# Patient Record
Sex: MALE | Race: WHITE | NOT HISPANIC OR LATINO | Employment: FULL TIME | URBAN - METROPOLITAN AREA
[De-identification: names, ages, dates, MRNs, and addresses within clinical notes are randomized per-mention and may not be internally consistent; named-entity substitution may affect disease eponyms.]

---

## 2017-01-05 ENCOUNTER — OFFICE VISIT (OUTPATIENT)
Dept: PULMONOLOGY | Facility: CLINIC | Age: 52
End: 2017-01-05
Payer: COMMERCIAL

## 2017-01-05 VITALS
HEIGHT: 73 IN | RESPIRATION RATE: 18 BRPM | SYSTOLIC BLOOD PRESSURE: 130 MMHG | DIASTOLIC BLOOD PRESSURE: 68 MMHG | OXYGEN SATURATION: 97 % | WEIGHT: 299.19 LBS | HEART RATE: 61 BPM | BODY MASS INDEX: 39.65 KG/M2

## 2017-01-05 DIAGNOSIS — G47.33 OSA (OBSTRUCTIVE SLEEP APNEA): Primary | ICD-10-CM

## 2017-01-05 DIAGNOSIS — R09.81 NASAL CONGESTION: ICD-10-CM

## 2017-01-05 PROCEDURE — 1159F MED LIST DOCD IN RCRD: CPT | Mod: S$GLB,,, | Performed by: NURSE PRACTITIONER

## 2017-01-05 PROCEDURE — 99214 OFFICE O/P EST MOD 30 MIN: CPT | Mod: S$GLB,,, | Performed by: NURSE PRACTITIONER

## 2017-01-05 PROCEDURE — 99999 PR PBB SHADOW E&M-EST. PATIENT-LVL III: CPT | Mod: PBBFAC,,, | Performed by: NURSE PRACTITIONER

## 2017-01-05 RX ORDER — FLUTICASONE PROPIONATE 50 MCG
2 SPRAY, SUSPENSION (ML) NASAL DAILY
Qty: 1 BOTTLE | Refills: 11 | Status: SHIPPED | OUTPATIENT
Start: 2017-01-05 | End: 2017-11-15 | Stop reason: SDUPTHER
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
## 2017-07-06 ENCOUNTER — PATIENT MESSAGE (OUTPATIENT)
Dept: PULMONOLOGY | Facility: CLINIC | Age: 52
End: 2017-07-06

## 2017-11-15 ENCOUNTER — OFFICE VISIT (OUTPATIENT)
Dept: URGENT CARE | Facility: CLINIC | Age: 52
End: 2017-11-15
Payer: COMMERCIAL

## 2017-11-15 VITALS
OXYGEN SATURATION: 98 % | WEIGHT: 312.5 LBS | DIASTOLIC BLOOD PRESSURE: 88 MMHG | HEIGHT: 73 IN | BODY MASS INDEX: 41.42 KG/M2 | HEART RATE: 84 BPM | TEMPERATURE: 99 F | SYSTOLIC BLOOD PRESSURE: 152 MMHG

## 2017-11-15 DIAGNOSIS — R05.9 COUGH: ICD-10-CM

## 2017-11-15 DIAGNOSIS — R09.81 SINUS CONGESTION: ICD-10-CM

## 2017-11-15 DIAGNOSIS — J32.9 SINUSITIS, UNSPECIFIED CHRONICITY, UNSPECIFIED LOCATION: Primary | ICD-10-CM

## 2017-11-15 DIAGNOSIS — M79.10 MYALGIA: ICD-10-CM

## 2017-11-15 LAB
CTP QC/QA: YES
FLUAV AG NPH QL: NEGATIVE
FLUBV AG NPH QL: NEGATIVE

## 2017-11-15 PROCEDURE — 99999 PR PBB SHADOW E&M-EST. PATIENT-LVL III: CPT | Mod: PBBFAC,,, | Performed by: NURSE PRACTITIONER

## 2017-11-15 PROCEDURE — 87804 INFLUENZA ASSAY W/OPTIC: CPT | Mod: 59,QW,S$GLB, | Performed by: NURSE PRACTITIONER

## 2017-11-15 PROCEDURE — 99214 OFFICE O/P EST MOD 30 MIN: CPT | Mod: 25,S$GLB,, | Performed by: NURSE PRACTITIONER

## 2017-11-15 RX ORDER — FLUTICASONE PROPIONATE 50 MCG
2 SPRAY, SUSPENSION (ML) NASAL DAILY
Qty: 1 BOTTLE | Refills: 11 | Status: SHIPPED | OUTPATIENT
Start: 2017-11-15 | End: 2019-10-14 | Stop reason: SDUPTHER

## 2017-11-15 RX ORDER — LORATADINE 10 MG/1
10 TABLET ORAL DAILY
Qty: 30 TABLET | Refills: 0 | Status: SHIPPED | OUTPATIENT
Start: 2017-11-15 | End: 2018-12-03

## 2017-11-15 RX ORDER — METHYLPREDNISOLONE 4 MG/1
TABLET ORAL
Qty: 1 PACKAGE | Refills: 0 | Status: SHIPPED | OUTPATIENT
Start: 2017-11-15 | End: 2017-12-04 | Stop reason: ALTCHOICE

## 2017-11-15 RX ORDER — AMOXICILLIN AND CLAVULANATE POTASSIUM 875; 125 MG/1; MG/1
1 TABLET, FILM COATED ORAL 2 TIMES DAILY
Qty: 20 TABLET | Refills: 0 | Status: SHIPPED | OUTPATIENT
Start: 2017-11-15 | End: 2017-11-25

## 2017-11-15 RX ORDER — PROMETHAZINE HYDROCHLORIDE AND DEXTROMETHORPHAN HYDROBROMIDE 6.25; 15 MG/5ML; MG/5ML
5 SYRUP ORAL NIGHTLY PRN
Qty: 118 ML | Refills: 0 | Status: SHIPPED | OUTPATIENT
Start: 2017-11-15 | End: 2017-11-25

## 2017-11-15 NOTE — PATIENT INSTRUCTIONS
Sinusitis (Antibiotic Treatment)    The sinuses are air-filled spaces within the bones of the face. They connect to the inside of the nose. Sinusitis is an inflammation of the tissue lining the sinus cavity. Sinus inflammation can occur during a cold. It can also be due to allergies to pollens and other particles in the air. Sinusitis can cause symptoms of sinus congestion and fullness. A sinus infection causes fever, headache and facial pain. There is often green or yellow drainage from the nose or into the back of the throat (post-nasal drip). You have been given antibiotics to treat this condition.  Home care:  · Take the full course of antibiotics as instructed. Do not stop taking them, even if you feel better.  · Drink plenty of water, hot tea, and other liquids. This may help thin mucus. It also may promote sinus drainage.  · Heat may help soothe painful areas of the face. Use a towel soaked in hot water. Or,  the shower and direct the hot spray onto your face. Using a vaporizer along with a menthol rub at night may also help.   · An expectorant containing guaifenesin may help thin the mucus and promote drainage from the sinuses.  · Over-the-counter decongestants may be used unless a similar medicine was prescribed. Nasal sprays work the fastest. Use one that contains phenylephrine or oxymetazoline. First blow the nose gently. Then use the spray. Do not use these medicines more often than directed on the label or symptoms may get worse. You may also use tablets containing pseudoephedrine. Avoid products that combine ingredients, because side effects may be increased. Read labels. You can also ask the pharmacist for help. (NOTE: Persons with high blood pressure should not use decongestants. They can raise blood pressure.)  · Over-the-counter antihistamines may help if allergies contributed to your sinusitis.    · Do not use nasal rinses or irrigation during an acute sinus infection, unless told to by  your health care provider. Rinsing may spread the infection to other sinuses.  · Use acetaminophen or ibuprofen to control pain, unless another pain medicine was prescribed. (If you have chronic liver or kidney disease or ever had a stomach ulcer, talk with your doctor before using these medicines. Aspirin should never be used in anyone under 18 years of age who is ill with a fever. It may cause severe liver damage.)  · Don't smoke. This can worsen symptoms.  Follow-up care  Follow up with your healthcare provider or our staff if you are not improving within the next week.  When to seek medical advice  Call your healthcare provider if any of these occur:  · Facial pain or headache becoming more severe  · Stiff neck  · Unusual drowsiness or confusion  · Swelling of the forehead or eyelids  · Vision problems, including blurred or double vision  · Fever of 100.4ºF (38ºC) or higher, or as directed by your healthcare provider  · Seizure  · Breathing problems  · Symptoms not resolving within 10 days  Date Last Reviewed: 4/13/2015  © 9024-6776 [employer]. [address] [place] [address], [place] [address]. All rights reserved. This information is not intended as a substitute for professional medical care. Always follow your healthcare professional's instructions.

## 2017-11-15 NOTE — PROGRESS NOTES
Subjective:       Patient ID: Dragan Man II is a 52 y.o. male.    Chief Complaint: Cough    Pt is a 52 year old male to clinic today with complaints of a cough. PND, congestion, sinus pressure and HA that began 5-7 days ago.       Cough   This is a new problem. The current episode started 1 to 4 weeks ago. The problem has been gradually worsening. The problem occurs every few minutes. The cough is productive of sputum. Associated symptoms include ear congestion, headaches, myalgias, nasal congestion, postnasal drip and rhinorrhea. Pertinent negatives include no chest pain, chills, ear pain, fever, heartburn, hemoptysis, rash, sore throat, shortness of breath, sweats, weight loss or wheezing. Nothing aggravates the symptoms. Treatments tried: nyquil/dayquil. The treatment provided mild relief. His past medical history is significant for asthma (childhood). There is no history of bronchitis or pneumonia.     Review of Systems   Constitutional: Negative for chills, diaphoresis, fatigue, fever and weight loss.   HENT: Positive for congestion, postnasal drip, rhinorrhea, sinus pain and sinus pressure. Negative for ear discharge, ear pain, sneezing, sore throat and trouble swallowing.    Eyes: Negative for pain.   Respiratory: Positive for cough. Negative for hemoptysis, chest tightness, shortness of breath and wheezing.    Cardiovascular: Negative for chest pain and palpitations.   Gastrointestinal: Negative for abdominal pain, constipation, diarrhea, heartburn, nausea and vomiting.   Genitourinary: Negative for dysuria.   Musculoskeletal: Positive for myalgias. Negative for back pain and neck pain.   Skin: Negative for rash.   Neurological: Positive for headaches. Negative for dizziness, light-headedness and numbness.       Objective:      Physical Exam   Constitutional: He is oriented to person, place, and time. He appears well-developed and well-nourished. No distress.   HENT:   Head: Normocephalic.   Right Ear:  Tympanic membrane, external ear and ear canal normal. No tenderness. Tympanic membrane is not bulging.   Left Ear: Tympanic membrane, external ear and ear canal normal. No tenderness. Tympanic membrane is not bulging.   Nose: Mucosal edema and rhinorrhea present. Right sinus exhibits maxillary sinus tenderness and frontal sinus tenderness. Left sinus exhibits maxillary sinus tenderness and frontal sinus tenderness.   Mouth/Throat: Uvula is midline, oropharynx is clear and moist and mucous membranes are normal. No oropharyngeal exudate, posterior oropharyngeal edema or posterior oropharyngeal erythema. No tonsillar exudate.   Eyes: Conjunctivae and EOM are normal. Pupils are equal, round, and reactive to light.   Neck: Normal range of motion. Neck supple.   Cardiovascular: Normal rate, regular rhythm and normal heart sounds.  Exam reveals no gallop and no friction rub.    No murmur heard.  Pulmonary/Chest: Effort normal and breath sounds normal. No accessory muscle usage. No apnea, no tachypnea and no bradypnea. No respiratory distress. He has no decreased breath sounds. He has no wheezes. He has no rhonchi. He has no rales.   Lymphadenopathy:        Head (right side): No submental, no submandibular and no tonsillar adenopathy present.        Head (left side): No submental, no submandibular and no tonsillar adenopathy present.     He has no cervical adenopathy.   Neurological: He is alert and oriented to person, place, and time.   Skin: Skin is warm and dry. No rash noted. He is not diaphoretic.   Psychiatric: He has a normal mood and affect. His behavior is normal. Thought content normal.   Nursing note and vitals reviewed.      Assessment:       1. Sinusitis, unspecified chronicity, unspecified location    2. Myalgia    3. Cough    4. Sinus congestion        Plan:   Sinusitis, unspecified chronicity, unspecified location  -     methylPREDNISolone (MEDROL DOSEPACK) 4 mg tablet; use as directed  Dispense: 1 Package;  Refill: 0  -     amoxicillin-clavulanate 875-125mg (AUGMENTIN) 875-125 mg per tablet; Take 1 tablet by mouth 2 (two) times daily.  Dispense: 20 tablet; Refill: 0    Myalgia  -     POCT Influenza A/B    Cough  -     promethazine-dextromethorphan (PROMETHAZINE-DM) 6.25-15 mg/5 mL Syrp; Take 5 mLs by mouth nightly as needed.  Dispense: 118 mL; Refill: 0    Sinus congestion  -     loratadine (CLARITIN) 10 mg tablet; Take 1 tablet (10 mg total) by mouth once daily.  Dispense: 30 tablet; Refill: 0  -     fluticasone (FLONASE) 50 mcg/actuation nasal spray; 2 sprays by Each Nare route once daily.  Dispense: 1 Bottle; Refill: 11      · Rest and increase fluids.   · May apply warm compresses as needed.   · Saline nasal spray or saline irrigation (Neti pot) to loosen nasal congestion.  · Flonase or Nasacort to reduce inflammation in the sinus cavities.  · Take antibiotics exactly as prescribed. Make sure to complete the entire course of antibiotics even if you start feeling better. This will prevent recurrence of your infection and bacterial resistance.   · Do not drive, drink alcohol, or take any other sedating medications or substances while taking cough syrup.   · Follow up with your primary care provider or with ENT if not improved within a few days or sooner for any new or worsening symptoms.   · Go to the ER for any fever that does not improve with Tylenol/Ibuprofen, neck stiffness, rash, severe headache, vision changes, shortness of breath, chest pain, severe facial pain or swelling, or for any other new and concerning symptoms.

## 2017-11-17 ENCOUNTER — HOSPITAL ENCOUNTER (OUTPATIENT)
Dept: RADIOLOGY | Facility: HOSPITAL | Age: 52
Discharge: HOME | End: 2017-11-17
Attending: FAMILY MEDICINE
Payer: COMMERCIAL

## 2017-11-17 ENCOUNTER — TELEPHONE (OUTPATIENT)
Dept: INTERNAL MEDICINE | Facility: CLINIC | Age: 52
End: 2017-11-17

## 2017-11-17 ENCOUNTER — OFFICE VISIT (OUTPATIENT)
Dept: INTERNAL MEDICINE | Facility: CLINIC | Age: 52
End: 2017-11-17
Payer: COMMERCIAL

## 2017-11-17 VITALS
WEIGHT: 311.06 LBS | BODY MASS INDEX: 41.22 KG/M2 | TEMPERATURE: 99 F | HEIGHT: 73 IN | DIASTOLIC BLOOD PRESSURE: 66 MMHG | OXYGEN SATURATION: 97 % | HEART RATE: 84 BPM | SYSTOLIC BLOOD PRESSURE: 138 MMHG

## 2017-11-17 DIAGNOSIS — R05.9 COUGH: ICD-10-CM

## 2017-11-17 DIAGNOSIS — E66.01 SEVERE OBESITY (BMI >= 40): Chronic | ICD-10-CM

## 2017-11-17 DIAGNOSIS — R73.09 ABNORMAL GLUCOSE: ICD-10-CM

## 2017-11-17 DIAGNOSIS — J20.9 ACUTE BRONCHITIS, UNSPECIFIED ORGANISM: Primary | ICD-10-CM

## 2017-11-17 PROCEDURE — 71020 XR CHEST PA AND LATERAL: CPT | Mod: TC,PO

## 2017-11-17 PROCEDURE — 71020 XR CHEST PA AND LATERAL: CPT | Mod: 26,,, | Performed by: RADIOLOGY

## 2017-11-17 PROCEDURE — 99999 PR PBB SHADOW E&M-EST. PATIENT-LVL III: CPT | Mod: PBBFAC,,, | Performed by: FAMILY MEDICINE

## 2017-11-17 PROCEDURE — 99214 OFFICE O/P EST MOD 30 MIN: CPT | Mod: 25,S$GLB,, | Performed by: FAMILY MEDICINE

## 2017-11-17 PROCEDURE — 96372 THER/PROPH/DIAG INJ SC/IM: CPT | Mod: S$GLB,,, | Performed by: FAMILY MEDICINE

## 2017-11-17 RX ORDER — METHYLPREDNISOLONE ACETATE 40 MG/ML
40 INJECTION, SUSPENSION INTRA-ARTICULAR; INTRALESIONAL; INTRAMUSCULAR; SOFT TISSUE
Status: COMPLETED | OUTPATIENT
Start: 2017-11-17 | End: 2017-11-17

## 2017-11-17 RX ADMIN — METHYLPREDNISOLONE ACETATE 40 MG: 40 INJECTION, SUSPENSION INTRA-ARTICULAR; INTRALESIONAL; INTRAMUSCULAR; SOFT TISSUE at 12:11

## 2017-11-17 NOTE — TELEPHONE ENCOUNTER
Spoke with patient advised he wasn't prescribed anything else, voices understanding, requested inhaler, advised I would check with doctor voices understanding

## 2017-11-17 NOTE — TELEPHONE ENCOUNTER
----- Message from Cyndi Joyner sent at 11/17/2017  1:20 PM CST -----  Contact: patient  Calling for status of RX for medication. Please call patient ASAP @ [phone]. Thanks, albert

## 2017-11-17 NOTE — ASSESSMENT & PLAN NOTE
--------------------------------------------------------------------------------  PROBLEM/CONDITION: OBESITY  RELEVANT DATA REVIEWED:  Wt Readings from Last 3 Encounters:   11/17/17 (!) 141.1 kg (311 lb 1.1 oz)   11/15/17 (!) 141.7 kg (312 lb 8 oz)   01/05/17 135.7 kg (299 lb 2.6 oz)     BMI Readings from Last 3 Encounters:   11/17/17 41.04 kg/m²   11/15/17 41.23 kg/m²   01/05/17 39.47 kg/m²      Lab Results   Component Value Date    HGBA1C 5.6 08/26/2013    CHOL 193 08/26/2013    TRIG 56 08/26/2013    HDL 49 08/26/2013    LDLCALC 133.0 08/26/2013    AST 20 08/26/2013    ALT 31 08/26/2013

## 2017-11-17 NOTE — LETTER
November 20, 2017      Wil Curiel, TYRONE  [address] [place] [address]           [facility]  [address]  [place] [address]  Phone: [phone]  Fax: [phone]          Patient: Dragan Man II   MR Number: [id number]   YOB: 1965   Date of Visit: 11/17/2017       Dear Wli Curiel:    Thank you for referring Dragan Man to me for evaluation. Attached you will find relevant portions of my assessment and plan of care.    If you have questions, please do not hesitate to call me. I look forward to following Dragan Man along with you.    Sincerely,    DENYS Barba MD    Enclosure  CC:  No Recipients    If you would like to receive this communication electronically, please contact [email or web address] or [phone] to request more information on [employer] Link access.    For providers and/or their staff who would like to refer a patient to [facility], please contact us through our one-stop-shop provider referral line, [facility], at [phone].    If you feel you have received this communication in error or would no longer like to receive these types of communications, please e-mail [email or web address]

## 2017-11-20 NOTE — PROGRESS NOTES
HEALTH MAINTENANCE REVIEW  Health Maintenance   Topic Date Due    Hepatitis C Screening  1965    TETANUS VACCINE  10/16/1981    Influenza Vaccine  08/01/2017    Lipid Panel  08/26/2018    Colonoscopy  05/14/2024        HEALTH MAINTENANCE INTERVENTIONS - DUE OR DUE SOON  Health Maintenance Due   Topic Date Due    Hepatitis C Screening  1965    TETANUS VACCINE  10/16/1981    Influenza Vaccine  08/01/2017       FUTURE APPOINTMENTS  Future Appointments  Date Time Provider Department Center   12/4/2017 9:00 AM DENYS Barba MD [facility]   1/3/2018 1:00 PM Elizabeth Lejeune, NP [facility]       CHIEF COMPLAINT  Cough and Nasal Congestion      HISTORY OF PRESENT ILLNESS  PROBLEM/CONDITION: NEW PROBLEM to me is what appears to be acute bronchitis. ONSET was 7 to 8 days ago. QUALITY described as a moist and congested cough. ASSOCIATED SYMPTOMS include: Christie, nasal stuffiness/congestion, post nasal drip. SEVERITY of symptoms described as MODERATELY SEVERE. ALLEVIATING FACTORS include methylprednisolone prescribed 2 days ago at urgent care. TIMING of symptoms described as worse at night. He reports no ASSOCIATED fever, chest pain, hemoptysis, or shortness of breath. I educated him on the apparently viral nature of this acute illness, how the management was largely supportive and symptom focused. We discussed the risks and benefits of treatment options. I encouraged him to rest and drink ample fluids. I discouraged the use of over-the-counter cough and cold medications. I told him to expect gradual resolution of symptoms over the next week or so, and I instructed him to let me know if his illness failed to follow this expected course.    PROBLEM/CONDITION: NEW/WORSENING problem is SEVERE obesity, with greater than 1 pound per month involuntary weight gain since January of this year. Therapeutic lifestyle changes encouraged.    PROBLEM/CONDITION: NEW PROBLEM identified is abnormal glucose  "(125 mg/dL) on screening fasting labs performed at work. I explained to him my concern for possible diabetes. He agreed to obtain labs ordered and return soon thereafter to review results and discuss treatment options.    NARRATIVE HISTORY: He is delinquent on a number of age-appropriate health maintenance services. I encouraged him to return soon for a preventive services visit and permit us an opportunity to properly address these issues.    No other complaints or concerns reported.    Problem List Items Addressed This Visit     Abnormal glucose    Relevant Orders    Hemoglobin A1c (Completed)    Acute bronchitis - Primary    Relevant Medications    methylPREDNISolone acetate injection 40 mg (Completed)    Class 3 obesity (BMI >= 40) (Chronic)    Current Assessment & Plan     --------------------------------------------------------------------------------  PROBLEM/CONDITION: OBESITY  RELEVANT DATA REVIEWED:  Wt Readings from Last 3 Encounters:   11/17/17 (!) 141.1 kg (311 lb 1.1 oz)   11/15/17 (!) 141.7 kg (312 lb 8 oz)   01/05/17 135.7 kg (299 lb 2.6 oz)     BMI Readings from Last 3 Encounters:   11/17/17 41.04 kg/m²   11/15/17 41.23 kg/m²   01/05/17 39.47 kg/m²      Lab Results   Component Value Date    HGBA1C 5.6 08/26/2013    CHOL 193 08/26/2013    TRIG 56 08/26/2013    HDL 49 08/26/2013    LDLCALC 133.0 08/26/2013    AST 20 08/26/2013    ALT 31 08/26/2013              Other Visit Diagnoses     Cough        Relevant Orders    X-Ray Chest PA And Lateral (Completed)          REVIEW OF SYSTEMS  CONSTITUTIONAL: No fever reported.  CARDIOVASCULAR: No chest pain reported.  PULMONARY: No trouble breathing reported.     PHYSICAL EXAM  Vitals:    11/17/17 1057   BP: 138/66   BP Location: Right arm   Patient Position: Sitting   BP Method: Large (Manual)   Pulse: 84   Temp: 98.8 °F (37.1 °C)   TempSrc: Tympanic   SpO2: 97%   Weight: (!) 141.1 kg (311 lb 1.1 oz)   Height: 6' 1" (1.854 m)     CONSTITUTIONAL: Vital " signs noted. No apparent distress. Does not appear acutely ill or septic. Appears adequately hydrated.  EYES: Pupils equal and reactive. Extraocular movements intact. Sclerae anicteric. Lids and conjunctiva unremarkable.  ENT: External ENT grossly unremarkable. Ear canals and visualized tympanic membranes are unremarkable. Hearing grossly intact. Nasal mucosa pink. Oropharynx moist without lesion, inflammation or exudate. Posterior oropharynx is symmetric.   NECK: Neck supple without meningismus. Trachea midline. No significant cervical lymphadenopathy.  PULM: Lungs clear. Breathing unlabored.  HEART: Auscultation reveals regular rate and rhythm without murmur, gallop or rub. No carotid bruit.  GI: Abdomen soft and nontender. Bowel sounds present.  DERM: Skin warm and moist with normal turgor.  NEURO: Strength is reasonably symmetric without gross focal motor deficits or gross deficits of cranial nerves III-XII.  PSYCH: Alert and oriented x 3. Mood is grossly euthymic. Affect appropriate. Judgment and insight not grossly compromised.  MSK: Grossly normal stance and gait.     PAST MEDICAL HISTORY, FAMILY HISTORY, SOCIAL HISTORY, CURRENT MEDICATION LIST, and ALLERGY LIST reviewed by me (DENYS Barba MD) and are updated consistent with the patient's report.    ASSESSMENT and PLAN  Acute bronchitis, unspecified organism  -     methylPREDNISolone acetate injection 40 mg; Inject 1 mL (40 mg total) into the muscle one time.    Abnormal glucose  -     Hemoglobin A1c; Future; Expected date: 12/01/2017    Class 3 obesity (BMI >= 40)    Cough  -     X-Ray Chest PA And Lateral; Future; Expected date: 11/17/2017        Medication List with Changes/Refills   Current Medications    AMOXICILLIN-CLAVULANATE 875-125MG (AUGMENTIN) 875-125 MG PER TABLET    Take 1 tablet by mouth 2 (two) times daily.    FLUTICASONE (FLONASE) 50 MCG/ACTUATION NASAL SPRAY    2 sprays by Each Nare route once daily.    LORATADINE (CLARITIN)  "10 MG TABLET    Take 1 tablet (10 mg total) by mouth once daily.    METHYLPREDNISOLONE (MEDROL DOSEPACK) 4 MG TABLET    use as directed    PROMETHAZINE-DEXTROMETHORPHAN (PROMETHAZINE-DM) 6.25-15 MG/5 ML SYRP    Take 5 mLs by mouth nightly as needed.    SODIUM CHLORIDE (OCEAN) 0.65 % NASAL SPRAY    1 spray by Nasal route as needed for Congestion.       Return in about 2 weeks (around 12/1/2017) for review test results and discuss treatment plan, re-evaluate problem(s) discussed today.    ABOUT THIS DOCUMENTATION:  · The order of the conditions listed in the HPI is one of convenience and does not necessarily reflect the chronology of the appointment, nor the relative importance of a condition. It is possible that additional description or status details about condition(s) may be found elsewhere in the documentation for today's encounter.  · Documentation entered by me for this encounter was done in part using speech-recognition technology. Although I have made an effort to ensure accuracy, "sound like" errors may exist and should be interpreted in context.                        -DENYS Barba MD    There are no Patient Instructions on file for this visit.    "

## 2017-12-04 ENCOUNTER — HOSPITAL ENCOUNTER (OUTPATIENT)
Dept: RADIOLOGY | Facility: HOSPITAL | Age: 52
Discharge: HOME | End: 2017-12-04
Attending: PHYSICIAN ASSISTANT
Payer: COMMERCIAL

## 2017-12-04 ENCOUNTER — OFFICE VISIT (OUTPATIENT)
Dept: INTERNAL MEDICINE | Facility: CLINIC | Age: 52
End: 2017-12-04
Payer: COMMERCIAL

## 2017-12-04 VITALS
WEIGHT: 315 LBS | DIASTOLIC BLOOD PRESSURE: 88 MMHG | OXYGEN SATURATION: 98 % | BODY MASS INDEX: 41.75 KG/M2 | TEMPERATURE: 98 F | HEART RATE: 80 BPM | HEIGHT: 73 IN | SYSTOLIC BLOOD PRESSURE: 156 MMHG

## 2017-12-04 DIAGNOSIS — J40 BRONCHITIS: Primary | ICD-10-CM

## 2017-12-04 DIAGNOSIS — R01.1 MURMUR: ICD-10-CM

## 2017-12-04 DIAGNOSIS — D22.9 NUMEROUS MOLES: ICD-10-CM

## 2017-12-04 DIAGNOSIS — J40 BRONCHITIS: ICD-10-CM

## 2017-12-04 PROCEDURE — 99213 OFFICE O/P EST LOW 20 MIN: CPT | Mod: 25,S$GLB,, | Performed by: PHYSICIAN ASSISTANT

## 2017-12-04 PROCEDURE — 90686 IIV4 VACC NO PRSV 0.5 ML IM: CPT | Mod: S$GLB,,, | Performed by: FAMILY MEDICINE

## 2017-12-04 PROCEDURE — 71020 XR CHEST PA AND LATERAL: CPT | Mod: TC,PO

## 2017-12-04 PROCEDURE — 99999 PR PBB SHADOW E&M-EST. PATIENT-LVL IV: CPT | Mod: PBBFAC,,, | Performed by: PHYSICIAN ASSISTANT

## 2017-12-04 PROCEDURE — 90471 IMMUNIZATION ADMIN: CPT | Mod: S$GLB,,, | Performed by: FAMILY MEDICINE

## 2017-12-04 PROCEDURE — 71020 XR CHEST PA AND LATERAL: CPT | Mod: 26,,, | Performed by: RADIOLOGY

## 2017-12-04 NOTE — PROGRESS NOTES
Subjective:      Patient ID: Dragan Man II is a 52 y.o. male.    Chief Complaint: Follow-up and Bronchitis    Patient reports significant improvement since onset. Denies any chest pain, shortness of breath, wheezing, or fever.       Cough   This is a new problem. The current episode started 1 to 4 weeks ago. The problem has been gradually improving. Associated symptoms include postnasal drip, a rash and rhinorrhea. Pertinent negatives include no chest pain, chills, ear congestion, ear pain, fever, headaches, heartburn, hemoptysis, myalgias, nasal congestion, sore throat, shortness of breath, sweats, weight loss or wheezing. Treatments tried: mucinex, cough drops, water, flonase, saline rinse, steroids, antibiotics. The treatment provided moderate relief. There is no history of asthma, bronchiectasis, bronchitis, COPD, emphysema, environmental allergies or pneumonia.   Rash   This is a new problem. The current episode started 1 to 4 weeks ago. The problem is unchanged. The affected locations include the face (left temple). The rash is characterized by peeling, scaling and itchiness. Associated symptoms include coughing and rhinorrhea. Pertinent negatives include no congestion, diarrhea, fatigue, fever, shortness of breath, sore throat or vomiting. Past treatments include topical steroids and antibiotic cream. The treatment provided no relief. There is no history of allergies, asthma, eczema or varicella.       Review of Systems   Constitutional: Negative for activity change, appetite change, chills, diaphoresis, fatigue, fever, unexpected weight change and weight loss.   HENT: Positive for postnasal drip and rhinorrhea. Negative for congestion, ear pain, hearing loss, sore throat, trouble swallowing and voice change.    Eyes: Negative.  Negative for visual disturbance.   Respiratory: Positive for cough. Negative for hemoptysis, choking, chest tightness, shortness of breath and wheezing.    Cardiovascular:  "Negative for chest pain, palpitations and leg swelling.   Gastrointestinal: Negative for abdominal distention, abdominal pain, blood in stool, constipation, diarrhea, heartburn, nausea and vomiting.   Endocrine: Negative for cold intolerance, heat intolerance, polydipsia and polyuria.   Genitourinary: Negative.  Negative for difficulty urinating and frequency.   Musculoskeletal: Negative for arthralgias, back pain, gait problem, joint swelling and myalgias.   Skin: Positive for rash. Negative for color change, pallor and wound.   Allergic/Immunologic: Negative for environmental allergies.   Neurological: Negative for dizziness, tremors, weakness, light-headedness, numbness and headaches.   Hematological: Negative for adenopathy.   Psychiatric/Behavioral: Negative for behavioral problems, confusion, self-injury, sleep disturbance and suicidal ideas. The patient is not nervous/anxious.      Objective:   BP (!) 156/88   Pulse 80   Temp 97.7 °F (36.5 °C) (Tympanic)   Ht 6' 1" (1.854 m)   Wt (!) 144.2 kg (317 lb 14.5 oz)   SpO2 98%   BMI 41.94 kg/m²     Physical Exam   Constitutional: He is oriented to person, place, and time. He appears well-developed and well-nourished.   HENT:   Head: Normocephalic and atraumatic.       Right Ear: Tympanic membrane, external ear and ear canal normal.   Left Ear: Tympanic membrane, external ear and ear canal normal.   Nose: Mucosal edema and rhinorrhea present.   Mouth/Throat: Uvula is midline, oropharynx is clear and moist and mucous membranes are normal. No oropharyngeal exudate. No tonsillar exudate.   Eyes: Conjunctivae and EOM are normal. Pupils are equal, round, and reactive to light.   Neck: Normal range of motion. Neck supple.   Cardiovascular: Normal rate and regular rhythm.  Exam reveals no gallop and no friction rub.    Murmur heard.  Pulmonary/Chest: Effort normal and breath sounds normal. No respiratory distress. He has no wheezes. He has no rales. He exhibits no " tenderness.   Abdominal: Soft. He exhibits no distension. There is no tenderness.   Musculoskeletal: Normal range of motion.   Lymphadenopathy:     He has no cervical adenopathy.   Neurological: He is alert and oriented to person, place, and time.   Skin: Skin is warm and dry.   Psychiatric: He has a normal mood and affect. His behavior is normal. Judgment and thought content normal.   Vitals reviewed.    CXR 11/17/2017 results:   Findings: There is mild cardiomegaly.  Mild interstitial opacities in the lungs bilaterally and mild bibasilar parenchymal scarring or atelectasis.  No confluent infiltrate or effusion.  Multilevel thoracic spondylosis.    Assessment:     1. Bronchitis    2. Numerous moles    3. Murmur      Plan:   Bronchitis  -     X-Ray Chest PA And Lateral; Future; Expected date: 12/04/2017  -increase fluids. Cough drops as needed.     Numerous moles  -     Ambulatory referral to Dermatology    Murmur  -murmur auscultated today. Asymptomatic.  Advised pt that if he starts to notice any lightheadedness, chest pain, shortness of breath, he needs to RTC.   -follow up with PCP     Other orders  -     Influenza - Quadrivalent (3 years & older) (PF)    Return if symptoms worsen or fail to improve.

## 2018-01-03 ENCOUNTER — OFFICE VISIT (OUTPATIENT)
Dept: SLEEP MEDICINE | Facility: CLINIC | Age: 53
End: 2018-01-03
Payer: COMMERCIAL

## 2018-01-03 VITALS
HEART RATE: 82 BPM | RESPIRATION RATE: 18 BRPM | OXYGEN SATURATION: 98 % | WEIGHT: 315 LBS | BODY MASS INDEX: 41.75 KG/M2 | SYSTOLIC BLOOD PRESSURE: 160 MMHG | DIASTOLIC BLOOD PRESSURE: 80 MMHG | HEIGHT: 73 IN

## 2018-01-03 DIAGNOSIS — E66.01 MORBID OBESITY WITH BMI OF 40.0-44.9, ADULT: ICD-10-CM

## 2018-01-03 DIAGNOSIS — G47.33 OSA (OBSTRUCTIVE SLEEP APNEA): Primary | ICD-10-CM

## 2018-01-03 PROCEDURE — 99999 PR PBB SHADOW E&M-EST. PATIENT-LVL III: CPT | Mod: PBBFAC,,, | Performed by: NURSE PRACTITIONER

## 2018-01-03 PROCEDURE — 99213 OFFICE O/P EST LOW 20 MIN: CPT | Mod: S$GLB,,, | Performed by: NURSE PRACTITIONER

## 2018-01-03 NOTE — LETTER
January 3, 2018                   [facility]  [address] [place] [address]  Phone: [phone] January 3, 2018     Patient: Dragan Man    YOB: 1965   Date of Visit: 1/3/2018       To Whom It May Concern:    Dragan Man  has obstructive sleep apnea on CPAP. He is compliant with CPAP. CPAP 14cm H2O which is optimal, as it reduced the rate of to A + H Index = 1.9 events / hr asleep. Review attached CPAP download.  If you have any questions or concerns, please don't hesitate to call.    Sincerely,          Elizabeth Lejeune, NP

## 2018-01-03 NOTE — PROGRESS NOTES
"Subjective:      Patient ID: Dragan Man II is a 52 y.o. male.    Chief Complaint: Sleep Apnea    Presents to office for review of CPAP therapy. Patient states improved symptoms with use of CPAP. Sleeping more soundly. Waking up feeling more refreshed. Improved daytime sleepiness. Patient states he is benefiting from use of the CPAP. He is requested updated machine. He is slowly but steadily gaining weight. 20lb. Due to excess calories.     Patient Active Problem List:     LAVERNE (obstructive sleep apnea)     Special screening for malignant neoplasms, colon     Advanced sleep phase syndrome     Behaviorally induced insufficient sleep syndrome     Sleep-related bruxism     Inadequate sleep hygiene     Abnormal glucose     Class 3 obesity (BMI >= 40)     Acute bronchitis            BP (!) 160/80   Pulse 82   Resp 18   Ht 6' 1" (1.854 m)   Wt (!) 146.4 kg (322 lb 12.1 oz)   SpO2 98%   BMI 42.58 kg/m²   Body mass index is 42.58 kg/m².    Review of Systems   Constitutional: Positive for weight gain.   HENT: Negative.    Respiratory: Negative.    Cardiovascular: Negative.    Musculoskeletal: Negative.    Gastrointestinal: Negative.    Neurological: Negative.    Psychiatric/Behavioral: Negative.      Objective:      Physical Exam   Constitutional: He is oriented to person, place, and time. He appears well-developed and well-nourished.   Obese   HENT:   Head: Normocephalic and atraumatic.   Neck: Normal range of motion. Neck supple.   Neurological: He is alert and oriented to person, place, and time.   Skin: Skin is warm and dry.   Psychiatric: He has a normal mood and affect.     Personal Diagnostic Review  CPAP download shows patient wears on average 8 hrs and 20 minutes. Greater than 4 hrs 100 % of the time.    Assessment:       1. LAVERNE (obstructive sleep apnea)    2. Morbid obesity with BMI of 40.0-44.9, adult        Outpatient Encounter Prescriptions as of 1/3/2018   Medication Sig Dispense Refill    " fluticasone (FLONASE) 50 mcg/actuation nasal spray 2 sprays by Each Nare route once daily. 1 Bottle 11    sodium chloride (OCEAN) 0.65 % nasal spray 1 spray by Nasal route as needed for Congestion.      loratadine (CLARITIN) 10 mg tablet Take 1 tablet (10 mg total) by mouth once daily. 30 tablet 0     No facility-administered encounter medications on file as of 1/3/2018.      Orders Placed This Encounter   Procedures    CPAP/BIPAP SUPPLIES     Order Specific Question:   Type of mask:     Answer:   FFM     Order Specific Question:   Headgear?     Answer:   Yes     Order Specific Question:   Tubing?     Answer:   Yes     Order Specific Question:   Humidifier chamber?     Answer:   Yes     Order Specific Question:   Chin strap?     Answer:   Yes     Order Specific Question:   Filters?     Answer:   Yes     Order Specific Question:   Length of need (1-99 months):     Answer:   99    CPAP FOR HOME USE     Order Specific Question:   Type:     Answer:   Auto CPAP     Order Specific Question:   Auto CPAP pressure setting range (cmH20):     Answer:   12-16     Order Specific Question:   Length of need (1-99 months):     Answer:   99     Order Specific Question:   Humidification:     Answer:   Heated     Order Specific Question:   Type of mask:     Answer:   FFM     Order Specific Question:   Headgear?     Answer:   Yes     Order Specific Question:   Tubing?     Answer:   Yes     Order Specific Question:   Humidifier chamber?     Answer:   Yes     Order Specific Question:   Chin strap?     Answer:   Yes     Order Specific Question:   Filters?     Answer:   Yes     Plan:      Weight loss and exercise to improve overall health.  AutoPAP 12-16 cm H2O replacement and follow up in 8 weeks with download of data card and review of symptoms.    Letter for DOT    Total time spent in face to face counseling and coordination of care -  15 minutes over 50% of time was used in discussion of prognosis, risks, benefits of treatment,  instructions and compliance with regimen . Coordination with DME

## 2018-01-22 ENCOUNTER — INITIAL CONSULT (OUTPATIENT)
Dept: DERMATOLOGY | Facility: CLINIC | Age: 53
End: 2018-01-22
Payer: COMMERCIAL

## 2018-01-22 ENCOUNTER — HOSPITAL ENCOUNTER (OUTPATIENT)
Dept: RADIOLOGY | Facility: HOSPITAL | Age: 53
Discharge: HOME | End: 2018-01-22
Attending: PHYSICIAN ASSISTANT
Payer: COMMERCIAL

## 2018-01-22 ENCOUNTER — OFFICE VISIT (OUTPATIENT)
Dept: ORTHOPEDICS | Facility: CLINIC | Age: 53
End: 2018-01-22
Payer: COMMERCIAL

## 2018-01-22 VITALS
HEART RATE: 83 BPM | DIASTOLIC BLOOD PRESSURE: 78 MMHG | RESPIRATION RATE: 12 BRPM | HEIGHT: 73 IN | WEIGHT: 314.38 LBS | SYSTOLIC BLOOD PRESSURE: 156 MMHG | BODY MASS INDEX: 41.67 KG/M2

## 2018-01-22 DIAGNOSIS — M67.431 GANGLION CYST OF DORSUM OF RIGHT WRIST: ICD-10-CM

## 2018-01-22 DIAGNOSIS — L57.0 ACTINIC KERATOSES: ICD-10-CM

## 2018-01-22 DIAGNOSIS — L73.9 FOLLICULITIS: Primary | ICD-10-CM
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
## 2018-01-22 DIAGNOSIS — M25.531 RIGHT WRIST PAIN: Primary | ICD-10-CM

## 2018-01-22 DIAGNOSIS — L08.9: Primary | ICD-10-CM

## 2018-01-22 DIAGNOSIS — S60.861A: Primary | ICD-10-CM

## 2018-01-22 DIAGNOSIS — M25.531 RIGHT WRIST PAIN: ICD-10-CM

## 2018-01-22 PROCEDURE — 87070 CULTURE OTHR SPECIMN AEROBIC: CPT

## 2018-01-22 PROCEDURE — 87077 CULTURE AEROBIC IDENTIFY: CPT

## 2018-01-22 PROCEDURE — 17000 DESTRUCT PREMALG LESION: CPT | Mod: S$GLB,,, | Performed by: DERMATOLOGY

## 2018-01-22 PROCEDURE — 73110 X-RAY EXAM OF WRIST: CPT | Mod: 26,RT,, | Performed by: RADIOLOGY

## 2018-01-22 PROCEDURE — 87186 SC STD MICRODIL/AGAR DIL: CPT

## 2018-01-22 PROCEDURE — 99203 OFFICE O/P NEW LOW 30 MIN: CPT | Mod: 25,S$GLB,, | Performed by: DERMATOLOGY

## 2018-01-22 PROCEDURE — 99999 PR PBB SHADOW E&M-EST. PATIENT-LVL III: CPT | Mod: PBBFAC,,, | Performed by: PHYSICIAN ASSISTANT

## 2018-01-22 PROCEDURE — 17003 DESTRUCT PREMALG LES 2-14: CPT | Mod: S$GLB,,, | Performed by: DERMATOLOGY

## 2018-01-22 PROCEDURE — 99999 PR PBB SHADOW E&M-EST. PATIENT-LVL III: CPT | Mod: PBBFAC,,, | Performed by: DERMATOLOGY

## 2018-01-22 PROCEDURE — 73110 X-RAY EXAM OF WRIST: CPT | Mod: TC,FY,PO,RT

## 2018-01-22 PROCEDURE — 99203 OFFICE O/P NEW LOW 30 MIN: CPT | Mod: S$GLB,,, | Performed by: PHYSICIAN ASSISTANT

## 2018-01-22 RX ORDER — RIFAMPIN 300 MG/1
300 CAPSULE ORAL EVERY 12 HOURS
Qty: 28 CAPSULE | Refills: 0 | Status: SHIPPED | OUTPATIENT
Start: 2018-01-22 | End: 2018-06-17

## 2018-01-22 RX ORDER — CLINDAMYCIN PHOSPHATE 10 MG/ML
SOLUTION TOPICAL 2 TIMES DAILY
Qty: 60 EACH | Refills: 3 | Status: SHIPPED | OUTPATIENT
Start: 2018-01-22 | End: 2018-06-17

## 2018-01-22 RX ORDER — SULFAMETHOXAZOLE AND TRIMETHOPRIM 800; 160 MG/1; MG/1
1 TABLET ORAL 2 TIMES DAILY
Qty: 28 TABLET | Refills: 0 | Status: SHIPPED | OUTPATIENT
Start: 2018-01-22 | End: 2018-06-17

## 2018-01-22 RX ORDER — DOXYCYCLINE 100 MG/1
CAPSULE ORAL
Qty: 20 CAPSULE | Refills: 0 | Status: SHIPPED | OUTPATIENT
Start: 2018-01-22 | End: 2018-01-22

## 2018-01-22 NOTE — LETTER
January 22, 2018      Yuni Art PA-C  [address]  [place] [address]           [facility]  [address] [place] [address]  Phone: [phone]  Fax: [phone]          Patient: Dragan Man II   MR Number: [id number]   YOB: 1965   Date of Visit: 1/22/2018       Dear Yuni Art:    Thank you for referring Dragan Man to me for evaluation. Attached you will find relevant portions of my assessment and plan of care.    If you have questions, please do not hesitate to call me. I look forward to following Dragan Man along with you.    Sincerely,    Skye Aguirre MD    Enclosure  CC:  No Recipients    If you would like to receive this communication electronically, please contact [email or web address] or [phone] to request more information on [employer] Link access.    For providers and/or their staff who would like to refer a patient to [facility], please contact us through our one-stop-shop provider referral line, [facility], at [phone].    If you feel you have received this communication in error or would no longer like to receive these types of communications, please e-mail [email or web address]

## 2018-01-22 NOTE — PROGRESS NOTES
Subjective:       Patient ID:  Dragan Man II is a 52 y.o. male who presents for   Chief Complaint   Patient presents with    Insect Bite     c/o insect bite x 2 weeks tx boil ease, triple antibiotic oint    Mass     c/o bumps on posterior scalp x 2 weeks tx. triple antibiotic oint, witch hazel, seabreeze    Spot     c/o dry flaky spot on left temple x 1 year tx triple antibiotic     History of Present Illness: The patient presents with chief complaint of spot.  Location: left temple  Duration: 1 year  Signs/Symptoms: dry, flaky    Prior treatments: otc triple antibiotic cream    Pt denies personal and family history of skin cancer    History of Present Illness: The patient presents with chief complaint of lesion.  Location: right wrist  Duration: 3 weeks, improving  Signs/Symptoms: painful, swollen, yellowish drainage    Prior treatments: boil ease and triple antibiotic oint            Review of Systems   Constitutional: Negative for fever and chills.   Gastrointestinal: Negative for nausea and vomiting.   Skin: Negative for daily sunscreen use, activity-related sunscreen use and recent sunburn.   Hematologic/Lymphatic: Does not bruise/bleed easily.        Objective:    Physical Exam   Constitutional: He appears well-developed and well-nourished. No distress.   Neurological: He is alert and oriented to person, place, and time. He is not disoriented.   Psychiatric: He has a normal mood and affect.   Skin:   Areas Examined (abnormalities noted in diagram):   Scalp / Hair Palpated and Inspected  Head / Face Inspection Performed  Neck Inspection Performed  Chest / Axilla Inspection Performed  Abdomen Inspection Performed  Back Inspection Performed  RUE Inspected  LUE Inspection Performed  RLE Inspected  LLE Inspection Performed  Nails and Digits Inspection Performed                          Diagram Legend     Erythematous scaling macule/papule c/w actinic keratosis     Assessment / Plan:         Folliculitis  -     doxycycline (MONODOX) 100 MG capsule; Take twice daily with food for 10 days.  May cause upset stomach.  Dispense: 20 capsule; Refill: 0  -     clindamycin (CLEOCIN T) 1 % Swab; Apply topically 2 (two) times daily.  Dispense: 60 each; Refill: 3  -     Recommend d/c picking areas of scalp, will start above meds.    Ganglion cyst of dorsum of right wrist  -     Ambulatory Referral to Orthopedics  -     S/p infection per patient.  Currently resolved, will refer to ortho for management options    Actinic keratoses  Cryosurgery Procedure Note    The patient is informed of the precancerous quality and need for treatment of these lesions. After risks, benefits and alternatives explained, including blistering, pain, hyper- and hypopigmentation, patient verbally consents to cryotherapy to precancerous lesions. Liquid nitrogen cryosurgery is applied to the 13 actinic keratoses, as detailed in the physical exam, to produce a freeze injury. The patient is aware that blisters may form and is instructed on wound care with gentle cleansing and use of vaseline ointment to keep moist until healed. The patient is supplied a handout on cryosurgery and is instructed to call if lesions do not completely resolve.             Follow-up in about 6 months (around 7/22/2018).

## 2018-01-22 NOTE — LETTER
January 23, 2018      Skye Aguirre MD  [address] [place] [place] [address]           [facility]  [address] [place] [address]  Phone: [phone]  Fax: [phone]          Patient: Dragan Man II   MR Number: [id number]   YOB: 1965   Date of Visit: 1/22/2018       Dear Dr. Skye Aguirre:    Thank you for referring Dragan Man to me for evaluation. Attached you will find relevant portions of my assessment and plan of care.    If you have questions, please do not hesitate to call me. I look forward to following Dragan Man along with you.    Sincerely,    Sharon Cheung PA-C    Enclosure  CC:  No Recipients    If you would like to receive this communication electronically, please contact [email or web address] or [phone] to request more information on [employer] Link access.    For providers and/or their staff who would like to refer a patient to [facility], please contact us through our one-stop-shop provider referral line, [facility], at [phone].    If you feel you have received this communication in error or would no longer like to receive these types of communications, please e-mail [email or web address]

## 2018-01-23 NOTE — PROGRESS NOTES
Subjective:      Patient ID: Dragan Man II is a 52 y.o. male.    Chief Complaint: Swelling of the Right Wrist      HPI: Dragan Man II  is a 52 y.o. male who c/o Swelling of the Right Wrist   for duration of 3 weeks.  He tells me that he noticed a spider on his wrist dorsally was worried it might be a spider bite.  He was seen by someone in the dermatology department.  They had some concern that this may be a ganglion cyst and have referred him to me for further evaluation.  He was given a prescription earlier today of doxycycline, but has not yet picked it up.  Pain is 0 out of 10 in severity.  However, he had aching pain as of last week.  Alleviating factors include.  Aggravating factors include trying to squeeze on the area of swelling.  He denies fevers.  He tells me that the area of swelling is much improved from last week or so.  He also tells me the erythema has improved dramatically as well.    Review of Systems   Constitution: Negative for fever.   Cardiovascular: Negative for chest pain.   Respiratory: Negative for cough and shortness of breath.    Skin: Positive for color change (erythema right wrist dorsally). Negative for rash.   Musculoskeletal: Positive for joint pain and joint swelling. Negative for stiffness.   Gastrointestinal: Negative for heartburn.   Neurological: Negative for headaches and numbness.         Objective:        General    Nursing note and vitals reviewed.  Constitutional: He is oriented to person, place, and time. He appears well-developed and well-nourished.   HENT:   Head: Normocephalic and atraumatic.   Eyes: EOM are normal.   Cardiovascular: Normal rate and regular rhythm.    Pulmonary/Chest: Effort normal.   Abdominal: Soft.   Neurological: He is alert and oriented to person, place, and time.   Psychiatric: He has a normal mood and affect. His behavior is normal.             Right Hand/Wrist Exam     Inspection   Scars: Wrist - absent   Effusion: Wrist - absent    Bruising: Wrist - absent   Deformity: Wrist - deformity (swelling and induration dorsally)     Tenderness   The patient is tender to palpation of the dorsal area.    Tests     Atrophy   Thenar:  negative  Hypothenar:  negative  Intrinsic:  negative  1st Dorsal Interosseous: negative    Other     Neuorologic Exam    Median Distribution: normal  Ulnar Distribution: normal  Radial Distribution: normal    Comments:  2+ radial pulse.  He has an area of induration on the right dorsal wrist.  It measures 2 cm x 1.75 cm.  He has associated erythema in the skin area surrounding the induration.  He is no purulent drainage.  He does have slight tenderness to palpation.  There is no fluid fluctuance.  He has some mild skin maceration centrally.  There is no associated necrotic tissue.  I have included a picture of the area of concern within his chart under the media tab.          Muscle Strength   Right Upper Extremity   Wrist Extension: 5/5/5   Wrist Flexion: 5/5/5   : 5/5/5   Pinch Mechanism: 5/5  Left Upper Extremity  Wrist Extension: 5/5/5   Wrist Flexion: 5/5/5   :  5/5/5   Pinch Mechanism: 5/5    Vascular Exam       Capillary Refill  Right Hand: normal capillary refill            Xray:   Right wrist from 1/22/18 images and report were reviewed today.  I agree with the radiologist's interpretation.  Minimal degenerative changes noted within the wrist and visualized portion of the hand.  Mild diffuse soft tissue swelling without evidence of acute or healing fracture.  No radiopaque foreign body or soft tissue calcification.  No focal erosion or periosteal reaction.  Slight increase prominence degenerative change at the first CMC joint.    Assessment:       Encounter Diagnosis   Name Primary?    Nonvenomous insect bite of right wrist with infection, initial encounter Yes          Plan:       Dragan was seen today for swelling.    Diagnoses and all orders for this visit:    Nonvenomous insect bite of right wrist  with infection, initial encounter  -     sulfamethoxazole-trimethoprim 800-160mg (BACTRIM DS) 800-160 mg Tab; Take 1 tablet by mouth 2 (two) times daily.  -     rifAMpin (RIFADIN) 300 MG capsule; Take 1 capsule (300 mg total) by mouth every 12 (twelve) hours.    Mr. Man him's in today for new problem of the right wrist.  I do not think this is a ganglion cyst.  I think this is a developing abscess in the right wrist dorsally.  I recommend oral antibiotics in the form of Bactrim and rifampin.  I have sent the prescriptions to his pharmacy.  I have instructed him that rifampin is known to cause bodily secretions to turn bright Orange or red.  Should he develop any fevers, increasing pain, increasing erythema, or purulent drainage, he should notify the office immediately.  Should occur after hours, I would recommend he go to the emergency department.  If he does not improve on oral antibiotics, we may need to consider I&D either in the operating room or doing a minor procedure here in the office.  I will see him back in the office in 1 week.  I'll follow him closely.  I discussed my assessment and plan with Dr. Read who agreed.  However, Dr. Read has recommended should the patient need an I&D, I would need to contact the physician on call for the department that day.  Patient verbalizes understanding and agrees.    Follow-up in about 1 week (around 1/29/2018).          The patient understands, chooses and consents to this plan and accepts all   the risks which include but are not limited to the risks mentioned above.     Disclaimer: This note was prepared using a voice recognition system and is likely to have sound alike errors within the text.

## 2018-01-25 LAB — BACTERIA SPEC AEROBE CULT: NORMAL

## 2018-01-29 ENCOUNTER — TELEPHONE (OUTPATIENT)
Dept: ORTHOPEDICS | Facility: CLINIC | Age: 53
End: 2018-01-29

## 2018-03-05 ENCOUNTER — OFFICE VISIT (OUTPATIENT)
Dept: PULMONOLOGY | Facility: CLINIC | Age: 53
End: 2018-03-05
Payer: COMMERCIAL

## 2018-03-05 VITALS
SYSTOLIC BLOOD PRESSURE: 138 MMHG | OXYGEN SATURATION: 97 % | WEIGHT: 313.25 LBS | RESPIRATION RATE: 18 BRPM | DIASTOLIC BLOOD PRESSURE: 82 MMHG | HEART RATE: 82 BPM | HEIGHT: 73 IN | BODY MASS INDEX: 41.52 KG/M2

## 2018-03-05 DIAGNOSIS — E66.01 MORBID OBESITY WITH BMI OF 40.0-44.9, ADULT: ICD-10-CM

## 2018-03-05 DIAGNOSIS — G47.33 OSA (OBSTRUCTIVE SLEEP APNEA): Primary | ICD-10-CM

## 2018-03-05 PROCEDURE — 99213 OFFICE O/P EST LOW 20 MIN: CPT | Mod: S$GLB,,, | Performed by: NURSE PRACTITIONER

## 2018-03-05 PROCEDURE — 99999 PR PBB SHADOW E&M-EST. PATIENT-LVL IV: CPT | Mod: PBBFAC,,, | Performed by: NURSE PRACTITIONER

## 2018-03-05 NOTE — PROGRESS NOTES
"Subjective:      Patient ID: Dragan Man II is a 52 y.o. male.    Chief Complaint: Sleep Apnea    Presents to office for review of AutoPAP therapy. Recent replacement. Patient states improved symptoms with use of AutoPAP. Sleeping more soundly. Waking up feeling more refreshed. Improved daytime sleepiness. Patient states he is benefiting from use of the AutoPAP.     Patient Active Problem List:     LAVERNE (obstructive sleep apnea)     Special screening for malignant neoplasms, colon     Advanced sleep phase syndrome     Behaviorally induced insufficient sleep syndrome     Sleep-related bruxism     Inadequate sleep hygiene     Abnormal glucose     Class 3 obesity (BMI >= 40)     Acute bronchitis              /82   Pulse 82   Resp 18   Ht 6' 1" (1.854 m)   Wt (!) 142.1 kg (313 lb 4.4 oz)   SpO2 97%   BMI 41.33 kg/m²   Body mass index is 41.33 kg/m².    Review of Systems   Constitutional: Negative.    HENT: Negative.    Respiratory: Negative.    Cardiovascular: Negative.    Musculoskeletal: Negative.    Gastrointestinal: Negative.    Neurological: Negative.    Psychiatric/Behavioral: Negative.      Objective:      Physical Exam   Constitutional: He is oriented to person, place, and time. He appears well-developed and well-nourished.   HENT:   Head: Normocephalic and atraumatic.   Neck: Normal range of motion. Neck supple.   Neurological: He is alert and oriented to person, place, and time.   Skin: Skin is warm and dry.   Psychiatric: He has a normal mood and affect.     Personal Diagnostic Review    Autopap download: Patient wears on average 8 hrs and 46 minutes. Greater than 4 hrs 100 % of the time. 90% percentile pressure 16 with AHI 2.7 events/hr      Assessment:       1. LAVERNE (obstructive sleep apnea)    2. Morbid obesity with BMI of 40.0-44.9, adult        Outpatient Encounter Prescriptions as of 3/5/2018   Medication Sig Dispense Refill    clindamycin (CLEOCIN T) 1 % Swab Apply topically 2 (two) " times daily. 60 each 3    fluticasone (FLONASE) 50 mcg/actuation nasal spray 2 sprays by Each Nare route once daily. 1 Bottle 11    loratadine (CLARITIN) 10 mg tablet Take 1 tablet (10 mg total) by mouth once daily. 30 tablet 0    rifAMpin (RIFADIN) 300 MG capsule Take 1 capsule (300 mg total) by mouth every 12 (twelve) hours. 28 capsule 0    sodium chloride (OCEAN) 0.65 % nasal spray 1 spray by Nasal route as needed for Congestion.      sulfamethoxazole-trimethoprim 800-160mg (BACTRIM DS) 800-160 mg Tab Take 1 tablet by mouth 2 (two) times daily. 28 tablet 0     No facility-administered encounter medications on file as of 3/5/2018.      No orders of the defined types were placed in this encounter.    Plan:   Weight loss and exercise to improve overall health.  Doing well on PAP settings. Patient is compliant. Follow up in 12 months with PAP data download or call earlier if any problems.

## 2018-06-17 ENCOUNTER — HOSPITAL ENCOUNTER (OUTPATIENT)
Dept: RADIOLOGY | Facility: HOSPITAL | Age: 53
Discharge: HOME | End: 2018-06-17
Attending: NURSE PRACTITIONER
Payer: COMMERCIAL

## 2018-06-17 ENCOUNTER — OFFICE VISIT (OUTPATIENT)
Dept: URGENT CARE | Facility: CLINIC | Age: 53
End: 2018-06-17
Payer: COMMERCIAL

## 2018-06-17 VITALS
OXYGEN SATURATION: 98 % | HEART RATE: 78 BPM | SYSTOLIC BLOOD PRESSURE: 154 MMHG | RESPIRATION RATE: 18 BRPM | WEIGHT: 309.06 LBS | TEMPERATURE: 98 F | DIASTOLIC BLOOD PRESSURE: 70 MMHG | HEIGHT: 73 IN | BODY MASS INDEX: 40.96 KG/M2
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
## 2018-06-17 DIAGNOSIS — M79.671 RIGHT FOOT PAIN: ICD-10-CM

## 2018-06-17 DIAGNOSIS — M25.571 ACUTE RIGHT ANKLE PAIN: ICD-10-CM

## 2018-06-17 DIAGNOSIS — M25.571 ACUTE RIGHT ANKLE PAIN: Primary | ICD-10-CM

## 2018-06-17 DIAGNOSIS — E66.01 SEVERE OBESITY (BMI >= 40): ICD-10-CM

## 2018-06-17 PROCEDURE — 73630 X-RAY EXAM OF FOOT: CPT | Mod: 26,RT,, | Performed by: RADIOLOGY

## 2018-06-17 PROCEDURE — 73610 X-RAY EXAM OF ANKLE: CPT | Mod: TC,FY,PO,RT

## 2018-06-17 PROCEDURE — 99214 OFFICE O/P EST MOD 30 MIN: CPT | Mod: S$GLB,,, | Performed by: NURSE PRACTITIONER

## 2018-06-17 PROCEDURE — 73630 X-RAY EXAM OF FOOT: CPT | Mod: TC,FY,PO,RT

## 2018-06-17 PROCEDURE — 73610 X-RAY EXAM OF ANKLE: CPT | Mod: 26,RT,, | Performed by: RADIOLOGY

## 2018-06-17 PROCEDURE — 3008F BODY MASS INDEX DOCD: CPT | Mod: CPTII,S$GLB,, | Performed by: NURSE PRACTITIONER

## 2018-06-17 PROCEDURE — 99999 PR PBB SHADOW E&M-EST. PATIENT-LVL IV: CPT | Mod: PBBFAC,,, | Performed by: NURSE PRACTITIONER

## 2018-06-17 RX ORDER — TRAMADOL HYDROCHLORIDE 50 MG/1
50 TABLET ORAL EVERY 6 HOURS PRN
Qty: 10 TABLET | Refills: 0 | Status: SHIPPED | OUTPATIENT
Start: 2018-06-17 | End: 2018-12-03 | Stop reason: ALTCHOICE

## 2018-06-17 NOTE — PATIENT INSTRUCTIONS
PLAN:  X-ray right foot and ankle stat  Consult Orthopedics  Advise increase p.o. fluids--water/juice & rest  Med's:  Ultram/no refills  Advise of boot / requested  Advised to elevate ankle compresses  Tylenol or Ibuprofen for fever, headache and body aches.  Advised no heat  Advise follow up with PCP  Advise go to ER if nausea, vomiting, fever, increased pain, or fail to improve with treatment.  AVS provided and reviewed with patient including supportive care, follow up, and red flag symptoms.   Patient verbalizes understanding and agrees with treatment plan. Discharged from Urgent Care in stable condition.

## 2018-06-17 NOTE — PROGRESS NOTES
CHIEF COMPLAINT/REASON FOR VISIT:  Right ankle and foot pain    HISTORY OF PRESENT ILLNESS:  52-year-old male complains of slipping and falling, twisted right ankle and foot onset 1 week ago.  Admits tried over-the-counter medications and ice with some relief.  Admits used warm soaks to right ankle and foot with no relief.  Patient admits slipped, twisted right ankle & foot onset yesterday re-injuring right ankle.  Patient concerned of possible fracture.  Discuss further evaluation with x-rays.  Patient agrees with plan of therapy.  Discussed Ace wrap vs a boot.  Patient requesting fracture boot.   Patient denies LOC, dizziness, chest pain, shortness of breath, nausea, vomiting, diarrhea, body aches, congestion, fever, cough, back pain and urinary discomfort.  Denies seek emergency room treatment.      Past Medical History:   Diagnosis Date    Depression     LAVERNE (obstructive sleep apnea)           Social History     Social History    Marital status:      Spouse name: N/A    Number of children: N/A    Years of education: N/A     Occupational History    Not on file.     Social History Main Topics    Smoking status: Never Smoker    Smokeless tobacco: Former User      Comment: quit 15 years ago     Alcohol use Yes      Comment: socially // beer    Drug use: No    Sexual activity: Yes     Partners: Female     Other Topics Concern    Not on file     Social History Narrative    No narrative on file          Family History   Problem Relation Age of Onset    Diabetes Father     Diabetes Paternal Grandfather        ROS:  GENERAL:  Slipped and twisted right foot and ankle.  SKIN: No rashes, itching or changes in color or texture of skin.  HEENT: No headaches or recent head trauma. Denies ear pain, discharge or vertigo. No loss of smell, no epistaxis or postnasal drip. No hoarseness or change in voice.   NODES: No masses or lesions. Denies swollen glands.  CHEST: Denies cyanosis, wheezing, cough and  sputum production.  CARDIOVASCULAR: Denies chest pain, PND, orthopnea or reduced exercise tolerance.  MUSCULOSKELETAL:  Right ankle and foot pain  NEUROLOGIC: No history of seizures, paralysis, alteration of gait or coordination.  PSYCHIATRIC: Denies mood swings, depression or suicidal thoughts.    PE:   APPEARANCE: Well nourished, well developed, in mild distress.   V/S: Reviewed.  SKIN: Normal skin turgor, no lesions..  CHEST:  No respiratory symptoms  CARDIOVASCULAR: Regular rate and rhythm   MUSCULOSKELETAL:  Right foot and ankle with limited range of motion due to pain, right lateral ankle with tenderness to touch, minimal soft tissue swelling, pulses palpable, no redness, no ecchymosis.  NEUROLOGIC: No sensory deficits. Gait & Posture:  Ambulates with a slight limp. No cerebellar signs.  MENTAL STATUS: Patient alert, oriented x 3 & conversant.    PLAN:  X-ray right foot and ankle stat  Consult Orthopedics  Advise increase p.o. fluids--water/juice & rest  Med's:  Ultram/no refills  Advise of boot / requested  Advised to elevate ankle compresses  Tylenol or Ibuprofen for fever, headache and body aches.  Advised no heat  Advise follow up with PCP  Advise go to ER if nausea, vomiting, fever, increased pain, or fail to improve with treatment.  AVS provided and reviewed with patient including supportive care, follow up, and red flag symptoms.   Patient verbalizes understanding and agrees with treatment plan. Discharged from Urgent Care in stable condition.  Given work excuse    DIAGNOSIS:  Fall  Obesity  Right ankle pain  Right foot pain

## 2018-12-03 ENCOUNTER — LAB VISIT (OUTPATIENT)
Dept: LAB | Facility: HOSPITAL | Age: 53
End: 2018-12-03
Attending: FAMILY MEDICINE
Payer: COMMERCIAL

## 2018-12-03 ENCOUNTER — OFFICE VISIT (OUTPATIENT)
Dept: INTERNAL MEDICINE | Facility: CLINIC | Age: 53
End: 2018-12-03
Payer: COMMERCIAL

## 2018-12-03 ENCOUNTER — OFFICE VISIT (OUTPATIENT)
Dept: PULMONOLOGY | Facility: CLINIC | Age: 53
End: 2018-12-03
Payer: COMMERCIAL

## 2018-12-03 VITALS
TEMPERATURE: 99 F | BODY MASS INDEX: 41.75 KG/M2 | OXYGEN SATURATION: 96 % | RESPIRATION RATE: 16 BRPM | HEART RATE: 72 BPM | HEIGHT: 73 IN | SYSTOLIC BLOOD PRESSURE: 158 MMHG | DIASTOLIC BLOOD PRESSURE: 92 MMHG | WEIGHT: 315 LBS

## 2018-12-03 VITALS
HEART RATE: 74 BPM | HEIGHT: 73 IN | DIASTOLIC BLOOD PRESSURE: 70 MMHG | OXYGEN SATURATION: 98 % | SYSTOLIC BLOOD PRESSURE: 158 MMHG | WEIGHT: 315 LBS | BODY MASS INDEX: 41.75 KG/M2 | RESPIRATION RATE: 20 BRPM

## 2018-12-03 DIAGNOSIS — G47.33 OSA (OBSTRUCTIVE SLEEP APNEA): Primary | ICD-10-CM

## 2018-12-03 DIAGNOSIS — E66.01 MORBID OBESITY WITH BMI OF 40.0-44.9, ADULT: ICD-10-CM

## 2018-12-03 DIAGNOSIS — I10 BENIGN ESSENTIAL HYPERTENSION: Primary | ICD-10-CM

## 2018-12-03 DIAGNOSIS — R05.9 COUGH: ICD-10-CM

## 2018-12-03 DIAGNOSIS — I10 BENIGN ESSENTIAL HYPERTENSION: ICD-10-CM

## 2018-12-03 DIAGNOSIS — E66.01 SEVERE OBESITY (BMI >= 40): Chronic | ICD-10-CM

## 2018-12-03 DIAGNOSIS — G47.33 OBSTRUCTIVE SLEEP APNEA TREATED WITH CONTINUOUS POSITIVE AIRWAY PRESSURE (CPAP): Chronic | ICD-10-CM

## 2018-12-03 PROCEDURE — 99214 OFFICE O/P EST MOD 30 MIN: CPT | Mod: 25,S$GLB,, | Performed by: NURSE PRACTITIONER

## 2018-12-03 PROCEDURE — 99999 PR PBB SHADOW E&M-EST. PATIENT-LVL III: CPT | Mod: PBBFAC,,, | Performed by: NURSE PRACTITIONER

## 2018-12-03 PROCEDURE — 85025 COMPLETE CBC W/AUTO DIFF WBC: CPT

## 2018-12-03 PROCEDURE — 36415 COLL VENOUS BLD VENIPUNCTURE: CPT | Mod: PO

## 2018-12-03 PROCEDURE — 99999 PR PBB SHADOW E&M-EST. PATIENT-LVL IV: CPT | Mod: PBBFAC,,, | Performed by: FAMILY MEDICINE

## 2018-12-03 PROCEDURE — 90471 IMMUNIZATION ADMIN: CPT | Mod: S$GLB,,, | Performed by: NURSE PRACTITIONER

## 2018-12-03 PROCEDURE — 99214 OFFICE O/P EST MOD 30 MIN: CPT | Mod: S$GLB,,, | Performed by: FAMILY MEDICINE

## 2018-12-03 PROCEDURE — 3008F BODY MASS INDEX DOCD: CPT | Mod: CPTII,S$GLB,, | Performed by: FAMILY MEDICINE

## 2018-12-03 PROCEDURE — 80061 LIPID PANEL: CPT

## 2018-12-03 PROCEDURE — 80053 COMPREHEN METABOLIC PANEL: CPT

## 2018-12-03 PROCEDURE — 3008F BODY MASS INDEX DOCD: CPT | Mod: CPTII,S$GLB,, | Performed by: NURSE PRACTITIONER

## 2018-12-03 PROCEDURE — 90686 IIV4 VACC NO PRSV 0.5 ML IM: CPT | Mod: S$GLB,,, | Performed by: NURSE PRACTITIONER

## 2018-12-03 RX ORDER — DEXTROMETHORPHAN HYDROBROMIDE, GUAIFENESIN 5; 100 MG/5ML; MG/5ML
650 LIQUID ORAL
COMMUNITY
End: 2020-02-17

## 2018-12-03 RX ORDER — LOSARTAN POTASSIUM AND HYDROCHLOROTHIAZIDE 12.5; 5 MG/1; MG/1
1 TABLET ORAL DAILY
Qty: 30 TABLET | Refills: 1 | Status: SHIPPED | OUTPATIENT
Start: 2018-12-03 | End: 2019-01-16

## 2018-12-03 RX ORDER — METHYLPREDNISOLONE 4 MG/1
4 TABLET ORAL SEE ADMIN INSTRUCTIONS
COMMUNITY
End: 2018-12-20

## 2018-12-03 RX ORDER — METHYLPREDNISOLONE 4 MG/1
TABLET ORAL
Qty: 1 PACKAGE | Refills: 0 | Status: SHIPPED | OUTPATIENT
Start: 2018-12-03 | End: 2018-12-03 | Stop reason: ALTCHOICE

## 2018-12-03 NOTE — PROGRESS NOTES
"Subjective:      Patient ID: Dragan Man II is a 53 y.o. male.    Chief Complaint: Sleep Apnea    HPI  Presents to office for review of AutoPAP therapy. Patient states improved symptoms with use of AutoPAP. Sleeping more soundly. Waking up feeling more refreshed. Improved daytime sleepiness. Patient states he is benefiting from use of the AutoPAP. He has had some weight gain.  He continues to have cough from bronchitis and postnasal drip.   Denies reflux but cough wakes him up at night at times.    He states he has had some high blood pressure readings at home.  BP (!) 158/70 (BP Location: Right arm, Patient Position: Sitting)   Pulse 74   Resp 20   Ht 6' 1.2" (1.859 m)   Wt (!) 145.7 kg (321 lb 3.4 oz)   SpO2 98%   BMI 42.15 kg/m²   Body mass index is 42.15 kg/m².    Review of Systems   Constitutional: Positive for weight gain.   HENT: Positive for postnasal drip.    Respiratory: Positive for cough.      Objective:      Physical Exam   Constitutional: He is oriented to person, place, and time. He appears well-developed and well-nourished.   obese   HENT:   Head: Normocephalic and atraumatic.   Mouth/Throat: Oropharynx is clear and moist.   Neck: Normal range of motion. Neck supple.   Cardiovascular: Normal rate and regular rhythm.   Pulmonary/Chest: Effort normal and breath sounds normal.   Abdominal: Soft.   Musculoskeletal: He exhibits no edema.   Neurological: He is alert and oriented to person, place, and time.   Skin: Skin is warm and dry.   Psychiatric: He has a normal mood and affect.     Personal Diagnostic Review  Autopap download: Patient wears on average 8 hrs and 9 minutes. Greater than 4 hrs 100 % of the time. 90% percentile pressure 16.9 with AHI 1.1 events/hr        Assessment:       1. LAVERNE (obstructive sleep apnea)    2. Morbid obesity with BMI of 40.0-44.9, adult    3. Cough        Outpatient Encounter Medications as of 12/3/2018   Medication Sig Dispense Refill    sodium chloride " (OCEAN) 0.65 % nasal spray 1 spray by Nasal route as needed for Congestion.      fluticasone (FLONASE) 50 mcg/actuation nasal spray 2 sprays by Each Nare route once daily. 1 Bottle 11    methylPREDNISolone (MEDROL DOSEPACK) 4 mg tablet use as directed 1 Package 0    traMADol (ULTRAM) 50 mg tablet Take 1 tablet (50 mg total) by mouth every 6 (six) hours as needed for Pain. 10 tablet 0    [DISCONTINUED] loratadine (CLARITIN) 10 mg tablet Take 1 tablet (10 mg total) by mouth once daily. 30 tablet 0     No facility-administered encounter medications on file as of 12/3/2018.      Orders Placed This Encounter   Procedures    Influenza - Quadrivalent (3 years & older) (PF)     Plan:   Weight loss and exercise to improve overall health.  High blood pressure reading today and at home. Schedule appt with Dr. Barba for follow up this week.   Medrol dose aakash. flonase  Doing well on PAP settings. Patient is compliant. Follow up in 12 months with PAP data download or call earlier if any problems.

## 2018-12-03 NOTE — ASSESSMENT & PLAN NOTE
He reports compliance with use of CPAP for treatment of obstructive sleep apnea, and he reports perceived therapeutic benefit.

## 2018-12-03 NOTE — ASSESSMENT & PLAN NOTE
Wt Readings from Last 5 Encounters:   12/03/18 (!) 145 kg (319 lb 10.7 oz)   12/03/18 (!) 145.7 kg (321 lb 3.4 oz)   06/17/18 (!) 140.2 kg (309 lb 1.4 oz)   03/05/18 (!) 142.1 kg (313 lb 4.4 oz)   01/22/18 (!) 142.6 kg (314 lb 6 oz)    He has long-standing struggle with obesity, worsening in spite of his efforts at therapeutic lifestyle changes.  We discussed treatment options.

## 2018-12-03 NOTE — ASSESSMENT & PLAN NOTE
BP Readings from Last 6 Encounters:   12/03/18 (!) 158/92   12/03/18 (!) 158/70   06/17/18 (!) 154/70   03/05/18 138/82   01/22/18 (!) 156/78   01/03/18 (!) 160/80     Wt Readings from Last 2 Encounters:   12/03/18 (!) 145 kg (319 lb 10.7 oz)   12/03/18 (!) 145.7 kg (321 lb 3.4 oz)     BMI Readings from Last 2 Encounters:   12/03/18 41.94 kg/m²   12/03/18 42.15 kg/m²     Lab Results   Component Value Date    ESTGFRAFRICA >60 08/26/2013    EGFRNONAA >60 08/26/2013    CREATININE 0.7 08/26/2013    BUN 15 08/26/2013       The ASCVD Risk score (Caty SMITH Jr., et al., 2013) failed to calculate for the following reasons:    Cannot find a previous HDL lab    Cannot find a previous total cholesterol lab     Hypertension is untreated, a symptomatic, uncontrolled.

## 2018-12-03 NOTE — PATIENT INSTRUCTIONS
[facility] has a very good comprehensive weight loss program and bariatric surgery program. To learn more:    COMPREHENSIVE WEIGHT LOSS PROGRAM - Phone [phone]     [email or web address]    BARIATRIC SURGERY PROGRAM - Phone [phone]     [email or web address]

## 2018-12-03 NOTE — LETTER
December 3, 2018                   [facility]  [address]  [place] [address]  Phone: [phone]  Fax: [phone] December 3, 2018     Patient: Dragan Man    YOB: 1965   Date of Visit: 12/3/2018       To Whom It May Concern:     Dragan Man  has obstructive sleep apnea on CPAP. He is compliant with CPAP. A + H Index = 1.1 events / hr asleep on therapy. Review attached CPAP download.  If you have any questions or concerns, please don't hesitate to call..    If you have any questions or concerns, please don't hesitate to call.    Sincerely,          Elizabeth Lejeune, NP

## 2018-12-03 NOTE — PROGRESS NOTES
CHIEF COMPLAINT  Hypertension    HISTORY OF PRESENT ILLNESS    Problem List Items Addressed This Visit        Cardiac/Vascular    Benign essential hypertension - Primary    Current Assessment & Plan     BP Readings from Last 6 Encounters:   12/03/18 (!) 158/92   12/03/18 (!) 158/70   06/17/18 (!) 154/70   03/05/18 138/82   01/22/18 (!) 156/78   01/03/18 (!) 160/80     Wt Readings from Last 2 Encounters:   12/03/18 (!) 145 kg (319 lb 10.7 oz)   12/03/18 (!) 145.7 kg (321 lb 3.4 oz)     BMI Readings from Last 2 Encounters:   12/03/18 41.94 kg/m²   12/03/18 42.15 kg/m²     Lab Results   Component Value Date    ESTGFRAFRICA >60 08/26/2013    EGFRNONAA >60 08/26/2013    CREATININE 0.7 08/26/2013    BUN 15 08/26/2013       The ASCVD Risk score (Caty SMITH Jr., et al., 2013) failed to calculate for the following reasons:    Cannot find a previous HDL lab    Cannot find a previous total cholesterol lab     Hypertension is untreated, a symptomatic, uncontrolled.         Relevant Medications    losartan-hydrochlorothiazide 50-12.5 mg (HYZAAR) 50-12.5 mg per tablet    Other Relevant Orders    Hypertension Digital Medicine ([facility]) Enrollment Order (Completed)    Hypertension Digital Medicine ([facility]): Assign Onboarding Questionnaires (Completed)    Lipid panel    Comprehensive metabolic panel    CBC auto differential       Endocrine    Severe obesity (BMI >= 40)    Current Assessment & Plan     Wt Readings from Last 5 Encounters:   12/03/18 (!) 145 kg (319 lb 10.7 oz)   12/03/18 (!) 145.7 kg (321 lb 3.4 oz)   06/17/18 (!) 140.2 kg (309 lb 1.4 oz)   03/05/18 (!) 142.1 kg (313 lb 4.4 oz)   01/22/18 (!) 142.6 kg (314 lb 6 oz)    He has long-standing struggle with obesity, worsening in spite of his efforts at therapeutic lifestyle changes.  We discussed treatment options.         Relevant Orders    Lipid panel    Comprehensive metabolic panel    CBC auto differential       Other    Obstructive sleep apnea treated with continuous positive  "airway pressure (CPAP) (Chronic)    Current Assessment & Plan     He reports compliance with use of CPAP for treatment of obstructive sleep apnea, and he reports perceived therapeutic benefit.          Relevant Orders    CBC auto differential          PAST MEDICAL HISTORY, FAMILY HISTORY and SOCIAL HISTORY, CURRENT MEDICATION LIST, and ALLERGY LIST reviewed by me (DENYS Barba MD) and are updated consistent with the patient's report.    REVIEW OF SYSTEMS  CONSTITUTIONAL: No fever reported.  CARDIOVASCULAR: No chest pain reported.  PULMONARY: No trouble breathing reported.     PHYSICAL EXAM  Vitals:    12/03/18 1648   BP: (!) 158/92   BP Location: Right arm   Patient Position: Sitting   BP Method: Large (Manual)   Pulse: 72   Resp: 16   Temp: 98.7 °F (37.1 °C)   TempSrc: Oral   SpO2: 96%   Weight: (!) 145 kg (319 lb 10.7 oz)   Height: 6' 1.2" (1.859 m)     CONSTITUTIONAL: Vital signs noted. No apparent distress. Does not appear acutely ill or septic. Appears adequately hydrated.  HEENT: External ENT grossly unremarkable. Hearing grossly intact. Oropharynx moist.  PULM: Lungs clear. Breathing unlabored.  HEART: Auscultation reveals regular rate and rhythm without murmur, gallop or rub.  DERM: Skin warm and moist with normal turgor.  NEURO: There are no gross focal motor deficits or gross deficits of cranial nerves III-XII.  PSYCHIATRIC: Alert and oriented x 3. Mood is grossly neutral. Affect appropriate. Judgment and insight not grossly compromised.  MUSCULOSKELETAL: Grossly normal stance and gait.     ASSESSMENT and PLAN  Benign essential hypertension  -     Hypertension [employer] [facility] ([place] [facility]) Enrollment Order  -     Hypertension Digital Medicine ([place] [facility]): Assign Onboarding Questionnaires  -     losartan-hydrochlorothiazide 50-12.5 mg (HYZAAR) 50-12.5 mg per tablet; Take 1 tablet by mouth once daily.  Dispense: 30 tablet; Refill: 1  -     Lipid panel; Future; Expected date: 12/03/2018  -     Comprehensive " "metabolic panel; Future; Expected date: 12/03/2018  -     CBC auto differential; Future; Expected date: 12/03/2018    Class 3 obesity (BMI >= 40)  -     Lipid panel; Future; Expected date: 12/03/2018  -     Comprehensive metabolic panel; Future; Expected date: 12/03/2018  -     CBC auto differential; Future; Expected date: 12/03/2018    Obstructive sleep apnea treated with continuous positive airway pressure (CPAP)  -     CBC auto differential; Future; Expected date: 12/03/2018        PRESCRIPTION MEDICATION MANAGEMENT  Medications Ordered This Encounter   Medications    losartan-hydrochlorothiazide 50-12.5 mg (HYZAAR) 50-12.5 mg per tablet     Sig: Take 1 tablet by mouth once daily.     Dispense:  30 tablet     Refill:  1     Medications Discontinued During This Encounter   Medication Reason    methylPREDNISolone (MEDROL DOSEPACK) 4 mg tablet Therapy completed    traMADol (ULTRAM) 50 mg tablet Therapy completed       Follow-up in about 6 weeks (around 1/14/2019) for review test results and discuss treatment plan, re-evaluate problem(s) discussed today.    Patient Instructions   [facility] has a very good comprehensive weight loss program and bariatric surgery program. To learn more:    COMPREHENSIVE WEIGHT LOSS PROGRAM - Phone [phone]     [email or web address]    BARIATRIC SURGERY PROGRAM - Phone [phone]     [email or web address]        ABOUT THIS DOCUMENTATION:  · The order of the conditions listed in the HPI is one of convenience and does not necessarily reflect the chronology of the appointment, nor the relative importance of a condition.  · Documentation entered by me for this encounter was done in part using speech-recognition technology. Although I have made an effort to ensure accuracy, "sound like" errors may exist and should be interpreted in context.                        -DENYS Barba MD     "

## 2018-12-04 ENCOUNTER — PATIENT MESSAGE (OUTPATIENT)
Dept: ADMINISTRATIVE | Facility: OTHER | Age: 53
End: 2018-12-04

## 2018-12-04 LAB
ALBUMIN SERPL BCP-MCNC: 3.8 G/DL
ALP SERPL-CCNC: 87 U/L
ALT SERPL W/O P-5'-P-CCNC: 33 U/L
ANION GAP SERPL CALC-SCNC: 6 MMOL/L
AST SERPL-CCNC: 25 U/L
BASOPHILS # BLD AUTO: 0.06 K/UL
BASOPHILS NFR BLD: 0.6 %
BILIRUB SERPL-MCNC: 0.6 MG/DL
BUN SERPL-MCNC: 13 MG/DL
CALCIUM SERPL-MCNC: 9.3 MG/DL
CHLORIDE SERPL-SCNC: 104 MMOL/L
CHOLEST SERPL-MCNC: 186 MG/DL
CHOLEST/HDLC SERPL: 3.9 {RATIO}
CO2 SERPL-SCNC: 28 MMOL/L
CREAT SERPL-MCNC: 0.8 MG/DL
DIFFERENTIAL METHOD: ABNORMAL
EOSINOPHIL # BLD AUTO: 0.3 K/UL
EOSINOPHIL NFR BLD: 2.6 %
ERYTHROCYTE [DISTWIDTH] IN BLOOD BY AUTOMATED COUNT: 12.2 %
EST. GFR  (AFRICAN AMERICAN): >60 ML/MIN/1.73 M^2
EST. GFR  (NON AFRICAN AMERICAN): >60 ML/MIN/1.73 M^2
GLUCOSE SERPL-MCNC: 85 MG/DL
HCT VFR BLD AUTO: 40.7 %
HDLC SERPL-MCNC: 48 MG/DL
HDLC SERPL: 25.8 %
HGB BLD-MCNC: 13.8 G/DL
IMM GRANULOCYTES # BLD AUTO: 0.04 K/UL
IMM GRANULOCYTES NFR BLD AUTO: 0.4 %
LDLC SERPL CALC-MCNC: 103.4 MG/DL
LYMPHOCYTES # BLD AUTO: 3.1 K/UL
LYMPHOCYTES NFR BLD: 32.4 %
MCH RBC QN AUTO: 32.5 PG
MCHC RBC AUTO-ENTMCNC: 33.9 G/DL
MCV RBC AUTO: 96 FL
MONOCYTES # BLD AUTO: 1 K/UL
MONOCYTES NFR BLD: 10.4 %
NEUTROPHILS # BLD AUTO: 5.1 K/UL
NEUTROPHILS NFR BLD: 53.6 %
NONHDLC SERPL-MCNC: 138 MG/DL
NRBC BLD-RTO: 0 /100 WBC
PLATELET # BLD AUTO: 154 K/UL
PMV BLD AUTO: 10.9 FL
POTASSIUM SERPL-SCNC: 4.4 MMOL/L
PROT SERPL-MCNC: 7.5 G/DL
RBC # BLD AUTO: 4.24 M/UL
SODIUM SERPL-SCNC: 138 MMOL/L
TRIGL SERPL-MCNC: 173 MG/DL
WBC # BLD AUTO: 9.5 K/UL

## 2018-12-06 ENCOUNTER — PATIENT MESSAGE (OUTPATIENT)
Dept: ADMINISTRATIVE | Facility: OTHER | Age: 53
End: 2018-12-06

## 2018-12-08 ENCOUNTER — PATIENT MESSAGE (OUTPATIENT)
Dept: ADMINISTRATIVE | Facility: OTHER | Age: 53
End: 2018-12-08

## 2018-12-17 ENCOUNTER — PATIENT OUTREACH (OUTPATIENT)
Dept: OTHER | Facility: OTHER | Age: 53
End: 2018-12-17

## 2018-12-17 NOTE — LETTER
Pamela Maldonado PA-C  [address] [place] [address]     Dear Dragan Man II,    Welcome to the [facility] Digital [facility] Program!           My name is Pamela Maldonado PA-C and I am your dedicated Digital Medicine clinician.  As an expert in medication management, I will help ensure that the medications you are taking continue to provide you with the intended benefits.        I am Mikhail Andrade and I will be your health  for the duration of the program.  My  job is to help you identify lifestyle changes to improve your blood pressure control.  We will talk about nutrition, exercise, and other ways that you may be able to adjust your current habits to better your health. Together, we will work to improve your overall health and encourage you to meet your goals for a healthier lifestyle.    What we expect from YOU:    You will need to take blood pressure readings multiple times a week and no less than one reading per week.   It is important that you take your measurements at different times during the day, when possible.     What you should expect from your Digital Medicine Care Team:   We will provide you with education about high blood pressure, including lifestyle changes that could help you to control your blood pressure.   We will review your weekly readings and provide you with monthly blood pressure progress reports after you have been in the program for more than 30 days.   We will send monthly progress reports on your blood pressure control to your physician so they can follow along with your progress as well.    You will be able to reach me by phone at [phone] or through your [facility] account by clicking my name under Care Team on the right side of the home screen.    I look forward to working with you to achieve your blood pressure goals!    Sincerely,  Pamela Maldonado PA-C  Your personal clinician    Please visit  [email or web address] to learn more about high blood pressure and what you can do lower your blood pressure.                                                                                           Dragan Man II  [address] [place] [address]

## 2018-12-17 NOTE — PROGRESS NOTES
Digital Medicine Enrollment Call    Introduced Mr. Dragan Man II to Digital Medicine.     Discussed program expectations and requirements.    Introduced digital medicine care team.     Reviewed the importance of self-monitoring for digital medicine participation.    Discussed proper blood pressure technique.      Reviewed that the Digital Medicine team is not available for emergencies and instructed the patient to call 911 or [facility] ([phone] or [phone]) if one arises.    Pt stated he feels the holidays has elevated his BP.        Pt requested to be contacted @4      Last 5 Patient Entered Readings                                      Current 30 Day Average: 154/83     Recent Readings 12/17/2018 12/16/2018 12/15/2018 12/14/2018 12/13/2018    SBP (mmHg) 142 141 144 164 147    DBP (mmHg) 82 71 77 77 86    Pulse 65 95 66 78 72

## 2018-12-19 ENCOUNTER — PATIENT OUTREACH (OUTPATIENT)
Dept: OTHER | Facility: OTHER | Age: 53
End: 2018-12-19

## 2018-12-19 DIAGNOSIS — I10 BENIGN ESSENTIAL HYPERTENSION: Primary | ICD-10-CM

## 2018-12-19 NOTE — PROGRESS NOTES
Last 5 Patient Entered Readings                                      Current 30 Day Average: 155/83     Recent Readings 12/19/2018 12/18/2018 12/17/2018 12/16/2018 12/15/2018    SBP (mmHg) 159 160 142 141 144    DBP (mmHg) 84 86 82 71 77    Pulse 67 80 65 95 66        Called patient following Enrollment Call.    PMHx: HTN, LAVERNE on CPAP, Obesity  Started on losartan/HCTZ 50-12.5 on 12/3/18   [employer]     Reviewed patient's medications and verified allergies on file.   Hypertension Medications             losartan-hydrochlorothiazide 50-12.5 mg (HYZAAR) 50-12.5 mg per tablet Take 1 tablet by mouth once daily.        Patient and I agreed that the patient will take his BP daily to every other day at varying times of the day.  Also asked that the BP be taken at least 1 hour after taking BP medications.     Explained that our goal is to get his BP to consistently below 130/80mmHg.     Emailed patient my direct phone number in case he has any questions.    From Profile: need to discuss next time.   Depression:   Sleep Apnea: diagnosed on CPAP

## 2019-01-03 ENCOUNTER — PATIENT OUTREACH (OUTPATIENT)
Dept: OTHER | Facility: OTHER | Age: 54
End: 2019-01-03

## 2019-01-03 DIAGNOSIS — I10 BENIGN ESSENTIAL HYPERTENSION: Primary | ICD-10-CM

## 2019-01-03 NOTE — PROGRESS NOTES
Last 5 Patient Entered Readings                                      Current 30 Day Average: 155/82     Recent Readings 1/6/2019 1/5/2019 1/4/2019 1/3/2019 1/2/2019    SBP (mmHg) 152 167 156 156 166    DBP (mmHg) 79 78 85 89 85    Pulse 72 78 70 69 83        Increase losartan/HCTZ to 100/25 mg (2 tablets of 50/12.5).  Denies symptoms of elevated blood pressure: severe HA, change in vision or speech, numbness/tingling/weakness on one side of body, CP, SOB.     Pt c/o constipation and acid indigestion since starting on medication. Encouraged increased fluid intake, fiber and/or use of a stool softener daily.      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    Will f/u in 2 weeks.

## 2019-01-08 ENCOUNTER — PATIENT OUTREACH (OUTPATIENT)
Dept: OTHER | Facility: OTHER | Age: 54
End: 2019-01-08

## 2019-01-08 NOTE — PROGRESS NOTES
Last 5 Patient Entered Readings                                      Current 30 Day Average: 155/82     Recent Readings 1/6/2019 1/5/2019 1/4/2019 1/3/2019 1/2/2019    SBP (mmHg) 152 167 156 156 166    DBP (mmHg) 79 78 85 89 85    Pulse 72 78 70 69 83        Digital Medicine: Health  Introduction Call     1/8: Left voicemail and requested call back in order to complete Digital Medicine health  introduction call.

## 2019-01-16 ENCOUNTER — PATIENT MESSAGE (OUTPATIENT)
Dept: OTHER | Facility: OTHER | Age: 54
End: 2019-01-16

## 2019-01-16 DIAGNOSIS — I10 BENIGN ESSENTIAL HYPERTENSION: ICD-10-CM

## 2019-01-16 RX ORDER — LOSARTAN POTASSIUM AND HYDROCHLOROTHIAZIDE 25; 100 MG/1; MG/1
1 TABLET ORAL DAILY
Qty: 30 TABLET | Refills: 1 | Status: SHIPPED | OUTPATIENT
Start: 2019-01-16 | End: 2019-02-04 | Stop reason: ALTCHOICE

## 2019-01-16 NOTE — PROGRESS NOTES
"Last 5 Patient Entered Readings                                      Current 30 Day Average: 153/78     Recent Readings 1/15/2019 1/14/2019 1/13/2019 1/11/2019 1/10/2019    SBP (mmHg) 154 143 159 146 134    DBP (mmHg) 71 68 74 67 68    Pulse 73 73 81 76 76          Digital Medicine: Health  Introduction    Introduced Mr. Dragan Man II to Digital Medicine. Discussed health  role and recommended lifestyle modifications.  Reviewed proper timing, technique, and body position for taking BP readings. Pt acknowledged.     Lifestyle Assessment:    Current Dietary Habits(i.e. 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  Patient states he has been eating more vegetables as well.  Patient states he has been substituting a bottle water for his "third cup of coffee".  Patient denies use of a salt shaker.  I encouraged patient to continue making these new choices and keep up the great work.     Exercise:  Patient states "he bought a treadmill".  Patient is just trying to "get on it".  I encouraged patient to start off easier and do 10-20 minutes x2/wk.  Patient states that is something he will try.  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        Alcohol/Tobacco:  None    Medication Adherence:   has been compliant with the medicaiton regimen  Patient reports he has been having "stoamch issues" for about 3 weeks.  He is unsure if it is related to medication change or dietary changes.  Confirmed that patient started new medication regimen as prescribed by Digital Medicine Clinician.    Other goals:  Will discuss next call.    Reviewed AHA/AACE recommendations:  Limit sodium intake to <2000mg/day. Pt acknowledged.     Reviewed the importance of self-monitoring, medication adherence, and that the health  can be used as a resource for lifestyle modifications to help reduce or maintain a healthy " lifestyle.  Reviewed that the Digital Medicine team is not available for emergencies and instructed the patient to call 911 or [facility] ([phone] or [phone]) if one arises.

## 2019-01-21 ENCOUNTER — PATIENT OUTREACH (OUTPATIENT)
Dept: OTHER | Facility: OTHER | Age: 54
End: 2019-01-21

## 2019-01-21 NOTE — PROGRESS NOTES
Last 5 Patient Entered Readings                                      Current 30 Day Average: 148/75     Recent Readings 1/26/2019 1/24/2019 1/23/2019 1/21/2019 1/20/2019    SBP (mmHg) 129 131 124 151 134    DBP (mmHg) 77 74 78 77 73    Pulse 84 76 88 74 71        Average BP and readings improved.  Pt states taking the Losartan 100-25 mg tablet TWICE a day as I instructed. I clarified that I told him twice daily for the 50-12.5 mg tablet. He did not read the message I sent when I prescribed the higher dose tablet.  He understands now and will start taking the medication only once daily.  If BP goes up, I will change Losartan to Olmesartan or Irbesartan.        F/U in 1 week.

## 2019-02-04 ENCOUNTER — PATIENT OUTREACH (OUTPATIENT)
Dept: OTHER | Facility: OTHER | Age: 54
End: 2019-02-04

## 2019-02-04 DIAGNOSIS — I10 BENIGN ESSENTIAL HYPERTENSION: Primary | ICD-10-CM

## 2019-02-04 RX ORDER — OLMESARTAN MEDOXOMIL AND HYDROCHLOROTHIAZIDE 40/25 40; 25 MG/1; MG/1
1 TABLET ORAL DAILY
Qty: 30 TABLET | Refills: 3 | Status: SHIPPED | OUTPATIENT
Start: 2019-02-04 | End: 2019-02-25

## 2019-02-04 NOTE — PROGRESS NOTES
Last 5 Patient Entered Readings                                      Current 30 Day Average: 144/73     Recent Readings 2/4/2019 2/3/2019 2/2/2019 2/1/2019 1/31/2019    SBP (mmHg) 149 143 152 130 145    DBP (mmHg) 71 81 78 72 72    Pulse 86 86 68 72 82          Patient's BP average is above goal of <130/80.     Patient denies s/s of hypertension (SOB, CP, severe headaches, changes in vision) associated with high readings. Instructed patient to go to the ED if BP > 180/110 and accompanied by hypertensive s/s, patient confirms understanding.    Made the following changes: Change losartan HCTZ to olmesartan HCTZ.     Will continue to monitor regularly. Will follow up in 2 weeks, sooner if BP begins to trend upward or downward.    Patient has my contact information and knows to call with any concerns or clinical changes.     Current HTN regimen:  Hypertension Medications             olmesartan-hydrochlorothiazide (BENICAR HCT) 40-25 mg per tablet Take 1 tablet by mouth once daily. *NEW

## 2019-02-13 ENCOUNTER — PATIENT OUTREACH (OUTPATIENT)
Dept: OTHER | Facility: OTHER | Age: 54
End: 2019-02-13

## 2019-02-13 NOTE — PROGRESS NOTES
"Last 5 Patient Entered Readings                                      Current 30 Day Average: 145/74     Recent Readings 2/12/2019 2/11/2019 2/10/2019 2/9/2019 2/9/2019    SBP (mmHg) 147 161 151 144 156    DBP (mmHg) 74 82 66 61 69    Pulse 78 58 81 67 66        Digital Medicine: Health  Follow Up    Lifestyle Modifications:    1.Dietary Modifications (Sodium intake <2,000mg/day, food labels, dining out):   Patient reports eating more salads instead of pork.  Informed patient of the "hidden sodium" that he could get from salad and encouraged patient to continue to make the healthier choices.  Patient reports staying hydrated and "laying off of the cokes".  Gave praise and encouraged patient to keep up the great work.     2.Physical Activity:   Patient reports he has been doing more walking and yard work lately to get ahead of "Spring".  Encouraged patient to keep up the great work.      3.Medication Therapy:   Patient has been compliant with the medication regimen.  Confirmed that patient started new medication regimen as prescribed by Digital Medicine Clinician.    4.Patient has the following medication side effects/concerns (Frequency/Alleviating factors/Precipitating factors, etc.):  Patient reports has been feeling tired lately.  Patient reports "he just has not had energy".    Follow up with Mr. Archibald ISRAEL Man II completed. No further questions or concerns. Will continue to follow up to achieve health goals.  Patient is currently not at goal, 145/74 mmHg does exceed <130/80 mmHg.  "

## 2019-02-18 ENCOUNTER — PATIENT OUTREACH (OUTPATIENT)
Dept: OTHER | Facility: OTHER | Age: 54
End: 2019-02-18

## 2019-02-18 DIAGNOSIS — I10 BENIGN ESSENTIAL HYPERTENSION: Primary | ICD-10-CM

## 2019-02-18 NOTE — PROGRESS NOTES
Last 5 Patient Entered Readings                                      Current 30 Day Average: 147/75     Recent Readings 2/25/2019 2/24/2019 2/24/2019 2/22/2019 2/21/2019    SBP (mmHg) 149 151 146 139 157    DBP (mmHg) 65 74 71 84 89    Pulse 79 54 55 64 65        Pt called to report significant hand and finger numbness and joint pain since starting on the olmesartan-HCTZ.  Instructed him to STOP that medication and restart the losartan-HCTZ today.  I will call him in a few days and if still elevated, I will add a CCB.    Hypertension Medications             losartan-hydrochlorothiazide 100-25 mg (HYZAAR) 100-25 mg per tablet Take 1 tablet by mouth once daily.

## 2019-02-25 RX ORDER — LOSARTAN POTASSIUM AND HYDROCHLOROTHIAZIDE 25; 100 MG/1; MG/1
1 TABLET ORAL DAILY
Qty: 30 TABLET | Refills: 1 | Status: SHIPPED | OUTPATIENT
Start: 2019-02-25 | End: 2019-04-01 | Stop reason: SDUPTHER

## 2019-02-28 ENCOUNTER — PATIENT OUTREACH (OUTPATIENT)
Dept: OTHER | Facility: OTHER | Age: 54
End: 2019-02-28

## 2019-02-28 NOTE — PROGRESS NOTES
Last 5 Patient Entered Readings                                      Current 30 Day Average: 148/74     Recent Readings 2/28/2019 2/28/2019 2/27/2019 2/26/2019 2/25/2019    SBP (mmHg) 144 150 150 140 149    DBP (mmHg) 71 73 80 77 65    Pulse 65 67 87 66 79          2/28: Spoke to patient to inquire about whether symptoms of joint pain and hand/finger numbness are improved since stopping Olmesartan to Losartan.  Pt reports fewer symptoms.  BP is stable so will leave on current regimen and call next week to add CCB if needed.    Hypertension Medications             losartan-hydrochlorothiazide 100-25 mg (HYZAAR) 100-25 mg per tablet Take 1 tablet by mouth once daily.

## 2019-03-06 ENCOUNTER — PATIENT OUTREACH (OUTPATIENT)
Dept: OTHER | Facility: OTHER | Age: 54
End: 2019-03-06

## 2019-03-06 DIAGNOSIS — I10 BENIGN ESSENTIAL HYPERTENSION: Primary | ICD-10-CM

## 2019-03-06 RX ORDER — AMLODIPINE BESYLATE 2.5 MG/1
2.5 TABLET ORAL DAILY
Qty: 30 TABLET | Refills: 3 | Status: SHIPPED | OUTPATIENT
Start: 2019-03-06 | End: 2019-04-01 | Stop reason: SDUPTHER

## 2019-03-06 NOTE — PROGRESS NOTES
Last 5 Patient Entered Readings                                      Current 30 Day Average: 148/74     Recent Readings 3/5/2019 3/5/2019 3/3/2019 3/2/2019 3/1/2019    SBP (mmHg) 156 152 140 156 145    DBP (mmHg) 82 85 69 68 68    Pulse 69 69 67 83 82          3/6: Spoke to patient.  He reports resolution of the finger and hand numbness and improvement in knee joint pain since stopping Olmesartan.  Pt reports 6 pound weight gain too though states he did not eat more or exercise less.  BP average above goal.  Starting on Amlodipine 2.5 mg daily.  Will f/u in 2 weeks.    Hypertension Medications             amLODIPine (NORVASC) 2.5 MG tablet Take 1 tablet (2.5 mg total) by mouth once daily.    losartan-hydrochlorothiazide 100-25 mg (HYZAAR) 100-25 mg per tablet Take 1 tablet by mouth once daily.

## 2019-03-12 ENCOUNTER — PATIENT MESSAGE (OUTPATIENT)
Dept: OTHER | Facility: OTHER | Age: 54
End: 2019-03-12

## 2019-03-14 NOTE — PROGRESS NOTES
Last 5 Patient Entered Readings                                      Current 30 Day Average: 147/74     Recent Readings 3/10/2019 3/9/2019 3/8/2019 3/7/2019 3/5/2019    SBP (mmHg) 146 159 133 134 156    DBP (mmHg) 72 75 65 71 82    Pulse 71 76 75 75 69        3/14: PA-C is scheduled to follow up on 3/20.  Patient prefers to be contacted Mon-Wed.  Will push outreach after PA-C call.

## 2019-03-20 ENCOUNTER — PATIENT MESSAGE (OUTPATIENT)
Dept: OTHER | Facility: OTHER | Age: 54
End: 2019-03-20

## 2019-03-20 ENCOUNTER — PATIENT OUTREACH (OUTPATIENT)
Dept: OTHER | Facility: OTHER | Age: 54
End: 2019-03-20

## 2019-03-20 NOTE — PROGRESS NOTES
"Last 5 Patient Entered Readings                                      Current 30 Day Average: 147/74     Recent Readings 3/10/2019 3/9/2019 3/8/2019 3/7/2019 3/5/2019    SBP (mmHg) 146 159 133 134 156    DBP (mmHg) 72 75 65 71 82    Pulse 71 76 75 75 69        3/20: Spoke to patient.  He spoke to [employer] about getting new phone connected and was told that he was fully connected. I see no readings since 3/10 so asked him to log into [employer] mel.  He states he is feeling a "little run down". Denies CP, SOB, HA, change in vision.  He states he is getting a FitBit to be able to log his activity as he is trying to do more exercise.  He thinks that is why he is tired.       Hypertension Medications             amLODIPine (NORVASC) 2.5 MG tablet Take 1 tablet (2.5 mg total) by mouth once daily.    losartan-hydrochlorothiazide 100-25 mg (HYZAAR) 100-25 mg per tablet Take 1 tablet by mouth once daily.        Will f/u.    "

## 2019-03-25 ENCOUNTER — PATIENT MESSAGE (OUTPATIENT)
Dept: OTHER | Facility: OTHER | Age: 54
End: 2019-03-25

## 2019-03-27 ENCOUNTER — PATIENT OUTREACH (OUTPATIENT)
Dept: OTHER | Facility: OTHER | Age: 54
End: 2019-03-27

## 2019-03-27 NOTE — PROGRESS NOTES
Last 5 Patient Entered Readings                                      Current 30 Day Average: 146/73     Recent Readings 3/27/2019 3/27/2019 3/27/2019 3/26/2019 3/25/2019    SBP (mmHg) 142 - - 149 -    DBP (mmHg) 81 - - 76 -    Pulse 78 63 65 69 92          Digital Medicine: Health  Follow Up    3/27: Left voicemail to follow up with Mr. Dragan Man II.  Current BP average 146/73 mmHg is not at goal, <130/80 mmHg.

## 2019-04-01 DIAGNOSIS — I10 BENIGN ESSENTIAL HYPERTENSION: ICD-10-CM

## 2019-04-01 RX ORDER — LOSARTAN POTASSIUM AND HYDROCHLOROTHIAZIDE 25; 100 MG/1; MG/1
1 TABLET ORAL DAILY
Qty: 30 TABLET | Refills: 1 | Status: SHIPPED | OUTPATIENT
Start: 2019-04-01 | End: 2019-04-18 | Stop reason: SDUPTHER

## 2019-04-01 RX ORDER — AMLODIPINE BESYLATE 2.5 MG/1
2.5 TABLET ORAL DAILY
Qty: 30 TABLET | Refills: 3 | Status: SHIPPED | OUTPATIENT
Start: 2019-04-01 | End: 2019-04-18

## 2019-04-03 ENCOUNTER — PATIENT OUTREACH (OUTPATIENT)
Dept: OTHER | Facility: OTHER | Age: 54
End: 2019-04-03

## 2019-04-03 DIAGNOSIS — I10 BENIGN ESSENTIAL HYPERTENSION: Primary | ICD-10-CM

## 2019-04-03 NOTE — PROGRESS NOTES
Last 5 Patient Entered Readings                                      Current 30 Day Average: 148/74     Recent Readings 3/31/2019 3/30/2019 3/28/2019 3/27/2019 3/27/2019    SBP (mmHg) 164 155 148 142 -    DBP (mmHg) 82 74 73 81 -    Pulse 75 69 69 78 63          4/3: Spoke to patient.  Average still above goal.  Pt reports increased stress at work.  Denies symptoms of elevated blood pressure: severe HA, change in vision or speech, numbness/tingling/weakness on one side of body, CP, SOB.      Increasing amlodipine to 5 mg daily as pt having no negative side effects on 2.5 mg dose.      Hypertension Medications             amLODIPine (NORVASC) 2.5 MG tablet Take 1 tablet (2.5 mg total) by mouth once daily.    losartan-hydrochlorothiazide 100-25 mg (HYZAAR) 100-25 mg per tablet Take 1 tablet by mouth once daily.        F/U next week.

## 2019-04-05 ENCOUNTER — PATIENT MESSAGE (OUTPATIENT)
Dept: OTHER | Facility: OTHER | Age: 54
End: 2019-04-05

## 2019-04-10 NOTE — PROGRESS NOTES
Last 5 Patient Entered Readings                                      Current 30 Day Average: 147/72     Recent Readings 4/10/2019 4/9/2019 4/8/2019 4/8/2019 4/7/2019    SBP (mmHg) 142 151 - 139 143    DBP (mmHg) 60 71 - 67 61    Pulse 86 65 64 75 83        4/10: PA changed medication on 4/3 and is scheduled to follow up on 4/11.  Will push outreach 1 week.

## 2019-04-17 NOTE — PROGRESS NOTES
Last 5 Patient Entered Readings                                      Current 30 Day Average: 147/71     Recent Readings 4/15/2019 4/13/2019 4/13/2019 4/12/2019 4/10/2019    SBP (mmHg) 155 137 140 157 142    DBP (mmHg) 68 59 60 74 60    Pulse 75 72 76 66 86          Digital Medicine: Health  Follow Up    4/17: Left voicemail to follow up with Mr. Archibald ISRAEL Man II.  Current BP average 147/71 mmHg is not at goal, <130/80 mmHg.  Sending [employer] message.

## 2019-04-18 ENCOUNTER — PATIENT OUTREACH (OUTPATIENT)
Dept: ADMINISTRATIVE | Facility: HOSPITAL | Age: 54
End: 2019-04-18

## 2019-04-18 ENCOUNTER — PATIENT MESSAGE (OUTPATIENT)
Dept: OTHER | Facility: OTHER | Age: 54
End: 2019-04-18

## 2019-04-18 DIAGNOSIS — I10 BENIGN ESSENTIAL HYPERTENSION: ICD-10-CM

## 2019-04-18 RX ORDER — LOSARTAN POTASSIUM AND HYDROCHLOROTHIAZIDE 25; 100 MG/1; MG/1
1 TABLET ORAL DAILY
Qty: 90 TABLET | Refills: 3 | Status: SHIPPED | OUTPATIENT
Start: 2019-04-18 | End: 2019-05-15 | Stop reason: SDUPTHER

## 2019-04-18 RX ORDER — AMLODIPINE BESYLATE 10 MG/1
10 TABLET ORAL DAILY
Qty: 30 TABLET | Refills: 3 | Status: SHIPPED | OUTPATIENT
Start: 2019-04-18 | End: 2019-05-15 | Stop reason: SDUPTHER

## 2019-04-18 NOTE — PROGRESS NOTES
Last 5 Patient Entered Readings                                      Current 30 Day Average: 147/71     Recent Readings 4/17/2019 4/15/2019 4/13/2019 4/13/2019 4/12/2019    SBP (mmHg) 144 155 137 140 157    DBP (mmHg) 70 68 59 60 74    Pulse 74 75 72 76 66          4/18: Reviewed chart.  Pt sent email requesting refill on Amlodipine so increased to 10 mg dose.  Also refilled Hyzaar as requested. Will f/u in 2 weeks.    Hypertension Medications             amLODIPine (NORVASC) 10 MG tablet Take 1 tablet (10 mg total) by mouth once daily.    losartan-hydrochlorothiazide 100-25 mg (HYZAAR) 100-25 mg per tablet Take 1 tablet by mouth once daily.

## 2019-04-18 NOTE — PROGRESS NOTES
Contacted patient to schedule.  Patient is enrolled in DM HTN will check DM records for the next 2 weeks d/t med changes by OLEG Maldonado.

## 2019-04-25 ENCOUNTER — PATIENT MESSAGE (OUTPATIENT)
Dept: PULMONOLOGY | Facility: CLINIC | Age: 54
End: 2019-04-25

## 2019-04-26 ENCOUNTER — TELEPHONE (OUTPATIENT)
Dept: PULMONOLOGY | Facility: CLINIC | Age: 54
End: 2019-04-26

## 2019-04-26 NOTE — TELEPHONE ENCOUNTER
Pt states [facility] is waiting for a signed order that he says has been sent to us.  Do you have or can you get it signed.  Pt wants to stop by to  supplies but I told him to wait until we get order signed and returned.

## 2019-04-26 NOTE — TELEPHONE ENCOUNTER
----- Message from Ivette Sanchez sent at 4/26/2019  8:41 AM CDT -----  Contact: Patient  Type:  Patient Returning Call    Who Called:Patient  Who Left Message for Patient:Ramona  Does the patient know what this is regarding?:equipment  Would the patient rather a call back or a response via [facility]? call  Best Call Back Number:[phone]  Additional Information:

## 2019-04-29 DIAGNOSIS — G47.33 OSA (OBSTRUCTIVE SLEEP APNEA): Primary | ICD-10-CM

## 2019-04-30 ENCOUNTER — PATIENT OUTREACH (OUTPATIENT)
Dept: OTHER | Facility: OTHER | Age: 54
End: 2019-04-30

## 2019-04-30 NOTE — PROGRESS NOTES
Last 5 Patient Entered Readings                                      Current 30 Day Average: 144/71     Recent Readings 4/29/2019 4/27/2019 4/25/2019 4/25/2019 4/25/2019    SBP (mmHg) 134 143 143 150 148    DBP (mmHg) 69 69 71 68 74    Pulse 75 62 70 77 63          4/30: Spoke to patient. He reports no negative side effects with increased amlodipine. Average slightly improved and reading for yesterday much improved.  Continue current regimen for 2 more weeks.     Hypertension Medications             amLODIPine (NORVASC) 10 MG tablet Take 1 tablet (10 mg total) by mouth once daily.    losartan-hydrochlorothiazide 100-25 mg (HYZAAR) 100-25 mg per tablet Take 1 tablet by mouth once daily.

## 2019-05-08 NOTE — PROGRESS NOTES
Last 5 Patient Entered Readings                                      Current 30 Day Average: 143/71     Recent Readings 5/7/2019 5/6/2019 5/5/2019 5/3/2019 5/3/2019    SBP (mmHg) 142 137 145 152 143    DBP (mmHg) 71 71 70 81 75    Pulse 77 72 74 72 74            Digital Medicine: Health  Follow Up    5/8: Left voicemail to follow up with Mr. Archibald ISRAEL Man II.  Current BP average 143/71 mmHg is not at goal, <130/80 mmHg.  Patient spoke with PA on 4/30.  Will continue to follow up.

## 2019-05-15 ENCOUNTER — PATIENT MESSAGE (OUTPATIENT)
Dept: OTHER | Facility: OTHER | Age: 54
End: 2019-05-15

## 2019-05-15 ENCOUNTER — PATIENT OUTREACH (OUTPATIENT)
Dept: OTHER | Facility: OTHER | Age: 54
End: 2019-05-15

## 2019-05-15 DIAGNOSIS — I10 BENIGN ESSENTIAL HYPERTENSION: ICD-10-CM

## 2019-05-15 RX ORDER — AMLODIPINE BESYLATE 10 MG/1
10 TABLET ORAL DAILY
Qty: 90 TABLET | Refills: 3 | Status: SHIPPED | OUTPATIENT
Start: 2019-05-15 | End: 2019-10-21 | Stop reason: SDUPTHER

## 2019-05-15 RX ORDER — LOSARTAN POTASSIUM AND HYDROCHLOROTHIAZIDE 25; 100 MG/1; MG/1
1 TABLET ORAL DAILY
Qty: 90 TABLET | Refills: 3 | Status: SHIPPED | OUTPATIENT
Start: 2019-05-15 | End: 2019-10-21 | Stop reason: SDUPTHER

## 2019-05-15 NOTE — PROGRESS NOTES
Last 5 Patient Entered Readings                                      Current 30 Day Average: 143/71     Recent Readings 5/15/2019 5/15/2019 5/14/2019 5/14/2019 5/12/2019    SBP (mmHg) 129 143 139 141 138    DBP (mmHg) 70 68 70 70 63    Pulse 71 73 63 67 70          5/15: Spoke to patient.  Average and readings improving.  Continue current regimen.  States feeling great.  Ordered 90 day refills on both meds.     Hypertension Medications             amLODIPine (NORVASC) 10 MG tablet Take 1 tablet (10 mg total) by mouth once daily.    losartan-hydrochlorothiazide 100-25 mg (HYZAAR) 100-25 mg per tablet Take 1 tablet by mouth once daily.

## 2019-07-02 ENCOUNTER — PATIENT OUTREACH (OUTPATIENT)
Dept: OTHER | Facility: OTHER | Age: 54
End: 2019-07-02

## 2019-07-02 NOTE — PROGRESS NOTES
Last 5 Patient Entered Readings                                      Current 30 Day Average: 131/66     Recent Readings 6/26/2019 6/22/2019 6/22/2019 6/18/2019 6/17/2019    SBP (mmHg) - 120 - 143 -    DBP (mmHg) - 80 - 73 -    Pulse 71 80 72 87 72          Digital Medicine: Health  Follow Up    7/2: Left voicemail to follow up with Paulo Dragan ISRAEL Man II.  Current BP average 131/66 mmHg is not at goal, <130/80 mmHg.  No readings since 6/22, will send [employer] message.

## 2019-07-15 ENCOUNTER — PATIENT OUTREACH (OUTPATIENT)
Dept: OTHER | Facility: OTHER | Age: 54
End: 2019-07-15

## 2019-07-15 NOTE — PROGRESS NOTES
Last 5 Patient Entered Readings                                      Current 30 Day Average: 134/67     Recent Readings 7/29/2019 7/29/2019 7/29/2019 7/29/2019 7/28/2019    SBP (mmHg) 138 125 133 - 133    DBP (mmHg) 54 56 62 - 56    Pulse 76 75 81 82 79        7/30: Spoke to patient. Close to goal so no med changes needed.       Hypertension Medications             amLODIPine (NORVASC) 10 MG tablet Take 1 tablet (10 mg total) by mouth once daily.    losartan-hydrochlorothiazide 100-25 mg (HYZAAR) 100-25 mg per tablet Take 1 tablet by mouth once daily.

## 2019-07-25 NOTE — PROGRESS NOTES
Last 5 Patient Entered Readings                                      Current 30 Day Average: 134/69     Recent Readings 7/25/2019 7/25/2019 7/25/2019 7/24/2019 7/22/2019    SBP (mmHg) 140 145 - - 143    DBP (mmHg) 67 72 - - 64    Pulse 62 63 67 78 78        Digital Medicine: Health  Follow Up    Patient reports he has started taking Magnesium, Ginseng, Omega 3's & Fish Oils, cinnamon and tumeric.  Patient reports he spoke with his doctor about all of them.  Gave praises to patient for speaking with his doctor.   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  Encouraged patient to relax for a couple of hours after work before taking any BP readings, patient verbalized understanding.    Lifestyle Modifications:    1.Dietary Modifications (Sodium intake <2,000mg/day, food labels, dining out):   Patient and I talked about staying adequately hydrated.  Patient reports he drinks 6-8 16oz parker/day.  Patient reports he will drink a Gatorade zero or Pedialyte w/ water to replenish electrolytes.  Gave encouragement to patient.      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  It is a physically demanding job.     3.Medication Therapy:   Patient has been compliant with the medication regimen.    4.Patient has the following medication side effects/concerns: None  (Frequency/Alleviating factors/Precipitating factors, etc.)     Follow up with Mr. Dragan Man II completed. No further questions or concerns. Will continue to follow up to achieve health goals.  Patient is currently not at goal, 134/69 mmHg does exceed <130/80 mmHg.

## 2019-09-17 ENCOUNTER — PATIENT OUTREACH (OUTPATIENT)
Dept: OTHER | Facility: OTHER | Age: 54
End: 2019-09-17

## 2019-10-14 ENCOUNTER — OFFICE VISIT (OUTPATIENT)
Dept: INTERNAL MEDICINE | Facility: CLINIC | Age: 54
End: 2019-10-14
Payer: COMMERCIAL

## 2019-10-14 ENCOUNTER — HOSPITAL ENCOUNTER (OUTPATIENT)
Dept: RADIOLOGY | Facility: HOSPITAL | Age: 54
Discharge: HOME | End: 2019-10-14
Attending: FAMILY MEDICINE
Payer: COMMERCIAL

## 2019-10-14 ENCOUNTER — CLINICAL SUPPORT (OUTPATIENT)
Dept: CARDIOLOGY | Facility: CLINIC | Age: 54
End: 2019-10-14
Payer: COMMERCIAL

## 2019-10-14 ENCOUNTER — LAB VISIT (OUTPATIENT)
Dept: LAB | Facility: HOSPITAL | Age: 54
End: 2019-10-14
Attending: FAMILY MEDICINE
Payer: COMMERCIAL

## 2019-10-14 VITALS
BODY MASS INDEX: 41.75 KG/M2 | SYSTOLIC BLOOD PRESSURE: 136 MMHG | HEART RATE: 82 BPM | OXYGEN SATURATION: 94 % | HEIGHT: 73 IN | TEMPERATURE: 98 F | DIASTOLIC BLOOD PRESSURE: 60 MMHG | WEIGHT: 315 LBS

## 2019-10-14 DIAGNOSIS — Z12.5 SCREENING PSA (PROSTATE SPECIFIC ANTIGEN): ICD-10-CM

## 2019-10-14 DIAGNOSIS — Z11.59 ENCOUNTER FOR HEPATITIS C SCREENING TEST FOR LOW RISK PATIENT: ICD-10-CM

## 2019-10-14 DIAGNOSIS — Z23 NEED FOR SHINGLES VACCINE: ICD-10-CM

## 2019-10-14 DIAGNOSIS — Z13.220 SCREENING FOR LIPID DISORDERS: ICD-10-CM

## 2019-10-14 DIAGNOSIS — R06.09 DYSPNEA ON EXERTION: ICD-10-CM

## 2019-10-14 DIAGNOSIS — I10 BENIGN ESSENTIAL HYPERTENSION: ICD-10-CM

## 2019-10-14 DIAGNOSIS — R06.09 DYSPNEA ON EXERTION: Primary | ICD-10-CM

## 2019-10-14 DIAGNOSIS — Z23 NEED FOR DIPHTHERIA-TETANUS-PERTUSSIS (TDAP) VACCINE: ICD-10-CM

## 2019-10-14 DIAGNOSIS — R01.1 HEART MURMUR: ICD-10-CM

## 2019-10-14 DIAGNOSIS — G47.33 OBSTRUCTIVE SLEEP APNEA TREATED WITH CONTINUOUS POSITIVE AIRWAY PRESSURE (CPAP): Chronic | ICD-10-CM

## 2019-10-14 DIAGNOSIS — Z91.89 AT RISK FOR CARDIOVASCULAR EVENT: ICD-10-CM

## 2019-10-14 DIAGNOSIS — Z00.00 PREVENTATIVE HEALTH CARE: ICD-10-CM

## 2019-10-14 DIAGNOSIS — E66.01 OBESITY, CLASS III, BMI 40-49.9 (MORBID OBESITY): Chronic | ICD-10-CM

## 2019-10-14 DIAGNOSIS — Z23 NEED FOR INFLUENZA VACCINATION: ICD-10-CM

## 2019-10-14 DIAGNOSIS — Z13.79 GENETIC SCREENING: ICD-10-CM

## 2019-10-14 DIAGNOSIS — L57.0 ACTINIC KERATOSES: ICD-10-CM

## 2019-10-14 DIAGNOSIS — J30.1 SEASONAL ALLERGIC RHINITIS DUE TO POLLEN: Chronic | ICD-10-CM

## 2019-10-14 DIAGNOSIS — R09.81 CHRONIC NASAL CONGESTION: ICD-10-CM

## 2019-10-14 PROBLEM — R73.09 ABNORMAL GLUCOSE: Status: RESOLVED | Noted: 2017-11-17 | Resolved: 2019-10-14

## 2019-10-14 PROBLEM — J20.9 ACUTE BRONCHITIS: Status: RESOLVED | Noted: 2017-11-17 | Resolved: 2019-10-14

## 2019-10-14 PROBLEM — E66.813 OBESITY, CLASS III, BMI 40-49.9 (MORBID OBESITY): Chronic | Status: ACTIVE | Noted: 2017-11-17

## 2019-10-14 PROCEDURE — 71046 XR CHEST PA AND LATERAL: ICD-10-PCS | Mod: 26,,, | Performed by: RADIOLOGY

## 2019-10-14 PROCEDURE — 85025 COMPLETE CBC W/AUTO DIFF WBC: CPT

## 2019-10-14 PROCEDURE — 93005 ELECTROCARDIOGRAM TRACING: CPT | Mod: S$GLB,,, | Performed by: FAMILY MEDICINE

## 2019-10-14 PROCEDURE — 93005 EKG 12-LEAD: ICD-10-PCS | Mod: S$GLB,,, | Performed by: FAMILY MEDICINE

## 2019-10-14 PROCEDURE — 90715 TDAP VACCINE GREATER THAN OR EQUAL TO 7YO IM: ICD-10-PCS | Mod: S$GLB,,, | Performed by: FAMILY MEDICINE

## 2019-10-14 PROCEDURE — 90686 IIV4 VACC NO PRSV 0.5 ML IM: CPT | Mod: S$GLB,,, | Performed by: FAMILY MEDICINE

## 2019-10-14 PROCEDURE — 80061 LIPID PANEL: CPT

## 2019-10-14 PROCEDURE — 99999 PR PBB SHADOW E&M-EST. PATIENT-LVL V: ICD-10-PCS | Mod: PBBFAC,,, | Performed by: FAMILY MEDICINE

## 2019-10-14 PROCEDURE — 90471 IMMUNIZATION ADMIN: CPT | Mod: S$GLB,,, | Performed by: FAMILY MEDICINE

## 2019-10-14 PROCEDURE — 99214 PR OFFICE/OUTPT VISIT, EST, LEVL IV, 30-39 MIN: ICD-10-PCS | Mod: 25,S$GLB,, | Performed by: FAMILY MEDICINE

## 2019-10-14 PROCEDURE — 84153 ASSAY OF PSA TOTAL: CPT

## 2019-10-14 PROCEDURE — 84443 ASSAY THYROID STIM HORMONE: CPT

## 2019-10-14 PROCEDURE — 3078F DIAST BP <80 MM HG: CPT | Mod: CPTII,S$GLB,, | Performed by: FAMILY MEDICINE

## 2019-10-14 PROCEDURE — 3008F PR BODY MASS INDEX (BMI) DOCUMENTED: ICD-10-PCS | Mod: CPTII,S$GLB,, | Performed by: FAMILY MEDICINE

## 2019-10-14 PROCEDURE — 3008F BODY MASS INDEX DOCD: CPT | Mod: CPTII,S$GLB,, | Performed by: FAMILY MEDICINE

## 2019-10-14 PROCEDURE — 93010 ELECTROCARDIOGRAM REPORT: CPT | Mod: S$GLB,,, | Performed by: INTERNAL MEDICINE

## 2019-10-14 PROCEDURE — 99214 OFFICE O/P EST MOD 30 MIN: CPT | Mod: 25,S$GLB,, | Performed by: FAMILY MEDICINE

## 2019-10-14 PROCEDURE — 90471 FLU VACCINE (QUAD) GREATER THAN OR EQUAL TO 3YO PRESERVATIVE FREE IM: ICD-10-PCS | Mod: S$GLB,,, | Performed by: FAMILY MEDICINE

## 2019-10-14 PROCEDURE — 99999 PR PBB SHADOW E&M-EST. PATIENT-LVL V: CPT | Mod: PBBFAC,,, | Performed by: FAMILY MEDICINE

## 2019-10-14 PROCEDURE — 3078F PR MOST RECENT DIASTOLIC BLOOD PRESSURE < 80 MM HG: ICD-10-PCS | Mod: CPTII,S$GLB,, | Performed by: FAMILY MEDICINE

## 2019-10-14 PROCEDURE — 3075F SYST BP GE 130 - 139MM HG: CPT | Mod: CPTII,S$GLB,, | Performed by: FAMILY MEDICINE

## 2019-10-14 PROCEDURE — 3075F PR MOST RECENT SYSTOLIC BLOOD PRESS GE 130-139MM HG: ICD-10-PCS | Mod: CPTII,S$GLB,, | Performed by: FAMILY MEDICINE

## 2019-10-14 PROCEDURE — 36415 COLL VENOUS BLD VENIPUNCTURE: CPT

## 2019-10-14 PROCEDURE — 71046 X-RAY EXAM CHEST 2 VIEWS: CPT | Mod: TC

## 2019-10-14 PROCEDURE — 90472 TDAP VACCINE GREATER THAN OR EQUAL TO 7YO IM: ICD-10-PCS | Mod: S$GLB,,, | Performed by: FAMILY MEDICINE

## 2019-10-14 PROCEDURE — 86803 HEPATITIS C AB TEST: CPT

## 2019-10-14 PROCEDURE — 90686 FLU VACCINE (QUAD) GREATER THAN OR EQUAL TO 3YO PRESERVATIVE FREE IM: ICD-10-PCS | Mod: S$GLB,,, | Performed by: FAMILY MEDICINE

## 2019-10-14 PROCEDURE — 90472 IMMUNIZATION ADMIN EACH ADD: CPT | Mod: S$GLB,,, | Performed by: FAMILY MEDICINE

## 2019-10-14 PROCEDURE — 71046 X-RAY EXAM CHEST 2 VIEWS: CPT | Mod: 26,,, | Performed by: RADIOLOGY

## 2019-10-14 PROCEDURE — 80053 COMPREHEN METABOLIC PANEL: CPT

## 2019-10-14 PROCEDURE — 90715 TDAP VACCINE 7 YRS/> IM: CPT | Mod: S$GLB,,, | Performed by: FAMILY MEDICINE

## 2019-10-14 PROCEDURE — 93010 EKG 12-LEAD: ICD-10-PCS | Mod: S$GLB,,, | Performed by: INTERNAL MEDICINE

## 2019-10-14 RX ORDER — ATORVASTATIN CALCIUM 20 MG/1
20 TABLET, FILM COATED ORAL NIGHTLY
Qty: 30 TABLET | Refills: 1 | Status: SHIPPED | OUTPATIENT
Start: 2019-10-14 | End: 2019-10-21 | Stop reason: SDUPTHER

## 2019-10-14 RX ORDER — FLUTICASONE PROPIONATE 50 MCG
2 SPRAY, SUSPENSION (ML) NASAL DAILY
Qty: 3 BOTTLE | Refills: 4 | Status: SHIPPED | OUTPATIENT
Start: 2019-10-14 | End: 2019-10-17 | Stop reason: SINTOL

## 2019-10-14 NOTE — ASSESSMENT & PLAN NOTE
He describes typical chronic seasonal nasal congestion and rhinorrhea exacerbated by pollen and changes in weather.

## 2019-10-14 NOTE — ASSESSMENT & PLAN NOTE
Wt Readings from Last 6 Encounters:   10/14/19 (!) 149.6 kg (329 lb 12.9 oz)   12/03/18 (!) 145 kg (319 lb 10.7 oz)   12/03/18 (!) 145.7 kg (321 lb 3.4 oz)   06/17/18 (!) 140.2 kg (309 lb 1.4 oz)   03/05/18 (!) 142.1 kg (313 lb 4.4 oz)   01/22/18 (!) 142.6 kg (314 lb 6 oz)     BMI Readings from Last 2 Encounters:   10/14/19 43.51 kg/m²   12/03/18 41.94 kg/m²     Worse. Therapeutic lifestyle changes encouraged.

## 2019-10-14 NOTE — PROGRESS NOTES
I'm happy to report that your chest x-ray did NOT show a pneumonia or anything else bad in your lungs and heart.

## 2019-10-14 NOTE — ASSESSMENT & PLAN NOTE
Lab Results   Component Value Date    CHOL 186 12/03/2018    CHOL 193 08/26/2013    TRIG 173 (H) 12/03/2018    TRIG 56 08/26/2013    HDL 48 12/03/2018    HDL 49 08/26/2013    LDLCALC 103.4 12/03/2018    LDLCALC 133.0 08/26/2013    NONHDLCHOL 138 12/03/2018    AST 25 12/03/2018    ALT 33 12/03/2018     Wt Readings from Last 2 Encounters:   10/14/19 (!) 149.6 kg (329 lb 12.9 oz)   12/03/18 (!) 145 kg (319 lb 10.7 oz)     Body mass index is 43.51 kg/m².  The 10-year ASCVD risk score (Catymyla SMITH Jr., et al., 2013) is: 6.9%    Values used to calculate the score:      Age: 53 years      Sex: Male      Is Non- : No      Diabetic: No      Tobacco smoker: No      Systolic Blood Pressure: 136 mmHg      Is BP treated: Yes      HDL Cholesterol: 48 mg/dL      Total Cholesterol: 186 mg/dL

## 2019-10-14 NOTE — PROGRESS NOTES
CHIEF COMPLAINT  Sinus Problem      HISTORY, ASSESSMENT, and PLAN    1. Dyspnea on exertion        ASSESSMENT:  Gradual onset over last several months.  He feels he gets winded more easily.  No exertional chest discomfort.     2. Heart murmur        ASSESSMENT: This problem is NEW. This problem requires further workup.      3. Seasonal allergic rhinitis due to pollen    4. Chronic nasal congestion        ASSESSMENT:  Worsening symptoms over last few months, to the point that it is making it difficult for him to use his CPAP.     5. Obstructive sleep apnea treated with continuous positive airway pressure (CPAP)        ASSESSMENT:  He he reports worsening hypersomnia.  He agreed to schedule follow-up appointment with sleep clinic for further evaluation and treatment.     6. Obesity, Class III, BMI 40-49.9 (morbid obesity)        ASSESSMENT:  Worse, in spite of his efforts at therapeutic lifestyle changes.  We discussed differential diagnosis and treatment options.     7. Benign essential hypertension        ASSESSMENT: Well Controlled. Improved.      8. Actinic keratoses        ASSESSMENT: Multiple.     9. Preventative health care    10. Screening for lipid disorders    11. Screening PSA (prostate specific antigen)    12. Need for influenza vaccination    13. Encounter for hepatitis C screening test for low risk patient    14. At risk for cardiovascular event    15. Need for diphtheria-tetanus-pertussis (Tdap) vaccine    16. Need for shingles vaccine        Orders Placed This Encounter    X-Ray Chest PA And Lateral    Tdap Vaccine    Influenza - Quadrivalent (Recombinant) (PF)    Comprehensive metabolic panel    Hepatitis C antibody    Lipid panel    PSA, Screening    CBC auto differential    TSH    Ambulatory Referral to Cardiology    Ambulatory Referral to ENT    Ambulatory Referral to Dermatology    SCHEDULED EKG 12-LEAD (to [facility])    fluticasone propionate (FLONASE) 50 mcg/actuation nasal spray     atorvastatin (LIPITOR) 20 MG tablet    varicella-zoster gE-AS01B, PF, (SHINGRIX) 50 mcg/0.5 mL injection       Problem List Items Addressed This Visit        Derm    Actinic keratoses    Relevant Orders    Ambulatory Referral to Dermatology       Cardiac/Vascular    Heart murmur    Relevant Orders    Ambulatory Referral to Cardiology    Benign essential hypertension    Relevant Orders    SCHEDULED EKG 12-LEAD (to [place])    Comprehensive metabolic panel    TSH    At risk for cardiovascular event    Current Assessment & Plan     Lab Results   Component Value Date    CHOL 186 12/03/2018    CHOL 193 08/26/2013    TRIG 173 (H) 12/03/2018    TRIG 56 08/26/2013    HDL 48 12/03/2018    HDL 49 08/26/2013    LDLCALC 103.4 12/03/2018    LDLCALC 133.0 08/26/2013    NONHDLCHOL 138 12/03/2018    AST 25 12/03/2018    ALT 33 12/03/2018     Wt Readings from Last 2 Encounters:   10/14/19 (!) 149.6 kg (329 lb 12.9 oz)   12/03/18 (!) 145 kg (319 lb 10.7 oz)     Body mass index is 43.51 kg/m².  The 10-year ASCVD risk score (Caty SMITH Jr., et al., 2013) is: 6.9%    Values used to calculate the score:      Age: 53 years      Sex: Male      Is Non- : No      Diabetic: No      Tobacco smoker: No      Systolic Blood Pressure: 136 mmHg      Is BP treated: Yes      HDL Cholesterol: 48 mg/dL      Total Cholesterol: 186 mg/dL         Relevant Medications    atorvastatin (LIPITOR) 20 MG tablet       Endocrine    Obesity, Class III, BMI 40-49.9 (morbid obesity) (Chronic)    Current Assessment & Plan     Wt Readings from Last 6 Encounters:   10/14/19 (!) 149.6 kg (329 lb 12.9 oz)   12/03/18 (!) 145 kg (319 lb 10.7 oz)   12/03/18 (!) 145.7 kg (321 lb 3.4 oz)   06/17/18 (!) 140.2 kg (309 lb 1.4 oz)   03/05/18 (!) 142.1 kg (313 lb 4.4 oz)   01/22/18 (!) 142.6 kg (314 lb 6 oz)     BMI Readings from Last 2 Encounters:   10/14/19 43.51 kg/m²   12/03/18 41.94 kg/m²     Worse. Therapeutic lifestyle changes encouraged.           Relevant Orders    Comprehensive metabolic panel    Lipid panel    CBC auto differential    TSH       Other    Seasonal allergic rhinitis due to pollen (Chronic)    Current Assessment & Plan     He describes typical chronic seasonal nasal congestion and rhinorrhea exacerbated by pollen and changes in weather.          Relevant Medications    fluticasone propionate (FLONASE) 50 mcg/actuation nasal spray    Other Relevant Orders    Ambulatory Referral to ENT    Obstructive sleep apnea treated with continuous positive airway pressure (CPAP) (Chronic)    Relevant Orders    CBC auto differential    Dyspnea on exertion - Primary    Current Assessment & Plan     Onset over last 2 months         Relevant Orders    Ambulatory Referral to Cardiology    SCHEDULED EKG 12-LEAD (to [facility])    X-Ray Chest PA And Lateral    Comprehensive metabolic panel    CBC auto differential      Other Visit Diagnoses     Chronic nasal congestion        Relevant Orders    Ambulatory Referral to ENT    Preventative health care        Relevant Medications    varicella-zoster gE-AS01B, PF, (SHINGRIX) 50 mcg/0.5 mL injection    Other Relevant Orders    Tdap Vaccine    Influenza - Quadrivalent (Recombinant) (PF)    Screening for lipid disorders        Relevant Orders    Lipid panel    Screening PSA (prostate specific antigen)        Relevant Orders    PSA, Screening    Need for influenza vaccination        Relevant Orders    Influenza - Quadrivalent (Recombinant) (PF)    Encounter for hepatitis C screening test for low risk patient        Relevant Orders    Hepatitis C antibody    Need for diphtheria-tetanus-pertussis (Tdap) vaccine        Relevant Orders    Tdap Vaccine    Need for shingles vaccine        Relevant Medications    varicella-zoster gE-AS01B, PF, (SHINGRIX) 50 mcg/0.5 mL injection           Outpatient Medications Prior to Visit   Medication Sig Dispense Refill    acetaminophen (TYLENOL) 650 MG TbSR Take 650 mg by mouth as needed.       "amLODIPine (NORVASC) 10 MG tablet Take 1 tablet (10 mg total) by mouth once daily. 90 tablet 3    losartan-hydrochlorothiazide 100-25 mg (HYZAAR) 100-25 mg per tablet Take 1 tablet by mouth once daily. 90 tablet 3    sodium chloride (OCEAN) 0.65 % nasal spray 1 spray by Nasal route as needed for Congestion.      fluticasone (FLONASE) 50 mcg/actuation nasal spray 2 sprays by Each Nare route once daily. 1 Bottle 11     No facility-administered medications prior to visit.         Medications Ordered This Encounter   Medications    atorvastatin (LIPITOR) 20 MG tablet     Sig: Take 1 tablet (20 mg total) by mouth every evening.     Dispense:  30 tablet     Refill:  1    fluticasone propionate (FLONASE) 50 mcg/actuation nasal spray     Si sprays (100 mcg total) by Each Nostril route once daily.     Dispense:  3 Bottle     Refill:  4    varicella-zoster gE-AS01B, PF, (SHINGRIX) 50 mcg/0.5 mL injection     Sig: Inject 0.5 mL (one dose) into muscle now; give second dose at least 2 months later     Dispense:  0.5 mL     Refill:  1       Medications Discontinued During This Encounter   Medication Reason    fluticasone (FLONASE) 50 mcg/actuation nasal spray Reorder        Follow up in about 1 week (around 10/21/2019) for re-evaluate problem(s) discussed today.    REVIEW OF SYSTEMS  CARDIOVASCULAR: No chest pain/discomfort, palpitations, or syncope reported.  PULMONARY: No hemoptysis reported.  ENDOCRINE: No polyuria or polydipsia reported.     PHYSICAL EXAM  Vitals:    10/14/19 1431   BP: 136/60   BP Location: Right arm   Patient Position: Sitting   BP Method: Large (Manual)   Pulse: 82   Temp: 98.4 °F (36.9 °C)   TempSrc: Tympanic   SpO2: (!) 94%   Weight: (!) 149.6 kg (329 lb 12.9 oz)   Height: 6' 1" (1.854 m)     CONSTITUTIONAL: Vital signs noted. No apparent distress. Does not appear acutely ill or septic. Appears adequately hydrated.  HEENT: External ENT grossly unremarkable. Hearing grossly intact. Oropharynx " "moist.  PULM: Lungs clear. Breathing unlabored.  HEART: Auscultation reveals regular rate and rhythm with grade 2/6 systolic murmur. No gallop or rub. No carotid bruit.  DERM: Skin warm and moist with normal turgor.  NEURO: There are no gross focal motor deficits or gross deficits of cranial nerves III-XII.  PSYCHIATRIC: Alert and conversant. Mood grossly neutral. Judgment and insight not grossly compromised.     TOTAL TIME evaluating and managing this patient for this encounter exceeded 25 minutes, the majority spent counseling and coordinating care for the listed diagnoses.   Documentation entered by me for this encounter may have been done in part using speech-recognition technology. Although I have made an effort to ensure accuracy, "sound like" errors may exist and should be interpreted in context. -DENYS Barba MD.   "

## 2019-10-15 LAB
ALBUMIN SERPL BCP-MCNC: 3.9 G/DL (ref 3.5–5.2)
ALP SERPL-CCNC: 94 U/L (ref 55–135)
ALT SERPL W/O P-5'-P-CCNC: 29 U/L (ref 10–44)
ANION GAP SERPL CALC-SCNC: 12 MMOL/L (ref 8–16)
AST SERPL-CCNC: 23 U/L (ref 10–40)
BASOPHILS # BLD AUTO: 0.04 K/UL (ref 0–0.2)
BASOPHILS NFR BLD: 0.4 % (ref 0–1.9)
BILIRUB SERPL-MCNC: 0.6 MG/DL (ref 0.1–1)
BUN SERPL-MCNC: 25 MG/DL (ref 6–20)
CALCIUM SERPL-MCNC: 9.3 MG/DL (ref 8.7–10.5)
CHLORIDE SERPL-SCNC: 99 MMOL/L (ref 95–110)
CHOLEST SERPL-MCNC: 185 MG/DL (ref 120–199)
CHOLEST/HDLC SERPL: 4.9 {RATIO} (ref 2–5)
CO2 SERPL-SCNC: 26 MMOL/L (ref 23–29)
COMPLEXED PSA SERPL-MCNC: 0.31 NG/ML (ref 0–4)
CREAT SERPL-MCNC: 1.2 MG/DL (ref 0.5–1.4)
DIFFERENTIAL METHOD: ABNORMAL
EOSINOPHIL # BLD AUTO: 0.2 K/UL (ref 0–0.5)
EOSINOPHIL NFR BLD: 2.1 % (ref 0–8)
ERYTHROCYTE [DISTWIDTH] IN BLOOD BY AUTOMATED COUNT: 12.6 % (ref 11.5–14.5)
EST. GFR  (AFRICAN AMERICAN): >60 ML/MIN/1.73 M^2
EST. GFR  (NON AFRICAN AMERICAN): >60 ML/MIN/1.73 M^2
GLUCOSE SERPL-MCNC: 104 MG/DL (ref 70–110)
HCT VFR BLD AUTO: 39.4 % (ref 40–54)
HDLC SERPL-MCNC: 38 MG/DL (ref 40–75)
HDLC SERPL: 20.5 % (ref 20–50)
HGB BLD-MCNC: 12.8 G/DL (ref 14–18)
IMM GRANULOCYTES # BLD AUTO: 0.05 K/UL (ref 0–0.04)
IMM GRANULOCYTES NFR BLD AUTO: 0.6 % (ref 0–0.5)
LDLC SERPL CALC-MCNC: 80.8 MG/DL (ref 63–159)
LYMPHOCYTES # BLD AUTO: 2.7 K/UL (ref 1–4.8)
LYMPHOCYTES NFR BLD: 29.7 % (ref 18–48)
MCH RBC QN AUTO: 32 PG (ref 27–31)
MCHC RBC AUTO-ENTMCNC: 32.5 G/DL (ref 32–36)
MCV RBC AUTO: 99 FL (ref 82–98)
MONOCYTES # BLD AUTO: 0.8 K/UL (ref 0.3–1)
MONOCYTES NFR BLD: 9.1 % (ref 4–15)
NEUTROPHILS # BLD AUTO: 5.2 K/UL (ref 1.8–7.7)
NEUTROPHILS NFR BLD: 58.1 % (ref 38–73)
NONHDLC SERPL-MCNC: 147 MG/DL
NRBC BLD-RTO: 0 /100 WBC
PLATELET # BLD AUTO: 172 K/UL (ref 150–350)
PMV BLD AUTO: 11.4 FL (ref 9.2–12.9)
POTASSIUM SERPL-SCNC: 4 MMOL/L (ref 3.5–5.1)
PROT SERPL-MCNC: 7.9 G/DL (ref 6–8.4)
RBC # BLD AUTO: 4 M/UL (ref 4.6–6.2)
SODIUM SERPL-SCNC: 137 MMOL/L (ref 136–145)
TRIGL SERPL-MCNC: 331 MG/DL (ref 30–150)
TSH SERPL DL<=0.005 MIU/L-ACNC: 3.98 UIU/ML (ref 0.4–4)
WBC # BLD AUTO: 8.95 K/UL (ref 3.9–12.7)

## 2019-10-16 ENCOUNTER — OFFICE VISIT (OUTPATIENT)
Dept: SLEEP MEDICINE | Facility: CLINIC | Age: 54
End: 2019-10-16
Payer: COMMERCIAL

## 2019-10-16 VITALS
HEART RATE: 95 BPM | BODY MASS INDEX: 41.75 KG/M2 | HEIGHT: 73 IN | DIASTOLIC BLOOD PRESSURE: 80 MMHG | OXYGEN SATURATION: 96 % | RESPIRATION RATE: 18 BRPM | WEIGHT: 315 LBS | SYSTOLIC BLOOD PRESSURE: 130 MMHG

## 2019-10-16 DIAGNOSIS — G47.33 OBSTRUCTIVE SLEEP APNEA TREATED WITH CONTINUOUS POSITIVE AIRWAY PRESSURE (CPAP): Primary | Chronic | ICD-10-CM

## 2019-10-16 DIAGNOSIS — E66.01 MORBID OBESITY WITH BMI OF 40.0-44.9, ADULT: ICD-10-CM

## 2019-10-16 DIAGNOSIS — R06.02 SOB (SHORTNESS OF BREATH): ICD-10-CM

## 2019-10-16 LAB — HCV AB SERPL QL IA: NEGATIVE

## 2019-10-16 PROCEDURE — 99214 OFFICE O/P EST MOD 30 MIN: CPT | Mod: S$GLB,,, | Performed by: NURSE PRACTITIONER

## 2019-10-16 PROCEDURE — 99999 PR PBB SHADOW E&M-EST. PATIENT-LVL III: CPT | Mod: PBBFAC,,, | Performed by: NURSE PRACTITIONER

## 2019-10-16 PROCEDURE — 3079F DIAST BP 80-89 MM HG: CPT | Mod: CPTII,S$GLB,, | Performed by: NURSE PRACTITIONER

## 2019-10-16 PROCEDURE — 3008F BODY MASS INDEX DOCD: CPT | Mod: CPTII,S$GLB,, | Performed by: NURSE PRACTITIONER

## 2019-10-16 PROCEDURE — 3075F SYST BP GE 130 - 139MM HG: CPT | Mod: CPTII,S$GLB,, | Performed by: NURSE PRACTITIONER

## 2019-10-16 PROCEDURE — 99214 PR OFFICE/OUTPT VISIT, EST, LEVL IV, 30-39 MIN: ICD-10-PCS | Mod: S$GLB,,, | Performed by: NURSE PRACTITIONER

## 2019-10-16 PROCEDURE — 99999 PR PBB SHADOW E&M-EST. PATIENT-LVL III: ICD-10-PCS | Mod: PBBFAC,,, | Performed by: NURSE PRACTITIONER

## 2019-10-16 PROCEDURE — 3079F PR MOST RECENT DIASTOLIC BLOOD PRESSURE 80-89 MM HG: ICD-10-PCS | Mod: CPTII,S$GLB,, | Performed by: NURSE PRACTITIONER

## 2019-10-16 PROCEDURE — 3075F PR MOST RECENT SYSTOLIC BLOOD PRESS GE 130-139MM HG: ICD-10-PCS | Mod: CPTII,S$GLB,, | Performed by: NURSE PRACTITIONER

## 2019-10-16 PROCEDURE — 3008F PR BODY MASS INDEX (BMI) DOCUMENTED: ICD-10-PCS | Mod: CPTII,S$GLB,, | Performed by: NURSE PRACTITIONER

## 2019-10-16 NOTE — PROGRESS NOTES
"Subjective:      Patient ID: Dragan Man II is a 54 y.o. male.    Chief Complaint: Sleep Apnea    HPI  Patient presents to the office today for evaluation of sleep apnea.  Increased fatigue.  Increased shortness of breath especially in the heat with activity.  He has referral in with Cardiology.  He would also like pulmonary testing.  BMI 43.  No wheezing.  He has cough at times from a tickle in his throat.  He has nasal congestion.  He is a nonsmoker.  He states he had breathing problems when he was a child  .  He wears his auto Pap nightly and benefits from use.    Patient Active Problem List   Diagnosis    Obstructive sleep apnea treated with continuous positive airway pressure (CPAP)    Advanced sleep phase syndrome    Behaviorally induced insufficient sleep syndrome    Sleep-related bruxism    Inadequate sleep hygiene    Obesity, Class III, BMI 40-49.9 (morbid obesity)    Benign essential hypertension    Actinic keratoses    Heart murmur    Dyspnea on exertion    Seasonal allergic rhinitis due to pollen    At risk for cardiovascular event       /80   Pulse 95   Resp 18   Ht 6' 1" (1.854 m)   Wt (!) 149.1 kg (328 lb 11.3 oz)   SpO2 96%   BMI 43.37 kg/m²   Body mass index is 43.37 kg/m².    Review of Systems   Constitutional: Positive for weight gain and fatigue.   HENT: Positive for congestion.    Respiratory: Positive for dyspnea on extertion and somnolence.    All other systems reviewed and are negative.    Objective:      Physical Exam   Constitutional: He is oriented to person, place, and time. He appears well-developed and well-nourished.   Obese   HENT:   Head: Normocephalic and atraumatic.   Nose: Nose normal.   Mouth/Throat: Uvula is midline and oropharynx is clear and moist.   Mallampati Score: IV     Neck: Trachea normal and normal range of motion. Neck supple. No thyroid mass and no thyromegaly present.   Cardiovascular: Normal rate, regular rhythm and normal heart sounds. "   Pulmonary/Chest: Effort normal and breath sounds normal. He has no wheezes. He has no rhonchi. He has no rales. Chest wall is not dull to percussion.   Abdominal: Soft. He exhibits no mass. There is no hepatosplenomegaly or splenomegaly. There is no tenderness.   Distended from obesity   Musculoskeletal: Normal range of motion. He exhibits no edema.   Neurological: He is alert and oriented to person, place, and time.   Skin: Skin is warm and dry.   Psychiatric: He has a normal mood and affect.     Personal Diagnostic Review      Results for orders placed during the hospital encounter of 10/14/19   X-Ray Chest PA And Lateral    Narrative EXAMINATION:  XR CHEST PA AND LATERAL    CLINICAL HISTORY:  Other forms of dyspnea    TECHNIQUE:  PA and lateral views of the chest were performed.    COMPARISON:  December 4, 2017    FINDINGS:  Decreased inspiratory result minimally accentuates heart and pulmonary markings.  Smooth pleural thickening stents today apices bilaterally.  Heart size within upper limits normal and pulmonary vasculatures stable and symmetric.  Midline trachea and sharp CP angles bilaterally.  Stable smooth elevation right hemidiaphragm.  No focal consolidation or effusion.    Generalized osteopenia and spondylosis with stable mild accentuated kyphosis.  Multilevel anterior vertebral body wedging in the mid to lower T-spine.      Impression Stable exam without acute infiltrate.  See above.      Electronically signed by: Igor Acosta MD  Date:    10/14/2019  Time:    18:08         Assessment:       1. Obstructive sleep apnea treated with continuous positive airway pressure (CPAP)    2. SOB (shortness of breath)    3. Morbid obesity with BMI of 40.0-44.9, adult        Outpatient Encounter Medications as of 10/16/2019   Medication Sig Dispense Refill    acetaminophen (TYLENOL) 650 MG TbSR Take 650 mg by mouth as needed.      amLODIPine (NORVASC) 10 MG tablet Take 1 tablet (10 mg total) by mouth once  daily. 90 tablet 3    atorvastatin (LIPITOR) 20 MG tablet Take 1 tablet (20 mg total) by mouth every evening. 30 tablet 1    fluticasone propionate (FLONASE) 50 mcg/actuation nasal spray 2 sprays (100 mcg total) by Each Nostril route once daily. 3 Bottle 4    losartan-hydrochlorothiazide 100-25 mg (HYZAAR) 100-25 mg per tablet Take 1 tablet by mouth once daily. 90 tablet 3    sodium chloride (OCEAN) 0.65 % nasal spray 1 spray by Nasal route as needed for Congestion.      varicella-zoster gE-AS01B, PF, (SHINGRIX) 50 mcg/0.5 mL injection Inject 0.5 mL (one dose) into muscle now; give second dose at least 2 months later 0.5 mL 1     No facility-administered encounter medications on file as of 10/16/2019.      Orders Placed This Encounter   Procedures    CPAP/BIPAP SUPPLIES     Order Specific Question:   Type of mask:     Answer:   FFM     Order Specific Question:   Headgear?     Answer:   Yes     Order Specific Question:   Tubing?     Answer:   Yes     Order Specific Question:   Humidifier chamber?     Answer:   Yes     Order Specific Question:   Chin strap?     Answer:   Yes     Order Specific Question:   Filters?     Answer:   Yes     Order Specific Question:   Cushions?     Answer:   Yes     Order Specific Question:   Length of need (1-99 months):     Answer:   99    Complete PFT with bronchodilator     Standing Status:   Future     Standing Expiration Date:   10/15/2020     Plan:        Problem List Items Addressed This Visit        Other    Obstructive sleep apnea treated with continuous positive airway pressure (CPAP) - Primary (Chronic)    Relevant Orders    CPAP/BIPAP SUPPLIES      Other Visit Diagnoses     SOB (shortness of breath)        Relevant Orders    Complete PFT with bronchodilator    Morbid obesity with BMI of 40.0-44.9, adult          Discussed Domo mel for weight loss- calorie and activity tracker.

## 2019-10-16 NOTE — PROGRESS NOTES
[facility]: [facility]  Follow-Up    Patient reports he has been having shortness of breath and has been back and forth doing testing at the hospital.  Patient reports he thinks it may just be allergies.    Patient denies any other concerns at this time.           Follow Up  Follow-up reason(s): reading review            Physical Activity:   When asked if exercising, patient responded: yes    Patient participates in the following activities: biking    Patient reports he has a stationary bike.  Patient states he is going to try to start doing 3miles a couple of days/wk.     Assigning the following patient goal(s): increase physical activity      INTERVENTION(S)  recommend physical activity and encouragement/support      There are no preventive care reminders to display for this patient.    Last 5 Patient Entered Readings                                      Current 30 Day Average: 135/69     Recent Readings 10/9/2019 10/9/2019 10/9/2019 10/8/2019 10/8/2019    SBP (mmHg) 136 143 138 136 142    DBP (mmHg) 69 72 70 62 66    Pulse 79 59 58 61 65

## 2019-10-16 NOTE — PROGRESS NOTES
Referring Provider:    DENYS Barba Md  [address] [place] [address]  Subjective:   Patient: Dragan Man II [id number], :1965   Visit date:10/17/2019 8:37 AM    Chief Complaint:  Nasal Congestion    HPI:  Dragan is a 54 y.o. male who I was asked to see in consultation for evaluation of the following issue(s):    Patient presented to clinic on 10/17/19 for an evaluation of chronic nasal congestion and AR. Has been on flonase and nasal saline with limited relief.  Prior allergy testing in  with Dr. Allred- cats, cockroach, dust mite, bald cypress, grass pollen.  Primary complaint for me is bilateral nasal obstruction.  Alternates sides.  No facial pain or pressure.  No purulent drainage.     Review of Systems:  Negative unless checked off.  Gen:  []fever   []fatigue  HENT:  []nosebleeds  []dental problem   Eyes:  []photophobia  []visual disturbance  Resp:  []chest tightness []wheezing  Card:  []chest pain  []leg swelling  GI:  []abdominal pain []blood in stool  :  []dysuria  []hematuria  Musc:  []joint swelling  []gait problem  Skin:  []color change  []pallor  Neuro:  []seizures  []numbness  Hem:  []bruise/bleed easily  Psych:  []hallucinations  []behavioral problems  Allergy/Imm: is allergic to olmesartan.    His meds, allergies, medical, surgical, social & family histories were reviewed & updated:  -     He has a current medication list which includes the following prescription(s): acetaminophen, amlodipine, atorvastatin, doxycycline, losartan-hydrochlorothiazide 100-25 mg, metronidazole, sodium chloride, varicella-zoster ge-as01b (pf), cetirizine, clindamycin, and mometasone.  -     He  has a past medical history of Actinic keratoses (10/14/2019), Depression, LAVERNE (obstructive sleep apnea), and Seasonal allergic rhinitis due to pollen (10/14/2019).   -     He does not have any pertinent problems on file.   -     He  has a past surgical history that includes None.  -     He   "reports that he has never smoked. He has quit using smokeless tobacco. He reports that he drinks alcohol. He reports that he does not use drugs.  -     His family history includes Diabetes in his father and paternal grandfather.  -     He is allergic to olmesartan.    Objective:     Physical Exam:  Vitals:  /64   Pulse 60   Ht 6' 1" (1.854 m)   Wt (!) 149.2 kg (328 lb 14.8 oz)   BMI 43.40 kg/m²   General appearance:  Well developed, well nourished    Eyes:  Extraocular motions intact, PERRL    Communication:  no hoarseness, no dysphonia    Ears:  Otoscopy of external auditory canals and tympanic membranes was normal, clinical speech reception thresholds grossly intact, no mass/lesion of auricle.  Nose:  No masses/lesions of external nose, nasal mucosa, septum, and turbinates were within normal limits.  Mouth:  No mass/lesion of lips, teeth, gums, hard/soft palate, tongue, tonsils, or oropharynx.    Cardiovascular:  No pedal edema; Radial Pulses +2     Neck & Lymphatics:  No cervical lymphadenopathy, no neck mass/crepitus/ asymmetry, trachea is midline, no thyroid enlargement/tenderness/mass.    Psych: Oriented x3,  Alert with normal mood and affect.     Respiration/Chest:  Symmetric expansion during respiration, normal respiratory effort.    Skin:  Warm and intact. No ulcerations of face, scalp, neck.    Assessment & Plan:   Dragan was seen today for nasal congestion.    Diagnoses and all orders for this visit:    Allergic rhinitis due to animal hair and dander  -     Ambulatory referral to Allergy    Nasal turbinate hypertrophy    Deviated nasal septum      Discussed options of turbinate reduction +/- septoplasty but would needs allergy optimization.  Discussed that congestion/obstruction may resolve with allergy treatment.  Will plan to see him back in 6 weeks and if still unable to breathe through the nose, will discuss surgical/procedural options again.           Thank you for allowing me to " participate in the care of Dragan.    Emmanuel Hayes MD, FACS

## 2019-10-17 ENCOUNTER — OFFICE VISIT (OUTPATIENT)
Dept: ALLERGY | Facility: CLINIC | Age: 54
End: 2019-10-17
Payer: COMMERCIAL

## 2019-10-17 ENCOUNTER — LAB VISIT (OUTPATIENT)
Dept: LAB | Facility: HOSPITAL | Age: 54
End: 2019-10-17
Attending: ALLERGY & IMMUNOLOGY
Payer: COMMERCIAL

## 2019-10-17 ENCOUNTER — OFFICE VISIT (OUTPATIENT)
Dept: CARDIOLOGY | Facility: CLINIC | Age: 54
End: 2019-10-17
Payer: COMMERCIAL

## 2019-10-17 ENCOUNTER — OFFICE VISIT (OUTPATIENT)
Dept: OTOLARYNGOLOGY | Facility: CLINIC | Age: 54
End: 2019-10-17
Payer: COMMERCIAL

## 2019-10-17 ENCOUNTER — INITIAL CONSULT (OUTPATIENT)
Dept: DERMATOLOGY | Facility: CLINIC | Age: 54
End: 2019-10-17
Payer: COMMERCIAL

## 2019-10-17 VITALS
BODY MASS INDEX: 41.75 KG/M2 | DIASTOLIC BLOOD PRESSURE: 64 MMHG | OXYGEN SATURATION: 98 % | WEIGHT: 315 LBS | HEIGHT: 73 IN | SYSTOLIC BLOOD PRESSURE: 136 MMHG | HEART RATE: 60 BPM

## 2019-10-17 VITALS
WEIGHT: 315 LBS | SYSTOLIC BLOOD PRESSURE: 136 MMHG | HEART RATE: 60 BPM | DIASTOLIC BLOOD PRESSURE: 64 MMHG | HEIGHT: 73 IN | BODY MASS INDEX: 41.75 KG/M2

## 2019-10-17 VITALS
TEMPERATURE: 97 F | HEIGHT: 73 IN | BODY MASS INDEX: 41.75 KG/M2 | WEIGHT: 315 LBS | DIASTOLIC BLOOD PRESSURE: 68 MMHG | SYSTOLIC BLOOD PRESSURE: 134 MMHG | HEART RATE: 64 BPM

## 2019-10-17 DIAGNOSIS — R06.09 DOE (DYSPNEA ON EXERTION): ICD-10-CM

## 2019-10-17 DIAGNOSIS — J34.3 NASAL TURBINATE HYPERTROPHY: ICD-10-CM

## 2019-10-17 DIAGNOSIS — L71.9 ROSACEA: Primary | ICD-10-CM

## 2019-10-17 DIAGNOSIS — J30.1 SEASONAL ALLERGIC RHINITIS DUE TO POLLEN: Chronic | ICD-10-CM

## 2019-10-17 DIAGNOSIS — G47.33 OBSTRUCTIVE SLEEP APNEA TREATED WITH CONTINUOUS POSITIVE AIRWAY PRESSURE (CPAP): Chronic | ICD-10-CM

## 2019-10-17 DIAGNOSIS — L73.9 FOLLICULITIS: ICD-10-CM

## 2019-10-17 DIAGNOSIS — I10 BENIGN ESSENTIAL HYPERTENSION: ICD-10-CM

## 2019-10-17 DIAGNOSIS — J31.0 RHINITIS, UNSPECIFIED TYPE: ICD-10-CM

## 2019-10-17 DIAGNOSIS — L57.0 ACTINIC KERATOSES: ICD-10-CM

## 2019-10-17 DIAGNOSIS — R07.89 OTHER CHEST PAIN: Primary | ICD-10-CM

## 2019-10-17 DIAGNOSIS — R01.1 HEART MURMUR: ICD-10-CM

## 2019-10-17 DIAGNOSIS — J31.0 RHINITIS, UNSPECIFIED TYPE: Primary | ICD-10-CM

## 2019-10-17 DIAGNOSIS — J30.81 ALLERGIC RHINITIS DUE TO ANIMAL HAIR AND DANDER: Primary | ICD-10-CM

## 2019-10-17 DIAGNOSIS — J34.2 DEVIATED NASAL SEPTUM: ICD-10-CM

## 2019-10-17 DIAGNOSIS — E66.01 OBESITY, CLASS III, BMI 40-49.9 (MORBID OBESITY): Chronic | ICD-10-CM

## 2019-10-17 LAB — IGE SERPL-ACNC: 750 IU/ML (ref 0–100)

## 2019-10-17 PROCEDURE — 86003 ALLG SPEC IGE CRUDE XTRC EA: CPT

## 2019-10-17 PROCEDURE — 99245 PR OFFICE CONSULTATION,LEVEL V: ICD-10-PCS | Mod: S$GLB,,, | Performed by: INTERNAL MEDICINE

## 2019-10-17 PROCEDURE — 99999 PR PBB SHADOW E&M-EST. PATIENT-LVL III: ICD-10-PCS | Mod: PBBFAC,,, | Performed by: OTOLARYNGOLOGY

## 2019-10-17 PROCEDURE — 99999 PR PBB SHADOW E&M-EST. PATIENT-LVL III: CPT | Mod: PBBFAC,,, | Performed by: ALLERGY & IMMUNOLOGY

## 2019-10-17 PROCEDURE — 99999 PR PBB SHADOW E&M-EST. PATIENT-LVL III: CPT | Mod: PBBFAC,,, | Performed by: INTERNAL MEDICINE

## 2019-10-17 PROCEDURE — 99999 PR PBB SHADOW E&M-EST. PATIENT-LVL II: ICD-10-PCS | Mod: PBBFAC,,, | Performed by: PHYSICIAN ASSISTANT

## 2019-10-17 PROCEDURE — 99999 PR PBB SHADOW E&M-EST. PATIENT-LVL III: ICD-10-PCS | Mod: PBBFAC,,, | Performed by: ALLERGY & IMMUNOLOGY

## 2019-10-17 PROCEDURE — 3075F SYST BP GE 130 - 139MM HG: CPT | Mod: CPTII,S$GLB,, | Performed by: ALLERGY & IMMUNOLOGY

## 2019-10-17 PROCEDURE — 95004 PERQ TESTS W/ALRGNC XTRCS: CPT | Mod: S$GLB,,, | Performed by: ALLERGY & IMMUNOLOGY

## 2019-10-17 PROCEDURE — 99999 PR PBB SHADOW E&M-EST. PATIENT-LVL III: ICD-10-PCS | Mod: PBBFAC,,, | Performed by: INTERNAL MEDICINE

## 2019-10-17 PROCEDURE — 3078F DIAST BP <80 MM HG: CPT | Mod: CPTII,S$GLB,, | Performed by: ALLERGY & IMMUNOLOGY

## 2019-10-17 PROCEDURE — 99204 OFFICE O/P NEW MOD 45 MIN: CPT | Mod: 25,S$GLB,, | Performed by: ALLERGY & IMMUNOLOGY

## 2019-10-17 PROCEDURE — 36415 COLL VENOUS BLD VENIPUNCTURE: CPT

## 2019-10-17 PROCEDURE — 3078F PR MOST RECENT DIASTOLIC BLOOD PRESSURE < 80 MM HG: ICD-10-PCS | Mod: CPTII,S$GLB,, | Performed by: PHYSICIAN ASSISTANT

## 2019-10-17 PROCEDURE — 82785 ASSAY OF IGE: CPT

## 2019-10-17 PROCEDURE — 99213 PR OFFICE/OUTPT VISIT, EST, LEVL III, 20-29 MIN: ICD-10-PCS | Mod: 25,S$GLB,, | Performed by: PHYSICIAN ASSISTANT

## 2019-10-17 PROCEDURE — 17004 DESTROY PREMAL LESIONS 15/>: CPT | Mod: S$GLB,,, | Performed by: PHYSICIAN ASSISTANT

## 2019-10-17 PROCEDURE — 86003 ALLG SPEC IGE CRUDE XTRC EA: CPT | Mod: 59

## 2019-10-17 PROCEDURE — 99243 OFF/OP CNSLTJ NEW/EST LOW 30: CPT | Mod: S$GLB,,, | Performed by: OTOLARYNGOLOGY

## 2019-10-17 PROCEDURE — 99204 PR OFFICE/OUTPT VISIT, NEW, LEVL IV, 45-59 MIN: ICD-10-PCS | Mod: 25,S$GLB,, | Performed by: ALLERGY & IMMUNOLOGY

## 2019-10-17 PROCEDURE — 95004 PR ALLERGY SKIN TESTS,ALLERGENS: ICD-10-PCS | Mod: S$GLB,,, | Performed by: ALLERGY & IMMUNOLOGY

## 2019-10-17 PROCEDURE — 3074F SYST BP LT 130 MM HG: CPT | Mod: CPTII,S$GLB,, | Performed by: PHYSICIAN ASSISTANT

## 2019-10-17 PROCEDURE — 3078F PR MOST RECENT DIASTOLIC BLOOD PRESSURE < 80 MM HG: ICD-10-PCS | Mod: CPTII,S$GLB,, | Performed by: ALLERGY & IMMUNOLOGY

## 2019-10-17 PROCEDURE — 17004 PR DESTRUCTION, PREMALIGNANT LESIONS; 15 OR MORE LESIONS: ICD-10-PCS | Mod: S$GLB,,, | Performed by: PHYSICIAN ASSISTANT

## 2019-10-17 PROCEDURE — 3074F PR MOST RECENT SYSTOLIC BLOOD PRESSURE < 130 MM HG: ICD-10-PCS | Mod: CPTII,S$GLB,, | Performed by: PHYSICIAN ASSISTANT

## 2019-10-17 PROCEDURE — 99999 PR PBB SHADOW E&M-EST. PATIENT-LVL II: CPT | Mod: PBBFAC,,, | Performed by: PHYSICIAN ASSISTANT

## 2019-10-17 PROCEDURE — 99213 OFFICE O/P EST LOW 20 MIN: CPT | Mod: 25,S$GLB,, | Performed by: PHYSICIAN ASSISTANT

## 2019-10-17 PROCEDURE — 99245 OFF/OP CONSLTJ NEW/EST HI 55: CPT | Mod: S$GLB,,, | Performed by: INTERNAL MEDICINE

## 2019-10-17 PROCEDURE — 99999 PR PBB SHADOW E&M-EST. PATIENT-LVL III: CPT | Mod: PBBFAC,,, | Performed by: OTOLARYNGOLOGY

## 2019-10-17 PROCEDURE — 3078F DIAST BP <80 MM HG: CPT | Mod: CPTII,S$GLB,, | Performed by: PHYSICIAN ASSISTANT

## 2019-10-17 PROCEDURE — 99243 PR OFFICE CONSULTATION,LEVEL III: ICD-10-PCS | Mod: S$GLB,,, | Performed by: OTOLARYNGOLOGY

## 2019-10-17 PROCEDURE — 3075F PR MOST RECENT SYSTOLIC BLOOD PRESS GE 130-139MM HG: ICD-10-PCS | Mod: CPTII,S$GLB,, | Performed by: ALLERGY & IMMUNOLOGY

## 2019-10-17 RX ORDER — CETIRIZINE HYDROCHLORIDE 10 MG/1
10 TABLET ORAL DAILY
Qty: 30 TABLET | Refills: 3 | COMMUNITY
Start: 2019-10-17 | End: 2021-10-14

## 2019-10-17 RX ORDER — METRONIDAZOLE 7.5 MG/G
GEL TOPICAL
Qty: 1 TUBE | Refills: 6 | Status: SHIPPED | OUTPATIENT
Start: 2019-10-17 | End: 2021-04-12

## 2019-10-17 RX ORDER — CLINDAMYCIN PHOSPHATE 10 MG/ML
SOLUTION TOPICAL 2 TIMES DAILY
Qty: 60 EACH | Refills: 5 | Status: SHIPPED | OUTPATIENT
Start: 2019-10-17 | End: 2021-04-12

## 2019-10-17 RX ORDER — DOXYCYCLINE 100 MG/1
CAPSULE ORAL
Qty: 30 CAPSULE | Refills: 0 | Status: SHIPPED | OUTPATIENT
Start: 2019-10-17 | End: 2019-11-12 | Stop reason: SDUPTHER

## 2019-10-17 RX ORDER — MOMETASONE FUROATE 50 UG/1
2 SPRAY, METERED NASAL DAILY
Qty: 30 G | Refills: 3 | Status: SHIPPED | OUTPATIENT
Start: 2019-10-17 | End: 2020-03-12 | Stop reason: SDUPTHER

## 2019-10-17 NOTE — PROGRESS NOTES
Subjective:       Patient ID: Dragan Man II is a 54 y.o. male.    Chief Complaint:  Allergies      HPI: 54 year old male complaining of a dry nose, post nasal drip, blurry vision, and itchy eyes. He denies a runny nose or nasal congestion. He has seen Dr. Allred in the past- 2014. He is currently using a nasal saline spray and Flonase. He is not taking oral antihistamines. He does not feel that Flonase is helping. He has not been on allergy immunotherapy in the past. He has had one sinus infection over the last year.    Past Medical History:   Diagnosis Date    Actinic keratoses 10/14/2019    Depression     LAVERNE (obstructive sleep apnea)     Seasonal allergic rhinitis due to pollen 10/14/2019     Family History   Problem Relation Age of Onset    Diabetes Father     Diabetes Paternal Grandfather      Current Outpatient Medications on File Prior to Visit   Medication Sig Dispense Refill    acetaminophen (TYLENOL) 650 MG TbSR Take 650 mg by mouth as needed.      amLODIPine (NORVASC) 10 MG tablet Take 1 tablet (10 mg total) by mouth once daily. 90 tablet 3    atorvastatin (LIPITOR) 20 MG tablet Take 1 tablet (20 mg total) by mouth every evening. 30 tablet 1    clindamycin (CLEOCIN T) 1 % Swab Apply topically 2 (two) times daily. For acne of neck. 60 each 5    doxycycline (MONODOX) 100 MG capsule Take once daily with food.  May cause upset stomach. 30 capsule 0    losartan-hydrochlorothiazide 100-25 mg (HYZAAR) 100-25 mg per tablet Take 1 tablet by mouth once daily. 90 tablet 3    metroNIDAZOLE (METROGEL) 0.75 % gel AAA face bid 1 Tube 6    sodium chloride (OCEAN) 0.65 % nasal spray 1 spray by Nasal route as needed for Congestion.      varicella-zoster gE-AS01B, PF, (SHINGRIX) 50 mcg/0.5 mL injection Inject 0.5 mL (one dose) into muscle now; give second dose at least 2 months later 0.5 mL 1    [DISCONTINUED] fluticasone propionate (FLONASE) 50 mcg/actuation nasal spray 2 sprays (100 mcg total) by  Each Nostril route once daily. 3 Bottle 4     No current facility-administered medications on file prior to visit.      Review of patient's allergies indicates:   Allergen Reactions    Olmesartan Other (See Comments)     Numbness and tingling in fingers and knee joint pain   No food or insect sting allergy        Environmental History: 3 dogs, no tobacco, carpet  Review of Systems   Constitutional: Negative for activity change, appetite change, chills and fever.   HENT: Positive for postnasal drip. Negative for congestion.    Eyes: Positive for itching. Negative for discharge.   Respiratory: Negative for cough, shortness of breath and wheezing.    Cardiovascular: Positive for leg swelling. Negative for chest pain.        Saw Cardiology earlier today   Gastrointestinal: Negative for diarrhea, nausea and vomiting.   Endocrine: Negative for polyuria.   Genitourinary: Negative for dysuria.   Musculoskeletal: Negative for arthralgias and back pain.   Skin: Negative for rash.   Allergic/Immunologic: Positive for environmental allergies. Negative for food allergies and immunocompromised state.   Hematological: Does not bruise/bleed easily.   Psychiatric/Behavioral: Negative for agitation and confusion.        Objective:    Physical Exam   Constitutional: He is oriented to person, place, and time. He appears well-developed and well-nourished. He appears distressed.   HENT:   Head: Normocephalic and atraumatic.   Left Ear: External ear normal.   Nose: Nose normal.   Mouth/Throat: Oropharynx is clear and moist.   Eyes: Pupils are equal, round, and reactive to light. EOM are normal. Right eye exhibits no discharge. Left eye exhibits no discharge.   Neck: Normal range of motion. Neck supple. No thyromegaly present.   Cardiovascular: Normal rate, regular rhythm, normal heart sounds and intact distal pulses. Exam reveals no gallop and no friction rub.   No murmur heard.  Pulmonary/Chest: Effort normal and breath sounds normal.  No stridor. No respiratory distress. He has no wheezes. He has no rales.   Abdominal: Soft. Bowel sounds are normal. He exhibits no distension and no mass. There is no tenderness. There is no guarding.   Musculoskeletal: Normal range of motion. He exhibits edema.   Neurological: He is alert and oriented to person, place, and time.   Skin: Skin is warm and dry. No rash noted. He is not diaphoretic.   Psychiatric: He has a normal mood and affect. His behavior is normal. Thought content normal.   Vitals reviewed.      Laboratory:   Skin prick testing attempted, but inappropriate positive control, thus invalid test.      A  Allergens:  Environmental  Prick  1:1   1 Histamine (+ control)  0   2 Saline (- control)  0   3 Cat Hair  0   4 Dog Epithelia   0   5 Farinae Mite  0   6 Ptero Mite  0   7 Cockroach   0   8 Alternaria  0   9 Aspergillus  0   10 Helminthosporium  0   B POLLENS: TREES and ONE Grass    1 Bald [place] 0   2 [place]  0   3 Elm  0   4 [place]  0   5 Ligustrum  0   6 [place]  0   7 Pecan  0   8 Red [place]  0   9 Victoria  0   10 Bahia  0   C POLLENS: Grass and Weeds    1 Bermuda  0   2 Miguel  0   3 Red Top  0   4 Rye   0   5 Ace  0   6 English Plantain  0   7 Marsh Elder  0   8 Pigweed  0   9 Ragweed  0   10 Russian Thistle   0   D Mold Spores    1 Curvularia  0   2 Epicoccum  0   3 Fusarium  0   4 Hormodendrum  0   5 Penicillium   0   6 Monilia / Yeast  0   7 Phoma  0   8 Stemphylium  0       Assessment:       1. Rhinitis, unspecified type         Plan:     Rhinitis, unspecified type  -     IgE; Future; Expected date: 10/17/2019  -     Bahia grass IgE; Future; Expected date: 10/17/2019  -     Aspergillus fumagatus IgE; Future; Expected date: 10/17/2019  -     Chaetomium globosum IgE; Future; Expected date: 10/17/2019  -     Cockroach, American IgE; Future; Expected date: 10/17/2019  -     Cladosporium IgE; Future; Expected date: 10/17/2019  -     Curvularia lunata IgE; Future; Expected date:  10/17/2019  -     D. farinae IgE; Future; Expected date: 10/17/2019  -     D. pteronyssinus IgE; Future; Expected date: 10/17/2019  -     Dog dander IgE; Future; Expected date: 10/17/2019  -     Plantain, English IgE; Future; Expected date: 10/17/2019  -     Eucalyptus IgE; Future; Expected date: 10/17/2019  -     Marsh elder, rough IgE; Future; Expected date: 10/17/2019  -     Mugwort IgE; Future; Expected date: 10/17/2019  -     Nettle IgE; Future; Expected date: 10/17/2019  -     Orchard grass IgE; Future; Expected date: 10/17/2019  -     Grandy, western white IgE; Future; Expected date: 10/17/2019  -     Privet, common IgE; Future; Expected date: 10/17/2019  -     Ragweed, short, common IgE; Future; Expected date: 10/17/2019  -     Red top grass IgE; Future; Expected date: 10/17/2019  -     Rye grass, cultivated IgE; Future; Expected date: 10/17/2019  -     Thistle, Russian IgE; Future; Expected date: 10/17/2019  -     Stemphyllium IgE; Future; Expected date: 10/17/2019  -     Ace IgE; Future; Expected date: 10/17/2019  -     Miguel grass IgE; Future; Expected date: 10/17/2019  -     ALLERGEN CAT EPITHELLIUM; Future; Expected date: 10/17/2019    Other orders  -     mometasone (NASONEX) 50 mcg/actuation nasal spray; 2 sprays by Nasal route once daily.  Dispense: 30 g; Refill: 3  -     cetirizine (ZYRTEC) 10 MG tablet; Take 1 tablet (10 mg total) by mouth once daily.  Dispense: 30 tablet; Refill: 3

## 2019-10-17 NOTE — LETTER
October 17, 2019      DENYS Barba MD  [address]  [place] [address]           [facility]  [address]  [place] [address]  Phone: [phone]  Fax: [phone]          Patient: Dragan Man II   MR Number: [id number]   YOB: 1965   Date of Visit: 10/17/2019       Dear Dr. DENYS Barba:    Thank you for referring Dragan Man to me for evaluation. Attached you will find relevant portions of my assessment and plan of care.    If you have questions, please do not hesitate to call me. I look forward to following Dragan Man along with you.    Sincerely,    Ron Shaikh MD    Enclosure  CC:  No Recipients    If you would like to receive this communication electronically, please contact [email or web address] or [phone] to request more information on [employer] Link access.    For providers and/or their staff who would like to refer a patient to [facility], please contact us through our one-stop-shop provider referral line, [facility], at [phone].    If you feel you have received this communication in error or would no longer like to receive these types of communications, please e-mail [email or web address]

## 2019-10-17 NOTE — LETTER
October 17, 2019      DENYS Barba MD  [address]  [place] [address]           [facility]  [address] [facility] [address]  Phone: [phone]  Fax: [phone]          Patient: Dragan Man II   MR Number: [id number]   YOB: 1965   Date of Visit: 10/17/2019       Dear Dr. DENYS Barba:    Thank you for referring Dragan Man to me for evaluation. Attached you will find relevant portions of my assessment and plan of care.    If you have questions, please do not hesitate to call me. I look forward to following Dragan Man along with you.    Sincerely,    Patti Taylor PA-C    Enclosure  CC:  No Recipients    If you would like to receive this communication electronically, please contact [email or web address] or [phone] to request more information on [employer] Link access.    For providers and/or their staff who would like to refer a patient to [facility], please contact us through our one-stop-shop provider referral line, [facility], at [phone].    If you feel you have received this communication in error or would no longer like to receive these types of communications, please e-mail [email or web address]

## 2019-10-17 NOTE — PROGRESS NOTES
Subjective:       Patient ID:  Dragan Man II is a 54 y.o. male who presents for   Chief Complaint   Patient presents with    Rhinitis       Hx of AKs, ganglion cyst of right wrist, folliculitis of posterior neck, last seen by Dr. Aguirre on 1/22/2018.  Here today at referral of Dr. Barba for AKs of face. Patient c/o dry, rough patches of nose.      C/o cyst of nose that is occasionally tender. Denies drainage or swelling, but occasional redness.    For folliculitis, previous tx: clinda wipes bid, doxy x 10 days. States the rubbing alochol and Witchhazel seem to work better than the wipes.            Review of Systems   Constitutional: Negative for fever and chills.   Gastrointestinal: Negative for nausea and vomiting.   Skin: Positive for dry skin and activity-related sunscreen use. Negative for itching, rash, daily sunscreen use and recent sunburn.   Hematologic/Lymphatic: Does not bruise/bleed easily.        Objective:    Physical Exam   Constitutional: He appears well-developed and well-nourished. No distress.   Neurological: He is alert and oriented to person, place, and time. He is not disoriented.   Psychiatric: He has a normal mood and affect.   Skin:   Areas Examined (abnormalities noted in diagram):   Scalp / Hair Palpated and Inspected  Head / Face Inspection Performed  Neck Inspection Performed  Chest / Axilla Inspection Performed  Back Inspection Performed  RUE Inspected  LUE Inspection Performed  Nails and Digits Inspection Performed                  Diagram Legend     Erythematous scaling macule/papule c/w actinic keratosis       Vascular papule c/w angioma      Pigmented verrucoid papule/plaque c/w seborrheic keratosis      Yellow umbilicated papule c/w sebaceous hyperplasia      Irregularly shaped tan macule c/w lentigo     1-2 mm smooth white papules consistent with Milia      Movable subcutaneous cyst with punctum c/w epidermal inclusion cyst      Subcutaneous movable cyst c/w pilar cyst       Firm pink to brown papule c/w dermatofibroma      Pedunculated fleshy papule(s) c/w skin tag(s)      Evenly pigmented macule c/w junctional nevus     Mildly variegated pigmented, slightly irregular-bordered macule c/w mildly atypical nevus      Flesh colored to evenly pigmented papule c/w intradermal nevus       Pink pearly papule/plaque c/w basal cell carcinoma      Erythematous hyperkeratotic cursted plaque c/w SCC      Surgical scar with no sign of skin cancer recurrence      Open and closed comedones      Inflammatory papules and pustules      Verrucoid papule consistent consistent with wart     Erythematous eczematous patches and plaques     Dystrophic onycholytic nail with subungual debris c/w onychomycosis     Umbilicated papule    Erythematous-base heme-crusted tan verrucoid plaque consistent with inflamed seborrheic keratosis     Erythematous Silvery Scaling Plaque c/w Psoriasis     See annotation      Assessment / Plan:        Rosacea  -     doxycycline (MONODOX) 100 MG capsule; Take once daily with food.  May cause upset stomach.  Dispense: 30 capsule; Refill: 0  -     metroNIDAZOLE (METROGEL) 0.75 % gel; AAA face bid  Dispense: 1 Tube; Refill: 6  Discussed dx and tx options. Will start daily spf 30 (recommend mineral based). Recommend gentle cleanser. Start metrogel bid. Start doxy qd x 1 month, would consider Oracea in future. AAD brochure provided.    Folliculitis  Start doxy as above, will refill clinda wipes. Consider BPO wash in future.    Actinic keratoses  Cryosurgery Procedure Note    The patient is informed of the precancerous quality and need for treatment of these lesions. After risks, benefits and alternatives explained, including blistering, pain, hyper- and hypopigmentation, patient verbally consents to cryotherapy to precancerous lesions. Liquid nitrogen cryosurgery is applied to the 28 actinic keratoses, as detailed in the physical exam, to produce a freeze injury. The patient is  aware that blisters may form and is instructed on wound care with gentle cleansing and use of vaseline ointment to keep moist until healed. The patient is supplied a handout on cryosurgery and is instructed to call if lesions do not completely resolve.         Follow up in about 4 weeks (around 11/14/2019) for to see Dermatology MD.

## 2019-10-17 NOTE — LETTER
October 17, 2019      DENYS Barba MD  [address] [facility] [address]           [facility]  [address] [facility] [address]  Phone: [phone]  Fax: [phone]          Patient: Dragan Man II   MR Number: [id number]   YOB: 1965   Date of Visit: 10/17/2019       Dear Dr. DENYS Barba:    Thank you for referring Dragan Man to me for evaluation. Attached you will find relevant portions of my assessment and plan of care.    If you have questions, please do not hesitate to call me. I look forward to following Dragan Man along with you.    Sincerely,    Emmanuel Hayes MD    Enclosure  CC:  No Recipients    If you would like to receive this communication electronically, please contact [email or web address] or [phone] to request more information on "[employer] Link access.    For providers and/or their staff who would like to refer a patient to [facility], please contact us through our one-stop-shop provider referral line, [facility], at [phone].    If you feel you have received this communication in error or would no longer like to receive these types of communications, please e-mail [email or web address]

## 2019-10-17 NOTE — LETTER
October 17, 2019      Emmanuel Hayes MD  [address]  [place] [address]           [facility]  [address]  [place] [address]  Phone: [phone]  Fax: [phone]          Patient: Dragan Man II   MR Number: [id number]   YOB: 1965   Date of Visit: 10/17/2019       Dear Dr. Emmanuel Hayes:    Thank you for referring Dragan Man to me for evaluation. Attached you will find relevant portions of my assessment and plan of care.    If you have questions, please do not hesitate to call me. I look forward to following Dragan Man along with you.    Sincerely,    Millie Vang MD    Enclosure  CC:  No Recipients    If you would like to receive this communication electronically, please contact [email or web address] or [phone] to request more information on [employer] Link access.    For providers and/or their staff who would like to refer a patient to [facility], please contact us through our one-stop-shop provider referral line, [facility], at [phone].    If you feel you have received this communication in error or would no longer like to receive these types of communications, please e-mail [email or web address]

## 2019-10-17 NOTE — PATIENT INSTRUCTIONS
Stop Fluticasone  Start Mometasone- Nasonex- one spray in each nostril twice a day  Cetirizine 10 mg at night    Labs today

## 2019-10-17 NOTE — PATIENT INSTRUCTIONS
CRYOSURGERY      Your PA has used a method called cryosurgery to treat your skin condition. Cryosurgery refers to the use of very cold substances to treat a variety of skin conditions such as warts, pre-skin cancers, molluscum contagiosum, sun spots, and several benign growths. The substance we use in cryosurgery is liquid nitrogen and is so cold (-195 degrees Celsius) that is burns when administered.     Following treatment in the office, the skin may immediately burn and become red. You may find the area around the lesion is affected as well. It is sometimes necessary to treat not only the lesion, but a small area of the surrounding normal skin to achieve a good response.     A blister, and even a blood filled blister, may form after treatment.   This is a normal response. If the blister is painful, it is acceptable to sterilize a needle with rubbing alcohol and gently pop the blister. It is important that you gently wash the area with soap and warm water as the blister fluid may contain wart virus if a wart was treated. Do not remove the roof of the blister.     The area treated can take anywhere from 1-3 weeks to heal. Healing time depends on the kind of skin lesion treated, the location, and how aggressively the lesion was treated. It is recommended that the areas treated are covered with Vaseline or bacitracin ointment and a band-aid. If a band-aid is not practical, just ointment applied several times per day will do. Keeping these areas moist will speed the healing time.    Treatment with liquid nitrogen can leave a scar. In dark skin, it may be a light or dark scar, in light skin it may be a white or pink scar. These will generally fade with time, but may never go away completely.     If you have any concerns after your treatment, please feel free to call the office.       [address] [place] [address] / [phone] / [email or web address]    Sunscreens for the Face  Cetaphil Redness Control    La  Roche-Posay    CeraVe AM or Ultra light    Elta MD UV Clear

## 2019-10-17 NOTE — PROGRESS NOTES
Subjective:   Patient ID:  Dragan Man II is a 54 y.o. male who presents for evaluation of Rhinitis (due to pollen ) and Chronic Nasal Congestion      55 yo. Male, referred for chest pain,   and beer delivery  PMH LAVERNE on CPAP, obesity. No Dx of heart attack,DM,stroke and cancer. No smoking/drinking  Chronic ROLLINS. Recently worse with dizziness, left chest tightness. Occurred at workplace and physical activity. Improved the symptoms at home. Thought related to the temperature change from parking lot to cooler.  Started treadmill and stationary bike now.   No f/h premature CAD  In 2012, had episode of syncope when lifted up heavy stuff.  2012. MPI showed no ischemia and echo showed normal EF, dilated RV. Mild LAE and         Past Medical History:   Diagnosis Date    Actinic keratoses 10/14/2019    Depression     LAVERNE (obstructive sleep apnea)     Seasonal allergic rhinitis due to pollen 10/14/2019       Past Surgical History:   Procedure Laterality Date    None         Social History     Tobacco Use    Smoking status: Never Smoker    Smokeless tobacco: Former User    Tobacco comment: quit 15 years ago    Substance Use Topics    Alcohol use: Yes     Frequency: 2-4 times a month     Drinks per session: 3 or 4     Binge frequency: Less than monthly     Comment: socially // beer    Drug use: No       Family History   Problem Relation Age of Onset    Diabetes Father     Diabetes Paternal Grandfather        Review of Systems   Constitution: Negative for decreased appetite, diaphoresis, fever, malaise/fatigue and night sweats.   HENT: Negative for nosebleeds.    Eyes: Negative for blurred vision and double vision.   Cardiovascular: Positive for chest pain and dyspnea on exertion. Negative for claudication, irregular heartbeat, leg swelling, near-syncope, orthopnea, palpitations, paroxysmal nocturnal dyspnea and syncope.   Respiratory: Negative for cough, shortness of breath, sleep disturbances due  to breathing, snoring, sputum production and wheezing.    Endocrine: Negative for cold intolerance and polyuria.   Hematologic/Lymphatic: Does not bruise/bleed easily.   Skin: Negative for rash.   Musculoskeletal: Negative for back pain, falls, joint pain, joint swelling and neck pain.   Gastrointestinal: Negative for abdominal pain, heartburn, nausea and vomiting.   Genitourinary: Negative for dysuria, frequency and hematuria.   Neurological: Positive for dizziness. Negative for difficulty with concentration, focal weakness, headaches, light-headedness, numbness, seizures and weakness.   Psychiatric/Behavioral: Negative for depression, memory loss and substance abuse. The patient does not have insomnia.    Allergic/Immunologic: Negative for HIV exposure and hives.       Objective:   Physical Exam   Constitutional: He is oriented to person, place, and time. He appears well-nourished.   HENT:   Head: Normocephalic.   Eyes: Pupils are equal, round, and reactive to light.   Neck: Normal carotid pulses and no JVD present. Carotid bruit is not present. No thyromegaly present.   Cardiovascular: Normal rate, regular rhythm and normal pulses.  No extrasystoles are present. PMI is not displaced. Exam reveals no gallop and no S3.   Murmur (ESM 3/6 on bases and along LSB. up to the neck) heard.  Pulmonary/Chest: Breath sounds normal. No stridor. No respiratory distress.   Abdominal: Soft. Bowel sounds are normal. There is no tenderness. There is no rebound.   Musculoskeletal: Normal range of motion.   Neurological: He is alert and oriented to person, place, and time.   Skin: Skin is intact. No rash noted.   Psychiatric: His behavior is normal.       Lab Results   Component Value Date    CHOL 185 10/14/2019    CHOL 186 12/03/2018    CHOL 193 08/26/2013     Lab Results   Component Value Date    HDL 38 (L) 10/14/2019    HDL 48 12/03/2018    HDL 49 08/26/2013     Lab Results   Component Value Date    LDLCALC 80.8 10/14/2019     LDLCALC 103.4 12/03/2018    LDLCALC 133.0 08/26/2013     Lab Results   Component Value Date    TRIG 331 (H) 10/14/2019    TRIG 173 (H) 12/03/2018    TRIG 56 08/26/2013     Lab Results   Component Value Date    CHOLHDL 20.5 10/14/2019    CHOLHDL 25.8 12/03/2018    CHOLHDL 25.4 08/26/2013       Chemistry        Component Value Date/Time     10/14/2019 1636    K 4.0 10/14/2019 1636    CL 99 10/14/2019 1636    CO2 26 10/14/2019 1636    BUN 25 (H) 10/14/2019 1636    CREATININE 1.2 10/14/2019 1636     10/14/2019 1636        Component Value Date/Time    CALCIUM 9.3 10/14/2019 1636    ALKPHOS 94 10/14/2019 1636    AST 23 10/14/2019 1636    ALT 29 10/14/2019 1636    BILITOT 0.6 10/14/2019 1636    ESTGFRAFRICA >60.0 10/14/2019 1636    EGFRNONAA >60.0 10/14/2019 1636          Lab Results   Component Value Date    HGBA1C 5.5 11/17/2017     Lab Results   Component Value Date    TSH 3.980 10/14/2019     Lab Results   Component Value Date    INR 1.1 10/18/2012     Lab Results   Component Value Date    WBC 8.95 10/14/2019    HGB 12.8 (L) 10/14/2019    HCT 39.4 (L) 10/14/2019    MCV 99 (H) 10/14/2019     10/14/2019     BNP  @LABRCNTIP(BNP,BNPTRIAGEBLO)@  Estimated Creatinine Clearance: 104.6 mL/min (based on SCr of 1.2 mg/dL).  No results found in the last 24 hours.  No results found in the last 24 hours.  No results found in the last 24 hours.    Assessment:      1. ROLLINS (dyspnea on exertion)    2. Other chest pain    3. Obstructive sleep apnea treated with continuous positive airway pressure (CPAP)    4. Benign essential hypertension    5. Obesity, Class III, BMI 40-49.9 (morbid obesity)    6. Seasonal allergic rhinitis due to pollen      Atypical CP and ROLLINS at workplace  BP high   Obese  LDL and A1c wnl    Plan:   Treadmill stress ekg  Echo  continue Hyzaar, Statin  Discussed DASH  Recommend heart-healthy diet, weight control and regular exercise.  Fadumo. Risk modification.   F/u with pcp    I have reviewed all  pertinent labs and cardiac studies. Plans and recommendations have been formulated under my direct supervision. All questions answered and patient voiced understanding. Patient to continue current medications.

## 2019-10-17 NOTE — PATIENT INSTRUCTIONS

## 2019-10-18 ENCOUNTER — CLINICAL SUPPORT (OUTPATIENT)
Dept: PULMONOLOGY | Facility: CLINIC | Age: 54
End: 2019-10-18
Payer: COMMERCIAL

## 2019-10-18 ENCOUNTER — CLINICAL SUPPORT (OUTPATIENT)
Dept: CARDIOLOGY | Facility: CLINIC | Age: 54
End: 2019-10-18
Attending: INTERNAL MEDICINE
Payer: COMMERCIAL

## 2019-10-18 DIAGNOSIS — R07.89 OTHER CHEST PAIN: ICD-10-CM

## 2019-10-18 DIAGNOSIS — R06.02 SOB (SHORTNESS OF BREATH): ICD-10-CM

## 2019-10-18 DIAGNOSIS — R01.1 HEART MURMUR: ICD-10-CM

## 2019-10-18 LAB
AORTIC VALVE REGURGITATION: ABNORMAL
AORTIC VALVE STENOSIS: ABNORMAL
BRPFT: ABNORMAL
DIASTOLIC DYSFUNCTION: NO
DIASTOLIC DYSFUNCTION: YES
DLCO ADJ PRE: 27.43 ML/(MIN*MMHG) (ref 23.74–37.6)
DLCO SINGLE BREATH LLN: 23.74
DLCO SINGLE BREATH PRE REF: 84.5 %
DLCO SINGLE BREATH REF: 30.67
DLCOC SBVA LLN: 3.04
DLCOC SBVA PRE REF: 117.8 %
DLCOC SBVA REF: 4.2
DLCOC SINGLE BREATH LLN: 23.74
DLCOC SINGLE BREATH PRE REF: 89.4 %
DLCOC SINGLE BREATH REF: 30.67
DLCOVA LLN: 3.04
DLCOVA PRE REF: 111.3 %
DLCOVA PRE: 4.68 ML/(MIN*MMHG*L) (ref 3.04–5.36)
DLCOVA REF: 4.2
DLVAADJ PRE: 4.95 ML/(MIN*MMHG*L) (ref 3.04–5.36)
ERV LLN: 1.27
ERV PRE REF: 31.6 %
ERV REF: 1.27
FEF 25 75 CHG: 22.1 %
FEF 25 75 LLN: 1.69
FEF 25 75 POST REF: 78.6 %
FEF 25 75 PRE REF: 64.4 %
FEF 25 75 REF: 3.3
FET100 CHG: 5.2 %
FEV1 CHG: 9.4 %
FEV1 FVC CHG: 4.9 %
FEV1 FVC LLN: 66
FEV1 FVC POST REF: 101.7 %
FEV1 FVC PRE REF: 96.9 %
FEV1 FVC REF: 78
FEV1 LLN: 2.94
FEV1 POST REF: 71 %
FEV1 PRE REF: 64.9 %
FEV1 REF: 3.84
FRCPLETH LLN: 2.64
FRCPLETH PREREF: 66.5 %
FRCPLETH REF: 3.63
FVC CHG: 4.3 %
FVC LLN: 3.8
FVC POST REF: 69.6 %
FVC PRE REF: 66.7 %
FVC REF: 4.94
IVC PRE: 3.78 L (ref 3.8–6.07)
IVC SINGLE BREATH LLN: 3.8
IVC SINGLE BREATH PRE REF: 76.6 %
IVC SINGLE BREATH REF: 4.94
MITRAL VALVE MOBILITY: NORMAL
MVV LLN: 123
MVV PRE REF: 47.6 %
MVV REF: 145
PEF CHG: 28.8 %
PEF LLN: 7.32
PEF POST REF: 60.6 %
PEF PRE REF: 47 %
PEF REF: 9.69
POST FEF 25 75: 2.59 L/S (ref 1.69–4.91)
POST FET 100: 7.57 SEC
POST FEV1 FVC: 79.32 % (ref 66.49–89.47)
POST FEV1: 2.72 L (ref 2.94–4.74)
POST FVC: 3.43 L (ref 3.8–6.07)
POST PEF: 5.87 L/S (ref 7.32–12.07)
PRE DLCO: 25.93 ML/(MIN*MMHG) (ref 23.74–37.6)
PRE ERV: 0.4 L (ref 1.27–1.27)
PRE FEF 25 75: 2.12 L/S (ref 1.69–4.91)
PRE FET 100: 7.19 SEC
PRE FEV1 FVC: 75.58 % (ref 66.49–89.47)
PRE FEV1: 2.49 L (ref 2.94–4.74)
PRE FRC PL: 2.41 L
PRE FVC: 3.29 L (ref 3.8–6.07)
PRE MVV: 69 L/MIN (ref 123.28–166.8)
PRE PEF: 4.56 L/S (ref 7.32–12.07)
PRE RV: 1.78 L (ref 1.69–3.03)
PRE TLC: 5.77 L (ref 6.15–8.45)
RAW LLN: 3.06
RAW PRE REF: 80.7 %
RAW PRE: 2.47 CMH2O*S/L (ref 3.06–3.06)
RAW REF: 3.06
RETIRED EF AND QEF - SEE NOTES: 60 (ref 55–65)
RV LLN: 1.69
RV PRE REF: 75.6 %
RV REF: 2.36
RVTLC LLN: 27
RVTLC PRE REF: 86.3 %
RVTLC PRE: 30.89 % (ref 26.82–44.78)
RVTLC REF: 36
TLC LLN: 6.15
TLC PRE REF: 79.1 %
TLC REF: 7.3
VA PRE: 5.55 L (ref 7.15–7.15)
VA SINGLE BREATH LLN: 7.15
VA SINGLE BREATH PRE REF: 77.6 %
VA SINGLE BREATH REF: 7.15
VC LLN: 3.8
VC PRE REF: 80.8 %
VC PRE: 3.99 L (ref 3.8–6.07)
VC REF: 4.94
VTGRAWPRE: 2.94 L

## 2019-10-18 PROCEDURE — 94729 PR C02/MEMBANE DIFFUSE CAPACITY: ICD-10-PCS | Mod: S$GLB,,, | Performed by: INTERNAL MEDICINE

## 2019-10-18 PROCEDURE — 93306 2D ECHO WITH COLOR FLOW DOPPLER: ICD-10-PCS | Mod: S$GLB,,, | Performed by: INTERNAL MEDICINE

## 2019-10-18 PROCEDURE — 94726 PULM FUNCT TST PLETHYSMOGRAP: ICD-10-PCS | Mod: S$GLB,,, | Performed by: INTERNAL MEDICINE

## 2019-10-18 PROCEDURE — 93015 CARDIAC TREADMILL STRESS TEST: ICD-10-PCS | Mod: S$GLB,,, | Performed by: INTERNAL MEDICINE

## 2019-10-18 PROCEDURE — 93015 CV STRESS TEST SUPVJ I&R: CPT | Mod: S$GLB,,, | Performed by: INTERNAL MEDICINE

## 2019-10-18 PROCEDURE — 93306 TTE W/DOPPLER COMPLETE: CPT | Mod: S$GLB,,, | Performed by: INTERNAL MEDICINE

## 2019-10-18 PROCEDURE — 94726 PLETHYSMOGRAPHY LUNG VOLUMES: CPT | Mod: S$GLB,,, | Performed by: INTERNAL MEDICINE

## 2019-10-18 PROCEDURE — 94729 DIFFUSING CAPACITY: CPT | Mod: S$GLB,,, | Performed by: INTERNAL MEDICINE

## 2019-10-18 PROCEDURE — 94060 EVALUATION OF WHEEZING: CPT | Mod: S$GLB,,, | Performed by: INTERNAL MEDICINE

## 2019-10-18 PROCEDURE — 94060 PR EVAL OF BRONCHOSPASM: ICD-10-PCS | Mod: S$GLB,,, | Performed by: INTERNAL MEDICINE

## 2019-10-21 ENCOUNTER — OFFICE VISIT (OUTPATIENT)
Dept: INTERNAL MEDICINE | Facility: CLINIC | Age: 54
End: 2019-10-21
Payer: COMMERCIAL

## 2019-10-21 VITALS
HEIGHT: 73 IN | DIASTOLIC BLOOD PRESSURE: 70 MMHG | HEART RATE: 68 BPM | WEIGHT: 315 LBS | SYSTOLIC BLOOD PRESSURE: 130 MMHG | BODY MASS INDEX: 41.75 KG/M2 | TEMPERATURE: 97 F | OXYGEN SATURATION: 98 %

## 2019-10-21 DIAGNOSIS — E66.01 MORBID OBESITY WITH BMI OF 40.0-44.9, ADULT: Chronic | ICD-10-CM

## 2019-10-21 DIAGNOSIS — Z91.89 AT RISK FOR CARDIOVASCULAR EVENT: ICD-10-CM

## 2019-10-21 DIAGNOSIS — G47.33 OBSTRUCTIVE SLEEP APNEA TREATED WITH CONTINUOUS POSITIVE AIRWAY PRESSURE (CPAP): Chronic | ICD-10-CM

## 2019-10-21 DIAGNOSIS — I10 BENIGN ESSENTIAL HYPERTENSION: Primary | ICD-10-CM

## 2019-10-21 DIAGNOSIS — R01.1 HEART MURMUR: ICD-10-CM

## 2019-10-21 LAB
A FUMIGATUS IGE QN: <0.35 KU/L
ALLERGEN CHAETOMIUM GLOBOSUM IGE: <0.35 KU/L
ALLERGEN WHITE PINE TREE IGE: <0.35 KU/L
BAHIA GRASS IGE QN: <0.35 KU/L
C HERBARUM IGE QN: <0.35 KU/L
C LUNATA IGE QN: <0.35 KU/L
CAT DANDER IGE QN: <0.35 KU/L
CHAETOMIUM GLOB. CLASS: NORMAL
COCKSFOOT IGE QN: <0.35 KU/L
COMMON RAGWEED IGE QN: <0.35 KU/L
D FARINAE IGE QN: 0.58 KU/L
D PTERONYSS IGE QN: 0.62 KU/L
DEPRECATED A FUMIGATUS IGE RAST QL: NORMAL
DEPRECATED BAHIA GRASS IGE RAST QL: NORMAL
DEPRECATED C HERBARUM IGE RAST QL: NORMAL
DEPRECATED C LUNATA IGE RAST QL: NORMAL
DEPRECATED CAT DANDER IGE RAST QL: NORMAL
DEPRECATED COCKSFOOT IGE RAST QL: NORMAL
DEPRECATED COMMON RAGWEED IGE RAST QL: NORMAL
DEPRECATED D FARINAE IGE RAST QL: ABNORMAL
DEPRECATED D PTERONYSS IGE RAST QL: ABNORMAL
DEPRECATED DOG DANDER IGE RAST QL: NORMAL
DEPRECATED ELDER IGE RAST QL: NORMAL
DEPRECATED ENGL PLANTAIN IGE RAST QL: NORMAL
DEPRECATED GUM-TREE IGE RAST QL: NORMAL
DEPRECATED JOHNSON GRASS IGE RAST QL: NORMAL
DEPRECATED MUGWORT IGE RAST QL: NORMAL
DEPRECATED NETTLE IGE RAST QL: NORMAL
DEPRECATED PER RYE GRASS IGE RAST QL: NORMAL
DEPRECATED PRIVET IGE RAST QL: NORMAL
DEPRECATED RED TOP GRASS IGE RAST QL: NORMAL
DEPRECATED ROACH IGE RAST QL: ABNORMAL
DEPRECATED SALTWORT IGE RAST QL: NORMAL
DEPRECATED TIMOTHY IGE RAST QL: NORMAL
DOG DANDER IGE QN: <0.35 KU/L
ELDER IGE QN: <0.35 KU/L
ENGL PLANTAIN IGE QN: <0.35 KU/L
GUM-TREE IGE QN: <0.35 KU/L
JOHNSON GRASS IGE QN: <0.35 KU/L
MUGWORT IGE QN: <0.35 KU/L
NETTLE IGE QN: <0.35 KU/L
PER RYE GRASS IGE QN: <0.35 KU/L
PRIVET IGE QN: <0.35 KU/L
RED TOP GRASS IGE QN: <0.35 KU/L
ROACH IGE QN: 2.03 KU/L
SALTWORT IGE QN: <0.35 KU/L
TIMOTHY IGE QN: <0.35 KU/L
WHITE PINE CLASS: NORMAL

## 2019-10-21 PROCEDURE — 3075F SYST BP GE 130 - 139MM HG: CPT | Mod: CPTII,S$GLB,, | Performed by: FAMILY MEDICINE

## 2019-10-21 PROCEDURE — 3075F PR MOST RECENT SYSTOLIC BLOOD PRESS GE 130-139MM HG: ICD-10-PCS | Mod: CPTII,S$GLB,, | Performed by: FAMILY MEDICINE

## 2019-10-21 PROCEDURE — 3078F DIAST BP <80 MM HG: CPT | Mod: CPTII,S$GLB,, | Performed by: FAMILY MEDICINE

## 2019-10-21 PROCEDURE — 99999 PR PBB SHADOW E&M-EST. PATIENT-LVL III: CPT | Mod: PBBFAC,,, | Performed by: FAMILY MEDICINE

## 2019-10-21 PROCEDURE — 99213 OFFICE O/P EST LOW 20 MIN: CPT | Mod: S$GLB,,, | Performed by: FAMILY MEDICINE

## 2019-10-21 PROCEDURE — 3078F PR MOST RECENT DIASTOLIC BLOOD PRESSURE < 80 MM HG: ICD-10-PCS | Mod: CPTII,S$GLB,, | Performed by: FAMILY MEDICINE

## 2019-10-21 PROCEDURE — 3008F PR BODY MASS INDEX (BMI) DOCUMENTED: ICD-10-PCS | Mod: CPTII,S$GLB,, | Performed by: FAMILY MEDICINE

## 2019-10-21 PROCEDURE — 99213 PR OFFICE/OUTPT VISIT, EST, LEVL III, 20-29 MIN: ICD-10-PCS | Mod: S$GLB,,, | Performed by: FAMILY MEDICINE

## 2019-10-21 PROCEDURE — 99999 PR PBB SHADOW E&M-EST. PATIENT-LVL III: ICD-10-PCS | Mod: PBBFAC,,, | Performed by: FAMILY MEDICINE

## 2019-10-21 PROCEDURE — 3008F BODY MASS INDEX DOCD: CPT | Mod: CPTII,S$GLB,, | Performed by: FAMILY MEDICINE

## 2019-10-21 RX ORDER — ATORVASTATIN CALCIUM 20 MG/1
20 TABLET, FILM COATED ORAL NIGHTLY
Qty: 90 TABLET | Refills: 4 | Status: SHIPPED | OUTPATIENT
Start: 2019-10-21 | End: 2021-01-04 | Stop reason: SDUPTHER

## 2019-10-21 RX ORDER — AMLODIPINE BESYLATE 10 MG/1
10 TABLET ORAL DAILY
Qty: 90 TABLET | Refills: 4 | Status: SHIPPED | OUTPATIENT
Start: 2019-10-21 | End: 2020-02-18 | Stop reason: DRUGHIGH

## 2019-10-21 RX ORDER — LOSARTAN POTASSIUM AND HYDROCHLOROTHIAZIDE 25; 100 MG/1; MG/1
1 TABLET ORAL DAILY
Qty: 90 TABLET | Refills: 4 | Status: SHIPPED | OUTPATIENT
Start: 2019-10-21 | End: 2020-02-17

## 2019-10-21 NOTE — PROGRESS NOTES
CHIEF COMPLAINT  Follow-up      HISTORY, ASSESSMENT, and PLAN    1. Benign essential hypertension    2. Heart murmur    3. Obstructive sleep apnea treated with continuous positive airway pressure (CPAP)    4. Morbid obesity with BMI of 40.0-44.9, adult    5. At risk for cardiovascular event      Chronic conditions are IMPROVED or at least STABLE and COMPENSATED/CONTROLLED.     Future Appointments   Date Time Provider Department Center   11/18/2019  8:00 AM Skye Aguirre MD [facility] DERM [facility]   11/22/2019  8:45 AM Emmanuel Hayes MD [facility] ENT [facility]   12/9/2019 11:40 AM Elizabeth Lejeune, NP [facility] PULMSVC [facility]        Orders Placed This Encounter    atorvastatin (LIPITOR) 20 MG tablet    amLODIPine (NORVASC) 10 MG tablet    losartan-hydrochlorothiazide 100-25 mg (HYZAAR) 100-25 mg per tablet       Problem List Items Addressed This Visit        Cardiac/Vascular    At risk for cardiovascular event    Relevant Medications    atorvastatin (LIPITOR) 20 MG tablet    Benign essential hypertension - Primary    Relevant Medications    amLODIPine (NORVASC) 10 MG tablet    losartan-hydrochlorothiazide 100-25 mg (HYZAAR) 100-25 mg per tablet    Heart murmur    Overview     Echocardiogram 10/18/2019    1 - Normal left ventricular systolic function (EF 60-65%).     2 - Impaired LV relaxation, elevated LAP (grade 2 diastolic dysfunction).     3 - Normal right ventricular systolic function .     4 - No wall motion abnormalities.     5 - Severe left atrial enlargement.     6 - Concentric hypertrophy.     7 - Mild aortic regurgitation.     8 - Moderate aortic stenosis, CRISTINO = 1.87 cm2, AVAi = 0.7 cm2/m2, peak velocity = 3.87 m/s, mean gradient = 34 mmHg.             Endocrine    Morbid obesity with BMI of 40.0-44.9, adult (Chronic)       Other    Obstructive sleep apnea treated with continuous positive airway pressure (CPAP) (Chronic)           Outpatient Medications Prior to Visit   Medication Sig Dispense Refill     acetaminophen (TYLENOL) 650 MG TbSR Take 650 mg by mouth as needed.      cetirizine (ZYRTEC) 10 MG tablet Take 1 tablet (10 mg total) by mouth once daily. 30 tablet 3    clindamycin (CLEOCIN T) 1 % Swab Apply topically 2 (two) times daily. For acne of neck. 60 each 5    doxycycline (MONODOX) 100 MG capsule Take once daily with food.  May cause upset stomach. 30 capsule 0    metroNIDAZOLE (METROGEL) 0.75 % gel AAA face bid 1 Tube 6    mometasone (NASONEX) 50 mcg/actuation nasal spray 2 sprays by Nasal route once daily. 30 g 3    sodium chloride (OCEAN) 0.65 % nasal spray 1 spray by Nasal route as needed for Congestion.      varicella-zoster gE-AS01B, PF, (SHINGRIX) 50 mcg/0.5 mL injection Inject 0.5 mL (one dose) into muscle now; give second dose at least 2 months later 0.5 mL 1    amLODIPine (NORVASC) 10 MG tablet Take 1 tablet (10 mg total) by mouth once daily. 90 tablet 3    atorvastatin (LIPITOR) 20 MG tablet Take 1 tablet (20 mg total) by mouth every evening. 30 tablet 1    losartan-hydrochlorothiazide 100-25 mg (HYZAAR) 100-25 mg per tablet Take 1 tablet by mouth once daily. 90 tablet 3     No facility-administered medications prior to visit.         Medications Ordered This Encounter   Medications    amLODIPine (NORVASC) 10 MG tablet     Sig: Take 1 tablet (10 mg total) by mouth once daily.     Dispense:  90 tablet     Refill:  4    atorvastatin (LIPITOR) 20 MG tablet     Sig: Take 1 tablet (20 mg total) by mouth every evening.     Dispense:  90 tablet     Refill:  4    losartan-hydrochlorothiazide 100-25 mg (HYZAAR) 100-25 mg per tablet     Sig: Take 1 tablet by mouth once daily.     Dispense:  90 tablet     Refill:  4     Please dispense #[id number]       Medications Discontinued During This Encounter   Medication Reason    atorvastatin (LIPITOR) 20 MG tablet Reorder    amLODIPine (NORVASC) 10 MG tablet Reorder    losartan-hydrochlorothiazide 100-25 mg (HYZAAR) 100-25 mg per tablet Reorder     "    Follow up in about 16 weeks (around 2/10/2020) for re-evaluate problem(s) discussed today.    REVIEW OF SYSTEMS  CONSTITUTIONAL: No fever reported.  CARDIOVASCULAR: No chest pain reported.  PULMONARY: No trouble breathing reported.     PHYSICAL EXAM  Vitals:    10/21/19 1148   BP: 130/70   Pulse: 68   Temp: 96.8 °F (36 °C)   TempSrc: Tympanic   SpO2: 98%   Weight: (!) 145.1 kg (319 lb 14.2 oz)   Height: 6' 1" (1.854 m)     CONSTITUTIONAL: Vital signs noted. No apparent distress. Does not appear acutely ill or septic. Appears adequately hydrated.  ENT: Oropharynx moist.  PULM: Breathing unlabored.  CV: Heart regular.  DERM: Skin normothermic.  PSYCHIATRIC: Alert and conversant. Grossly oriented. Mood is grossly neutral. Affect appropriate. Judgment and insight not grossly compromised.     TOTAL TIME evaluating and managing this patient for this encounter exceeded 15 minutes, the majority spent counseling and coordinating care for the listed diagnoses.   Documentation entered by me for this encounter may have been done in part using speech-recognition technology. Although I have made an effort to ensure accuracy, "sound like" errors may exist and should be interpreted in context. -DENYS Barba MD.   "

## 2019-10-22 ENCOUNTER — PATIENT MESSAGE (OUTPATIENT)
Dept: ALLERGY | Facility: CLINIC | Age: 54
End: 2019-10-22

## 2019-10-22 ENCOUNTER — TELEPHONE (OUTPATIENT)
Dept: CARDIOLOGY | Facility: CLINIC | Age: 54
End: 2019-10-22

## 2019-10-22 NOTE — TELEPHONE ENCOUNTER
The patient has been notified of this information and all questions answered.      Echo showed normal EF and mild AS

## 2019-10-22 NOTE — TELEPHONE ENCOUNTER
The patient has been notified of this information and all questions answered.      Stress test no ischemia.   HTN uncontrolled. Follow up in 2 months. Appointment was made for 12/16/19.

## 2019-10-22 NOTE — TELEPHONE ENCOUNTER
----- Message from Ron Shaikh MD sent at 10/21/2019  4:41 PM CDT -----  Stress test no ischemia.   HTN uncontrolled. F/h in 2 months

## 2019-10-22 NOTE — TELEPHONE ENCOUNTER
----- Message from Ron Shaikh MD sent at 10/22/2019  4:04 PM CDT -----  Echo showed normal EF and mild AS.

## 2019-10-22 NOTE — TELEPHONE ENCOUNTER
Dear Mr. Man,    I hope your day is going well. I enjoyed chatting with you last week, particularly about food 9 I am a Foodie).  :)  You allergy labs are significant for dust mites and cockroach. One lab is pending but it will not change my current recommendations.  I recommend continuing your current medications.  If interested in allergy immunotherapy, please let me know.    I also recommend:    Dust mites are microscopic insect-like pests that live in house dust. They feed on dead skin or dander that are shed by people and pets.They can worsen asthma and allergies. Dust mites live in mattresses, bedding, upholstered furniture, carpets, and curtains in your home. No matter how clean a home is, dust mites cannot be completely eliminated. A number of things can be done to reduce the number of dust mites:  -Use a dehumidifier or air conditioner to maintain humidity levels at, or below, 50 percent.  -Encase mattress and pillows in dust mite- proof or allergen impermeable covers.  -Wash all bedding and blankets once a week in hot water, 130 to 140 degrees Fahrenheit, to kill dust mites. Non- washable bedding can be frozen overnight.  -Replace wool or feathered bedding products with synthetic materials, and traditional stuffed animals with washable ones.  -In bedrooms, replace wall to wall carpeting with bare floors, and remove fabric curtains and upholstered furniture, whenever possible.  -Use a damp mop or rag to remove dust. Never use a dry cloth, as it stirs up allergens.  -Use a double-layered microfilter bag or a HEPA filter in your vacuum .  -Wear a mask while vacuuming, and stay out of the vacuuming area for 20 minutes after vacuuming, to allow dust and allergens to settle.    Have a great day. Please let me know, if you have any questions.  RENETTA

## 2019-10-24 LAB
STEMPHYLIUM HERBARUM CLASS: NORMAL
STEMPHYLLIUM, IGE: <0.1 KU/L

## 2019-11-11 ENCOUNTER — PATIENT OUTREACH (OUTPATIENT)
Dept: OTHER | Facility: OTHER | Age: 54
End: 2019-11-11

## 2019-11-11 DIAGNOSIS — I10 BENIGN ESSENTIAL HYPERTENSION: Primary | ICD-10-CM

## 2019-11-11 NOTE — PROGRESS NOTES
"Digital Medicine: Clinician Follow-Up    11/11: LM for patient.  Close to goal. /70 on 10/21 at PCP office.   11/25: LM for patient.    12/23/2019  LM for patient.  Close to goal.  BP was 134/74 on 12/16 at Cardiology appt.   01/06/2020:  Reviewed chart.  Pt spoke to HC on 12/30.  Postponing my call x 2 weeks.   01/20/2020 LM for patient.  Sent BombBomb Message.  Patient returned my call.  States that he had MOHS surgery to remove skin cancer on his face.  States did not exercise for > 1 week and admits to weight gain.  Readings yesterday were better.  Mentioned Intermittent Fasting to him as a possible strategy for weight loss since he states he "cannot get under 300 pounds."  States that he weighs 306 currently.        The history is provided by the patient. No  was used.     Follow Up  Follow-up reason(s): reading review      Readings are trending down       INTERVENTION(S)  encouragement/support    PLAN  continue monitoring    Readings improving.  Pt states no needs from Program today.       Last 5 Patient Entered Readings                                      Current 30 Day Average: 138/68     Recent Readings 1/19/2020 1/19/2020 1/17/2020 1/17/2020 1/15/2020    SBP (mmHg) 133 130 138 134 141    DBP (mmHg) 68 72 69 67 59    Pulse 69 70 65 63 71                 Hypertension Medications             amLODIPine (NORVASC) 10 MG tablet Take 1 tablet (10 mg total) by mouth once daily.    losartan-hydrochlorothiazide 100-25 mg (HYZAAR) 100-25 mg per tablet Take 1 tablet by mouth once daily.                 Screenings  "

## 2019-11-12 DIAGNOSIS — L71.9 ROSACEA: ICD-10-CM

## 2019-11-13 RX ORDER — DOXYCYCLINE 100 MG/1
CAPSULE ORAL
Qty: 30 CAPSULE | Refills: 0 | Status: SHIPPED | OUTPATIENT
Start: 2019-11-13 | End: 2019-12-09 | Stop reason: SDUPTHER

## 2019-11-18 ENCOUNTER — OFFICE VISIT (OUTPATIENT)
Dept: DERMATOLOGY | Facility: CLINIC | Age: 54
End: 2019-11-18
Payer: COMMERCIAL

## 2019-11-18 DIAGNOSIS — M67.431 GANGLION CYST OF DORSUM OF RIGHT WRIST: ICD-10-CM

## 2019-11-18 DIAGNOSIS — L57.0 ACTINIC KERATOSES: ICD-10-CM

## 2019-11-18 DIAGNOSIS — D48.5 NEOPLASM OF UNCERTAIN BEHAVIOR OF SKIN: ICD-10-CM

## 2019-11-18 DIAGNOSIS — L71.9 ROSACEA: Primary | ICD-10-CM

## 2019-11-18 PROCEDURE — 17004 PR DESTRUCTION, PREMALIGNANT LESIONS; 15 OR MORE LESIONS: ICD-10-PCS | Mod: S$GLB,,, | Performed by: DERMATOLOGY

## 2019-11-18 PROCEDURE — 99213 PR OFFICE/OUTPT VISIT, EST, LEVL III, 20-29 MIN: ICD-10-PCS | Mod: 25,S$GLB,, | Performed by: DERMATOLOGY

## 2019-11-18 PROCEDURE — 88305 TISSUE EXAM BY PATHOLOGIST: ICD-10-PCS | Mod: 26,,, | Performed by: PATHOLOGY

## 2019-11-18 PROCEDURE — 99999 PR PBB SHADOW E&M-EST. PATIENT-LVL III: CPT | Mod: PBBFAC,,, | Performed by: DERMATOLOGY

## 2019-11-18 PROCEDURE — 17004 DESTROY PREMAL LESIONS 15/>: CPT | Mod: S$GLB,,, | Performed by: DERMATOLOGY

## 2019-11-18 PROCEDURE — 11102 TANGNTL BX SKIN SINGLE LES: CPT | Mod: 59,S$GLB,, | Performed by: DERMATOLOGY

## 2019-11-18 PROCEDURE — 88305 TISSUE EXAM BY PATHOLOGIST: CPT | Mod: 26,,, | Performed by: PATHOLOGY

## 2019-11-18 PROCEDURE — 11102 PR TANGENTIAL BIOPSY, SKIN, SINGLE LESION: ICD-10-PCS | Mod: 59,S$GLB,, | Performed by: DERMATOLOGY

## 2019-11-18 PROCEDURE — 88305 TISSUE EXAM BY PATHOLOGIST: CPT | Performed by: PATHOLOGY

## 2019-11-18 PROCEDURE — 99213 OFFICE O/P EST LOW 20 MIN: CPT | Mod: 25,S$GLB,, | Performed by: DERMATOLOGY

## 2019-11-18 PROCEDURE — 99999 PR PBB SHADOW E&M-EST. PATIENT-LVL III: ICD-10-PCS | Mod: PBBFAC,,, | Performed by: DERMATOLOGY

## 2019-11-18 NOTE — PATIENT INSTRUCTIONS
CRYOSURGERY      Your doctor has used a method called cryosurgery to treat your skin condition. Cryosurgery refers to the use of very cold substances to treat a variety of skin conditions such as warts, pre-skin cancers, molluscum contagiosum, sun spots, and several benign growths. The substance we use in cryosurgery is liquid nitrogen and is so cold (-195 degrees Celsius) that is burns when administered.     Following treatment in the office, the skin may immediately burn and become red. You may find the area around the lesion is affected as well. It is sometimes necessary to treat not only the lesion, but a small area of the surrounding normal skin to achieve a good response.     A blister, and even a blood filled blister, may form after treatment.   This is a normal response. If the blister is painful, it is acceptable to sterilize a needle and with rubbing alcohol and gently pop the blister. It is important that you gently wash the area with soap and warm water as the blister fluid may contain wart virus if a wart was treated. Do no remove the roof of the blister.     The area treated can take anywhere from 1-3 weeks to heal. Healing time depends on the kind of skin lesion treated, the location, and how aggressively the lesion was treated. It is recommended that the areas treated are covered with Vaseline or bacitracin ointment and a band-aid. If a band-aid is not practical, just ointment applied several times per day will do. Keeping these areas moist will speed the healing time.    Treatment with liquid nitrogen can leave a scar. In dark skin, it may be a light or dark scar, in light skin it may be a white or pink scar. These will generally fade with time.    If you have any concerns after your treatment, please feel free to call the office.         [facility] DERMATOLOGY  [address] [address]  Dept: [phone]  Dept Fax: [phone]     Shave Biopsy Wound  Care    Your doctor has performed a shave biopsy today.  A band aid and vaseline ointment has been placed over the site.  This should remain in place for 24 hours.  It is recommended that you keep the area dry for the first 24 hours.  After 24 hours, you may remove the band aid and wash the area with warm soap and water and apply Vaseline jelly.  Many patients prefer to use Neosporin or Bacitracin ointment.  This is acceptable; however, know that you can develop an allergy to this medication even if you have used it safely for years.  It is important to keep the area moist.  Letting it dry out and get air slows healing time, and will worsen the scar.  Band aid is optional after first 24 hours.      If you notice increasing redness, tenderness, pain, or yellow drainage at the biopsy site, please notify your doctor.  These are signs of an infection.    If your biopsy site is bleeding, apply firm pressure for 15 minutes straight.  Repeat for another 15 minutes, if it is still bleeding.   If the surgical site continues to bleed, then please contact your doctor.      [facility] - [facility]   DERMATOLOGY  [address]   [facility] [address]   Dept: [phone]   Dept Fax: [phone]

## 2019-11-18 NOTE — PROGRESS NOTES
Subjective:       Patient ID:  Dragan Man II is a 54 y.o. male who presents for   Chief Complaint   Patient presents with    Spot     c/o spots to face and nose x 1 month,,previously cryo'ed    Skin Check     UBSE,,,     Hx of AK's, last seen on [facility] on 10/17/19.  He c/o lesion of the right nose x several years, + tender and drainage. No tx tried.     The patient denies personal or family history of skin cancer.        Review of Systems   Constitutional: Negative for fever and chills.   Gastrointestinal: Negative for nausea and vomiting.   Skin: Positive for activity-related sunscreen use. Negative for daily sunscreen use and recent sunburn.   Hematologic/Lymphatic: Does not bruise/bleed easily.        Objective:    Physical Exam   Constitutional: He appears well-developed and well-nourished. No distress.   Neurological: He is alert and oriented to person, place, and time. He is not disoriented.   Psychiatric: He has a normal mood and affect.   Skin:   Areas Examined (abnormalities noted in diagram):   Scalp / Hair Palpated and Inspected  Head / Face Inspection Performed  Neck Inspection Performed  Chest / Axilla Inspection Performed  Abdomen Inspection Performed  Back Inspection Performed  RUE Inspected  LUE Inspection Performed  Nails and Digits Inspection Performed                   Diagram Legend     Erythematous scaling macule/papule c/w actinic keratosis       Vascular papule c/w angioma      Pigmented verrucoid papule/plaque c/w seborrheic keratosis      Yellow umbilicated papule c/w sebaceous hyperplasia      Irregularly shaped tan macule c/w lentigo     1-2 mm smooth white papules consistent with Milia      Movable subcutaneous cyst with punctum c/w epidermal inclusion cyst      Subcutaneous movable cyst c/w pilar cyst      Firm pink to brown papule c/w dermatofibroma      Pedunculated fleshy papule(s) c/w skin tag(s)      Evenly pigmented macule c/w junctional nevus     Mildly variegated  pigmented, slightly irregular-bordered macule c/w mildly atypical nevus      Flesh colored to evenly pigmented papule c/w intradermal nevus       Pink pearly papule/plaque c/w basal cell carcinoma      Erythematous hyperkeratotic cursted plaque c/w SCC      Surgical scar with no sign of skin cancer recurrence      Open and closed comedones      Inflammatory papules and pustules      Verrucoid papule consistent consistent with wart     Erythematous eczematous patches and plaques     Dystrophic onycholytic nail with subungual debris c/w onychomycosis     Umbilicated papule    Erythematous-base heme-crusted tan verrucoid plaque consistent with inflamed seborrheic keratosis     Erythematous Silvery Scaling Plaque c/w Psoriasis     See annotation            Assessment / Plan:      Pathology Orders:     Normal Orders This Visit    Specimen to Pathology, Dermatology     Questions:    Procedure Type:  Dermatology and skin neoplasms    Number of Specimens:  1    ------------------------:  -------------------------    Spec 1 Procedure:  Biopsy    Spec 1 Clinical Impression:  r/o SCC    Spec 1 Source:  right nasal sidewall        Rosacea  Continue doxy and metrogel.     Ganglion cyst of dorsum of right wrist  -     Ambulatory referral/consult to Orthopedics; Future; Expected date: 11/18/2019    Actinic keratoses  Cryosurgery Procedure Note    The patient is informed of the precancerous quality and need for treatment of these lesions. After risks, benefits and alternatives explained, including blistering, pain, hyper- and hypopigmentation, patient verbally consents to cryotherapy to precancerous lesions. Liquid nitrogen cryosurgery is applied to the 15 actinic keratoses, as detailed in the physical exam, to produce a freeze injury. The patient is aware that blisters may form and is instructed on wound care with gentle cleansing and use of vaseline ointment to keep moist until healed. The patient is supplied a handout on  cryosurgery and is instructed to call if lesions do not completely resolve.    Neoplasm of uncertain behavior of skin  -     Specimen to Pathology, Dermatology  -     Shave biopsy(-ies) done of 1 site(s).   Patient informed to call for results within 2 weeks if [facility] not [facility]           Follow up for call for results.     PROCEDURE NOTE - SHAVE BIOPSY   Location: see above    After risk, benefits, and alternatives were discussed with the patient, the patient agrees to the procedure by verbal informed consent.  The area(s) were cleansed with alcohol. 2 cc of lidocaine 1% with epinephrine was injected for local anesthesia into each lesion(s).  A sharp dermablade was used to remove part or all of the lesion(s).  The specimen(s) will be sent for tissue pathology.  Hemostasis was obtained with aluminum chloride and/or hyfrecation.  The area(s) were dressed with vaseline ointment and bandaged.  The patient tolerated the procedure well without adverse events.  Wound care instructions were given to the patient on the AVS.  The patient will be notified of pathology results once available. Results will also be available in Epic.

## 2019-12-04 ENCOUNTER — TELEPHONE (OUTPATIENT)
Dept: SPORTS MEDICINE | Facility: CLINIC | Age: 54
End: 2019-12-04

## 2019-12-05 ENCOUNTER — TELEPHONE (OUTPATIENT)
Dept: SPORTS MEDICINE | Facility: CLINIC | Age: 54
End: 2019-12-05

## 2019-12-09 ENCOUNTER — OFFICE VISIT (OUTPATIENT)
Dept: PULMONOLOGY | Facility: CLINIC | Age: 54
End: 2019-12-09
Payer: COMMERCIAL

## 2019-12-09 VITALS
DIASTOLIC BLOOD PRESSURE: 80 MMHG | RESPIRATION RATE: 18 BRPM | WEIGHT: 315 LBS | SYSTOLIC BLOOD PRESSURE: 130 MMHG | HEART RATE: 74 BPM | BODY MASS INDEX: 41.75 KG/M2 | OXYGEN SATURATION: 96 % | HEIGHT: 73 IN

## 2019-12-09 DIAGNOSIS — G47.33 OBSTRUCTIVE SLEEP APNEA TREATED WITH CONTINUOUS POSITIVE AIRWAY PRESSURE (CPAP): Primary | Chronic | ICD-10-CM

## 2019-12-09 DIAGNOSIS — L71.9 ROSACEA: ICD-10-CM

## 2019-12-09 DIAGNOSIS — R06.09 DOE (DYSPNEA ON EXERTION): ICD-10-CM

## 2019-12-09 DIAGNOSIS — E66.01 MORBID OBESITY WITH BMI OF 40.0-44.9, ADULT: Chronic | ICD-10-CM

## 2019-12-09 PROCEDURE — 3079F DIAST BP 80-89 MM HG: CPT | Mod: CPTII,S$GLB,, | Performed by: NURSE PRACTITIONER

## 2019-12-09 PROCEDURE — 99999 PR PBB SHADOW E&M-EST. PATIENT-LVL IV: ICD-10-PCS | Mod: PBBFAC,,, | Performed by: NURSE PRACTITIONER

## 2019-12-09 PROCEDURE — 3075F SYST BP GE 130 - 139MM HG: CPT | Mod: CPTII,S$GLB,, | Performed by: NURSE PRACTITIONER

## 2019-12-09 PROCEDURE — 99214 PR OFFICE/OUTPT VISIT, EST, LEVL IV, 30-39 MIN: ICD-10-PCS | Mod: S$GLB,,, | Performed by: NURSE PRACTITIONER

## 2019-12-09 PROCEDURE — 3075F PR MOST RECENT SYSTOLIC BLOOD PRESS GE 130-139MM HG: ICD-10-PCS | Mod: CPTII,S$GLB,, | Performed by: NURSE PRACTITIONER

## 2019-12-09 PROCEDURE — 99214 OFFICE O/P EST MOD 30 MIN: CPT | Mod: S$GLB,,, | Performed by: NURSE PRACTITIONER

## 2019-12-09 PROCEDURE — 3008F BODY MASS INDEX DOCD: CPT | Mod: CPTII,S$GLB,, | Performed by: NURSE PRACTITIONER

## 2019-12-09 PROCEDURE — 3079F PR MOST RECENT DIASTOLIC BLOOD PRESSURE 80-89 MM HG: ICD-10-PCS | Mod: CPTII,S$GLB,, | Performed by: NURSE PRACTITIONER

## 2019-12-09 PROCEDURE — 99999 PR PBB SHADOW E&M-EST. PATIENT-LVL IV: CPT | Mod: PBBFAC,,, | Performed by: NURSE PRACTITIONER

## 2019-12-09 PROCEDURE — 3008F PR BODY MASS INDEX (BMI) DOCUMENTED: ICD-10-PCS | Mod: CPTII,S$GLB,, | Performed by: NURSE PRACTITIONER

## 2019-12-09 NOTE — LETTER
December 9, 2019                   [facility]  [address]  [place] [address]  Phone: [phone]  Fax: [phone] December 9, 2019     Patient: Dragan Man    YOB: 1965   Date of Visit: 12/9/2019       To Whom It May Concern:    Dragan Man  Has obstructive sleep apnea. He is compliant with CPAP with normal AHI on CPAP. No work restrictions as long as he continues treatment. No daytime sleepiness.    If you have any questions or concerns, please don't hesitate to call.    Sincerely,        Elizabeth Lejeune, NP

## 2019-12-09 NOTE — PROGRESS NOTES
"Subjective:      Patient ID: Dragan Man II is a 54 y.o. male.    Chief Complaint: Sleep Apnea    HPI  Presents to office for review of AutoPAP therapy. Patient states improved symptoms with use of AutoPAP. Sleeping more soundly. Waking up feeling more refreshed. Improved daytime sleepiness. Patient states he is benefiting from use of the AutoPAP. He is losing weight with [employer] mel.   ROLLINS with abdominal obesity and restriction of PFT.     Patient Active Problem List   Diagnosis    Obstructive sleep apnea treated with continuous positive airway pressure (CPAP)    Advanced sleep phase syndrome    Behaviorally induced insufficient sleep syndrome    Sleep-related bruxism    Inadequate sleep hygiene    Morbid obesity with BMI of 40.0-44.9, adult    Benign essential hypertension    Actinic keratoses    Heart murmur    ROLLINS (dyspnea on exertion)    Seasonal allergic rhinitis due to pollen    At risk for cardiovascular event    Other chest pain       /80   Pulse 74   Resp 18   Ht 6' 1" (1.854 m)   Wt (!) 143.2 kg (315 lb 11.2 oz)   SpO2 96%   BMI 41.65 kg/m²   Body mass index is 41.65 kg/m².    Review of Systems   Constitutional: Negative.    HENT: Negative.    Respiratory: Positive for dyspnea on extertion.    Cardiovascular: Negative.    Musculoskeletal: Negative.    Gastrointestinal: Negative.    Neurological: Negative.    Psychiatric/Behavioral: Negative.      Objective:      Physical Exam   Constitutional: He is oriented to person, place, and time. He appears well-developed and well-nourished.   Obese   HENT:   Head: Normocephalic and atraumatic.   Nose: Nose normal.   Mouth/Throat: Uvula is midline and oropharynx is clear and moist.   Mallampati Score: IV     Neck: Trachea normal and normal range of motion. Neck supple. No thyroid mass and no thyromegaly present.   Cardiovascular: Normal rate, regular rhythm and normal heart sounds.   Pulmonary/Chest: Effort normal and breath " sounds normal. He has no wheezes. He has no rhonchi. He has no rales. Chest wall is not dull to percussion.   Abdominal: Soft. He exhibits no mass. There is no hepatosplenomegaly or splenomegaly. There is no tenderness.   Distended from obesity   Musculoskeletal: Normal range of motion. He exhibits no edema.   Neurological: He is alert and oriented to person, place, and time.   Skin: Skin is warm and dry.   Psychiatric: He has a normal mood and affect.     Personal Diagnostic Review  Compliance Summary  11/9/2019 - 12/8/2019 (30 days)  Days with Device Usage 30 days  Days without Device Usage 0 days  Percent Days with Device Usage 100.0%  Cumulative Usage 10 days 1 hrs. 38 mins. 31 secs.  Maximum Usage (1 Day) 12 hrs. 37 mins. 27 secs.  Average Usage (All Days) 8 hrs. 3 mins. 17 secs.  Average Usage (Days Used) 8 hrs. 3 mins. 17 secs.  Minimum Usage (1 Day) 4 hrs. 36 mins. 37 secs.  Percent of Days with Usage >= 4 Hours 100.0%  Percent of Days with Usage < 4 Hours 0.0%  Date Range  Total Blower Time 10 days 1 hrs. 46 mins. 42 secs.  Average AHI 1.0  Auto-CPAP Summary  Auto-CPAP Mean Pressure 15.0 cmH2O  Auto-CPAP Peak Average Pressure 16.3 cmH2O  Average Device Pressure <= 90% of Time 16.9 cmH2O  Average Time in Large Leak Per Day 12 secs.    Results for orders placed during the hospital encounter of 10/14/19   X-Ray Chest PA And Lateral    Narrative EXAMINATION:  XR CHEST PA AND LATERAL    CLINICAL HISTORY:  Other forms of dyspnea    TECHNIQUE:  PA and lateral views of the chest were performed.    COMPARISON:  December 4, 2017    FINDINGS:  Decreased inspiratory result minimally accentuates heart and pulmonary markings.  Smooth pleural thickening stents today apices bilaterally.  Heart size within upper limits normal and pulmonary vasculatures stable and symmetric.  Midline trachea and sharp CP angles bilaterally.  Stable smooth elevation right hemidiaphragm.  No focal consolidation or effusion.    Generalized  osteopenia and spondylosis with stable mild accentuated kyphosis.  Multilevel anterior vertebral body wedging in the mid to lower T-spine.      Impression Stable exam without acute infiltrate.  See above.      Electronically signed by: Igor Acosta MD  Date:    10/14/2019  Time:    18:08         Assessment:       1. Obstructive sleep apnea treated with continuous positive airway pressure (CPAP)    2. Morbid obesity with BMI of 40.0-44.9, adult    3. ROLLINS (dyspnea on exertion)        Outpatient Encounter Medications as of 12/9/2019   Medication Sig Dispense Refill    acetaminophen (TYLENOL) 650 MG TbSR Take 650 mg by mouth as needed.      amLODIPine (NORVASC) 10 MG tablet Take 1 tablet (10 mg total) by mouth once daily. 90 tablet 4    atorvastatin (LIPITOR) 20 MG tablet Take 1 tablet (20 mg total) by mouth every evening. 90 tablet 4    cetirizine (ZYRTEC) 10 MG tablet Take 1 tablet (10 mg total) by mouth once daily. 30 tablet 3    clindamycin (CLEOCIN T) 1 % Swab Apply topically 2 (two) times daily. For acne of neck. 60 each 5    doxycycline (MONODOX) 100 MG capsule Take once daily with food.  May cause upset stomach. 30 capsule 0    losartan-hydrochlorothiazide 100-25 mg (HYZAAR) 100-25 mg per tablet Take 1 tablet by mouth once daily. 90 tablet 4    metroNIDAZOLE (METROGEL) 0.75 % gel AAA face bid 1 Tube 6    mometasone (NASONEX) 50 mcg/actuation nasal spray 2 sprays by Nasal route once daily. 30 g 3    sodium chloride (OCEAN) 0.65 % nasal spray 1 spray by Nasal route as needed for Congestion.      varicella-zoster gE-AS01B, PF, (SHINGRIX) 50 mcg/0.5 mL injection Inject 0.5 mL (one dose) into muscle now; give second dose at least 2 months later 0.5 mL 1    [DISCONTINUED] amLODIPine (NORVASC) 10 MG tablet Take 1 tablet (10 mg total) by mouth once daily. 90 tablet 3    [DISCONTINUED] atorvastatin (LIPITOR) 20 MG tablet Take 1 tablet (20 mg total) by mouth every evening. 30 tablet 1    [DISCONTINUED]  doxycycline (MONODOX) 100 MG capsule Take once daily with food.  May cause upset stomach. 30 capsule 0    [DISCONTINUED] losartan-hydrochlorothiazide 100-25 mg (HYZAAR) 100-25 mg per tablet Take 1 tablet by mouth once daily. 90 tablet 3     No facility-administered encounter medications on file as of 12/9/2019.      No orders of the defined types were placed in this encounter.    Plan:   Continue weight loss and exercise to improve overall health.  Doing well on PAP settings. Patient is compliant. Follow up in 12 months with PAP data download or call earlier if any problems.  CDL letter       Problem List Items Addressed This Visit        Endocrine    Morbid obesity with BMI of 40.0-44.9, adult (Chronic)       Other    Obstructive sleep apnea treated with continuous positive airway pressure (CPAP) - Primary (Chronic)    ROLLINS (dyspnea on exertion)

## 2019-12-10 RX ORDER — DOXYCYCLINE 50 MG/1
CAPSULE ORAL
Qty: 30 CAPSULE | Refills: 5 | Status: SHIPPED | OUTPATIENT
Start: 2019-12-10 | End: 2020-02-17

## 2019-12-11 DIAGNOSIS — L71.9 ROSACEA: ICD-10-CM

## 2019-12-11 RX ORDER — DOXYCYCLINE 50 MG/1
CAPSULE ORAL
Qty: 30 CAPSULE | Refills: 5 | OUTPATIENT
Start: 2019-12-11

## 2019-12-13 LAB
FINAL PATHOLOGIC DIAGNOSIS: NORMAL
GROSS: NORMAL

## 2019-12-16 ENCOUNTER — OFFICE VISIT (OUTPATIENT)
Dept: CARDIOLOGY | Facility: CLINIC | Age: 54
End: 2019-12-16
Payer: COMMERCIAL

## 2019-12-16 VITALS
HEART RATE: 63 BPM | SYSTOLIC BLOOD PRESSURE: 134 MMHG | DIASTOLIC BLOOD PRESSURE: 74 MMHG | OXYGEN SATURATION: 96 % | BODY MASS INDEX: 42.44 KG/M2 | WEIGHT: 315 LBS

## 2019-12-16 DIAGNOSIS — I35.0 NONRHEUMATIC AORTIC VALVE STENOSIS: ICD-10-CM

## 2019-12-16 DIAGNOSIS — I51.7 LEFT ATRIAL ENLARGEMENT: ICD-10-CM

## 2019-12-16 DIAGNOSIS — E66.01 MORBID OBESITY WITH BMI OF 40.0-44.9, ADULT: Chronic | ICD-10-CM

## 2019-12-16 DIAGNOSIS — I10 BENIGN ESSENTIAL HYPERTENSION: Primary | ICD-10-CM

## 2019-12-16 PROCEDURE — 3075F PR MOST RECENT SYSTOLIC BLOOD PRESS GE 130-139MM HG: ICD-10-PCS | Mod: CPTII,S$GLB,, | Performed by: INTERNAL MEDICINE

## 2019-12-16 PROCEDURE — 3008F BODY MASS INDEX DOCD: CPT | Mod: CPTII,S$GLB,, | Performed by: INTERNAL MEDICINE

## 2019-12-16 PROCEDURE — 99214 PR OFFICE/OUTPT VISIT, EST, LEVL IV, 30-39 MIN: ICD-10-PCS | Mod: S$GLB,,, | Performed by: INTERNAL MEDICINE

## 2019-12-16 PROCEDURE — 99999 PR PBB SHADOW E&M-EST. PATIENT-LVL III: ICD-10-PCS | Mod: PBBFAC,,, | Performed by: INTERNAL MEDICINE

## 2019-12-16 PROCEDURE — 3078F DIAST BP <80 MM HG: CPT | Mod: CPTII,S$GLB,, | Performed by: INTERNAL MEDICINE

## 2019-12-16 PROCEDURE — 3078F PR MOST RECENT DIASTOLIC BLOOD PRESSURE < 80 MM HG: ICD-10-PCS | Mod: CPTII,S$GLB,, | Performed by: INTERNAL MEDICINE

## 2019-12-16 PROCEDURE — 99999 PR PBB SHADOW E&M-EST. PATIENT-LVL III: CPT | Mod: PBBFAC,,, | Performed by: INTERNAL MEDICINE

## 2019-12-16 PROCEDURE — 99214 OFFICE O/P EST MOD 30 MIN: CPT | Mod: S$GLB,,, | Performed by: INTERNAL MEDICINE

## 2019-12-16 PROCEDURE — 3008F PR BODY MASS INDEX (BMI) DOCUMENTED: ICD-10-PCS | Mod: CPTII,S$GLB,, | Performed by: INTERNAL MEDICINE

## 2019-12-16 PROCEDURE — 3075F SYST BP GE 130 - 139MM HG: CPT | Mod: CPTII,S$GLB,, | Performed by: INTERNAL MEDICINE

## 2019-12-16 NOTE — PROGRESS NOTES
Subjective:   Patient ID:  Dragan Man II is a 54 y.o. male who presents for follow up of No chief complaint on file.      53 yo. Male, f/u for HTN.  and beer delivery  PMH HTN, LAVERNE on CPAP, obesity. No Dx of heart attack,DM,stroke and cancer. No smoking/drinking  Chronic ROLLINS. Recently worse with dizziness, left chest tightness. Occurred at workplace and physical activity. Improved the symptoms at home. Thought related to the temperature change from parking lot to cooler.   Treadmill stress ekg showed no stress induced EKG change. Peak  mmHG   ECHO showed normal EF, severe LAE, and mild AS  No f/h premature CAD  In 2012, had episode of syncope when lifted up heavy stuff.  2012. MPI showed no ischemia and echo showed normal EF, dilated RV. Mild LAE and   Enrolled in HTN digit propgram      Past Medical History:   Diagnosis Date    Actinic keratoses 10/14/2019    Depression     Morbid obesity with BMI of 40.0-44.9, adult 11/17/2017    LAVERNE (obstructive sleep apnea)     Seasonal allergic rhinitis due to pollen 10/14/2019       Past Surgical History:   Procedure Laterality Date    None         Social History     Tobacco Use    Smoking status: Never Smoker    Smokeless tobacco: Former User    Tobacco comment: quit 15 years ago    Substance Use Topics    Alcohol use: Yes     Frequency: 2-4 times a month     Drinks per session: 3 or 4     Binge frequency: Less than monthly     Comment: socially // beer    Drug use: No       Family History   Problem Relation Age of Onset    Diabetes Father     Diabetes Paternal Grandfather          Review of Systems   Constitution: Negative for decreased appetite, diaphoresis, fever, malaise/fatigue and night sweats.   HENT: Negative for nosebleeds.    Eyes: Negative for blurred vision and double vision.   Cardiovascular: Positive for dyspnea on exertion. Negative for claudication, irregular heartbeat, leg swelling, near-syncope, orthopnea,  palpitations, paroxysmal nocturnal dyspnea and syncope.   Respiratory: Negative for cough, shortness of breath, sleep disturbances due to breathing, snoring, sputum production and wheezing.    Endocrine: Negative for cold intolerance and polyuria.   Hematologic/Lymphatic: Does not bruise/bleed easily.   Skin: Negative for rash.   Musculoskeletal: Negative for back pain, falls, joint pain, joint swelling and neck pain.   Gastrointestinal: Negative for abdominal pain, heartburn, nausea and vomiting.   Genitourinary: Negative for dysuria, frequency and hematuria.   Neurological: Positive for dizziness. Negative for difficulty with concentration, focal weakness, headaches, light-headedness, numbness, seizures and weakness.   Psychiatric/Behavioral: Negative for depression, memory loss and substance abuse. The patient does not have insomnia.    Allergic/Immunologic: Negative for HIV exposure and hives.       Objective:   Physical Exam   Constitutional: He is oriented to person, place, and time. He appears well-nourished.   HENT:   Head: Normocephalic.   Eyes: Pupils are equal, round, and reactive to light.   Neck: Normal carotid pulses and no JVD present. Carotid bruit is not present. No thyromegaly present.   Cardiovascular: Normal rate, regular rhythm and normal pulses.  No extrasystoles are present. PMI is not displaced. Exam reveals no gallop and no S3.   Murmur: ESM 3/6 on bases and along LSB. up to the neck.  Pulmonary/Chest: Breath sounds normal. No stridor. No respiratory distress.   Abdominal: Soft. Bowel sounds are normal. There is no tenderness. There is no rebound.   Musculoskeletal: Normal range of motion.   Neurological: He is alert and oriented to person, place, and time.   Skin: Skin is intact. No rash noted.   Psychiatric: His behavior is normal.       Lab Results   Component Value Date    CHOL 185 10/14/2019    CHOL 186 12/03/2018    CHOL 193 08/26/2013     Lab Results   Component Value Date    HDL 38  (L) 10/14/2019    HDL 48 12/03/2018    HDL 49 08/26/2013     Lab Results   Component Value Date    LDLCALC 80.8 10/14/2019    LDLCALC 103.4 12/03/2018    LDLCALC 133.0 08/26/2013     Lab Results   Component Value Date    TRIG 331 (H) 10/14/2019    TRIG 173 (H) 12/03/2018    TRIG 56 08/26/2013     Lab Results   Component Value Date    CHOLHDL 20.5 10/14/2019    CHOLHDL 25.8 12/03/2018    CHOLHDL 25.4 08/26/2013       Chemistry        Component Value Date/Time     10/14/2019 1636    K 4.0 10/14/2019 1636    CL 99 10/14/2019 1636    CO2 26 10/14/2019 1636    BUN 25 (H) 10/14/2019 1636    CREATININE 1.2 10/14/2019 1636     10/14/2019 1636        Component Value Date/Time    CALCIUM 9.3 10/14/2019 1636    ALKPHOS 94 10/14/2019 1636    AST 23 10/14/2019 1636    ALT 29 10/14/2019 1636    BILITOT 0.6 10/14/2019 1636    ESTGFRAFRICA >60.0 10/14/2019 1636    EGFRNONAA >60.0 10/14/2019 1636          Lab Results   Component Value Date    HGBA1C 5.5 11/17/2017     Lab Results   Component Value Date    TSH 3.980 10/14/2019     Lab Results   Component Value Date    INR 1.1 10/18/2012     Lab Results   Component Value Date    WBC 8.95 10/14/2019    HGB 12.8 (L) 10/14/2019    HCT 39.4 (L) 10/14/2019    MCV 99 (H) 10/14/2019     10/14/2019     BMP  Sodium   Date Value Ref Range Status   10/14/2019 137 136 - 145 mmol/L Final     Potassium   Date Value Ref Range Status   10/14/2019 4.0 3.5 - 5.1 mmol/L Final     Chloride   Date Value Ref Range Status   10/14/2019 99 95 - 110 mmol/L Final     CO2   Date Value Ref Range Status   10/14/2019 26 23 - 29 mmol/L Final     BUN, Bld   Date Value Ref Range Status   10/14/2019 25 (H) 6 - 20 mg/dL Final     Creatinine   Date Value Ref Range Status   10/14/2019 1.2 0.5 - 1.4 mg/dL Final     Calcium   Date Value Ref Range Status   10/14/2019 9.3 8.7 - 10.5 mg/dL Final     Anion Gap   Date Value Ref Range Status   10/14/2019 12 8 - 16 mmol/L Final     eGFR if     Date Value Ref Range Status   10/14/2019 >60.0 >60 mL/min/1.73 m^2 Final     eGFR if non    Date Value Ref Range Status   10/14/2019 >60.0 >60 mL/min/1.73 m^2 Final     Comment:     Calculation used to obtain the estimated glomerular filtration  rate (eGFR) is the CKD-EPI equation.        BNP  @LABRCNTIP(BNP,BNPTRIAGEBLO)@  @LABRCNTIP(troponini)@  CrCl cannot be calculated (Patient's most recent lab result is older than the maximum 7 days allowed.).  No results found in the last 24 hours.  No results found in the last 24 hours.  No results found in the last 24 hours.    Assessment:      1. Benign essential hypertension    2. Left atrial enlargement    3. Nonrheumatic aortic valve stenosis    4. Morbid obesity with BMI of 40.0-44.9, adult      BP controlled  Severe LAE  Mild to moderate AS.  Cut the coffeine  Plan:     Continue amlodipine and ARB/HCTZ  Repeat echo in 1 yr for AS  DASH  Recommend heart-healthy diet, weight control and regular exercise.  Fadumo. Risk modification.   RTC in 1 yr    I have reviewed all pertinent labs and cardiac studies. Plans and recommendations have been formulated under my direct supervision. All questions answered and patient voiced understanding. Patient to continue current medications.

## 2019-12-17 ENCOUNTER — PATIENT MESSAGE (OUTPATIENT)
Dept: ADMINISTRATIVE | Facility: OTHER | Age: 54
End: 2019-12-17

## 2019-12-17 ENCOUNTER — TELEPHONE (OUTPATIENT)
Dept: DERMATOLOGY | Facility: CLINIC | Age: 54
End: 2019-12-17

## 2019-12-17 NOTE — TELEPHONE ENCOUNTER
----- Message from Chuck Carver sent at 12/17/2019  3:42 PM CST -----  Contact: self  Type:  Patient Returning Call    Who Called:pt  Who Left Message for Patient:n/a  Does the patient know what this is regarding?:no  Would the patient rather a call back or a response via [facility]? Call back  Best Call Back Number:[phone]  Additional Information: none    Thanks,  hCuck Carver

## 2019-12-18 ENCOUNTER — PATIENT MESSAGE (OUTPATIENT)
Dept: DERMATOLOGY | Facility: CLINIC | Age: 54
End: 2019-12-18

## 2019-12-30 ENCOUNTER — PATIENT OUTREACH (OUTPATIENT)
Dept: OTHER | Facility: OTHER | Age: 54
End: 2019-12-30

## 2019-12-30 NOTE — PROGRESS NOTES
"Digital Medicine: Health  Follow-Up    Patient reports he was losing weight and got down to about "305lbs" and then "the holidays happened".  Encouraged patient to get back into his routine.          Follow Up  Follow-up reason(s): reading review      Patient reports he has another machine that was reading "in the 120's".  Patient reports he got a physical and his reading was "120/60?".  Instructed patient to bring his machine to his next appointment to have them compare it against a manual reading.  Reminded patient of his technique and making sure to relax 5-10 minutes before taking readings.       INTERVENTION(S)  encouragement/support and denied further coaching    PLAN  patient verbalizes understanding and continue monitoring      There are no preventive care reminders to display for this patient.    Last 5 Patient Entered Readings                                      Current 30 Day Average: 134/70     Recent Readings 12/30/2019 12/30/2019 12/28/2019 12/28/2019 12/28/2019    SBP (mmHg) 133 131 145 139 145    DBP (mmHg) 73 67 71 73 80    Pulse 61 62 59 57 62            Diet Screening   No change to diet.      Physical Activity Screening   No change to exercise routine.        "

## 2019-12-31 ENCOUNTER — PATIENT MESSAGE (OUTPATIENT)
Dept: ADMINISTRATIVE | Facility: OTHER | Age: 54
End: 2019-12-31

## 2020-01-11 ENCOUNTER — PATIENT MESSAGE (OUTPATIENT)
Dept: ADMINISTRATIVE | Facility: OTHER | Age: 55
End: 2020-01-11

## 2020-01-13 ENCOUNTER — TELEPHONE (OUTPATIENT)
Dept: OTHER | Facility: OTHER | Age: 55
End: 2020-01-13

## 2020-01-13 DIAGNOSIS — I10 BENIGN ESSENTIAL HYPERTENSION: ICD-10-CM

## 2020-01-13 NOTE — TELEPHONE ENCOUNTER
01/13/2020  [facility] called to ask whether OK to split Losartan-HCTZ into 2 different tablets since combo tablet is on back-order.    Pamela Maldonado, MPH, PA-C  [facility]  [phone]

## 2020-02-06 ENCOUNTER — DOCUMENTATION ONLY (OUTPATIENT)
Dept: INTERNAL MEDICINE | Facility: CLINIC | Age: 55
End: 2020-02-06

## 2020-02-07 NOTE — PROGRESS NOTES
Correspondence from Jaden Collazo MD      January 13, 2020    Dear DR Honorio Barba,     I had the pleasure of seeing your patient, RENATA LUJAN, in consultation on January 13, 2020. He was referred by Dr. Skye Aguirrefor a lesion on the right nasal sidewall . The tumor was completely removed and repaired as below:     Mohs Surgery   Surgery Date: January 13, 2020  Diagnosis: Squamous Cell Carcinoma in situ  Location: right nasal sidewall  Date of Previous Biopsy: 11/18/3019  Stages: 1   Primary Defect Size: 1.7 cm x 1.2 cm  Repair Type: Localized Dermabrasion     Thank you for the opportunity to participate in RENATA LUJAN's care. I will Keep you updated on nis progress. Meanwhile, if I can be of any further assistance, please do not hesitate to contact me.     Sincerely,    Jaden Collazo MD

## 2020-02-17 ENCOUNTER — OFFICE VISIT (OUTPATIENT)
Dept: INTERNAL MEDICINE | Facility: CLINIC | Age: 55
End: 2020-02-17
Payer: COMMERCIAL

## 2020-02-17 VITALS
SYSTOLIC BLOOD PRESSURE: 132 MMHG | TEMPERATURE: 98 F | WEIGHT: 315 LBS | DIASTOLIC BLOOD PRESSURE: 62 MMHG | OXYGEN SATURATION: 96 % | HEART RATE: 71 BPM | HEIGHT: 73 IN | BODY MASS INDEX: 41.75 KG/M2

## 2020-02-17 DIAGNOSIS — G56.23 ULNAR NEUROPATHY OF BOTH UPPER EXTREMITIES: Chronic | ICD-10-CM

## 2020-02-17 DIAGNOSIS — I50.32 CHRONIC DIASTOLIC CONGESTIVE HEART FAILURE: ICD-10-CM

## 2020-02-17 DIAGNOSIS — G47.33 OBSTRUCTIVE SLEEP APNEA TREATED WITH CONTINUOUS POSITIVE AIRWAY PRESSURE (CPAP): Chronic | ICD-10-CM

## 2020-02-17 DIAGNOSIS — I35.0 NONRHEUMATIC AORTIC VALVE STENOSIS: ICD-10-CM

## 2020-02-17 DIAGNOSIS — R60.1 GENERALIZED EDEMA: Primary | ICD-10-CM

## 2020-02-17 DIAGNOSIS — I10 BENIGN ESSENTIAL HYPERTENSION: ICD-10-CM

## 2020-02-17 PROBLEM — R07.89 OTHER CHEST PAIN: Status: RESOLVED | Noted: 2019-10-17 | Resolved: 2020-02-17

## 2020-02-17 PROCEDURE — 99214 OFFICE O/P EST MOD 30 MIN: CPT | Mod: S$GLB,,, | Performed by: FAMILY MEDICINE

## 2020-02-17 PROCEDURE — 3075F SYST BP GE 130 - 139MM HG: CPT | Mod: CPTII,S$GLB,, | Performed by: FAMILY MEDICINE

## 2020-02-17 PROCEDURE — 99214 PR OFFICE/OUTPT VISIT, EST, LEVL IV, 30-39 MIN: ICD-10-PCS | Mod: S$GLB,,, | Performed by: FAMILY MEDICINE

## 2020-02-17 PROCEDURE — 99999 PR PBB SHADOW E&M-EST. PATIENT-LVL III: CPT | Mod: PBBFAC,,, | Performed by: FAMILY MEDICINE

## 2020-02-17 PROCEDURE — 99999 PR PBB SHADOW E&M-EST. PATIENT-LVL III: ICD-10-PCS | Mod: PBBFAC,,, | Performed by: FAMILY MEDICINE

## 2020-02-17 PROCEDURE — 3075F PR MOST RECENT SYSTOLIC BLOOD PRESS GE 130-139MM HG: ICD-10-PCS | Mod: CPTII,S$GLB,, | Performed by: FAMILY MEDICINE

## 2020-02-17 PROCEDURE — 3008F BODY MASS INDEX DOCD: CPT | Mod: CPTII,S$GLB,, | Performed by: FAMILY MEDICINE

## 2020-02-17 PROCEDURE — 3078F DIAST BP <80 MM HG: CPT | Mod: CPTII,S$GLB,, | Performed by: FAMILY MEDICINE

## 2020-02-17 PROCEDURE — 3078F PR MOST RECENT DIASTOLIC BLOOD PRESSURE < 80 MM HG: ICD-10-PCS | Mod: CPTII,S$GLB,, | Performed by: FAMILY MEDICINE

## 2020-02-17 PROCEDURE — 3008F PR BODY MASS INDEX (BMI) DOCUMENTED: ICD-10-PCS | Mod: CPTII,S$GLB,, | Performed by: FAMILY MEDICINE

## 2020-02-17 RX ORDER — HYDROCHLOROTHIAZIDE 25 MG/1
TABLET ORAL
COMMUNITY
Start: 2020-01-13 | End: 2020-10-05 | Stop reason: SDUPTHER

## 2020-02-17 RX ORDER — HYDROCODONE BITARTRATE AND ACETAMINOPHEN 5; 325 MG/1; MG/1
TABLET ORAL
COMMUNITY
Start: 2020-01-13 | End: 2020-02-17

## 2020-02-17 RX ORDER — LOSARTAN POTASSIUM 100 MG/1
TABLET ORAL
COMMUNITY
Start: 2020-01-13 | End: 2021-03-18 | Stop reason: SDUPTHER

## 2020-02-17 NOTE — ASSESSMENT & PLAN NOTE
He reports several month history of painless edema, primarily bilateral lower extremity edema (1+ pitting pretibial edema on exam today).  However, he also reports puffiness in his hands and a sensation of puffiness in his face.  He has been aggressively avoiding salt and drinking ample fluid.  We discussed how his amlodipine may be an exacerbating factor. We discussed differential diagnosis and treatment options.  At his request, I am going to send a message to his cardiologist asking his thoughts and recommendations for how to proceed.

## 2020-02-17 NOTE — PROGRESS NOTES
CHIEF COMPLAINT  Follow-up      SUMMARY OF DIAGNOSES AND ASSOCIATED ORDERS    1. Generalized edema    2. Ulnar neuropathy of both upper extremities  - EMG W/ ULTRASOUND AND NERVE CONDUCTION TEST 2 Extremities; Future    3. Benign essential hypertension    4. Obstructive sleep apnea treated with continuous positive airway pressure (CPAP)    5. Chronic diastolic congestive heart failure    6. Nonrheumatic aortic valve stenosis       ADDITIONAL HISTORY OF PRESENT ILLNESS  Problem List Items Addressed This Visit        Neuro    Ulnar neuropathy of both upper extremities (Chronic)    Current Assessment & Plan     Several months worsening paresthesias in distribution of ulnar nerves bilaterally.         Relevant Orders    EMG W/ ULTRASOUND AND NERVE CONDUCTION TEST 2 Extremities       Cardiac/Vascular    Benign essential hypertension    Current Assessment & Plan     BP Readings from Last 6 Encounters:   02/17/20 132/62   12/16/19 134/74   12/09/19 130/80   10/21/19 130/70   10/17/19 134/68   10/17/19 136/64     Lab Results   Component Value Date    ESTGFRAFRICA >60.0 10/14/2019    EGFRNONAA >60.0 10/14/2019    CREATININE 1.2 10/14/2019    BUN 25 (H) 10/14/2019    K 4.0 10/14/2019     10/14/2019    CL 99 10/14/2019     Well controlled, stable.           Chronic diastolic congestive heart failure    Overview     ECHOCARDIOGRAM 10/18/2019  1 - Normal left ventricular systolic function (EF 60-65%).  2 - Impaired LV relaxation, elevated LAP (grade 2 diastolic dysfunction).  3 - Normal right ventricular systolic function.  4 - No wall motion abnormalities.  5 - Severe left atrial enlargement.  6 - Concentric hypertrophy.  7 - Mild aortic regurgitation.  8 - Moderate aortic stenosis, CRISTINO = 1.87 cm2, AVAi = 0.7 cm2/m2, peak velocity = 3.87 m/s, mean gradient = 34 mmHg.         Current Assessment & Plan     Compensated/controlled and stable. No orthopnea or PND.         Nonrheumatic aortic valve stenosis    Overview      ECHOCARDIOGRAM 10/18/2019  1 - Normal left ventricular systolic function (EF 60-65%).  2 - Impaired LV relaxation, elevated LAP (grade 2 diastolic dysfunction).  3 - Normal right ventricular systolic function.  4 - No wall motion abnormalities.  5 - Severe left atrial enlargement.  6 - Concentric hypertrophy.  7 - Mild aortic regurgitation.  8 - Moderate aortic stenosis, CRISTINO = 1.87 cm2, AVAi = 0.7 cm2/m2, peak velocity = 3.87 m/s, mean gradient = 34 mmHg.         Current Assessment & Plan     Clinically stable.            Other    Generalized edema - Primary    Current Assessment & Plan     He reports several month history of painless edema, primarily bilateral lower extremity edema (1+ pitting pretibial edema on exam today).  However, he also reports puffiness in his hands and a sensation of puffiness in his face.  He has been aggressively avoiding salt and drinking ample fluid.  We discussed how his amlodipine may be an exacerbating factor. We discussed differential diagnosis and treatment options.  At his request, I am going to send a message to his cardiologist asking his thoughts and recommendations for how to proceed.         Obstructive sleep apnea treated with continuous positive airway pressure (CPAP) (Chronic)    Current Assessment & Plan     He reports compliance with use of CPAP for treatment of obstructive sleep apnea, and he reports perceived therapeutic benefit.               MEDICATION MANAGMENT    MEDICATIONS SUMMARY: I am having Dragan Man II maintain his sodium chloride, varicella-zoster gE-AS01B (PF), metroNIDAZOLE, clindamycin, mometasone, cetirizine, atorvastatin, amLODIPine, losartan, and hydroCHLOROthiazide.    Outpatient Medications Prior to Visit   Medication Sig Dispense Refill    amLODIPine (NORVASC) 10 MG tablet Take 1 tablet (10 mg total) by mouth once daily. 90 tablet 4    atorvastatin (LIPITOR) 20 MG tablet Take 1 tablet (20 mg total) by mouth every evening. 90 tablet 4     cetirizine (ZYRTEC) 10 MG tablet Take 1 tablet (10 mg total) by mouth once daily. 30 tablet 3    clindamycin (CLEOCIN T) 1 % Swab Apply topically 2 (two) times daily. For acne of neck. 60 each 5    hydroCHLOROthiazide (HYDRODIURIL) 25 MG tablet       losartan (COZAAR) 100 MG tablet       metroNIDAZOLE (METROGEL) 0.75 % gel AAA face bid 1 Tube 6    mometasone (NASONEX) 50 mcg/actuation nasal spray 2 sprays by Nasal route once daily. 30 g 3    sodium chloride (OCEAN) 0.65 % nasal spray 1 spray by Nasal route as needed for Congestion.      varicella-zoster gE-AS01B, PF, (SHINGRIX) 50 mcg/0.5 mL injection Inject 0.5 mL (one dose) into muscle now; give second dose at least 2 months later 0.5 mL 1    acetaminophen (TYLENOL) 650 MG TbSR Take 650 mg by mouth as needed.      doxycycline (MONODOX) 50 MG Cap Take once daily with food.  May cause upset stomach. 30 capsule 5    HYDROcodone-acetaminophen (NORCO) 5-325 mg per tablet       losartan-hydrochlorothiazide 100-25 mg (HYZAAR) 100-25 mg per tablet Take 1 tablet by mouth once daily. 90 tablet 4     No facility-administered medications prior to visit.         Medications Discontinued During This Encounter   Medication Reason    acetaminophen (TYLENOL) 650 MG TbSR Patient no longer taking    doxycycline (MONODOX) 50 MG Cap Patient no longer taking    HYDROcodone-acetaminophen (NORCO) 5-325 mg per tablet Patient no longer taking    losartan-hydrochlorothiazide 100-25 mg (HYZAAR) 100-25 mg per tablet Patient no longer taking             FOLLOW-UP  Follow up if symptoms worsen or fail to improve.     REVIEW OF SYSTEMS  CARDIOVASCULAR: No angina or orthopnea reported.   PULMONARY: No trouble breathing reported.   GENITOURINARY: No dysuria or hematuria reported.     PHYSICAL EXAM  Vitals:    02/17/20 1647   BP: 132/62   BP Location: Left arm   Patient Position: Sitting   BP Method: Large (Manual)   Pulse: 71   Temp: 97.5 °F (36.4 °C)   TempSrc: Tympanic   SpO2: 96%  "  Weight: (!) 147.4 kg (324 lb 15.3 oz)   Height: 6' 1" (1.854 m)     CONSTITUTIONAL: Vital signs noted. No apparent distress. Does not appear acutely ill or septic. Appears adequately hydrated.  HEENT: External ENT grossly unremarkable. Hearing grossly intact. Oropharynx moist.  PULM: Lungs clear. Breathing unlabored.  HEART: Auscultation reveals regular rate and rhythm. He has painless 1+ pitting pretibial edema.  DERM: Skin warm and moist with normal turgor.  NEURO: There are no gross focal motor deficits or gross deficits of cranial nerves III-XII.  PSYCHIATRIC: Alert and conversant. Mood grossly neutral. Judgment and insight not grossly compromised.     Documentation entered by me for this encounter may have been done in part using speech-recognition technology. Although I have made an effort to ensure accuracy, "sound like" errors may exist and should be interpreted in context. -DENYS Barba MD.   "

## 2020-02-17 NOTE — PATIENT INSTRUCTIONS
Understanding Chronic Venous Insufficiency  Problems with the veins in the legs may lead to chronic venous insufficiency (CVI). CVI means that there is a long-term problem with the veins not being able to pump blood back to your heart. When this happens, blood stays in the legs and causes swelling and aching.   Two problems that may lead to chronic venous insufficiency are:  · Damaged valves. Valves keep blood flowing from the legs through the blood vessels and back to the heart. When the valves are damaged, blood does not flow as well.   · Deep vein thrombosis (DVT). Blood clots may form in the deep veins of the legs. This may cause pain, redness, and swelling in the legs. It may also block the flow of blood back to the heart. Seek immediate medical care if you have these symptoms.  · A blood clot in the leg can also break off and travel to the lungs. This is called pulmonary embolism (PE). In the lungs, the clot can cut off the flow of blood. This may cause chest pain, trouble breathing, sweating, a fast heartbeat, coughing (may cough up blood), and fainting. It is a medical emergency and may cause death. Call 911 if you have these symptoms.  · Healthcare providers call the two conditions, DVT and PE, venous thromboembolism (VTE).  CVI cant be cured, but you can control leg swelling to reduce the likelihood of ulcers (sores).  Recognizing the symptoms  Be aware of the following:  · If you stand or sit with your feet down for long periods, your legs may ache or feel heavy.  · Swollen ankles are possibly the most common symptom of CVI.  · As swelling increases, the skin over your ankles may show red spots or a brownish tinge. The skin may feel leathery or scaly, and may start to itch.  · If swelling is not controlled, an ulcer (open wound) may form.  What you can do  Reduce your risk of developing ulcers by doing the following:  · Increase blood flow back to your heart by elevating your legs, exercising daily,  and wearing elastic stockings.  · Boost blood flow in your legs by losing excess weight.  · If you must stand or sit in one place for a period of time, keep your blood moving by wiggling your toes, shifting your body position, and rising up on the balls of your feet.    Date Last Reviewed: 5/1/2016  © 7565-2964 [employer]. [address] [place] [address], [place] [address]. All rights reserved. This information is not intended as a substitute for professional medical care. Always follow your healthcare professional's instructions.

## 2020-02-17 NOTE — ASSESSMENT & PLAN NOTE
BP Readings from Last 6 Encounters:   02/17/20 132/62   12/16/19 134/74   12/09/19 130/80   10/21/19 130/70   10/17/19 134/68   10/17/19 136/64     Lab Results   Component Value Date    ESTGFRAFRICA >60.0 10/14/2019    EGFRNONAA >60.0 10/14/2019    CREATININE 1.2 10/14/2019    BUN 25 (H) 10/14/2019    K 4.0 10/14/2019     10/14/2019    CL 99 10/14/2019     Well controlled, stable.

## 2020-02-18 ENCOUNTER — TELEPHONE (OUTPATIENT)
Dept: INTERNAL MEDICINE | Facility: CLINIC | Age: 55
End: 2020-02-18

## 2020-02-18 DIAGNOSIS — I10 BENIGN ESSENTIAL HYPERTENSION: Primary | ICD-10-CM

## 2020-02-18 DIAGNOSIS — R60.1 GENERALIZED EDEMA: ICD-10-CM

## 2020-02-18 RX ORDER — SPIRONOLACTONE 25 MG/1
25 TABLET ORAL DAILY
Qty: 90 TABLET | Refills: 0 | Status: SHIPPED | OUTPATIENT
Start: 2020-02-18 | End: 2020-03-23

## 2020-02-18 RX ORDER — AMLODIPINE BESYLATE 2.5 MG/1
2.5 TABLET ORAL DAILY
Qty: 90 TABLET | Refills: 0 | Status: SHIPPED | OUTPATIENT
Start: 2020-02-18 | End: 2020-03-23

## 2020-02-18 NOTE — TELEPHONE ENCOUNTER
Called and left a voicemail for patient to contact me about if he would like his follow up and lab scheduled on 3/18/2020.

## 2020-02-18 NOTE — TELEPHONE ENCOUNTER
----- Message from Ron Shaikh MD sent at 2/17/2020  9:57 PM CST -----  Regarding: RE: edema  I think ok to d/c amlodipine.  May add Aldactone for HTN and edema  THX  -Z  ----- Message -----  From: DENYS Barba MD  Sent: 2/17/2020   6:15 PM CST  To: Ron Shaikh MD  Subject: edema                                            Hi, Dr. Shaikh.    Dragan reports several month history of painless edema, primarily bilateral lower extremity edema (1+ pitting pretibial edema on exam today). However, he also reports puffiness in his hands and in his face. He has been aggressively avoiding salt and drinking ample water. His TSH and renal function was unremarkable in October. He reports no orthopnea or PND. We discussed how his amlodipine may be an exacerbating factor. I have recommended that he use compression stockings.    Dragan's requested that I send you this message to ask for your thoughts and recommendations.    Thanks for your help.    TIFFANY

## 2020-02-18 NOTE — TELEPHONE ENCOUNTER
ACTION NEEDED:  Please read Patient Portal Message (below), and then contact him to verify his receipt and understanding.  If he wants to do his lab and appointment on the same day as his Dermatology appointment (March 18), schedule him to have his lab done BEFORE his Dermatology appointment, then his appointment with me AFTER the dermatology appointment. That way the potassium lab results should be available by the time of his appointment with me.  Thanks.  Orders Placed This Encounter   Procedures    Dragan Pena.    I discussed your edema with Dr. Shaikh.    We decided to decrease your amlodipine, since it can make edema worse. In its place, we will substitute spironolactone, which will help control your blood pressure and decrease edema.    I'll need to see you in about a month so that we can check your potassium level and re-evaluate your blood pressure and your edema Someone from [facility] will be contacting you soon to help you schedule your lab and appointment. If you would like, they can all be done around the same time as your Dermatology appointment here (Wednesday, March 18, 2020 at 10:00 AM).    I look forward to seeing you then.     Thanks for letting me care for you, thanks for trusting us with your healthcare needs, and thanks for using [facility].    Sincerely,    DENYS Barba MD     P.S. - Please tell me how I'm doing and review me at [email or web address].    Medications Discontinued During This Encounter   Medication Reason    amLODIPine (NORVASC) 10 MG tablet Dose adjustment      Medications Ordered This Encounter   Medications    amLODIPine (NORVASC) 2.5 MG tablet     Sig: Take 1 tablet (2.5 mg total) by mouth once daily.     Dispense:  90 tablet     Refill:  0     NOTE DOSE CHANGE. This REPLACES any prior prescription for this drug. DISCONTINUE any prior prescription for this drug.     spironolactone (ALDACTONE) 25 MG tablet     Sig: Take 1 tablet (25 mg total) by mouth once daily.      Dispense:  90 tablet     Refill:  0

## 2020-02-24 ENCOUNTER — PATIENT MESSAGE (OUTPATIENT)
Dept: INTERNAL MEDICINE | Facility: CLINIC | Age: 55
End: 2020-02-24

## 2020-02-26 ENCOUNTER — PATIENT MESSAGE (OUTPATIENT)
Dept: INTERNAL MEDICINE | Facility: CLINIC | Age: 55
End: 2020-02-26

## 2020-02-26 ENCOUNTER — PATIENT OUTREACH (OUTPATIENT)
Dept: OTHER | Facility: OTHER | Age: 55
End: 2020-02-26

## 2020-03-12 RX ORDER — MOMETASONE FUROATE 50 UG/1
2 SPRAY, METERED NASAL DAILY
Qty: 30 G | Refills: 3 | Status: SHIPPED | OUTPATIENT
Start: 2020-03-12 | End: 2020-07-06

## 2020-03-18 ENCOUNTER — PATIENT OUTREACH (OUTPATIENT)
Dept: OTHER | Facility: OTHER | Age: 55
End: 2020-03-18

## 2020-03-18 NOTE — PROGRESS NOTES
Digital Medicine: Clinician Follow-Up    3/18/2020  Called and LM for patient.  SBP average above goal.  Saw PCP on 2/17 with /62.  He changed his medication by decreasing Amlodipine and adding Spironolactone.  PCP noted that he talked to pt's Cardiologist Dr. Shaikh about the changes. Noted significant edema which prompted med changes.  Ask pt whether edema improved.  BP readings fluctuating with some readings at goal.     03/19/2020 Pt returned my call and LM so I called him and was able to speak to him.  States swelling is much improved.  Last reading better.                Follow Up  Follow-up reason(s): reading review      Readings are trending down       INTERVENTION(S)  encouragement/support    PLAN  continue monitoring    Continue current regimen.          Last 5 Patient Entered Readings                                      Current 30 Day Average: 139/68     Recent Readings 3/19/2020 3/18/2020 3/18/2020 3/18/2020 3/17/2020    SBP (mmHg) 136 142 132 146 135    DBP (mmHg) 68 67 69 71 63    Pulse 66 63 60 63 78             Hypertension Medications             amLODIPine (NORVASC) 2.5 MG tablet Take 1 tablet (2.5 mg total) by mouth once daily.    hydroCHLOROthiazide (HYDRODIURIL) 25 MG tablet     losartan (COZAAR) 100 MG tablet     spironolactone (ALDACTONE) 25 MG tablet Take 1 tablet (25 mg total) by mouth once daily.                             Medication Adherence Screening   He did not miss a dose this month.

## 2020-03-20 ENCOUNTER — PATIENT MESSAGE (OUTPATIENT)
Dept: DERMATOLOGY | Facility: CLINIC | Age: 55
End: 2020-03-20

## 2020-03-25 RX ORDER — DOXYCYCLINE HYCLATE 50 MG/1
50 CAPSULE ORAL DAILY
Qty: 30 CAPSULE | Refills: 3 | Status: SHIPPED | OUTPATIENT
Start: 2020-03-25 | End: 2020-05-11

## 2020-03-25 NOTE — TELEPHONE ENCOUNTER
Pt message:  Good afternoon   I am out DOXYCYCLINEMONO 50MG and looking to get refill. I am bumps returning and red rash/tender on my nose again now that I am out.   I was able to make appointment on May 11 to see you.   Thank you.

## 2020-03-25 NOTE — PROGRESS NOTES
"Digital Medicine: Health  Follow-Up    Patient reports his mother in law passed away "about three weeks ago".  Patient reports he was very stressed out but is starting to     Patient reports he has been taking fish oil and tumeric pills to help with inflammation. Encouraged patient to speak with his doctor about taking those supplements.         Follow Up  Follow-up reason(s): reading review      Readings are trending down   Patient reports his readings are trending down due to his stress levels coming down.       INTERVENTION(S)  encouragement/support and denied further coaching    PLAN  continue monitoring      There are no preventive care reminders to display for this patient.    Last 5 Patient Entered Readings                                      Current 30 Day Average: 139/68     Recent Readings 3/25/2020 3/25/2020 3/23/2020 3/23/2020 3/19/2020    SBP (mmHg) 130 131 140 146 136    DBP (mmHg) 64 66 66 68 68    Pulse 63 66 69 70 66            Diet Screening   No change to diet.      Physical Activity Screening   No change to exercise routine.        "

## 2020-04-13 ENCOUNTER — PATIENT MESSAGE (OUTPATIENT)
Dept: OTHER | Facility: OTHER | Age: 55
End: 2020-04-13

## 2020-04-14 NOTE — TELEPHONE ENCOUNTER
04/13/2020  Called pt to ask him more about his low oxygen saturation.  States those are taken with his phone, not a oximeter.  He denies dyspnea.  Instructed him to monitor and seek medical attention immediately if he experiences SOB at anytime.  He verbalized understanding.    Recommended OTC sinus medication safe for HTN.  Recommended Henok's vapor rub for topical application.    Pamela Maldonado, MPH, PA-C  [facility]  [phone]

## 2020-05-11 ENCOUNTER — LAB VISIT (OUTPATIENT)
Dept: LAB | Facility: HOSPITAL | Age: 55
End: 2020-05-11
Attending: FAMILY MEDICINE
Payer: COMMERCIAL

## 2020-05-11 ENCOUNTER — OFFICE VISIT (OUTPATIENT)
Dept: DERMATOLOGY | Facility: CLINIC | Age: 55
End: 2020-05-11
Payer: COMMERCIAL

## 2020-05-11 DIAGNOSIS — R60.1 GENERALIZED EDEMA: ICD-10-CM

## 2020-05-11 DIAGNOSIS — Z12.83 SCREENING, MALIGNANT NEOPLASM, SKIN: ICD-10-CM

## 2020-05-11 DIAGNOSIS — I10 BENIGN ESSENTIAL HYPERTENSION: ICD-10-CM

## 2020-05-11 DIAGNOSIS — L90.5 SCAR CONDITIONS/SKIN FIBROSIS: Primary | ICD-10-CM

## 2020-05-11 DIAGNOSIS — Z85.828 HISTORY OF SKIN CANCER: ICD-10-CM

## 2020-05-11 DIAGNOSIS — L71.9 ROSACEA: ICD-10-CM

## 2020-05-11 DIAGNOSIS — D48.5 NEOPLASM OF UNCERTAIN BEHAVIOR OF SKIN: ICD-10-CM

## 2020-05-11 DIAGNOSIS — L57.0 ACTINIC KERATOSES: ICD-10-CM

## 2020-05-11 LAB
ANION GAP SERPL CALC-SCNC: 8 MMOL/L (ref 8–16)
BUN SERPL-MCNC: 11 MG/DL (ref 6–20)
CALCIUM SERPL-MCNC: 9.2 MG/DL (ref 8.7–10.5)
CHLORIDE SERPL-SCNC: 101 MMOL/L (ref 95–110)
CO2 SERPL-SCNC: 28 MMOL/L (ref 23–29)
CREAT SERPL-MCNC: 0.8 MG/DL (ref 0.5–1.4)
EST. GFR  (AFRICAN AMERICAN): >60 ML/MIN/1.73 M^2
EST. GFR  (NON AFRICAN AMERICAN): >60 ML/MIN/1.73 M^2
GLUCOSE SERPL-MCNC: 123 MG/DL (ref 70–110)
POTASSIUM SERPL-SCNC: 4.4 MMOL/L (ref 3.5–5.1)
POTASSIUM SERPL-SCNC: 4.4 MMOL/L (ref 3.5–5.1)
SODIUM SERPL-SCNC: 137 MMOL/L (ref 136–145)

## 2020-05-11 PROCEDURE — 88342 IMHCHEM/IMCYTCHM 1ST ANTB: CPT | Mod: 26,,, | Performed by: PATHOLOGY

## 2020-05-11 PROCEDURE — 99214 PR OFFICE/OUTPT VISIT, EST, LEVL IV, 30-39 MIN: ICD-10-PCS | Mod: 25,S$GLB,, | Performed by: DERMATOLOGY

## 2020-05-11 PROCEDURE — 11103 PR TANGENTIAL BIOPSY, SKIN, EA ADDTL LESION: ICD-10-PCS | Mod: S$GLB,,, | Performed by: DERMATOLOGY

## 2020-05-11 PROCEDURE — 17003 DESTRUCT PREMALG LES 2-14: CPT | Mod: S$GLB,,, | Performed by: DERMATOLOGY

## 2020-05-11 PROCEDURE — 11103 TANGNTL BX SKIN EA SEP/ADDL: CPT | Mod: S$GLB,,, | Performed by: DERMATOLOGY

## 2020-05-11 PROCEDURE — 80048 BASIC METABOLIC PNL TOTAL CA: CPT

## 2020-05-11 PROCEDURE — 88342 IMHCHEM/IMCYTCHM 1ST ANTB: CPT | Performed by: PATHOLOGY

## 2020-05-11 PROCEDURE — 88305 TISSUE EXAM BY PATHOLOGIST: ICD-10-PCS | Mod: 26,,, | Performed by: PATHOLOGY

## 2020-05-11 PROCEDURE — 88305 TISSUE EXAM BY PATHOLOGIST: CPT | Mod: 26,,, | Performed by: PATHOLOGY

## 2020-05-11 PROCEDURE — 11102 TANGNTL BX SKIN SINGLE LES: CPT | Mod: S$GLB,,, | Performed by: DERMATOLOGY

## 2020-05-11 PROCEDURE — 36415 COLL VENOUS BLD VENIPUNCTURE: CPT

## 2020-05-11 PROCEDURE — 17003 DESTRUCTION, PREMALIGNANT LESIONS; SECOND THROUGH 14 LESIONS: ICD-10-PCS | Mod: S$GLB,,, | Performed by: DERMATOLOGY

## 2020-05-11 PROCEDURE — 11102 PR TANGENTIAL BIOPSY, SKIN, SINGLE LESION: ICD-10-PCS | Mod: S$GLB,,, | Performed by: DERMATOLOGY

## 2020-05-11 PROCEDURE — 17000 PR DESTRUCTION(LASER SURGERY,CRYOSURGERY,CHEMOSURGERY),PREMALIGNANT LESIONS,FIRST LESION: ICD-10-PCS | Mod: 59,S$GLB,, | Performed by: DERMATOLOGY

## 2020-05-11 PROCEDURE — 17000 DESTRUCT PREMALG LESION: CPT | Mod: 59,S$GLB,, | Performed by: DERMATOLOGY

## 2020-05-11 PROCEDURE — 88305 TISSUE EXAM BY PATHOLOGIST: CPT | Mod: 59 | Performed by: PATHOLOGY

## 2020-05-11 PROCEDURE — 99999 PR PBB SHADOW E&M-EST. PATIENT-LVL III: ICD-10-PCS | Mod: PBBFAC,,, | Performed by: DERMATOLOGY

## 2020-05-11 PROCEDURE — 99999 PR PBB SHADOW E&M-EST. PATIENT-LVL III: CPT | Mod: PBBFAC,,, | Performed by: DERMATOLOGY

## 2020-05-11 PROCEDURE — 99214 OFFICE O/P EST MOD 30 MIN: CPT | Mod: 25,S$GLB,, | Performed by: DERMATOLOGY

## 2020-05-11 PROCEDURE — 88342 CHG IMMUNOCYTOCHEMISTRY: ICD-10-PCS | Mod: 26,,, | Performed by: PATHOLOGY

## 2020-05-11 RX ORDER — DOXYCYCLINE 100 MG/1
CAPSULE ORAL
Qty: 30 CAPSULE | Refills: 2 | Status: SHIPPED | OUTPATIENT
Start: 2020-05-11 | End: 2020-07-29

## 2020-05-11 NOTE — PROGRESS NOTES
Subjective:       Patient ID:  Dragan Man II is a 54 y.o. male who presents for   Chief Complaint   Patient presents with    Skin Check     skin tags, moles treated on nose . has bumps on nose      Hx of SCCIS of the right nasal sidewall (s/p Mohs 1/13/20), AK's, rosacea, last seen on [facility] on 11/18/19.  He c/o lesion of the left nose x several days, + crusted. No tx tried.    The patient denies personal or family history of skin cancer.    For rosacea, he has tried doxy 50 mg qD without improvement, clinda swabs and metrogel.  He wishes to increase to doxy 100 mg qD.             Review of Systems   Constitutional: Negative for fever and chills.   Gastrointestinal: Negative for nausea and vomiting.   Skin: Positive for activity-related sunscreen use. Negative for daily sunscreen use and recent sunburn.   Hematologic/Lymphatic: Does not bruise/bleed easily.        Objective:    Physical Exam   Constitutional: He appears well-developed and well-nourished. No distress.   Neurological: He is alert and oriented to person, place, and time. He is not disoriented.   Psychiatric: He has a normal mood and affect.   Skin:   Areas Examined (abnormalities noted in diagram):   Scalp / Hair Palpated and Inspected  Head / Face Inspection Performed  Neck Inspection Performed  Chest / Axilla Inspection Performed  Abdomen Inspection Performed  Back Inspection Performed  RUE Inspected  LUE Inspection Performed  Nails and Digits Inspection Performed              Diagram Legend     Erythematous scaling macule/papule c/w actinic keratosis       Vascular papule c/w angioma      Pigmented verrucoid papule/plaque c/w seborrheic keratosis      Yellow umbilicated papule c/w sebaceous hyperplasia      Irregularly shaped tan macule c/w lentigo     1-2 mm smooth white papules consistent with Milia      Movable subcutaneous cyst with punctum c/w epidermal inclusion cyst      Subcutaneous movable cyst c/w pilar cyst      Firm pink to  brown papule c/w dermatofibroma      Pedunculated fleshy papule(s) c/w skin tag(s)      Evenly pigmented macule c/w junctional nevus     Mildly variegated pigmented, slightly irregular-bordered macule c/w mildly atypical nevus      Flesh colored to evenly pigmented papule c/w intradermal nevus       Pink pearly papule/plaque c/w basal cell carcinoma      Erythematous hyperkeratotic cursted plaque c/w SCC      Surgical scar with no sign of skin cancer recurrence      Open and closed comedones      Inflammatory papules and pustules      Verrucoid papule consistent consistent with wart     Erythematous eczematous patches and plaques     Dystrophic onycholytic nail with subungual debris c/w onychomycosis     Umbilicated papule    Erythematous-base heme-crusted tan verrucoid plaque consistent with inflamed seborrheic keratosis     Erythematous Silvery Scaling Plaque c/w Psoriasis     See annotation                    Assessment / Plan:      Pathology Orders:     Normal Orders This Visit    Specimen to Pathology, Dermatology     Questions:    Procedure Type:  Dermatology and skin neoplasms    Number of Specimens:  3    ------------------------:  -------------------------    Spec 1 Procedure:  Biopsy    Spec 1 Clinical Impression:  r/o SCCIS vs. HAK    Spec 1 Source:  left temple    ------------------------:  -------------------------    Spec 2 Procedure:  Biopsy    Spec 2 Clinical Impression:  r/o SCCIS vs. HAK    Spec 2 Source:  left medial cheek    ------------------------:  -------------------------    Spec 3 Procedure:  Biopsy    Spec 3 Clinical Impression:  r/o SCCIS vs. HAK    Spec 3 Source:  right lower eyelid    Clinical Information:  see above        Scar conditions/skin fibrosis  Screening, malignant neoplasm, skin  History of skin cancer  Scar of the right nasal sidewall, hx of NMSC.  No evidence of recurrence on physical exam today.  Continue routine skin surveillance. Daily sunscreen advised.    Rosacea  -      doxycycline (MONODOX) 100 MG capsule; Take once daily with food.  May cause upset stomach.  Dispense: 30 capsule; Refill: 2    Actinic keratoses  Cryosurgery Procedure Note    The patient is informed of the precancerous quality and need for treatment of these lesions. After risks, benefits and alternatives explained, including blistering, pain, hyper- and hypopigmentation, patient verbally consents to cryotherapy to precancerous lesions. Liquid nitrogen cryosurgery is applied to the 11 actinic keratoses, as detailed in the physical exam, to produce a freeze injury. The patient is aware that blisters may form and is instructed on wound care with gentle cleansing and use of vaseline ointment to keep moist until healed. The patient is supplied a handout on cryosurgery and is instructed to call if lesions do not completely resolve.      Neoplasm of uncertain behavior of skin  -     Specimen to Pathology, Dermatology  -     Shave biopsy(-ies) done of 3 site(s).   Patient informed to call for results within 2 weeks if have not received notification [facility] telephone [facility] or [facility]           Follow up for call for results.     PROCEDURE NOTE - SHAVE BIOPSY   Location: see above    After risk, benefits, and alternatives were discussed with the patient, the patient agrees to the procedure by verbal informed consent.  The area(s) were cleansed with alcohol. 2 cc of lidocaine 1% with epinephrine was injected for local anesthesia into each lesion(s).  A sharp dermablade was used to remove part or all of the lesion(s).  The specimen(s) will be sent for tissue pathology.  Hemostasis was obtained with aluminum chloride and/or hyfrecation.  The area(s) were dressed with vaseline ointment and bandaged.  The patient tolerated the procedure well without adverse events.  Wound care instructions were given to the patient on the AVS.  The patient will be notified of pathology results once available. Results will also be available in  [facility].

## 2020-05-11 NOTE — PATIENT INSTRUCTIONS
Shave Biopsy Wound Care    Your doctor has performed a shave biopsy today.  A band aid and vaseline ointment has been placed over the site.  This should remain in place for 24 hours.  It is recommended that you keep the area dry for the first 24 hours.  After 24 hours, you may remove the band aid and wash the area with warm soap and water and apply Vaseline jelly.  Many patients prefer to use Neosporin or Bacitracin ointment.  This is acceptable; however, know that you can develop an allergy to this medication even if you have used it safely for years.  It is important to keep the area moist.  Letting it dry out and get air slows healing time, and will worsen the scar.  Band aid is optional after first 24 hours.      If you notice increasing redness, tenderness, pain, or yellow drainage at the biopsy site, please notify your doctor.  These are signs of an infection.    If your biopsy site is bleeding, apply firm pressure for 15 minutes straight.  Repeat for another 15 minutes, if it is still bleeding.   If the surgical site continues to bleed, then please contact your doctor.      [facility] - [facility]   DERMATOLOGY  [address]   [place] [address]   Dept: [phone]   Dept Fax: [phone]

## 2020-05-14 LAB
FINAL PATHOLOGIC DIAGNOSIS: NORMAL
GROSS: NORMAL

## 2020-05-19 ENCOUNTER — PATIENT OUTREACH (OUTPATIENT)
Dept: OTHER | Facility: OTHER | Age: 55
End: 2020-05-19

## 2020-05-19 NOTE — PROGRESS NOTES
Digital Medicine: Clinician Follow-Up    05/19/2020  Spoke to patient whose last 2 readings very close to goal.  He believes it is bc he got 15 hours of sleep starting Sunday night through Monday morning. Was off of work yesterday (Monday).  States breathing problems and sleep improved after he got a new CPAP mask. Expect BP to continue to improve.     The history is provided by the patient. No  was used.     Follow Up  Follow-up reason(s): reading review      Readings are trending down due to lifestyle change and medication adherence.        INTERVENTION(S)  encouragement/support    PLAN  continue monitoring    Encouraged pt to continue sleeping as much as he can.  F/U in 2 months. Did not tolerate Olmesartan.           Last 5 Patient Entered Readings                                      Current 30 Day Average: 139/74     Recent Readings 5/19/2020 5/19/2020 5/19/2020 5/14/2020 5/14/2020    SBP (mmHg) 131 132 147 150 144    DBP (mmHg) 71 70 75 83 75    Pulse 63 65 64 59 56             Hypertension Medications             amLODIPine (NORVASC) 2.5 MG tablet TAKE 1 TABLET BY MOUTH EVERY DAY    hydroCHLOROthiazide (HYDRODIURIL) 25 MG tablet     losartan (COZAAR) 100 MG tablet     spironolactone (ALDACTONE) 25 MG tablet TAKE 1 TABLET BY MOUTH EVERY DAY                             Medication Adherence Screening   He did not miss a dose this month.

## 2020-06-10 ENCOUNTER — PATIENT OUTREACH (OUTPATIENT)
Dept: OTHER | Facility: OTHER | Age: 55
End: 2020-06-10

## 2020-06-10 NOTE — PROGRESS NOTES
Digital Medicine: Health  Follow-Up    Called to introduce myself as new health . Patient reports he had surgery this morning to remove skin cancer, doing well. Reports systolic BP was 140s this morning. Patient reports he is well, no complaints. Denies symptoms of hypertension- HA, chest pains and SOB. Reports experiencing slight breathing issues due to sinuses.           The history is provided by the patient.   Follow Up  Follow-up reason(s): reading review and routine education          INTERVENTION(S)  recommended diet modifications, recommend physical activity, encouragement/support, denied further coaching, denied resources and denied questions    PLAN  patient verbalizes understanding, demonstrates understanding via teach back, patient amenable to changes and continue monitoring      There are no preventive care reminders to display for this patient.    Last 5 Patient Entered Readings                                      Current 30 Day Average: 142/74     Recent Readings 6/7/2020 6/7/2020 6/2/2020 6/2/2020 6/2/2020    SBP (mmHg) 144 146 144 137 146    DBP (mmHg) 73 74 68 68 73    Pulse 58 58 72 71 74                      Diet Screening   Patient reports eating or drinking the following: fruit    Reports he has been working to get back into a routine, hard with numerous recent appointments.   Changed his diet- working to get more fruit and less pork, reducing salt intake; expressed 5-10 bottles of water per day    Physical Activity Screening   When asked if exercising, patient responded: yes    Patient participates in the following activities: biking    Reports he has been working to get back into a routine of exercising, hard with numerous recent appointments. Recently set up his stationary bike. Reports he enjoys riding it in the afternoons as a stress-relief. 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     Medication Adherence Screening   He did not miss a dose this month.      SDOH

## 2020-06-12 DIAGNOSIS — I10 BENIGN ESSENTIAL HYPERTENSION: ICD-10-CM

## 2020-06-12 DIAGNOSIS — R60.1 GENERALIZED EDEMA: ICD-10-CM

## 2020-06-28 RX ORDER — SPIRONOLACTONE 25 MG/1
TABLET ORAL
Qty: 90 TABLET | Refills: 0 | Status: SHIPPED | OUTPATIENT
Start: 2020-06-28 | End: 2020-09-21

## 2020-07-15 ENCOUNTER — PATIENT OUTREACH (OUTPATIENT)
Dept: OTHER | Facility: OTHER | Age: 55
End: 2020-07-15

## 2020-07-15 NOTE — PROGRESS NOTES
Digital Medicine: Clinician Follow-Up    07/15/2020 LM for patient with SBP above goal.  Ask about lower readings.  May change HCTZ to Chlorthalidone for trial. 137/68    07/28/2020 LM for patient.  140/67    08/11/2020 LM for patient. 137/72  Change HCTZ to Chlorthalidone.  Patient called me back.  States that he has not charged his device lately so will do so tonight.  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  States that yesterday he had 1 flat tire and his air brakes malfunctioned on his way back to [place] from [place]. Today, he had 3 flat tires on his way to [place] when [place].      The history is provided by the patient.   Follow-up reason(s): routine follow up.     Hypertension    Readings are trending up due to stress and need to charge device .    Patient is not experiencing signs/symptoms of hypotension.  Patient is not experiencing signs/symptoms of hypertension.        Last 5 Patient Entered Readings                                      Current 30 Day Average: 140/67     Recent Readings 7/22/2020 7/22/2020 7/22/2020 7/14/2020 7/14/2020    SBP (mmHg) 141 137 140 137 133    DBP (mmHg) 65 65 66 69 69    Pulse 74 76 77 62 63             Screenings    ASSESSMENT(S)  Patients BP average is 137/72 mmHg, which is above goal. Patient's BP goal is less than or equal to 130/80 per 2017 ACC/AHA Hypertension Guidelines.       Hypertension Plan  Continue current therapy.  Instructed to charge device.       Patient verbalizes understanding.        If readings continue to be elevated, will switch HCTZ to Chlorthalidone for trial.           Hypertension Medications             amLODIPine (NORVASC) 2.5 MG tablet TAKE 1 TABLET BY MOUTH EVERY DAY    hydroCHLOROthiazide (HYDRODIURIL) 25 MG tablet     losartan (COZAAR) 100 MG tablet     spironolactone (ALDACTONE) 25 MG tablet TAKE 1 TABLET BY  MOUTH EVERY DAY

## 2020-07-22 ENCOUNTER — PATIENT OUTREACH (OUTPATIENT)
Dept: OTHER | Facility: OTHER | Age: 55
End: 2020-07-22

## 2020-08-17 ENCOUNTER — OFFICE VISIT (OUTPATIENT)
Dept: DERMATOLOGY | Facility: CLINIC | Age: 55
End: 2020-08-17
Payer: COMMERCIAL

## 2020-08-17 DIAGNOSIS — Z85.828 HISTORY OF SKIN CANCER: ICD-10-CM

## 2020-08-17 DIAGNOSIS — D48.5 NEOPLASM OF UNCERTAIN BEHAVIOR OF SKIN: ICD-10-CM

## 2020-08-17 DIAGNOSIS — L91.8 ACROCHORDON: ICD-10-CM

## 2020-08-17 DIAGNOSIS — Z12.83 SCREENING, MALIGNANT NEOPLASM, SKIN: ICD-10-CM

## 2020-08-17 DIAGNOSIS — L90.5 SCAR CONDITIONS/SKIN FIBROSIS: Primary | ICD-10-CM

## 2020-08-17 PROCEDURE — 99213 OFFICE O/P EST LOW 20 MIN: CPT | Mod: 25,S$GLB,, | Performed by: DERMATOLOGY

## 2020-08-17 PROCEDURE — 11102 PR TANGENTIAL BIOPSY, SKIN, SINGLE LESION: ICD-10-PCS | Mod: S$GLB,,, | Performed by: DERMATOLOGY

## 2020-08-17 PROCEDURE — 88305 TISSUE EXAM BY PATHOLOGIST: ICD-10-PCS | Mod: 26,,, | Performed by: PATHOLOGY

## 2020-08-17 PROCEDURE — 88305 TISSUE EXAM BY PATHOLOGIST: CPT | Performed by: PATHOLOGY

## 2020-08-17 PROCEDURE — 99999 PR PBB SHADOW E&M-EST. PATIENT-LVL III: CPT | Mod: PBBFAC,,, | Performed by: DERMATOLOGY

## 2020-08-17 PROCEDURE — 88305 TISSUE EXAM BY PATHOLOGIST: CPT | Mod: 26,,, | Performed by: PATHOLOGY

## 2020-08-17 PROCEDURE — 99213 PR OFFICE/OUTPT VISIT, EST, LEVL III, 20-29 MIN: ICD-10-PCS | Mod: 25,S$GLB,, | Performed by: DERMATOLOGY

## 2020-08-17 PROCEDURE — 99999 PR PBB SHADOW E&M-EST. PATIENT-LVL III: ICD-10-PCS | Mod: PBBFAC,,, | Performed by: DERMATOLOGY

## 2020-08-17 PROCEDURE — 11102 TANGNTL BX SKIN SINGLE LES: CPT | Mod: S$GLB,,, | Performed by: DERMATOLOGY

## 2020-08-17 NOTE — PATIENT INSTRUCTIONS
Shave Biopsy Wound Care    Your doctor has performed a shave biopsy today.  A band aid and vaseline ointment has been placed over the site.  This should remain in place for 24 hours.  It is recommended that you keep the area dry for the first 24 hours.  After 24 hours, you may remove the band aid and wash the area with warm soap and water and apply Vaseline jelly.  Many patients prefer to use Neosporin or Bacitracin ointment.  This is acceptable; however, know that you can develop an allergy to this medication even if you have used it safely for years.  It is important to keep the area moist.  Letting it dry out and get air slows healing time, and will worsen the scar.  Band aid is optional after first 24 hours.      If you notice increasing redness, tenderness, pain, or yellow drainage at the biopsy site, please notify your doctor.  These are signs of an infection.    If your biopsy site is bleeding, apply firm pressure for 15 minutes straight.  Repeat for another 15 minutes, if it is still bleeding.   If the surgical site continues to bleed, then please contact your doctor.      [facility]   DERMATOLOGY  [address] [facility] [address]   Dept: [phone]   Dept Fax: [phone]             Preventing Skin Cancer  Relaxing in the sun may feel good. But it isnt good for your skin. In fact, being exposed to the suns harmful rays is a major cause of skin cancer. This is a serious disease that can be life-threatening. People of all ages and backgrounds are at risk. But in most cases, skin cancer can be prevented.    Your Role in Prevention  You can act today to help prevent skin cancer. Start by avoiding the suns UV (ultraviolet) rays. And dont use tanning beds, which are no safer than the sun. Taking these steps can help keep you from getting skin cancer. It can also help prevent wrinkles and other sun-induced aging effects. Make sure your children also  follow these safeguards. Now is the time to start taking preventive steps against skin cancer.  When You Are Outdoors  Protect your skin when you go outdoors during the day. Take precautions whenever you go out to eat, run errands by car or on foot, or do any outdoor activity. There isnt just one easy way to protect your skin. Its best to follow all of these steps:  · Wear tightly woven clothing that covers your skin. Put on a wide-brimmed hat to protect your face, ears, and scalp.  · Watch the clock. Try to avoid the sun between 10 a.m. and 4 p.m., when it is strongest.  · Head for the shade or create your own. Use an umbrella when sitting or strolling.  · Know that the suns rays can reflect off sand, water, and snow. This can harm your skin. Take extra care when you are near reflective surfaces.  · Keep in mind that even when the weather is hazy or cloudy, your skin can be exposed to strong UV rays.  · Shield your skin with sunscreen. Also, apply sunscreen to your childrens skin.  Tips for Using Sunscreen  To help prevent skin cancer, choose the right sunscreen and use it correctly. Try the following tips:  · Choose a sunscreen that has a sun protection factor (SPF) of at least 15. For the best protection, an SPF of at least 30 is preferred. Also, choose a sunscreen labeled broad spectrum. This will shield you from both UVA and UVB (ultraviolet A and B) rays.  · If one brand irritates your skin, try another, particularly ones without fragrance.  · Use a water-resistant sunscreen if swimming or sweating.  · Reapply sunscreen every 2 hours. If youre active, do this more often.  · Cover any sun-exposed skin, from your face to your feet. Dont forget your ears and your lips.  · Know that while sunscreen helps protect you, it isnt enough. You should also wear protective clothing. And try to stay out of the sun as much as you can, especially from 10 a.m. to 4 p.m.  © 1834-7415 [employer] [address]  [place] [address]. All rights reserved. This information is not intended as a substitute for professional medical care. Always follow your healthcare professional's instructions.

## 2020-08-17 NOTE — PROGRESS NOTES
Subjective:       Patient ID:  Dragan Man II is a 54 y.o. male who presents for   Chief Complaint   Patient presents with    Skin Check     Hx of SCCIS of the right nasal sidewall (s/p Mohs 1/13/20), SCC of the left temple (s/p Mohs on 5/29/20 ?), SCCIS of the left medial cheek (s/p Mohs on 5/29/20 ?), SCCIS of the right lower eyelid (s/p Mohs on 6/1/20 ?), last seen on 5/11/20.  He c/o scaly area of the right eyebrow.  No tx tried.    This is a high risk patient here to check for the development of new lesions.     For rosacea, he has tried doxy 100 mg qD and clinda swabs with improvement.       Review of Systems   Constitutional: Negative for fever and chills.   Gastrointestinal: Negative for nausea and vomiting.   Skin: Positive for activity-related sunscreen use. Negative for daily sunscreen use and recent sunburn.   Hematologic/Lymphatic: Does not bruise/bleed easily.        Objective:    Physical Exam   Constitutional: He appears well-developed and well-nourished. No distress.   Neurological: He is alert and oriented to person, place, and time. He is not disoriented.   Psychiatric: He has a normal mood and affect.   Skin:   Areas Examined (abnormalities noted in diagram):   Scalp / Hair Palpated and Inspected  Head / Face Inspection Performed  Neck Inspection Performed  Chest / Axilla Inspection Performed  Abdomen Inspection Performed  Back Inspection Performed  RUE Inspected  LUE Inspection Performed  Nails and Digits Inspection Performed                   Diagram Legend     Erythematous scaling macule/papule c/w actinic keratosis       Vascular papule c/w angioma      Pigmented verrucoid papule/plaque c/w seborrheic keratosis      Yellow umbilicated papule c/w sebaceous hyperplasia      Irregularly shaped tan macule c/w lentigo     1-2 mm smooth white papules consistent with Milia      Movable subcutaneous cyst with punctum c/w epidermal inclusion cyst      Subcutaneous movable cyst c/w pilar cyst       Firm pink to brown papule c/w dermatofibroma      Pedunculated fleshy papule(s) c/w skin tag(s)      Evenly pigmented macule c/w junctional nevus     Mildly variegated pigmented, slightly irregular-bordered macule c/w mildly atypical nevus      Flesh colored to evenly pigmented papule c/w intradermal nevus       Pink pearly papule/plaque c/w basal cell carcinoma      Erythematous hyperkeratotic cursted plaque c/w SCC      Surgical scar with no sign of skin cancer recurrence      Open and closed comedones      Inflammatory papules and pustules      Verrucoid papule consistent consistent with wart     Erythematous eczematous patches and plaques     Dystrophic onycholytic nail with subungual debris c/w onychomycosis     Umbilicated papule    Erythematous-base heme-crusted tan verrucoid plaque consistent with inflamed seborrheic keratosis     Erythematous Silvery Scaling Plaque c/w Psoriasis     See annotation            Assessment / Plan:      Pathology Orders:     Normal Orders This Visit    Specimen to Pathology, Dermatology     Questions:    Procedure Type: Dermatology and skin neoplasms    Number of Specimens: 1    ------------------------: -------------------------    Spec 1 Procedure: Biopsy    Spec 1 Clinical Impression: r/o SCC    Spec 1 Source: right superior brow    Clinical Information: see above        Scar conditions/skin fibrosis  Screening, malignant neoplasm, skin  History of skin cancer  Scar of the several sites, hx of NMSC.  No evidence of recurrence on physical exam today.  Continue routine skin surveillance. Daily sunscreen advised.    Acrochordon  Reassurance provided.  Informed patient that lesions are benign.  After risks, benefits and alternatives explained, including allergic reaction to anesthesia (if given), pain, recurrence, and scar, and verbal consent was obtained, 1 lesions treated with scissor excision.  Patient tolerated well.  Hemostasis achieved with aluminum chloride.  Verbal  wound care instructions were given.     Neoplasm of uncertain behavior of skin  -     Specimen to Pathology, Dermatology  -     Shave biopsy(-ies) done of 1 site(s).   Patient informed to call for results within 2 weeks if [facility] not [facility]           Follow up for call for results.     PROCEDURE NOTE - SHAVE BIOPSY   Location: see above    After risk, benefits, and alternatives were discussed with the patient, the patient agrees to the procedure by verbal informed consent.  The area(s) were cleansed with alcohol. 2 cc of lidocaine 1% with epinephrine was injected for local anesthesia into each lesion(s).  A sharp dermablade was used to remove part or all of the lesion(s).  The specimen(s) will be sent for tissue pathology.  Hemostasis was obtained with aluminum chloride and/or hyfrecation.  The area(s) were dressed with vaseline ointment and bandaged.  The patient tolerated the procedure well without adverse events.  Wound care instructions were given to the patient on the AVS.  The patient will be notified of pathology results once available. Results will also be available in Epic.

## 2020-08-19 LAB
FINAL PATHOLOGIC DIAGNOSIS: NORMAL
GROSS: NORMAL

## 2020-08-20 ENCOUNTER — TELEPHONE (OUTPATIENT)
Dept: DERMATOLOGY | Facility: CLINIC | Age: 55
End: 2020-08-20

## 2020-08-20 NOTE — TELEPHONE ENCOUNTER
----- Message from Skye Aguirre MD sent at 8/20/2020  9:40 AM CDT -----  Please call and notify patient of the diagnosis of SCC of the right superior brow. Will refer to Mohs. Will have patient follow-up in 4 month(s) for skin check, please schedule.

## 2020-08-26 ENCOUNTER — PATIENT OUTREACH (OUTPATIENT)
Dept: OTHER | Facility: OTHER | Age: 55
End: 2020-08-26

## 2020-08-26 DIAGNOSIS — I10 BENIGN ESSENTIAL HYPERTENSION: Primary | ICD-10-CM

## 2020-08-26 NOTE — PROGRESS NOTES
Digital Medicine: Clinician Follow-Up    08/26/2020 Called and LM for patient still above goal. Ask if he will consider switch from HCTZ to Chlorthalidone. 141/74    09/09/2020 LM for patient. 142/72    09/22/2020 LM for patient. 142/71  Sending [employer] message.  Takes multiple readings in a row with some improvement.     09/23/2020 Patient responded to [employer] message that he is willing to try Chlorthalidone in place of HCTZ.  Patient called me back.  Patient states that he just replaced the AC/heating unit for his entire house.  States otherwise doing OK.      The history is provided by the patient.      Review of patient's allergies indicates:   -- Olmesartan -- Other (See Comments)    --  Numbness and tingling in fingers and knee joint             pain  Follow-up reason(s): routine follow up.     Hypertension    Readings are trending up due to needs more medication .    Patient is not experiencing signs/symptoms of hypotension.  Patient is not experiencing signs/symptoms of hypertension.        Last 5 Patient Entered Readings                                      Current 30 Day Average: 142/72     Recent Readings 9/23/2020 9/23/2020 9/23/2020 9/23/2020 9/21/2020    SBP (mmHg) 144 137 128 146 137    DBP (mmHg) 76 76 86 77 76    Pulse 64 66 63 64 61             Screenings    ASSESSMENT(S)  Patients BP average is 142/72 mmHg, which is above goal. Patient's BP goal is less than or equal to 130/80 per 2017 ACC/AHA Hypertension Guidelines.     Hypertension Plan  Medication change. Trial of Chlorthalidone to replace HCTZ        Patient verbalizes understanding.              Hypertension Medications             amLODIPine (NORVASC) 2.5 MG tablet TAKE 1 TABLET BY MOUTH EVERY DAY    chlorthalidone (HYGROTEN) 25 MG Tab Take 1 tablet (25 mg total) by mouth once daily.    hydroCHLOROthiazide (HYDRODIURIL) 25 MG tablet HOLD for trial of chlorthalidone    losartan (COZAAR) 100 MG tablet     spironolactone (ALDACTONE) 25 MG  tablet TAKE 1 TABLET BY MOUTH EVERY DAY

## 2020-09-19 DIAGNOSIS — R60.1 GENERALIZED EDEMA: ICD-10-CM

## 2020-09-19 DIAGNOSIS — I10 BENIGN ESSENTIAL HYPERTENSION: ICD-10-CM

## 2020-09-21 RX ORDER — SPIRONOLACTONE 25 MG/1
TABLET ORAL
Qty: 90 TABLET | Refills: 1 | Status: SHIPPED | OUTPATIENT
Start: 2020-09-21 | End: 2021-03-18

## 2020-09-22 NOTE — TELEPHONE ENCOUNTER
REFILL APPROVED    Medications Ordered This Encounter   Medications    spironolactone (ALDACTONE) 25 MG tablet     Sig: TAKE 1 TABLET BY MOUTH EVERY DAY     Dispense:  90 tablet     Refill:  1

## 2020-09-23 RX ORDER — CHLORTHALIDONE 25 MG/1
25 TABLET ORAL DAILY
Qty: 14 TABLET | Refills: 3 | Status: SHIPPED | OUTPATIENT
Start: 2020-09-23 | End: 2020-10-05 | Stop reason: ALTCHOICE

## 2020-09-26 DIAGNOSIS — I10 BENIGN ESSENTIAL HYPERTENSION: ICD-10-CM

## 2020-09-29 RX ORDER — AMLODIPINE BESYLATE 2.5 MG/1
TABLET ORAL
Qty: 90 TABLET | Refills: 1 | Status: SHIPPED | OUTPATIENT
Start: 2020-09-29 | End: 2020-11-17

## 2020-09-29 NOTE — TELEPHONE ENCOUNTER
REFILL REQUEST APPROVED.  Requested Prescriptions   Pending Prescriptions Disp Refills    amLODIPine (NORVASC) 2.5 MG tablet [Pharmacy Med Name: AMLODIPINE BESYLATE 2.5 MG TAB] 90 tablet 1     Sig: TAKE 1 TABLET BY MOUTH EVERY DAY

## 2020-10-05 ENCOUNTER — PATIENT OUTREACH (OUTPATIENT)
Dept: OTHER | Facility: OTHER | Age: 55
End: 2020-10-05

## 2020-10-05 DIAGNOSIS — I10 BENIGN ESSENTIAL HYPERTENSION: Primary | ICD-10-CM

## 2020-10-05 RX ORDER — HYDROCHLOROTHIAZIDE 25 MG/1
25 TABLET ORAL DAILY
Qty: 90 TABLET | Refills: 3 | Status: SHIPPED | OUTPATIENT
Start: 2020-10-05 | End: 2021-04-12

## 2020-10-05 NOTE — PROGRESS NOTES
Digital Medicine: Clinician Follow-Up    10/05/2020 Patient called to report that Chlorthalidone was affecting his mood.  States that he was told he was depressed.  States that better readings were likely more related to his diet than the chlorthalidone anyway.  Eating more vegetables with non-salt seasoning.      The history is provided by the patient.      Review of patient's allergies indicates:   -- Olmesartan -- Other (See Comments)    --  Numbness and tingling in fingers and knee joint             pain   -- Chlorthalidone -- Other (See Comments)    --  Patient states it made him depressed.  Follow-up reason(s): medication change follow-up.     Hypertension    Patient's blood pressure readings are labile.   Patient is not experiencing signs/symptoms of hypotension.  Patient is not experiencing signs/symptoms of hypertension.      Patient did make medication change.    Is patient tolerating med change? no  Reasons:  Patient told he now appears depressed.          Last 5 Patient Entered Readings                                      Current 30 Day Average: 141/70     Recent Readings 10/3/2020 10/3/2020 10/3/2020 9/30/2020 9/30/2020    SBP (mmHg) 144 142 142 134 133    DBP (mmHg) 72 73 73 66 61    Pulse 61 60 60 77 75                             Medication Adherence-Medication adherence was assessed.          ASSESSMENT(S)  Patients BP average is 141/70 mmHg, which is above goal. Patient's BP goal is less than or equal to 130/80.     Hypertension Plan  Continue current therapy. Resume HCTZ in place of Chlorthalidone.   Continue current diet/physical activity routine.  Will try to increase Amlodipine to 5 mg. It was decreased bc of edema.  Patient instructed to focus more on healthy eating with NO added sodium.      Patient verbalizes understanding.            Hypertension Medications             amLODIPine (NORVASC) 2.5 MG tablet TAKE 1 TABLET BY MOUTH EVERY DAY    hydroCHLOROthiazide (HYDRODIURIL) 25 MG tablet  Take 1 tablet (25 mg total) by mouth once daily.    losartan (COZAAR) 100 MG tablet     spironolactone (ALDACTONE) 25 MG tablet TAKE 1 TABLET BY MOUTH EVERY DAY

## 2020-11-02 ENCOUNTER — PATIENT OUTREACH (OUTPATIENT)
Dept: OTHER | Facility: OTHER | Age: 55
End: 2020-11-02

## 2020-11-02 NOTE — PROGRESS NOTES
Digital Medicine: Clinician Follow-Up    11/02/2020 LM for patient not at goal but with some good readings. Patient returned my call.  States that he takes the readings about 15-20 minutes after medication.      The history is provided by the patient.   Follow-up reason(s): routine follow up.     Hypertension    Patient's blood pressure readings are labile.   Patient is not experiencing signs/symptoms of hypotension.  Patient is not experiencing signs/symptoms of hypertension.            Last 5 Patient Entered Readings                                      Current 30 Day Average: 141/71     Recent Readings 10/31/2020 10/31/2020 10/31/2020 10/31/2020 10/31/2020    SBP (mmHg) 133 142 132 148 -    DBP (mmHg) 68 69 68 72 -    Pulse 57 57 57 62 68                             Medication Adherence-Medication adherence was assessed.          ASSESSMENT(S)  Patients BP average is 141/71 mmHg, which is above goal. Patient's BP goal is less than or equal to 130/80.     Hypertension Plan  Hypertension Medication Change. Increase Amlodipine to 2.5 mg BID   Will take some evening readings too.       Patient verbalizes understanding.               Hypertension Medications             amLODIPine (NORVASC) 2.5 MG tablet TAKE 1 TABLET BY MOUTH EVERY DAY. Increase to BID dosing     hydroCHLOROthiazide (HYDRODIURIL) 25 MG tablet Take 1 tablet (25 mg total) by mouth once daily.    losartan (COZAAR) 100 MG tablet     spironolactone (ALDACTONE) 25 MG tablet TAKE 1 TABLET BY MOUTH EVERY DAY

## 2020-11-16 ENCOUNTER — PATIENT OUTREACH (OUTPATIENT)
Dept: OTHER | Facility: OTHER | Age: 55
End: 2020-11-16

## 2020-11-16 DIAGNOSIS — I10 BENIGN ESSENTIAL HYPERTENSION: ICD-10-CM

## 2020-11-16 NOTE — PROGRESS NOTES
Digital Medicine: Clinician Follow-Up    11/16/2020 LM for patient re: increase in Amlodipine to BID dosing. Average slightly improved.     11/17/2020 Patient returned my call.  Denies foot or ankle swelling with increased Amlodipine.  States that he bought a pulse oximeter at the [employer] and is wondering whether I can see that he had an 89% reading.  I explained that is out of scope for this Program but I will look into where those readings go so they can be seen by other Providers.      The history is provided by the patient.      Review of patient's allergies indicates:   -- Olmesartan -- Other (See Comments)    --  Numbness and tingling in fingers and knee joint             pain   -- Chlorthalidone -- Other (See Comments)    --  Patient states it made him depressed.  Follow-up reason(s): medication change follow-up.     Hypertension    Readings are trending down due to medication adherence.       Patient is not experiencing signs/symptoms of hypotension.  Patient is not experiencing signs/symptoms of hypertension.      Patient did make medication change.    Is patient tolerating med change? yes        Last 5 Patient Entered Readings                                      Current 30 Day Average: 139/69     Recent Readings 11/17/2020 11/17/2020 11/16/2020 11/16/2020 11/16/2020    SBP (mmHg) - 147 - 139 -    DBP (mmHg) - 68 - 68 -    Pulse 79 79 65 65 74                      Medication Adherence-Medication adherence was assessed.          ASSESSMENT(S)  Patients BP average is 139/69 mmHg, which is above goal. Patient's BP goal is less than or equal to 130/80.     Hypertension Plan  Continue current therapy.  Continue current diet/physical activity routine.       Patient verbalizes understanding.               Hypertension Medications             amLODIPine (NORVASC) 2.5 MG tablet TAKE 1 TABLET BY MOUTH TWICE daily     hydroCHLOROthiazide (HYDRODIURIL) 25 MG tablet Take 1 tablet (25 mg total) by mouth once daily.     losartan (COZAAR) 100 MG tablet     spironolactone (ALDACTONE) 25 MG tablet TAKE 1 TABLET BY MOUTH EVERY DAY

## 2020-11-17 RX ORDER — AMLODIPINE BESYLATE 2.5 MG/1
2.5 TABLET ORAL 2 TIMES DAILY
Qty: 180 TABLET | Refills: 1 | Status: SHIPPED | OUTPATIENT
Start: 2020-11-17 | End: 2021-04-12 | Stop reason: SDUPTHER

## 2020-12-09 ENCOUNTER — PATIENT MESSAGE (OUTPATIENT)
Dept: ADMINISTRATIVE | Facility: OTHER | Age: 55
End: 2020-12-09

## 2020-12-23 ENCOUNTER — PATIENT MESSAGE (OUTPATIENT)
Dept: ADMINISTRATIVE | Facility: OTHER | Age: 55
End: 2020-12-23

## 2020-12-23 DIAGNOSIS — Z91.89 AT RISK FOR CARDIOVASCULAR EVENT: ICD-10-CM

## 2020-12-28 ENCOUNTER — LAB VISIT (OUTPATIENT)
Dept: PRIMARY CARE CLINIC | Facility: OTHER | Age: 55
End: 2020-12-28
Attending: INTERNAL MEDICINE
Payer: COMMERCIAL

## 2020-12-28 DIAGNOSIS — Z03.818 ENCOUNTER FOR OBSERVATION FOR SUSPECTED EXPOSURE TO OTHER BIOLOGICAL AGENTS RULED OUT: Primary | ICD-10-CM

## 2020-12-28 DIAGNOSIS — Z91.89 AT RISK FOR CARDIOVASCULAR EVENT: ICD-10-CM

## 2020-12-28 PROCEDURE — U0003 INFECTIOUS AGENT DETECTION BY NUCLEIC ACID (DNA OR RNA); SEVERE ACUTE RESPIRATORY SYNDROME CORONAVIRUS 2 (SARS-COV-2) (CORONAVIRUS DISEASE [COVID-19]), AMPLIFIED PROBE TECHNIQUE, MAKING USE OF HIGH THROUGHPUT TECHNOLOGIES AS DESCRIBED BY CMS-2020-01-R: HCPCS

## 2020-12-28 RX ORDER — ATORVASTATIN CALCIUM 20 MG/1
20 TABLET, FILM COATED ORAL NIGHTLY
Qty: 90 TABLET | Refills: 4 | Status: CANCELLED | OUTPATIENT
Start: 2020-12-28 | End: 2021-12-28

## 2020-12-29 LAB — SARS-COV-2 RNA RESP QL NAA+PROBE: NOT DETECTED

## 2021-01-03 RX ORDER — ATORVASTATIN CALCIUM 20 MG/1
TABLET, FILM COATED ORAL
Qty: 90 TABLET | Refills: 4 | OUTPATIENT
Start: 2021-01-03

## 2021-01-04 ENCOUNTER — TELEPHONE (OUTPATIENT)
Dept: DERMATOLOGY | Facility: CLINIC | Age: 56
End: 2021-01-04

## 2021-01-04 ENCOUNTER — TELEPHONE (OUTPATIENT)
Dept: INTERNAL MEDICINE | Facility: CLINIC | Age: 56
End: 2021-01-04

## 2021-01-04 ENCOUNTER — PATIENT OUTREACH (OUTPATIENT)
Dept: ADMINISTRATIVE | Facility: HOSPITAL | Age: 56
End: 2021-01-04

## 2021-01-04 DIAGNOSIS — Z91.89 AT RISK FOR CARDIOVASCULAR EVENT: ICD-10-CM

## 2021-01-04 RX ORDER — ATORVASTATIN CALCIUM 20 MG/1
20 TABLET, FILM COATED ORAL NIGHTLY
Qty: 90 TABLET | Refills: 0 | Status: SHIPPED | OUTPATIENT
Start: 2021-01-04 | End: 2021-04-03

## 2021-01-05 ENCOUNTER — PATIENT MESSAGE (OUTPATIENT)
Dept: ADMINISTRATIVE | Facility: OTHER | Age: 56
End: 2021-01-05

## 2021-02-02 ENCOUNTER — TELEPHONE (OUTPATIENT)
Dept: ORTHOPEDICS | Facility: CLINIC | Age: 56
End: 2021-02-02

## 2021-03-15 ENCOUNTER — HOSPITAL ENCOUNTER (OUTPATIENT)
Dept: CARDIOLOGY | Facility: HOSPITAL | Age: 56
Discharge: HOME | End: 2021-03-15
Attending: INTERNAL MEDICINE
Payer: COMMERCIAL

## 2021-03-15 ENCOUNTER — OFFICE VISIT (OUTPATIENT)
Dept: CARDIOLOGY | Facility: CLINIC | Age: 56
End: 2021-03-15
Payer: COMMERCIAL

## 2021-03-15 VITALS
DIASTOLIC BLOOD PRESSURE: 66 MMHG | HEART RATE: 88 BPM | WEIGHT: 315 LBS | OXYGEN SATURATION: 97 % | BODY MASS INDEX: 43.44 KG/M2 | SYSTOLIC BLOOD PRESSURE: 134 MMHG

## 2021-03-15 DIAGNOSIS — I10 BENIGN ESSENTIAL HYPERTENSION: Primary | ICD-10-CM

## 2021-03-15 DIAGNOSIS — E66.01 MORBID OBESITY WITH BMI OF 40.0-44.9, ADULT: Chronic | ICD-10-CM

## 2021-03-15 DIAGNOSIS — I10 BENIGN ESSENTIAL HYPERTENSION: ICD-10-CM

## 2021-03-15 DIAGNOSIS — I50.32 CHRONIC DIASTOLIC CONGESTIVE HEART FAILURE: ICD-10-CM

## 2021-03-15 DIAGNOSIS — I73.9 PVD (PERIPHERAL VASCULAR DISEASE): ICD-10-CM

## 2021-03-15 DIAGNOSIS — I35.0 NONRHEUMATIC AORTIC VALVE STENOSIS: Primary | ICD-10-CM

## 2021-03-15 PROCEDURE — 99214 OFFICE O/P EST MOD 30 MIN: CPT | Mod: S$GLB,,, | Performed by: INTERNAL MEDICINE

## 2021-03-15 PROCEDURE — 3078F DIAST BP <80 MM HG: CPT | Mod: CPTII,S$GLB,, | Performed by: INTERNAL MEDICINE

## 2021-03-15 PROCEDURE — 99999 PR PBB SHADOW E&M-EST. PATIENT-LVL III: ICD-10-PCS | Mod: PBBFAC,,, | Performed by: INTERNAL MEDICINE

## 2021-03-15 PROCEDURE — 3078F PR MOST RECENT DIASTOLIC BLOOD PRESSURE < 80 MM HG: ICD-10-PCS | Mod: CPTII,S$GLB,, | Performed by: INTERNAL MEDICINE

## 2021-03-15 PROCEDURE — 99999 PR PBB SHADOW E&M-EST. PATIENT-LVL III: CPT | Mod: PBBFAC,,, | Performed by: INTERNAL MEDICINE

## 2021-03-15 PROCEDURE — 93010 EKG 12-LEAD: ICD-10-PCS | Mod: ,,, | Performed by: INTERNAL MEDICINE

## 2021-03-15 PROCEDURE — 3075F SYST BP GE 130 - 139MM HG: CPT | Mod: CPTII,S$GLB,, | Performed by: INTERNAL MEDICINE

## 2021-03-15 PROCEDURE — 3008F PR BODY MASS INDEX (BMI) DOCUMENTED: ICD-10-PCS | Mod: CPTII,S$GLB,, | Performed by: INTERNAL MEDICINE

## 2021-03-15 PROCEDURE — 3075F PR MOST RECENT SYSTOLIC BLOOD PRESS GE 130-139MM HG: ICD-10-PCS | Mod: CPTII,S$GLB,, | Performed by: INTERNAL MEDICINE

## 2021-03-15 PROCEDURE — 93010 ELECTROCARDIOGRAM REPORT: CPT | Mod: ,,, | Performed by: INTERNAL MEDICINE

## 2021-03-15 PROCEDURE — 93005 ELECTROCARDIOGRAM TRACING: CPT

## 2021-03-15 PROCEDURE — 3008F BODY MASS INDEX DOCD: CPT | Mod: CPTII,S$GLB,, | Performed by: INTERNAL MEDICINE

## 2021-03-15 PROCEDURE — 99214 PR OFFICE/OUTPT VISIT, EST, LEVL IV, 30-39 MIN: ICD-10-PCS | Mod: S$GLB,,, | Performed by: INTERNAL MEDICINE

## 2021-03-15 RX ORDER — FUROSEMIDE 20 MG/1
20 TABLET ORAL
Qty: 8 TABLET | Refills: 11 | Status: SHIPPED | OUTPATIENT
Start: 2021-03-15 | End: 2021-03-17 | Stop reason: SDUPTHER

## 2021-03-17 RX ORDER — FUROSEMIDE 20 MG/1
20 TABLET ORAL
Qty: 8 TABLET | Refills: 11 | OUTPATIENT
Start: 2021-03-18 | End: 2021-03-18 | Stop reason: SDUPTHER

## 2021-03-18 DIAGNOSIS — R60.1 GENERALIZED EDEMA: ICD-10-CM

## 2021-03-18 DIAGNOSIS — I10 BENIGN ESSENTIAL HYPERTENSION: ICD-10-CM

## 2021-03-18 RX ORDER — SPIRONOLACTONE 25 MG/1
TABLET ORAL
Qty: 60 TABLET | Refills: 0 | Status: SHIPPED | OUTPATIENT
Start: 2021-03-18 | End: 2021-04-12 | Stop reason: SDUPTHER

## 2021-03-22 ENCOUNTER — HOSPITAL ENCOUNTER (OUTPATIENT)
Dept: CARDIOLOGY | Facility: HOSPITAL | Age: 56
Discharge: HOME | End: 2021-03-22
Attending: INTERNAL MEDICINE
Payer: COMMERCIAL

## 2021-03-22 VITALS
SYSTOLIC BLOOD PRESSURE: 134 MMHG | DIASTOLIC BLOOD PRESSURE: 66 MMHG | HEIGHT: 73 IN | BODY MASS INDEX: 41.75 KG/M2 | WEIGHT: 315 LBS

## 2021-03-22 DIAGNOSIS — I35.0 NONRHEUMATIC AORTIC VALVE STENOSIS: ICD-10-CM

## 2021-03-22 LAB
AORTIC ROOT ANNULUS: 2.99 CM
AV INDEX (PROSTH): 0.4
AV MEAN GRADIENT: 50 MMHG
AV PEAK GRADIENT: 83 MMHG
AV VALVE AREA: 1.25 CM2
AV VELOCITY RATIO: 0.36
BSA FOR ECHO PROCEDURE: 2.77 M2
CV ECHO LV RWT: 0.34 CM
DOP CALC AO PEAK VEL: 4.55 M/S
DOP CALC AO VTI: 101.08 CM
DOP CALC LVOT AREA: 3.1 CM2
DOP CALC LVOT DIAMETER: 2 CM
DOP CALC LVOT PEAK VEL: 1.62 M/S
DOP CALC LVOT STROKE VOLUME: 126.48 CM3
DOP CALC RVOT PEAK VEL: 0.91 M/S
DOP CALC RVOT VTI: 22.4 CM
DOP CALCLVOT PEAK VEL VTI: 40.28 CM
E WAVE DECELERATION TIME: 294.72 MSEC
E/A RATIO: 1.01
E/E' RATIO: 13.58 M/S
ECHO LV POSTERIOR WALL: 1 CM (ref 0.6–1.1)
FRACTIONAL SHORTENING: 47 % (ref 28–44)
INTERVENTRICULAR SEPTUM: 1 CM (ref 0.6–1.1)
LA MAJOR: 6.38 CM
LA MINOR: 6.11 CM
LA WIDTH: 4.79 CM
LEFT ATRIUM SIZE: 4.04 CM
LEFT ATRIUM VOLUME INDEX: 38.6 ML/M2
LEFT ATRIUM VOLUME: 102.68 CM3
LEFT INTERNAL DIMENSION IN SYSTOLE: 3.09 CM (ref 2.1–4)
LEFT VENTRICLE DIASTOLIC VOLUME INDEX: 62.67 ML/M2
LEFT VENTRICLE DIASTOLIC VOLUME: 166.69 ML
LEFT VENTRICLE MASS INDEX: 88 G/M2
LEFT VENTRICLE SYSTOLIC VOLUME INDEX: 14.2 ML/M2
LEFT VENTRICLE SYSTOLIC VOLUME: 37.67 ML
LEFT VENTRICULAR INTERNAL DIMENSION IN DIASTOLE: 5.8 CM (ref 3.5–6)
LEFT VENTRICULAR MASS: 233.09 G
LV LATERAL E/E' RATIO: 12.9 M/S
LV SEPTAL E/E' RATIO: 14.33 M/S
MV PEAK A VEL: 1.28 M/S
MV PEAK E VEL: 1.29 M/S
MV STENOSIS PRESSURE HALF TIME: 85.47 MS
MV VALVE AREA P 1/2 METHOD: 2.57 CM2
PV MEAN GRADIENT: 2 MMHG
PV PEAK VELOCITY: 1.71 CM/S
RA MAJOR: 4.72 CM
RA PRESSURE: 3 MMHG
RA WIDTH: 3.87 CM
RIGHT VENTRICULAR END-DIASTOLIC DIMENSION: 5.03 CM
SINUS: 2.98 CM
STJ: 2.9 CM
TDI LATERAL: 0.1 M/S
TDI SEPTAL: 0.09 M/S
TDI: 0.1 M/S
TRICUSPID ANNULAR PLANE SYSTOLIC EXCURSION: 2.58 CM

## 2021-03-22 PROCEDURE — 93306 TTE W/DOPPLER COMPLETE: CPT

## 2021-03-22 PROCEDURE — 93306 TTE W/DOPPLER COMPLETE: CPT | Mod: 26,,, | Performed by: INTERNAL MEDICINE

## 2021-03-22 PROCEDURE — 93306 ECHO (CUPID ONLY): ICD-10-PCS | Mod: 26,,, | Performed by: INTERNAL MEDICINE

## 2021-03-29 ENCOUNTER — TELEPHONE (OUTPATIENT)
Dept: DERMATOLOGY | Facility: CLINIC | Age: 56
End: 2021-03-29

## 2021-03-29 DIAGNOSIS — L71.9 ROSACEA: ICD-10-CM

## 2021-03-29 DIAGNOSIS — Z11.4 SCREENING FOR HIV WITHOUT PRESENCE OF RISK FACTORS: ICD-10-CM

## 2021-03-29 DIAGNOSIS — Z91.89 AT RISK FOR CARDIOVASCULAR EVENT: ICD-10-CM

## 2021-03-29 DIAGNOSIS — G47.33 OBSTRUCTIVE SLEEP APNEA TREATED WITH CONTINUOUS POSITIVE AIRWAY PRESSURE (CPAP): ICD-10-CM

## 2021-03-29 DIAGNOSIS — R73.09 ELEVATED GLUCOSE: Primary | ICD-10-CM

## 2021-03-29 DIAGNOSIS — Z12.5 SCREENING PSA (PROSTATE SPECIFIC ANTIGEN): ICD-10-CM

## 2021-03-29 DIAGNOSIS — I10 ESSENTIAL HYPERTENSION: ICD-10-CM

## 2021-03-29 RX ORDER — DOXYCYCLINE 100 MG/1
CAPSULE ORAL
Qty: 90 CAPSULE | Refills: 1 | Status: SHIPPED | OUTPATIENT
Start: 2021-03-29 | End: 2021-09-20

## 2021-04-03 RX ORDER — ATORVASTATIN CALCIUM 20 MG/1
TABLET, FILM COATED ORAL
Qty: 30 TABLET | Refills: 0 | Status: SHIPPED | OUTPATIENT
Start: 2021-04-03 | End: 2021-04-12 | Stop reason: SDUPTHER

## 2021-04-06 ENCOUNTER — LAB VISIT (OUTPATIENT)
Dept: LAB | Facility: HOSPITAL | Age: 56
End: 2021-04-06
Attending: FAMILY MEDICINE
Payer: COMMERCIAL

## 2021-04-06 DIAGNOSIS — G47.33 OBSTRUCTIVE SLEEP APNEA TREATED WITH CONTINUOUS POSITIVE AIRWAY PRESSURE (CPAP): ICD-10-CM

## 2021-04-06 DIAGNOSIS — Z11.4 SCREENING FOR HIV WITHOUT PRESENCE OF RISK FACTORS: ICD-10-CM

## 2021-04-06 DIAGNOSIS — R73.09 ELEVATED GLUCOSE: ICD-10-CM

## 2021-04-06 DIAGNOSIS — Z12.5 SCREENING PSA (PROSTATE SPECIFIC ANTIGEN): ICD-10-CM

## 2021-04-06 DIAGNOSIS — I10 ESSENTIAL HYPERTENSION: ICD-10-CM

## 2021-04-06 DIAGNOSIS — Z91.89 AT RISK FOR CARDIOVASCULAR EVENT: ICD-10-CM

## 2021-04-06 LAB
ERYTHROCYTE [DISTWIDTH] IN BLOOD BY AUTOMATED COUNT: 12 % (ref 11.5–14.5)
ESTIMATED AVG GLUCOSE: 120 MG/DL (ref 68–131)
HBA1C MFR BLD: 5.8 % (ref 4–5.6)
HCT VFR BLD AUTO: 36.1 % (ref 40–54)
HGB BLD-MCNC: 12 G/DL (ref 14–18)
MCH RBC QN AUTO: 31.8 PG (ref 27–31)
MCHC RBC AUTO-ENTMCNC: 33.2 G/DL (ref 32–36)
MCV RBC AUTO: 96 FL (ref 82–98)
PLATELET # BLD AUTO: 183 K/UL (ref 150–450)
PMV BLD AUTO: 10.8 FL (ref 9.2–12.9)
RBC # BLD AUTO: 3.77 M/UL (ref 4.6–6.2)
WBC # BLD AUTO: 11.53 K/UL (ref 3.9–12.7)

## 2021-04-06 PROCEDURE — 84153 ASSAY OF PSA TOTAL: CPT | Performed by: FAMILY MEDICINE

## 2021-04-06 PROCEDURE — 83036 HEMOGLOBIN GLYCOSYLATED A1C: CPT | Performed by: FAMILY MEDICINE

## 2021-04-06 PROCEDURE — 80053 COMPREHEN METABOLIC PANEL: CPT | Performed by: FAMILY MEDICINE

## 2021-04-06 PROCEDURE — 80061 LIPID PANEL: CPT | Performed by: FAMILY MEDICINE

## 2021-04-06 PROCEDURE — 85027 COMPLETE CBC AUTOMATED: CPT | Performed by: FAMILY MEDICINE

## 2021-04-06 PROCEDURE — 36415 COLL VENOUS BLD VENIPUNCTURE: CPT | Performed by: FAMILY MEDICINE

## 2021-04-06 PROCEDURE — 86703 HIV-1/HIV-2 1 RESULT ANTBDY: CPT | Performed by: FAMILY MEDICINE

## 2021-04-07 LAB
ALBUMIN SERPL BCP-MCNC: 4.3 G/DL (ref 3.5–5.2)
ALP SERPL-CCNC: 72 U/L (ref 55–135)
ALT SERPL W/O P-5'-P-CCNC: 28 U/L (ref 10–44)
ANION GAP SERPL CALC-SCNC: 11 MMOL/L (ref 8–16)
AST SERPL-CCNC: 26 U/L (ref 10–40)
BILIRUB SERPL-MCNC: 1.2 MG/DL (ref 0.1–1)
BUN SERPL-MCNC: 16 MG/DL (ref 6–20)
CALCIUM SERPL-MCNC: 9.1 MG/DL (ref 8.7–10.5)
CHLORIDE SERPL-SCNC: 100 MMOL/L (ref 95–110)
CHOLEST SERPL-MCNC: 135 MG/DL (ref 120–199)
CHOLEST/HDLC SERPL: 3.3 {RATIO} (ref 2–5)
CO2 SERPL-SCNC: 24 MMOL/L (ref 23–29)
COMPLEXED PSA SERPL-MCNC: 0.23 NG/ML (ref 0–4)
CREAT SERPL-MCNC: 0.8 MG/DL (ref 0.5–1.4)
EST. GFR  (AFRICAN AMERICAN): >60 ML/MIN/1.73 M^2
EST. GFR  (NON AFRICAN AMERICAN): >60 ML/MIN/1.73 M^2
GLUCOSE SERPL-MCNC: 75 MG/DL (ref 70–110)
HDLC SERPL-MCNC: 41 MG/DL (ref 40–75)
HDLC SERPL: 30.4 % (ref 20–50)
HIV 1+2 AB+HIV1 P24 AG SERPL QL IA: NEGATIVE
LDLC SERPL CALC-MCNC: 70.8 MG/DL (ref 63–159)
NONHDLC SERPL-MCNC: 94 MG/DL
POTASSIUM SERPL-SCNC: 4 MMOL/L (ref 3.5–5.1)
PROT SERPL-MCNC: 7.6 G/DL (ref 6–8.4)
SODIUM SERPL-SCNC: 135 MMOL/L (ref 136–145)
TRIGL SERPL-MCNC: 116 MG/DL (ref 30–150)

## 2021-04-08 ENCOUNTER — PATIENT MESSAGE (OUTPATIENT)
Dept: INTERNAL MEDICINE | Facility: CLINIC | Age: 56
End: 2021-04-08

## 2021-04-08 ENCOUNTER — TELEPHONE (OUTPATIENT)
Dept: INTERNAL MEDICINE | Facility: CLINIC | Age: 56
End: 2021-04-08

## 2021-04-12 ENCOUNTER — HOSPITAL ENCOUNTER (OUTPATIENT)
Dept: RADIOLOGY | Facility: HOSPITAL | Age: 56
Discharge: HOME | End: 2021-04-12
Attending: FAMILY MEDICINE
Payer: COMMERCIAL

## 2021-04-12 ENCOUNTER — OFFICE VISIT (OUTPATIENT)
Dept: INTERNAL MEDICINE | Facility: CLINIC | Age: 56
End: 2021-04-12
Payer: COMMERCIAL

## 2021-04-12 ENCOUNTER — OFFICE VISIT (OUTPATIENT)
Dept: DERMATOLOGY | Facility: CLINIC | Age: 56
End: 2021-04-12
Payer: COMMERCIAL

## 2021-04-12 VITALS
WEIGHT: 315 LBS | OXYGEN SATURATION: 97 % | BODY MASS INDEX: 44.1 KG/M2 | HEIGHT: 71 IN | SYSTOLIC BLOOD PRESSURE: 128 MMHG | HEART RATE: 72 BPM | TEMPERATURE: 98 F | DIASTOLIC BLOOD PRESSURE: 58 MMHG

## 2021-04-12 DIAGNOSIS — E66.01 MORBID OBESITY WITH BMI OF 45.0-49.9, ADULT: Chronic | ICD-10-CM

## 2021-04-12 DIAGNOSIS — Z12.83 SCREENING, MALIGNANT NEOPLASM, SKIN: ICD-10-CM

## 2021-04-12 DIAGNOSIS — Z91.89 AT RISK FOR CARDIOVASCULAR EVENT: ICD-10-CM

## 2021-04-12 DIAGNOSIS — Z00.01 ENCOUNTER FOR WELL ADULT EXAM WITH ABNORMAL FINDINGS: Primary | ICD-10-CM

## 2021-04-12 DIAGNOSIS — D64.9 MILD ANEMIA: Chronic | ICD-10-CM

## 2021-04-12 DIAGNOSIS — R60.1 GENERALIZED EDEMA: ICD-10-CM

## 2021-04-12 DIAGNOSIS — R73.03 PREDIABETES: ICD-10-CM

## 2021-04-12 DIAGNOSIS — I50.32 CHRONIC DIASTOLIC CONGESTIVE HEART FAILURE: ICD-10-CM

## 2021-04-12 DIAGNOSIS — Z85.828 HISTORY OF SKIN CANCER: ICD-10-CM

## 2021-04-12 DIAGNOSIS — G89.29 CHRONIC BILATERAL LOW BACK PAIN WITHOUT SCIATICA: Chronic | ICD-10-CM

## 2021-04-12 DIAGNOSIS — M54.9 DORSALGIA, UNSPECIFIED: ICD-10-CM

## 2021-04-12 DIAGNOSIS — G89.29 CHRONIC BILATERAL LOW BACK PAIN WITHOUT SCIATICA: ICD-10-CM

## 2021-04-12 DIAGNOSIS — J30.1 SEASONAL ALLERGIC RHINITIS DUE TO POLLEN: Chronic | ICD-10-CM

## 2021-04-12 DIAGNOSIS — Z79.899 ON STATIN THERAPY DUE TO RISK OF FUTURE CARDIOVASCULAR EVENT: Chronic | ICD-10-CM

## 2021-04-12 DIAGNOSIS — M54.50 CHRONIC BILATERAL LOW BACK PAIN WITHOUT SCIATICA: Chronic | ICD-10-CM

## 2021-04-12 DIAGNOSIS — L90.5 SCAR CONDITIONS/SKIN FIBROSIS: Primary | ICD-10-CM

## 2021-04-12 DIAGNOSIS — I10 BENIGN ESSENTIAL HYPERTENSION: ICD-10-CM

## 2021-04-12 DIAGNOSIS — G56.03 BILATERAL CARPAL TUNNEL SYNDROME: Chronic | ICD-10-CM

## 2021-04-12 DIAGNOSIS — M54.50 CHRONIC BILATERAL LOW BACK PAIN WITHOUT SCIATICA: ICD-10-CM

## 2021-04-12 DIAGNOSIS — L57.0 ACTINIC KERATOSES: ICD-10-CM

## 2021-04-12 DIAGNOSIS — G47.33 OBSTRUCTIVE SLEEP APNEA TREATED WITH CONTINUOUS POSITIVE AIRWAY PRESSURE (CPAP): Chronic | ICD-10-CM

## 2021-04-12 PROCEDURE — 99213 PR OFFICE/OUTPT VISIT, EST, LEVL III, 20-29 MIN: ICD-10-PCS | Mod: 25,S$GLB,, | Performed by: DERMATOLOGY

## 2021-04-12 PROCEDURE — 99999 PR PBB SHADOW E&M-EST. PATIENT-LVL V: ICD-10-PCS | Mod: PBBFAC,,, | Performed by: FAMILY MEDICINE

## 2021-04-12 PROCEDURE — 1126F AMNT PAIN NOTED NONE PRSNT: CPT | Mod: S$GLB,,, | Performed by: DERMATOLOGY

## 2021-04-12 PROCEDURE — 17004 PR DESTRUCTION, PREMALIGNANT LESIONS; 15 OR MORE LESIONS: ICD-10-PCS | Mod: S$GLB,,, | Performed by: DERMATOLOGY

## 2021-04-12 PROCEDURE — 72100 X-RAY EXAM L-S SPINE 2/3 VWS: CPT | Mod: TC

## 2021-04-12 PROCEDURE — 3008F PR BODY MASS INDEX (BMI) DOCUMENTED: ICD-10-PCS | Mod: CPTII,S$GLB,, | Performed by: FAMILY MEDICINE

## 2021-04-12 PROCEDURE — 99213 OFFICE O/P EST LOW 20 MIN: CPT | Mod: 25,S$GLB,, | Performed by: DERMATOLOGY

## 2021-04-12 PROCEDURE — 99999 PR PBB SHADOW E&M-EST. PATIENT-LVL III: ICD-10-PCS | Mod: PBBFAC,,, | Performed by: DERMATOLOGY

## 2021-04-12 PROCEDURE — 1126F PR PAIN SEVERITY QUANTIFIED, NO PAIN PRESENT: ICD-10-PCS | Mod: S$GLB,,, | Performed by: DERMATOLOGY

## 2021-04-12 PROCEDURE — 72100 XR LUMBAR SPINE 2 OR 3 VIEWS: ICD-10-PCS | Mod: 26,,, | Performed by: RADIOLOGY

## 2021-04-12 PROCEDURE — 99396 PR PREVENTIVE VISIT,EST,40-64: ICD-10-PCS | Mod: S$GLB,,, | Performed by: FAMILY MEDICINE

## 2021-04-12 PROCEDURE — 72100 X-RAY EXAM L-S SPINE 2/3 VWS: CPT | Mod: 26,,, | Performed by: RADIOLOGY

## 2021-04-12 PROCEDURE — 99999 PR PBB SHADOW E&M-EST. PATIENT-LVL V: CPT | Mod: PBBFAC,,, | Performed by: FAMILY MEDICINE

## 2021-04-12 PROCEDURE — 3008F BODY MASS INDEX DOCD: CPT | Mod: CPTII,S$GLB,, | Performed by: FAMILY MEDICINE

## 2021-04-12 PROCEDURE — 99396 PREV VISIT EST AGE 40-64: CPT | Mod: S$GLB,,, | Performed by: FAMILY MEDICINE

## 2021-04-12 PROCEDURE — 99999 PR PBB SHADOW E&M-EST. PATIENT-LVL III: CPT | Mod: PBBFAC,,, | Performed by: DERMATOLOGY

## 2021-04-12 PROCEDURE — 1125F PR PAIN SEVERITY QUANTIFIED, PAIN PRESENT: ICD-10-PCS | Mod: S$GLB,,, | Performed by: FAMILY MEDICINE

## 2021-04-12 PROCEDURE — 1125F AMNT PAIN NOTED PAIN PRSNT: CPT | Mod: S$GLB,,, | Performed by: FAMILY MEDICINE

## 2021-04-12 PROCEDURE — 17004 DESTROY PREMAL LESIONS 15/>: CPT | Mod: S$GLB,,, | Performed by: DERMATOLOGY

## 2021-04-12 RX ORDER — ATORVASTATIN CALCIUM 20 MG/1
20 TABLET, FILM COATED ORAL NIGHTLY
Qty: 90 TABLET | Refills: 4 | Status: SHIPPED | OUTPATIENT
Start: 2021-04-12 | End: 2021-09-29 | Stop reason: SDUPTHER

## 2021-04-12 RX ORDER — AMLODIPINE BESYLATE 2.5 MG/1
2.5 TABLET ORAL 2 TIMES DAILY
Qty: 180 TABLET | Refills: 4 | Status: SHIPPED | OUTPATIENT
Start: 2021-04-12 | End: 2022-02-14 | Stop reason: SDUPTHER

## 2021-04-12 RX ORDER — SPIRONOLACTONE 25 MG/1
25 TABLET ORAL DAILY
Qty: 90 TABLET | Refills: 4 | Status: SHIPPED | OUTPATIENT
Start: 2021-04-12 | End: 2021-10-14 | Stop reason: SDUPTHER

## 2021-04-12 RX ORDER — LOSARTAN POTASSIUM AND HYDROCHLOROTHIAZIDE 25; 100 MG/1; MG/1
1 TABLET ORAL DAILY
Qty: 90 TABLET | Refills: 4 | Status: SHIPPED | OUTPATIENT
Start: 2021-04-12 | End: 2022-02-14 | Stop reason: SDUPTHER

## 2021-04-19 ENCOUNTER — OFFICE VISIT (OUTPATIENT)
Dept: PULMONOLOGY | Facility: CLINIC | Age: 56
End: 2021-04-19
Payer: COMMERCIAL

## 2021-04-19 VITALS
OXYGEN SATURATION: 97 % | DIASTOLIC BLOOD PRESSURE: 82 MMHG | BODY MASS INDEX: 44.1 KG/M2 | SYSTOLIC BLOOD PRESSURE: 136 MMHG | HEIGHT: 71 IN | WEIGHT: 315 LBS | HEART RATE: 75 BPM | RESPIRATION RATE: 18 BRPM

## 2021-04-19 DIAGNOSIS — R06.09 DOE (DYSPNEA ON EXERTION): ICD-10-CM

## 2021-04-19 DIAGNOSIS — G47.33 OSA (OBSTRUCTIVE SLEEP APNEA): Primary | ICD-10-CM

## 2021-04-19 PROCEDURE — 99999 PR PBB SHADOW E&M-EST. PATIENT-LVL III: CPT | Mod: PBBFAC,,, | Performed by: NURSE PRACTITIONER

## 2021-04-19 PROCEDURE — 3008F BODY MASS INDEX DOCD: CPT | Mod: CPTII,S$GLB,, | Performed by: NURSE PRACTITIONER

## 2021-04-19 PROCEDURE — 99214 OFFICE O/P EST MOD 30 MIN: CPT | Mod: S$GLB,,, | Performed by: NURSE PRACTITIONER

## 2021-04-19 PROCEDURE — 3008F PR BODY MASS INDEX (BMI) DOCUMENTED: ICD-10-PCS | Mod: CPTII,S$GLB,, | Performed by: NURSE PRACTITIONER

## 2021-04-19 PROCEDURE — 99214 PR OFFICE/OUTPT VISIT, EST, LEVL IV, 30-39 MIN: ICD-10-PCS | Mod: S$GLB,,, | Performed by: NURSE PRACTITIONER

## 2021-04-19 PROCEDURE — 99999 PR PBB SHADOW E&M-EST. PATIENT-LVL III: ICD-10-PCS | Mod: PBBFAC,,, | Performed by: NURSE PRACTITIONER

## 2021-06-15 ENCOUNTER — OFFICE VISIT (OUTPATIENT)
Dept: URGENT CARE | Facility: CLINIC | Age: 56
End: 2021-06-15
Payer: COMMERCIAL

## 2021-06-15 ENCOUNTER — HOSPITAL ENCOUNTER (OUTPATIENT)
Dept: RADIOLOGY | Facility: HOSPITAL | Age: 56
Discharge: HOME | End: 2021-06-15
Attending: PHYSICIAN ASSISTANT
Payer: COMMERCIAL

## 2021-06-15 VITALS
WEIGHT: 315 LBS | BODY MASS INDEX: 44.1 KG/M2 | HEIGHT: 71 IN | RESPIRATION RATE: 18 BRPM | SYSTOLIC BLOOD PRESSURE: 125 MMHG | HEART RATE: 80 BPM | DIASTOLIC BLOOD PRESSURE: 70 MMHG | OXYGEN SATURATION: 98 %

## 2021-06-15 DIAGNOSIS — M79.671 RIGHT FOOT PAIN: ICD-10-CM

## 2021-06-15 DIAGNOSIS — M79.671 RIGHT FOOT PAIN: Primary | ICD-10-CM

## 2021-06-15 PROCEDURE — 3008F PR BODY MASS INDEX (BMI) DOCUMENTED: ICD-10-PCS | Mod: CPTII,S$GLB,, | Performed by: PHYSICIAN ASSISTANT

## 2021-06-15 PROCEDURE — 1125F AMNT PAIN NOTED PAIN PRSNT: CPT | Mod: S$GLB,,, | Performed by: PHYSICIAN ASSISTANT

## 2021-06-15 PROCEDURE — 73630 X-RAY EXAM OF FOOT: CPT | Mod: TC,PO,RT

## 2021-06-15 PROCEDURE — 1125F PR PAIN SEVERITY QUANTIFIED, PAIN PRESENT: ICD-10-PCS | Mod: S$GLB,,, | Performed by: PHYSICIAN ASSISTANT

## 2021-06-15 PROCEDURE — 99214 PR OFFICE/OUTPT VISIT, EST, LEVL IV, 30-39 MIN: ICD-10-PCS | Mod: 25,S$GLB,, | Performed by: PHYSICIAN ASSISTANT

## 2021-06-15 PROCEDURE — 96372 THER/PROPH/DIAG INJ SC/IM: CPT | Mod: S$GLB,,, | Performed by: PHYSICIAN ASSISTANT

## 2021-06-15 PROCEDURE — 99999 PR PBB SHADOW E&M-EST. PATIENT-LVL III: ICD-10-PCS | Mod: PBBFAC,,, | Performed by: PHYSICIAN ASSISTANT

## 2021-06-15 PROCEDURE — 99214 OFFICE O/P EST MOD 30 MIN: CPT | Mod: 25,S$GLB,, | Performed by: PHYSICIAN ASSISTANT

## 2021-06-15 PROCEDURE — 99999 PR PBB SHADOW E&M-EST. PATIENT-LVL III: CPT | Mod: PBBFAC,,, | Performed by: PHYSICIAN ASSISTANT

## 2021-06-15 PROCEDURE — 73630 X-RAY EXAM OF FOOT: CPT | Mod: 26,RT,, | Performed by: RADIOLOGY

## 2021-06-15 PROCEDURE — 73630 XR FOOT COMPLETE 3 VIEW RIGHT: ICD-10-PCS | Mod: 26,RT,, | Performed by: RADIOLOGY

## 2021-06-15 PROCEDURE — 3008F BODY MASS INDEX DOCD: CPT | Mod: CPTII,S$GLB,, | Performed by: PHYSICIAN ASSISTANT

## 2021-06-15 PROCEDURE — 96372 PR INJECTION,THERAP/PROPH/DIAG2ST, IM OR SUBCUT: ICD-10-PCS | Mod: S$GLB,,, | Performed by: PHYSICIAN ASSISTANT

## 2021-06-15 RX ORDER — KETOROLAC TROMETHAMINE 30 MG/ML
30 INJECTION, SOLUTION INTRAMUSCULAR; INTRAVENOUS
Status: COMPLETED | OUTPATIENT
Start: 2021-06-15 | End: 2021-06-15

## 2021-06-15 RX ORDER — NAPROXEN 500 MG/1
500 TABLET ORAL 2 TIMES DAILY WITH MEALS
Qty: 20 TABLET | Refills: 0 | Status: SHIPPED | OUTPATIENT
Start: 2021-06-15 | End: 2021-10-14

## 2021-06-15 RX ADMIN — KETOROLAC TROMETHAMINE 30 MG: 30 INJECTION, SOLUTION INTRAMUSCULAR; INTRAVENOUS at 05:06

## 2021-06-16 ENCOUNTER — PATIENT MESSAGE (OUTPATIENT)
Dept: INTERNAL MEDICINE | Facility: CLINIC | Age: 56
End: 2021-06-16

## 2021-06-17 ENCOUNTER — OFFICE VISIT (OUTPATIENT)
Dept: INTERNAL MEDICINE | Facility: CLINIC | Age: 56
End: 2021-06-17
Payer: COMMERCIAL

## 2021-06-17 VITALS
OXYGEN SATURATION: 98 % | TEMPERATURE: 97 F | SYSTOLIC BLOOD PRESSURE: 122 MMHG | BODY MASS INDEX: 44.1 KG/M2 | HEIGHT: 71 IN | DIASTOLIC BLOOD PRESSURE: 76 MMHG | WEIGHT: 315 LBS | HEART RATE: 80 BPM | RESPIRATION RATE: 18 BRPM

## 2021-06-17 DIAGNOSIS — G89.29 CHRONIC BILATERAL LOW BACK PAIN WITHOUT SCIATICA: Primary | Chronic | ICD-10-CM

## 2021-06-17 DIAGNOSIS — M79.671 RIGHT FOOT PAIN: ICD-10-CM

## 2021-06-17 DIAGNOSIS — M54.50 CHRONIC BILATERAL LOW BACK PAIN WITHOUT SCIATICA: Primary | Chronic | ICD-10-CM

## 2021-06-17 DIAGNOSIS — I10 BENIGN ESSENTIAL HYPERTENSION: ICD-10-CM

## 2021-06-17 DIAGNOSIS — M79.89 RIGHT LEG SWELLING: ICD-10-CM

## 2021-06-17 DIAGNOSIS — I50.32 CHRONIC DIASTOLIC CONGESTIVE HEART FAILURE: ICD-10-CM

## 2021-06-17 PROCEDURE — 99999 PR PBB SHADOW E&M-EST. PATIENT-LVL V: ICD-10-PCS | Mod: PBBFAC,,, | Performed by: PHYSICIAN ASSISTANT

## 2021-06-17 PROCEDURE — 99214 PR OFFICE/OUTPT VISIT, EST, LEVL IV, 30-39 MIN: ICD-10-PCS | Mod: S$GLB,,, | Performed by: PHYSICIAN ASSISTANT

## 2021-06-17 PROCEDURE — 3008F BODY MASS INDEX DOCD: CPT | Mod: CPTII,S$GLB,, | Performed by: PHYSICIAN ASSISTANT

## 2021-06-17 PROCEDURE — 1125F AMNT PAIN NOTED PAIN PRSNT: CPT | Mod: S$GLB,,, | Performed by: PHYSICIAN ASSISTANT

## 2021-06-17 PROCEDURE — 99214 OFFICE O/P EST MOD 30 MIN: CPT | Mod: S$GLB,,, | Performed by: PHYSICIAN ASSISTANT

## 2021-06-17 PROCEDURE — 3008F PR BODY MASS INDEX (BMI) DOCUMENTED: ICD-10-PCS | Mod: CPTII,S$GLB,, | Performed by: PHYSICIAN ASSISTANT

## 2021-06-17 PROCEDURE — 99999 PR PBB SHADOW E&M-EST. PATIENT-LVL V: CPT | Mod: PBBFAC,,, | Performed by: PHYSICIAN ASSISTANT

## 2021-06-17 PROCEDURE — 1125F PR PAIN SEVERITY QUANTIFIED, PAIN PRESENT: ICD-10-PCS | Mod: S$GLB,,, | Performed by: PHYSICIAN ASSISTANT

## 2021-06-17 RX ORDER — DICLOFENAC SODIUM 10 MG/G
2 GEL TOPICAL 4 TIMES DAILY
Qty: 100 G | Refills: 0 | Status: SHIPPED | OUTPATIENT
Start: 2021-06-17 | End: 2021-10-14

## 2021-06-17 RX ORDER — TIZANIDINE 4 MG/1
4 TABLET ORAL EVERY 8 HOURS
Qty: 15 TABLET | Refills: 0 | Status: SHIPPED | OUTPATIENT
Start: 2021-06-17 | End: 2021-06-27

## 2021-06-18 ENCOUNTER — OFFICE VISIT (OUTPATIENT)
Dept: PODIATRY | Facility: CLINIC | Age: 56
End: 2021-06-18
Payer: COMMERCIAL

## 2021-06-18 ENCOUNTER — HOSPITAL ENCOUNTER (OUTPATIENT)
Dept: RADIOLOGY | Facility: HOSPITAL | Age: 56
Discharge: HOME | End: 2021-06-18
Attending: PHYSICIAN ASSISTANT
Payer: COMMERCIAL

## 2021-06-18 VITALS — BODY MASS INDEX: 44.1 KG/M2 | HEIGHT: 71 IN | WEIGHT: 315 LBS

## 2021-06-18 DIAGNOSIS — M79.671 RIGHT FOOT PAIN: Primary | ICD-10-CM

## 2021-06-18 DIAGNOSIS — G57.81 NEUROMA OF THIRD INTERSPACE OF RIGHT FOOT: ICD-10-CM

## 2021-06-18 DIAGNOSIS — M19.071 ARTHRITIS OF FIRST METATARSOPHALANGEAL (MTP) JOINT OF RIGHT FOOT: ICD-10-CM

## 2021-06-18 DIAGNOSIS — M79.89 RIGHT LEG SWELLING: ICD-10-CM

## 2021-06-18 DIAGNOSIS — G89.29 CHRONIC BILATERAL LOW BACK PAIN WITHOUT SCIATICA: ICD-10-CM

## 2021-06-18 DIAGNOSIS — M54.50 CHRONIC BILATERAL LOW BACK PAIN WITHOUT SCIATICA: ICD-10-CM

## 2021-06-18 PROCEDURE — 93971 EXTREMITY STUDY: CPT | Mod: TC,RT

## 2021-06-18 PROCEDURE — 99203 PR OFFICE/OUTPT VISIT, NEW, LEVL III, 30-44 MIN: ICD-10-PCS | Mod: 25,S$GLB,, | Performed by: PODIATRIST

## 2021-06-18 PROCEDURE — 3008F PR BODY MASS INDEX (BMI) DOCUMENTED: ICD-10-PCS | Mod: CPTII,S$GLB,, | Performed by: PODIATRIST

## 2021-06-18 PROCEDURE — 99999 PR PBB SHADOW E&M-EST. PATIENT-LVL III: CPT | Mod: PBBFAC,,, | Performed by: PODIATRIST

## 2021-06-18 PROCEDURE — 20600 SMALL JOINT ASPIRATION/INJECTION: R GREAT MTP: ICD-10-PCS | Mod: 51,RT,S$GLB, | Performed by: PODIATRIST

## 2021-06-18 PROCEDURE — 3008F BODY MASS INDEX DOCD: CPT | Mod: CPTII,S$GLB,, | Performed by: PODIATRIST

## 2021-06-18 PROCEDURE — 1125F AMNT PAIN NOTED PAIN PRSNT: CPT | Mod: S$GLB,,, | Performed by: PODIATRIST

## 2021-06-18 PROCEDURE — 99999 PR PBB SHADOW E&M-EST. PATIENT-LVL III: ICD-10-PCS | Mod: PBBFAC,,, | Performed by: PODIATRIST

## 2021-06-18 PROCEDURE — 64455 NEUROMA INJECTION FOOT: ICD-10-PCS | Mod: RT,S$GLB,, | Performed by: PODIATRIST

## 2021-06-18 PROCEDURE — 64455 NJX AA&/STRD PLTR COM DG NRV: CPT | Mod: RT,S$GLB,, | Performed by: PODIATRIST

## 2021-06-18 PROCEDURE — 93971 EXTREMITY STUDY: CPT | Mod: 26,RT,, | Performed by: RADIOLOGY

## 2021-06-18 PROCEDURE — 20600 DRAIN/INJ JOINT/BURSA W/O US: CPT | Mod: 51,RT,S$GLB, | Performed by: PODIATRIST

## 2021-06-18 PROCEDURE — 99203 OFFICE O/P NEW LOW 30 MIN: CPT | Mod: 25,S$GLB,, | Performed by: PODIATRIST

## 2021-06-18 PROCEDURE — 1125F PR PAIN SEVERITY QUANTIFIED, PAIN PRESENT: ICD-10-PCS | Mod: S$GLB,,, | Performed by: PODIATRIST

## 2021-06-18 PROCEDURE — 93971 US LOWER EXTREMITY VEINS RIGHT: ICD-10-PCS | Mod: 26,RT,, | Performed by: RADIOLOGY

## 2021-06-18 RX ORDER — DEXAMETHASONE SODIUM PHOSPHATE 4 MG/ML
4 INJECTION, SOLUTION INTRA-ARTICULAR; INTRALESIONAL; INTRAMUSCULAR; INTRAVENOUS; SOFT TISSUE
Status: DISCONTINUED | OUTPATIENT
Start: 2021-06-18 | End: 2021-06-18 | Stop reason: HOSPADM

## 2021-06-18 RX ORDER — TRIAMCINOLONE ACETONIDE 40 MG/ML
80 INJECTION, SUSPENSION INTRA-ARTICULAR; INTRAMUSCULAR
Status: DISCONTINUED | OUTPATIENT
Start: 2021-06-18 | End: 2021-06-18 | Stop reason: HOSPADM

## 2021-06-18 RX ADMIN — TRIAMCINOLONE ACETONIDE 80 MG: 40 INJECTION, SUSPENSION INTRA-ARTICULAR; INTRAMUSCULAR at 09:06

## 2021-06-18 RX ADMIN — DEXAMETHASONE SODIUM PHOSPHATE 4 MG: 4 INJECTION, SOLUTION INTRA-ARTICULAR; INTRALESIONAL; INTRAMUSCULAR; INTRAVENOUS; SOFT TISSUE at 09:06

## 2021-06-21 ENCOUNTER — PATIENT MESSAGE (OUTPATIENT)
Dept: ADMINISTRATIVE | Facility: OTHER | Age: 56
End: 2021-06-21

## 2021-06-21 ENCOUNTER — CLINICAL SUPPORT (OUTPATIENT)
Dept: REHABILITATION | Facility: HOSPITAL | Age: 56
End: 2021-06-21
Payer: COMMERCIAL

## 2021-06-21 DIAGNOSIS — M79.671 RIGHT FOOT PAIN: ICD-10-CM

## 2021-06-21 DIAGNOSIS — G89.29 CHRONIC BILATERAL LOW BACK PAIN WITHOUT SCIATICA: Chronic | ICD-10-CM

## 2021-06-21 DIAGNOSIS — G89.29 CHRONIC RIGHT-SIDED LOW BACK PAIN WITHOUT SCIATICA: ICD-10-CM

## 2021-06-21 DIAGNOSIS — M54.50 CHRONIC BILATERAL LOW BACK PAIN WITHOUT SCIATICA: Chronic | ICD-10-CM

## 2021-06-21 DIAGNOSIS — M54.50 CHRONIC RIGHT-SIDED LOW BACK PAIN WITHOUT SCIATICA: ICD-10-CM

## 2021-06-21 PROCEDURE — 97140 MANUAL THERAPY 1/> REGIONS: CPT

## 2021-06-21 PROCEDURE — 97110 THERAPEUTIC EXERCISES: CPT

## 2021-06-21 PROCEDURE — 97161 PT EVAL LOW COMPLEX 20 MIN: CPT

## 2021-06-25 ENCOUNTER — CLINICAL SUPPORT (OUTPATIENT)
Dept: REHABILITATION | Facility: HOSPITAL | Age: 56
End: 2021-06-25
Payer: COMMERCIAL

## 2021-06-25 DIAGNOSIS — M79.671 RIGHT FOOT PAIN: ICD-10-CM

## 2021-06-25 DIAGNOSIS — G89.29 CHRONIC RIGHT-SIDED LOW BACK PAIN WITHOUT SCIATICA: ICD-10-CM

## 2021-06-25 DIAGNOSIS — M54.50 CHRONIC RIGHT-SIDED LOW BACK PAIN WITHOUT SCIATICA: ICD-10-CM

## 2021-06-25 PROCEDURE — 97140 MANUAL THERAPY 1/> REGIONS: CPT

## 2021-06-25 PROCEDURE — 97110 THERAPEUTIC EXERCISES: CPT

## 2021-06-25 PROCEDURE — 97014 ELECTRIC STIMULATION THERAPY: CPT

## 2021-06-28 ENCOUNTER — CLINICAL SUPPORT (OUTPATIENT)
Dept: REHABILITATION | Facility: HOSPITAL | Age: 56
End: 2021-06-28
Payer: COMMERCIAL

## 2021-06-28 DIAGNOSIS — G89.29 CHRONIC RIGHT-SIDED LOW BACK PAIN WITHOUT SCIATICA: ICD-10-CM

## 2021-06-28 DIAGNOSIS — M79.671 RIGHT FOOT PAIN: ICD-10-CM

## 2021-06-28 DIAGNOSIS — M54.50 CHRONIC RIGHT-SIDED LOW BACK PAIN WITHOUT SCIATICA: ICD-10-CM

## 2021-06-28 PROCEDURE — 97110 THERAPEUTIC EXERCISES: CPT

## 2021-06-28 PROCEDURE — 97014 ELECTRIC STIMULATION THERAPY: CPT

## 2021-06-28 PROCEDURE — 97140 MANUAL THERAPY 1/> REGIONS: CPT

## 2021-07-02 ENCOUNTER — CLINICAL SUPPORT (OUTPATIENT)
Dept: REHABILITATION | Facility: HOSPITAL | Age: 56
End: 2021-07-02
Payer: COMMERCIAL

## 2021-07-02 DIAGNOSIS — M79.671 RIGHT FOOT PAIN: ICD-10-CM

## 2021-07-02 DIAGNOSIS — G89.29 CHRONIC RIGHT-SIDED LOW BACK PAIN WITHOUT SCIATICA: ICD-10-CM

## 2021-07-02 DIAGNOSIS — M54.50 CHRONIC RIGHT-SIDED LOW BACK PAIN WITHOUT SCIATICA: ICD-10-CM

## 2021-07-02 PROCEDURE — 97140 MANUAL THERAPY 1/> REGIONS: CPT | Mod: CQ

## 2021-07-02 PROCEDURE — 97110 THERAPEUTIC EXERCISES: CPT | Mod: CQ

## 2021-07-06 ENCOUNTER — CLINICAL SUPPORT (OUTPATIENT)
Dept: REHABILITATION | Facility: HOSPITAL | Age: 56
End: 2021-07-06
Payer: COMMERCIAL

## 2021-07-06 DIAGNOSIS — M54.50 CHRONIC RIGHT-SIDED LOW BACK PAIN WITHOUT SCIATICA: ICD-10-CM

## 2021-07-06 DIAGNOSIS — M79.671 RIGHT FOOT PAIN: ICD-10-CM

## 2021-07-06 DIAGNOSIS — G89.29 CHRONIC RIGHT-SIDED LOW BACK PAIN WITHOUT SCIATICA: ICD-10-CM

## 2021-07-06 PROCEDURE — 97140 MANUAL THERAPY 1/> REGIONS: CPT | Mod: CQ

## 2021-07-06 PROCEDURE — 97110 THERAPEUTIC EXERCISES: CPT | Mod: CQ

## 2021-07-09 ENCOUNTER — CLINICAL SUPPORT (OUTPATIENT)
Dept: REHABILITATION | Facility: HOSPITAL | Age: 56
End: 2021-07-09
Payer: COMMERCIAL

## 2021-07-09 DIAGNOSIS — M54.50 CHRONIC RIGHT-SIDED LOW BACK PAIN WITHOUT SCIATICA: ICD-10-CM

## 2021-07-09 DIAGNOSIS — G89.29 CHRONIC RIGHT-SIDED LOW BACK PAIN WITHOUT SCIATICA: ICD-10-CM

## 2021-07-09 DIAGNOSIS — M79.671 RIGHT FOOT PAIN: ICD-10-CM

## 2021-07-09 PROCEDURE — 97110 THERAPEUTIC EXERCISES: CPT | Mod: CQ

## 2021-07-09 PROCEDURE — 97140 MANUAL THERAPY 1/> REGIONS: CPT | Mod: CQ

## 2021-07-12 ENCOUNTER — CLINICAL SUPPORT (OUTPATIENT)
Dept: REHABILITATION | Facility: HOSPITAL | Age: 56
End: 2021-07-12
Payer: COMMERCIAL

## 2021-07-12 DIAGNOSIS — M79.671 RIGHT FOOT PAIN: ICD-10-CM

## 2021-07-12 DIAGNOSIS — G89.29 CHRONIC RIGHT-SIDED LOW BACK PAIN WITHOUT SCIATICA: Primary | ICD-10-CM

## 2021-07-12 DIAGNOSIS — M54.50 CHRONIC RIGHT-SIDED LOW BACK PAIN WITHOUT SCIATICA: Primary | ICD-10-CM

## 2021-07-12 PROCEDURE — 97140 MANUAL THERAPY 1/> REGIONS: CPT

## 2021-07-12 PROCEDURE — 97110 THERAPEUTIC EXERCISES: CPT

## 2021-07-16 ENCOUNTER — CLINICAL SUPPORT (OUTPATIENT)
Dept: REHABILITATION | Facility: HOSPITAL | Age: 56
End: 2021-07-16
Payer: COMMERCIAL

## 2021-07-16 ENCOUNTER — DOCUMENTATION ONLY (OUTPATIENT)
Dept: REHABILITATION | Facility: HOSPITAL | Age: 56
End: 2021-07-16

## 2021-07-16 DIAGNOSIS — M79.671 RIGHT FOOT PAIN: ICD-10-CM

## 2021-07-16 DIAGNOSIS — G89.29 CHRONIC RIGHT-SIDED LOW BACK PAIN WITHOUT SCIATICA: Primary | ICD-10-CM

## 2021-07-16 DIAGNOSIS — M54.50 CHRONIC RIGHT-SIDED LOW BACK PAIN WITHOUT SCIATICA: Primary | ICD-10-CM

## 2021-07-16 PROCEDURE — 97110 THERAPEUTIC EXERCISES: CPT | Mod: CQ

## 2021-07-19 ENCOUNTER — CLINICAL SUPPORT (OUTPATIENT)
Dept: REHABILITATION | Facility: HOSPITAL | Age: 56
End: 2021-07-19
Payer: COMMERCIAL

## 2021-07-19 DIAGNOSIS — M54.50 ACUTE RIGHT-SIDED LOW BACK PAIN WITHOUT SCIATICA: ICD-10-CM

## 2021-07-19 DIAGNOSIS — M79.671 RIGHT FOOT PAIN: ICD-10-CM

## 2021-07-19 PROCEDURE — 97110 THERAPEUTIC EXERCISES: CPT

## 2021-07-19 PROCEDURE — 97010 HOT OR COLD PACKS THERAPY: CPT

## 2021-07-23 ENCOUNTER — CLINICAL SUPPORT (OUTPATIENT)
Dept: REHABILITATION | Facility: HOSPITAL | Age: 56
End: 2021-07-23
Payer: COMMERCIAL

## 2021-07-23 DIAGNOSIS — M54.50 ACUTE RIGHT-SIDED LOW BACK PAIN WITHOUT SCIATICA: ICD-10-CM

## 2021-07-23 DIAGNOSIS — M79.671 RIGHT FOOT PAIN: ICD-10-CM

## 2021-07-23 PROCEDURE — 97110 THERAPEUTIC EXERCISES: CPT | Mod: CQ

## 2021-07-26 ENCOUNTER — CLINICAL SUPPORT (OUTPATIENT)
Dept: REHABILITATION | Facility: HOSPITAL | Age: 56
End: 2021-07-26
Payer: COMMERCIAL

## 2021-07-26 DIAGNOSIS — M54.50 ACUTE RIGHT-SIDED LOW BACK PAIN WITHOUT SCIATICA: ICD-10-CM

## 2021-07-26 DIAGNOSIS — M79.671 RIGHT FOOT PAIN: ICD-10-CM

## 2021-07-26 PROCEDURE — 97110 THERAPEUTIC EXERCISES: CPT

## 2021-07-26 PROCEDURE — 97140 MANUAL THERAPY 1/> REGIONS: CPT

## 2021-08-02 ENCOUNTER — CLINICAL SUPPORT (OUTPATIENT)
Dept: REHABILITATION | Facility: HOSPITAL | Age: 56
End: 2021-08-02
Payer: COMMERCIAL

## 2021-08-02 DIAGNOSIS — M79.671 RIGHT FOOT PAIN: ICD-10-CM

## 2021-08-02 DIAGNOSIS — M54.50 ACUTE RIGHT-SIDED LOW BACK PAIN WITHOUT SCIATICA: ICD-10-CM

## 2021-08-02 PROCEDURE — 97140 MANUAL THERAPY 1/> REGIONS: CPT

## 2021-08-02 PROCEDURE — 97110 THERAPEUTIC EXERCISES: CPT

## 2021-10-14 ENCOUNTER — OFFICE VISIT (OUTPATIENT)
Dept: INTERNAL MEDICINE | Facility: CLINIC | Age: 56
End: 2021-10-14
Payer: COMMERCIAL

## 2021-10-14 VITALS
RESPIRATION RATE: 20 BRPM | HEART RATE: 66 BPM | BODY MASS INDEX: 44.1 KG/M2 | DIASTOLIC BLOOD PRESSURE: 75 MMHG | TEMPERATURE: 99 F | WEIGHT: 315 LBS | HEIGHT: 71 IN | OXYGEN SATURATION: 97 % | SYSTOLIC BLOOD PRESSURE: 136 MMHG

## 2021-10-14 DIAGNOSIS — I10 BENIGN ESSENTIAL HYPERTENSION: ICD-10-CM

## 2021-10-14 DIAGNOSIS — R60.1 GENERALIZED EDEMA: ICD-10-CM

## 2021-10-14 PROCEDURE — 3008F BODY MASS INDEX DOCD: CPT | Mod: CPTII,S$GLB,, | Performed by: PEDIATRICS

## 2021-10-14 PROCEDURE — 3008F PR BODY MASS INDEX (BMI) DOCUMENTED: ICD-10-PCS | Mod: CPTII,S$GLB,, | Performed by: PEDIATRICS

## 2021-10-14 PROCEDURE — 4010F ACE/ARB THERAPY RXD/TAKEN: CPT | Mod: CPTII,S$GLB,, | Performed by: PEDIATRICS

## 2021-10-14 PROCEDURE — 99999 PR PBB SHADOW E&M-EST. PATIENT-LVL IV: ICD-10-PCS | Mod: PBBFAC,,, | Performed by: PEDIATRICS

## 2021-10-14 PROCEDURE — 1160F RVW MEDS BY RX/DR IN RCRD: CPT | Mod: CPTII,S$GLB,, | Performed by: PEDIATRICS

## 2021-10-14 PROCEDURE — 1159F MED LIST DOCD IN RCRD: CPT | Mod: CPTII,S$GLB,, | Performed by: PEDIATRICS

## 2021-10-14 PROCEDURE — 3044F HG A1C LEVEL LT 7.0%: CPT | Mod: CPTII,S$GLB,, | Performed by: PEDIATRICS

## 2021-10-14 PROCEDURE — 4010F PR ACE/ARB THEARPY RXD/TAKEN: ICD-10-PCS | Mod: CPTII,S$GLB,, | Performed by: PEDIATRICS

## 2021-10-14 PROCEDURE — 99214 OFFICE O/P EST MOD 30 MIN: CPT | Mod: S$GLB,,, | Performed by: PEDIATRICS

## 2021-10-14 PROCEDURE — 99214 PR OFFICE/OUTPT VISIT, EST, LEVL IV, 30-39 MIN: ICD-10-PCS | Mod: S$GLB,,, | Performed by: PEDIATRICS

## 2021-10-14 PROCEDURE — 3044F PR MOST RECENT HEMOGLOBIN A1C LEVEL <7.0%: ICD-10-PCS | Mod: CPTII,S$GLB,, | Performed by: PEDIATRICS

## 2021-10-14 PROCEDURE — 1159F PR MEDICATION LIST DOCUMENTED IN MEDICAL RECORD: ICD-10-PCS | Mod: CPTII,S$GLB,, | Performed by: PEDIATRICS

## 2021-10-14 PROCEDURE — 1160F PR REVIEW ALL MEDS BY PRESCRIBER/CLIN PHARMACIST DOCUMENTED: ICD-10-PCS | Mod: CPTII,S$GLB,, | Performed by: PEDIATRICS

## 2021-10-14 PROCEDURE — 3078F DIAST BP <80 MM HG: CPT | Mod: CPTII,S$GLB,, | Performed by: PEDIATRICS

## 2021-10-14 PROCEDURE — 3075F SYST BP GE 130 - 139MM HG: CPT | Mod: CPTII,S$GLB,, | Performed by: PEDIATRICS

## 2021-10-14 PROCEDURE — 3078F PR MOST RECENT DIASTOLIC BLOOD PRESSURE < 80 MM HG: ICD-10-PCS | Mod: CPTII,S$GLB,, | Performed by: PEDIATRICS

## 2021-10-14 PROCEDURE — 99999 PR PBB SHADOW E&M-EST. PATIENT-LVL IV: CPT | Mod: PBBFAC,,, | Performed by: PEDIATRICS

## 2021-10-14 PROCEDURE — 3075F PR MOST RECENT SYSTOLIC BLOOD PRESS GE 130-139MM HG: ICD-10-PCS | Mod: CPTII,S$GLB,, | Performed by: PEDIATRICS

## 2021-10-14 RX ORDER — SPIRONOLACTONE 25 MG/1
25 TABLET ORAL 2 TIMES DAILY
Qty: 180 TABLET | Refills: 4 | Status: SHIPPED | OUTPATIENT
Start: 2021-10-14 | End: 2022-02-14 | Stop reason: SDUPTHER

## 2021-11-12 ENCOUNTER — HOSPITAL ENCOUNTER (OUTPATIENT)
Dept: CARDIOLOGY | Facility: HOSPITAL | Age: 56
Discharge: HOME | End: 2021-11-12
Attending: FAMILY MEDICINE
Payer: COMMERCIAL

## 2021-11-12 ENCOUNTER — HOSPITAL ENCOUNTER (OUTPATIENT)
Dept: RADIOLOGY | Facility: HOSPITAL | Age: 56
Discharge: HOME | End: 2021-11-12
Attending: FAMILY MEDICINE
Payer: COMMERCIAL

## 2021-11-12 ENCOUNTER — OFFICE VISIT (OUTPATIENT)
Dept: INTERNAL MEDICINE | Facility: CLINIC | Age: 56
End: 2021-11-12
Payer: COMMERCIAL

## 2021-11-12 VITALS
TEMPERATURE: 99 F | OXYGEN SATURATION: 97 % | HEART RATE: 80 BPM | BODY MASS INDEX: 44.1 KG/M2 | DIASTOLIC BLOOD PRESSURE: 60 MMHG | SYSTOLIC BLOOD PRESSURE: 120 MMHG | WEIGHT: 315 LBS | HEIGHT: 71 IN

## 2021-11-12 DIAGNOSIS — I10 PRIMARY HYPERTENSION: Chronic | ICD-10-CM

## 2021-11-12 DIAGNOSIS — E66.01 MORBID OBESITY WITH BMI OF 45.0-49.9, ADULT: Chronic | ICD-10-CM

## 2021-11-12 DIAGNOSIS — I50.32 CHRONIC DIASTOLIC CONGESTIVE HEART FAILURE: ICD-10-CM

## 2021-11-12 DIAGNOSIS — R60.0 LOWER EXTREMITY EDEMA: ICD-10-CM

## 2021-11-12 DIAGNOSIS — D64.9 MILD ANEMIA: Chronic | ICD-10-CM

## 2021-11-12 DIAGNOSIS — I35.0 NONRHEUMATIC AORTIC VALVE STENOSIS: ICD-10-CM

## 2021-11-12 DIAGNOSIS — Z79.899 ON STATIN THERAPY DUE TO RISK OF FUTURE CARDIOVASCULAR EVENT: Chronic | ICD-10-CM

## 2021-11-12 DIAGNOSIS — R06.09 DYSPNEA ON EXERTION: ICD-10-CM

## 2021-11-12 DIAGNOSIS — G47.33 OBSTRUCTIVE SLEEP APNEA TREATED WITH CONTINUOUS POSITIVE AIRWAY PRESSURE (CPAP): Chronic | ICD-10-CM

## 2021-11-12 DIAGNOSIS — R17 ELEVATED BILIRUBIN: ICD-10-CM

## 2021-11-12 DIAGNOSIS — R06.09 DYSPNEA ON EXERTION: Primary | ICD-10-CM

## 2021-11-12 DIAGNOSIS — E87.1 HYPONATREMIA: ICD-10-CM

## 2021-11-12 PROCEDURE — 93010 EKG 12-LEAD: ICD-10-PCS | Mod: ,,, | Performed by: INTERNAL MEDICINE

## 2021-11-12 PROCEDURE — 99214 OFFICE O/P EST MOD 30 MIN: CPT | Mod: S$GLB,,, | Performed by: FAMILY MEDICINE

## 2021-11-12 PROCEDURE — 83880 ASSAY OF NATRIURETIC PEPTIDE: CPT | Performed by: FAMILY MEDICINE

## 2021-11-12 PROCEDURE — 85027 COMPLETE CBC AUTOMATED: CPT | Performed by: FAMILY MEDICINE

## 2021-11-12 PROCEDURE — 3008F PR BODY MASS INDEX (BMI) DOCUMENTED: ICD-10-PCS | Mod: CPTII,S$GLB,, | Performed by: FAMILY MEDICINE

## 2021-11-12 PROCEDURE — 71046 X-RAY EXAM CHEST 2 VIEWS: CPT | Mod: 26,,, | Performed by: RADIOLOGY

## 2021-11-12 PROCEDURE — 93010 ELECTROCARDIOGRAM REPORT: CPT | Mod: ,,, | Performed by: INTERNAL MEDICINE

## 2021-11-12 PROCEDURE — 1160F PR REVIEW ALL MEDS BY PRESCRIBER/CLIN PHARMACIST DOCUMENTED: ICD-10-PCS | Mod: CPTII,S$GLB,, | Performed by: FAMILY MEDICINE

## 2021-11-12 PROCEDURE — 82728 ASSAY OF FERRITIN: CPT | Performed by: FAMILY MEDICINE

## 2021-11-12 PROCEDURE — 3008F BODY MASS INDEX DOCD: CPT | Mod: CPTII,S$GLB,, | Performed by: FAMILY MEDICINE

## 2021-11-12 PROCEDURE — 1159F PR MEDICATION LIST DOCUMENTED IN MEDICAL RECORD: ICD-10-PCS | Mod: CPTII,S$GLB,, | Performed by: FAMILY MEDICINE

## 2021-11-12 PROCEDURE — 1160F RVW MEDS BY RX/DR IN RCRD: CPT | Mod: CPTII,S$GLB,, | Performed by: FAMILY MEDICINE

## 2021-11-12 PROCEDURE — 93005 ELECTROCARDIOGRAM TRACING: CPT

## 2021-11-12 PROCEDURE — 83540 ASSAY OF IRON: CPT | Performed by: FAMILY MEDICINE

## 2021-11-12 PROCEDURE — 84075 ASSAY ALKALINE PHOSPHATASE: CPT | Performed by: FAMILY MEDICINE

## 2021-11-12 PROCEDURE — 80069 RENAL FUNCTION PANEL: CPT | Performed by: FAMILY MEDICINE

## 2021-11-12 PROCEDURE — 99214 PR OFFICE/OUTPT VISIT, EST, LEVL IV, 30-39 MIN: ICD-10-PCS | Mod: S$GLB,,, | Performed by: FAMILY MEDICINE

## 2021-11-12 PROCEDURE — 71046 XR CHEST PA AND LATERAL: ICD-10-PCS | Mod: 26,,, | Performed by: RADIOLOGY

## 2021-11-12 PROCEDURE — 3078F PR MOST RECENT DIASTOLIC BLOOD PRESSURE < 80 MM HG: ICD-10-PCS | Mod: CPTII,S$GLB,, | Performed by: FAMILY MEDICINE

## 2021-11-12 PROCEDURE — 3074F PR MOST RECENT SYSTOLIC BLOOD PRESSURE < 130 MM HG: ICD-10-PCS | Mod: CPTII,S$GLB,, | Performed by: FAMILY MEDICINE

## 2021-11-12 PROCEDURE — 1159F MED LIST DOCD IN RCRD: CPT | Mod: CPTII,S$GLB,, | Performed by: FAMILY MEDICINE

## 2021-11-12 PROCEDURE — 71046 X-RAY EXAM CHEST 2 VIEWS: CPT | Mod: TC

## 2021-11-12 PROCEDURE — 99999 PR PBB SHADOW E&M-EST. PATIENT-LVL IV: ICD-10-PCS | Mod: PBBFAC,,, | Performed by: FAMILY MEDICINE

## 2021-11-12 PROCEDURE — 99999 PR PBB SHADOW E&M-EST. PATIENT-LVL IV: CPT | Mod: PBBFAC,,, | Performed by: FAMILY MEDICINE

## 2021-11-12 PROCEDURE — 3074F SYST BP LT 130 MM HG: CPT | Mod: CPTII,S$GLB,, | Performed by: FAMILY MEDICINE

## 2021-11-12 PROCEDURE — 3078F DIAST BP <80 MM HG: CPT | Mod: CPTII,S$GLB,, | Performed by: FAMILY MEDICINE

## 2021-11-12 NOTE — ASSESSMENT & PLAN NOTE
Lab Results   Component Value Date    CHOL 135 04/06/2021    CHOL 185 10/14/2019    TRIG 116 04/06/2021    TRIG 331 (H) 10/14/2019    HDL 41 04/06/2021    HDL 38 (L) 10/14/2019    LDLCALC 70.8 04/06/2021    LDLCALC 80.8 10/14/2019    NONHDLCHOL 94 04/06/2021    NONHDLCHOL 147 10/14/2019    AST 26 04/06/2021    ALT 28 04/06/2021     The 10-year ASCVD risk score (Caty SMITH Jr., et al., 2013) is: 6%    Values used to calculate the score:      Age: 56 years      Sex: Male      Is Non- : No      Diabetic: No      Tobacco smoker: No      Systolic Blood Pressure: 120 mmHg      Is BP treated: Yes      HDL Cholesterol: 41 mg/dL      Total Cholesterol: 135 mg/dL

## 2021-11-12 NOTE — ASSESSMENT & PLAN NOTE
Lab Results   Component Value Date    HGB 12.0 (L) 04/06/2021    HGB 12.8 (L) 10/14/2019    HGB 13.8 (L) 12/03/2018    HGB 13.2 (L) 10/24/2012    HCT 36.1 (L) 04/06/2021    HCT 39.4 (L) 10/14/2019    HCT 40.7 12/03/2018    HCT 38.0 (L) 10/24/2012     Lab Results   Component Value Date    FERRITIN 152 04/12/2021    FERRITIN 268 11/05/2012    IRON 67 04/12/2021    IRON 49 11/05/2012    TRANSFERRIN 254 04/12/2021    TIBC 376 04/12/2021    TIBC 308.0 11/05/2012    FESATURATED 18 (L) 04/12/2021    FESATURATED 16 (L) 11/05/2012    FOLATE 12.1 11/05/2012

## 2021-11-12 NOTE — PROGRESS NOTES
"CHIEF COMPLAINT: Follow-up      Unless noted herein, any chronic conditions are represented as and appear compensated/controlled and stable, and no other significant complaints or concerns were reported.    DIAGNOSES SPECIFICALLY EVALUATED AND TREATED THIS ENCOUNTER  1. Morbid obesity with BMI of 45.0-49.9, adult    2. Mild anemia  - CBC Without Differential  - Ferritin  - Iron and TIBC    3. On statin therapy due to risk of future cardiovascular event  - Hepatic Function Panel    4. Obstructive sleep apnea treated with continuous positive airway pressure (CPAP)    5. Chronic diastolic congestive heart failure  - Ambulatory referral/consult to Cardiology; Future  - Renal Function Panel  - BNP    6. Elevated bilirubin  - Hepatic Function Panel    7. Hyponatremia  - Renal Function Panel    8. Dyspnea on exertion  - SCHEDULED EKG 12-LEAD (to Muse); Future  - X-Ray Chest PA And Lateral; Future  - Ambulatory referral/consult to Cardiology; Future  - CBC Without Differential  - Renal Function Panel  - BNP    9. Nonrheumatic aortic valve stenosis  - Ambulatory referral/consult to Cardiology; Future    10. Lower extremity edema  - Renal Function Panel    11. Primary hypertension  - SCHEDULED EKG 12-LEAD (to Muse); Future  - Renal Function Panel     Follow up in about 3 months (around 2/12/2022) for periodic management of chronic medical conditions.    Problem List Items Addressed This Visit     Morbid obesity with BMI of 45.0-49.9, adult (Chronic)    Current Assessment & Plan     Wt Readings from Last 6 Encounters:   11/12/21 (!) 151.5 kg (334 lb)   10/14/21 (!) 151.2 kg (333 lb 5.4 oz)   06/18/21 (!) 146.5 kg (322 lb 15.6 oz)   06/17/21 (!) 146 kg (321 lb 14 oz)   06/15/21 (!) 146.1 kg (322 lb 1.5 oz)   04/19/21 (!) 149.3 kg (329 lb 2.4 oz)     Estimated body mass index is 46.58 kg/m² as calculated from the following:    Height as of this encounter: 5' 11" (1.803 m).    Weight as of this encounter: 151.5 kg (334 lb).     "       Primary hypertension (Chronic)    Current Assessment & Plan     BP Readings from Last 6 Encounters:   11/12/21 120/60   10/14/21 136/75   06/17/21 122/76   06/15/21 125/70   04/19/21 136/82   04/12/21 (!) 128/58      Last 5 Patient Entered Readings                Current 30 Day Average: 140/73  Recent Readings 11/8/2021 11/8/2021 11/8/2021 11/8/2021 11/6/2021   SBP (mmHg) 137 126 135 140 145   DBP (mmHg) 72 75 74 82 76   Pulse 64 64 68 63 63                      Relevant Orders    SCHEDULED EKG 12-LEAD (to Muse) (Completed)    Renal Function Panel (Completed)    On statin therapy due to risk of future cardiovascular event (Chronic)    Current Assessment & Plan     Lab Results   Component Value Date    CHOL 135 04/06/2021    CHOL 185 10/14/2019    TRIG 116 04/06/2021    TRIG 331 (H) 10/14/2019    HDL 41 04/06/2021    HDL 38 (L) 10/14/2019    LDLCALC 70.8 04/06/2021    LDLCALC 80.8 10/14/2019    NONHDLCHOL 94 04/06/2021    NONHDLCHOL 147 10/14/2019    AST 26 04/06/2021    ALT 28 04/06/2021     The 10-year ASCVD risk score (Caty SMITH Jr., et al., 2013) is: 6%    Values used to calculate the score:      Age: 56 years      Sex: Male      Is Non- : No      Diabetic: No      Tobacco smoker: No      Systolic Blood Pressure: 120 mmHg      Is BP treated: Yes      HDL Cholesterol: 41 mg/dL      Total Cholesterol: 135 mg/dL          Relevant Orders    Hepatic Function Panel (Completed)    Mild anemia (Chronic)    Current Assessment & Plan     Lab Results   Component Value Date    HGB 12.0 (L) 04/06/2021    HGB 12.8 (L) 10/14/2019    HGB 13.8 (L) 12/03/2018    HGB 13.2 (L) 10/24/2012    HCT 36.1 (L) 04/06/2021    HCT 39.4 (L) 10/14/2019    HCT 40.7 12/03/2018    HCT 38.0 (L) 10/24/2012     Lab Results   Component Value Date    FERRITIN 152 04/12/2021    FERRITIN 268 11/05/2012    IRON 67 04/12/2021    IRON 49 11/05/2012    TRANSFERRIN 254 04/12/2021    TIBC 376 04/12/2021    TIBC 308.0 11/05/2012     FESATURATED 18 (L) 04/12/2021    FESATURATED 16 (L) 11/05/2012    FOLATE 12.1 11/05/2012             Relevant Orders    CBC Without Differential (Completed)    Ferritin (Completed)    Iron and TIBC (Completed)    Lower extremity edema    Current Assessment & Plan     2+         Relevant Orders    Renal Function Panel (Completed)    Chronic diastolic congestive heart failure    Overview     ECHOCARDIOGRAM 03/22/2021  ·The left ventricle is normal in size with normal systolic function. The estimated ejection fraction is 60%  ·Indeterminate left ventricular diastolic function.  ·Normal right ventricular size with normal right ventricular systolic function.  ·Mild left atrial enlargement.  ·Normal central venous pressure (3 mmHg).  ·There is severe aortic valve stenosis.  ·Aortic valve area is 1.25 cm2; peak velocity is 4.55 m/s; mean gradient is 50 mmHg.    ECHOCARDIOGRAM 10/18/2019  1 - Normal left ventricular systolic function (EF 60-65%).  2 - Impaired LV relaxation, elevated LAP (grade 2 diastolic dysfunction).  3 - Normal right ventricular systolic function.  4 - No wall motion abnormalities.  5 - Severe left atrial enlargement.  6 - Concentric hypertrophy.  7 - Mild aortic regurgitation.  8 - Moderate aortic stenosis, CRISTINO = 1.87 cm2, AVAi = 0.7 cm2/m2, peak velocity = 3.87 m/s, mean gradient = 34 mmHg.         Relevant Orders    Ambulatory referral/consult to Cardiology    Renal Function Panel (Completed)    BNP (Completed)    Obstructive sleep apnea treated with continuous positive airway pressure (CPAP) (Chronic)    Nonrheumatic aortic valve stenosis    Overview     ECHOCARDIOGRAM 10/18/2019  1 - Normal left ventricular systolic function (EF 60-65%).  2 - Impaired LV relaxation, elevated LAP (grade 2 diastolic dysfunction).  3 - Normal right ventricular systolic function.  4 - No wall motion abnormalities.  5 - Severe left atrial enlargement.  6 - Concentric hypertrophy.  7 - Mild aortic regurgitation.  8 -  Moderate aortic stenosis, CRISTINO = 1.87 cm2, AVAi = 0.7 cm2/m2, peak velocity = 3.87 m/s, mean gradient = 34 mmHg.         Relevant Orders    Ambulatory referral/consult to Cardiology      Other Visit Diagnoses     Dyspnea on exertion    -  Primary    Relevant Orders    SCHEDULED EKG 12-LEAD (to Muse) (Completed)    X-Ray Chest PA And Lateral (Completed)    Ambulatory referral/consult to Cardiology    CBC Without Differential (Completed)    Renal Function Panel (Completed)    BNP (Completed)    Elevated bilirubin        Relevant Orders    Hepatic Function Panel (Completed)    Hyponatremia        Relevant Orders    Renal Function Panel (Completed)          PRESCRIPTION DRUG MANAGEMENT  Outpatient Medications Prior to Visit   Medication Sig Dispense Refill    amLODIPine (NORVASC) 2.5 MG tablet Take 1 tablet (2.5 mg total) by mouth 2 (two) times daily. 180 tablet 4    atorvastatin (LIPITOR) 20 MG tablet Take 1 tablet (20 mg total) by mouth every evening. 90 tablet 3    doxycycline (MONODOX) 100 MG capsule TAKE ONCE DAILY WITH FOOD. MAY CAUSE UPSET STOMACH. 90 capsule 1    losartan-hydrochlorothiazide 100-25 mg (HYZAAR) 100-25 mg per tablet Take 1 tablet by mouth once daily. 90 tablet 4    mometasone (NASONEX) 50 mcg/actuation nasal spray INSTILL 2 SPRAYS INTO EACH NOSTRIL ONCE DAILY. 1 each 1    sodium chloride (OCEAN) 0.65 % nasal spray 1 spray by Nasal route as needed for Congestion.      spironolactone (ALDACTONE) 25 MG tablet Take 1 tablet (25 mg total) by mouth 2 (two) times daily. 180 tablet 4    furosemide (LASIX) 20 MG tablet Take 1 tablet (20 mg total) by mouth twice a week. 24 tablet 3     No facility-administered medications prior to visit.      There are no discontinued medications.        PHYSICAL EXAM  Vitals:    11/12/21 1040   BP: 120/60   BP Location: Left arm   Patient Position: Sitting   BP Method: Large (Manual)   Pulse: 80   Temp: 98.5 °F (36.9 °C)   TempSrc: Tympanic   SpO2: 97%   Weight: (!)  "151.5 kg (334 lb)   Height: 5' 11" (1.803 m)   CONSTITUTIONAL: Vital signs noted. No apparent distress. Does not appear acutely ill or septic. Appears adequately hydrated.  PULM: Lungs clear. Breathing unlabored.  HEART: Auscultation reveals regular rate.  DERM: Skin warm and moist with normal turgor.  NEURO: There are no gross focal motor deficits.  PSYCHIATRIC: Alert and conversant. Mood grossly neutral. Judgment and insight grossly intact.    Documentation entered by me for this encounter may have been done in part using speech-recognition technology. Although I have made an effort to ensure accuracy, "sound like" errors may exist and should be interpreted in context. -DENYS Barba MD.     "

## 2021-11-12 NOTE — ASSESSMENT & PLAN NOTE
BP Readings from Last 6 Encounters:   11/12/21 120/60   10/14/21 136/75   06/17/21 122/76   06/15/21 125/70   04/19/21 136/82   04/12/21 (!) 128/58      Last 5 Patient Entered Readings                Current 30 Day Average: 140/73  Recent Readings 11/8/2021 11/8/2021 11/8/2021 11/8/2021 11/6/2021   SBP (mmHg) 137 126 135 140 145   DBP (mmHg) 72 75 74 82 76   Pulse 64 64 68 63 63

## 2021-11-12 NOTE — ASSESSMENT & PLAN NOTE
"Wt Readings from Last 6 Encounters:   11/12/21 (!) 151.5 kg (334 lb)   10/14/21 (!) 151.2 kg (333 lb 5.4 oz)   06/18/21 (!) 146.5 kg (322 lb 15.6 oz)   06/17/21 (!) 146 kg (321 lb 14 oz)   06/15/21 (!) 146.1 kg (322 lb 1.5 oz)   04/19/21 (!) 149.3 kg (329 lb 2.4 oz)     Estimated body mass index is 46.58 kg/m² as calculated from the following:    Height as of this encounter: 5' 11" (1.803 m).    Weight as of this encounter: 151.5 kg (334 lb).    "

## 2021-11-13 LAB
ALBUMIN SERPL BCP-MCNC: 4.2 G/DL (ref 3.5–5.2)
ALBUMIN SERPL BCP-MCNC: 4.2 G/DL (ref 3.5–5.2)
ALP SERPL-CCNC: 72 U/L (ref 55–135)
ALT SERPL W/O P-5'-P-CCNC: 27 U/L (ref 10–44)
ANION GAP SERPL CALC-SCNC: 10 MMOL/L (ref 8–16)
AST SERPL-CCNC: 26 U/L (ref 10–40)
BILIRUB DIRECT SERPL-MCNC: 0.5 MG/DL (ref 0.1–0.3)
BILIRUB SERPL-MCNC: 1.4 MG/DL (ref 0.1–1)
BNP SERPL-MCNC: <10 PG/ML (ref 0–99)
BUN SERPL-MCNC: 14 MG/DL (ref 6–20)
CALCIUM SERPL-MCNC: 9.5 MG/DL (ref 8.7–10.5)
CHLORIDE SERPL-SCNC: 101 MMOL/L (ref 95–110)
CO2 SERPL-SCNC: 24 MMOL/L (ref 23–29)
CREAT SERPL-MCNC: 0.9 MG/DL (ref 0.5–1.4)
ERYTHROCYTE [DISTWIDTH] IN BLOOD BY AUTOMATED COUNT: 12.1 % (ref 11.5–14.5)
EST. GFR  (AFRICAN AMERICAN): >60 ML/MIN/1.73 M^2
EST. GFR  (NON AFRICAN AMERICAN): >60 ML/MIN/1.73 M^2
FERRITIN SERPL-MCNC: 242 NG/ML (ref 20–300)
GLUCOSE SERPL-MCNC: 86 MG/DL (ref 70–110)
HCT VFR BLD AUTO: 37.5 % (ref 40–54)
HGB BLD-MCNC: 12.3 G/DL (ref 14–18)
IRON SERPL-MCNC: 108 UG/DL (ref 45–160)
MCH RBC QN AUTO: 31.8 PG (ref 27–31)
MCHC RBC AUTO-ENTMCNC: 32.8 G/DL (ref 32–36)
MCV RBC AUTO: 97 FL (ref 82–98)
PHOSPHATE SERPL-MCNC: 3 MG/DL (ref 2.7–4.5)
PLATELET # BLD AUTO: 160 K/UL (ref 150–450)
PMV BLD AUTO: 10.5 FL (ref 9.2–12.9)
POTASSIUM SERPL-SCNC: 4.2 MMOL/L (ref 3.5–5.1)
PROT SERPL-MCNC: 7.6 G/DL (ref 6–8.4)
RBC # BLD AUTO: 3.87 M/UL (ref 4.6–6.2)
SATURATED IRON: 30 % (ref 20–50)
SODIUM SERPL-SCNC: 135 MMOL/L (ref 136–145)
TOTAL IRON BINDING CAPACITY: 366 UG/DL (ref 250–450)
TRANSFERRIN SERPL-MCNC: 247 MG/DL (ref 200–375)
WBC # BLD AUTO: 8.4 K/UL (ref 3.9–12.7)

## 2021-11-15 ENCOUNTER — OFFICE VISIT (OUTPATIENT)
Dept: CARDIOLOGY | Facility: CLINIC | Age: 56
End: 2021-11-15
Payer: COMMERCIAL

## 2021-11-15 VITALS
DIASTOLIC BLOOD PRESSURE: 62 MMHG | HEIGHT: 71 IN | BODY MASS INDEX: 44.1 KG/M2 | SYSTOLIC BLOOD PRESSURE: 120 MMHG | OXYGEN SATURATION: 98 % | HEART RATE: 68 BPM | WEIGHT: 315 LBS

## 2021-11-15 DIAGNOSIS — Z79.899 ON STATIN THERAPY DUE TO RISK OF FUTURE CARDIOVASCULAR EVENT: Chronic | ICD-10-CM

## 2021-11-15 DIAGNOSIS — I35.0 NONRHEUMATIC AORTIC VALVE STENOSIS: ICD-10-CM

## 2021-11-15 DIAGNOSIS — I50.32 CHRONIC DIASTOLIC CONGESTIVE HEART FAILURE: ICD-10-CM

## 2021-11-15 DIAGNOSIS — I73.9 PVD (PERIPHERAL VASCULAR DISEASE): Primary | ICD-10-CM

## 2021-11-15 DIAGNOSIS — R06.09 DYSPNEA ON EXERTION: ICD-10-CM

## 2021-11-15 PROCEDURE — 3078F DIAST BP <80 MM HG: CPT | Mod: CPTII,S$GLB,, | Performed by: INTERNAL MEDICINE

## 2021-11-15 PROCEDURE — 4010F PR ACE/ARB THEARPY RXD/TAKEN: ICD-10-PCS | Mod: CPTII,S$GLB,, | Performed by: INTERNAL MEDICINE

## 2021-11-15 PROCEDURE — 3008F BODY MASS INDEX DOCD: CPT | Mod: CPTII,S$GLB,, | Performed by: INTERNAL MEDICINE

## 2021-11-15 PROCEDURE — 99215 PR OFFICE/OUTPT VISIT, EST, LEVL V, 40-54 MIN: ICD-10-PCS | Mod: S$GLB,,, | Performed by: INTERNAL MEDICINE

## 2021-11-15 PROCEDURE — 3074F PR MOST RECENT SYSTOLIC BLOOD PRESSURE < 130 MM HG: ICD-10-PCS | Mod: CPTII,S$GLB,, | Performed by: INTERNAL MEDICINE

## 2021-11-15 PROCEDURE — 99999 PR PBB SHADOW E&M-EST. PATIENT-LVL IV: CPT | Mod: PBBFAC,,, | Performed by: INTERNAL MEDICINE

## 2021-11-15 PROCEDURE — 1159F PR MEDICATION LIST DOCUMENTED IN MEDICAL RECORD: ICD-10-PCS | Mod: CPTII,S$GLB,, | Performed by: INTERNAL MEDICINE

## 2021-11-15 PROCEDURE — 99215 OFFICE O/P EST HI 40 MIN: CPT | Mod: S$GLB,,, | Performed by: INTERNAL MEDICINE

## 2021-11-15 PROCEDURE — 3008F PR BODY MASS INDEX (BMI) DOCUMENTED: ICD-10-PCS | Mod: CPTII,S$GLB,, | Performed by: INTERNAL MEDICINE

## 2021-11-15 PROCEDURE — 3074F SYST BP LT 130 MM HG: CPT | Mod: CPTII,S$GLB,, | Performed by: INTERNAL MEDICINE

## 2021-11-15 PROCEDURE — 99999 PR PBB SHADOW E&M-EST. PATIENT-LVL IV: ICD-10-PCS | Mod: PBBFAC,,, | Performed by: INTERNAL MEDICINE

## 2021-11-15 PROCEDURE — 3078F PR MOST RECENT DIASTOLIC BLOOD PRESSURE < 80 MM HG: ICD-10-PCS | Mod: CPTII,S$GLB,, | Performed by: INTERNAL MEDICINE

## 2021-11-15 PROCEDURE — 4010F ACE/ARB THERAPY RXD/TAKEN: CPT | Mod: CPTII,S$GLB,, | Performed by: INTERNAL MEDICINE

## 2021-11-15 PROCEDURE — 1159F MED LIST DOCD IN RCRD: CPT | Mod: CPTII,S$GLB,, | Performed by: INTERNAL MEDICINE

## 2021-11-15 PROCEDURE — 3044F PR MOST RECENT HEMOGLOBIN A1C LEVEL <7.0%: ICD-10-PCS | Mod: CPTII,S$GLB,, | Performed by: INTERNAL MEDICINE

## 2021-11-15 PROCEDURE — 3044F HG A1C LEVEL LT 7.0%: CPT | Mod: CPTII,S$GLB,, | Performed by: INTERNAL MEDICINE

## 2021-11-15 RX ORDER — FUROSEMIDE 20 MG/1
20 TABLET ORAL
Qty: 60 TABLET | Refills: 3 | Status: SHIPPED | OUTPATIENT
Start: 2021-11-15 | End: 2022-02-14 | Stop reason: SDUPTHER

## 2021-11-29 ENCOUNTER — ANESTHESIA (OUTPATIENT)
Dept: CARDIOLOGY | Facility: HOSPITAL | Age: 56
End: 2021-11-29
Payer: COMMERCIAL

## 2021-11-29 ENCOUNTER — HOSPITAL ENCOUNTER (OUTPATIENT)
Facility: HOSPITAL | Age: 56
Discharge: HOME | End: 2021-11-29
Attending: INTERNAL MEDICINE | Admitting: INTERNAL MEDICINE
Payer: COMMERCIAL

## 2021-11-29 ENCOUNTER — ANESTHESIA EVENT (OUTPATIENT)
Dept: CARDIOLOGY | Facility: HOSPITAL | Age: 56
End: 2021-11-29
Payer: COMMERCIAL

## 2021-11-29 VITALS
WEIGHT: 315 LBS | DIASTOLIC BLOOD PRESSURE: 60 MMHG | OXYGEN SATURATION: 97 % | SYSTOLIC BLOOD PRESSURE: 129 MMHG | HEIGHT: 71 IN | BODY MASS INDEX: 44.1 KG/M2 | HEART RATE: 65 BPM | TEMPERATURE: 98 F | RESPIRATION RATE: 17 BRPM

## 2021-11-29 DIAGNOSIS — I35.0 AORTIC STENOSIS: ICD-10-CM

## 2021-11-29 DIAGNOSIS — I35.0 NONRHEUMATIC AORTIC VALVE STENOSIS: ICD-10-CM

## 2021-11-29 LAB
AV INDEX (PROSTH): 0.42
AV MEAN GRADIENT: 36 MMHG
AV PEAK GRADIENT: 60 MMHG
AV REGURGITATION PRESSURE HALF TIME: 630.27 MS
AV VALVE AREA: 1.32 CM2
AV VELOCITY RATIO: 0.4
BSA FOR ECHO PROCEDURE: 2.76 M2
CV ECHO LV RWT: 0.49 CM
DOP CALC AO PEAK VEL: 3.87 M/S
DOP CALC AO VTI: 88.2 CM
DOP CALC LVOT AREA: 3.1 CM2
DOP CALC LVOT DIAMETER: 2 CM
DOP CALC LVOT PEAK VEL: 1.55 M/S
DOP CALC LVOT STROKE VOLUME: 116.81 CM3
DOP CALCLVOT PEAK VEL VTI: 37.2 CM
ECHO LV POSTERIOR WALL: 1.2 CM (ref 0.6–1.1)
EJECTION FRACTION: 60 %
FRACTIONAL SHORTENING: 51 % (ref 28–44)
INTERVENTRICULAR SEPTUM: 1.3 CM (ref 0.6–1.1)
LEFT INTERNAL DIMENSION IN SYSTOLE: 2.4 CM (ref 2.1–4)
LEFT VENTRICLE MASS INDEX: 91 G/M2
LEFT VENTRICULAR INTERNAL DIMENSION IN DIASTOLE: 4.9 CM (ref 3.5–6)
LEFT VENTRICULAR MASS: 239.86 G
LVOT MG: 6.04 MMHG
LVOT MV: 1.17 CM/S
PISA AR MAX VEL: 3.84 M/S
PROX AORTA: 2.7 CM
SINUS: 2.1 CM
STJ: 2.6 CM

## 2021-11-29 PROCEDURE — 25000003 PHARM REV CODE 250: Performed by: FAMILY MEDICINE

## 2021-11-29 PROCEDURE — 37000008 HC ANESTHESIA 1ST 15 MINUTES: Performed by: INTERNAL MEDICINE

## 2021-11-29 PROCEDURE — 63600175 PHARM REV CODE 636 W HCPCS: Performed by: FAMILY MEDICINE

## 2021-11-29 PROCEDURE — 37000009 HC ANESTHESIA EA ADD 15 MINS: Performed by: INTERNAL MEDICINE

## 2021-11-29 PROCEDURE — 25000003 PHARM REV CODE 250: Performed by: INTERNAL MEDICINE

## 2021-11-29 RX ORDER — LIDOCAINE HYDROCHLORIDE 20 MG/ML
SOLUTION OROPHARYNGEAL
Status: DISCONTINUED | OUTPATIENT
Start: 2021-11-29 | End: 2021-11-29 | Stop reason: HOSPADM

## 2021-11-29 RX ORDER — PROPOFOL 10 MG/ML
VIAL (ML) INTRAVENOUS
Status: DISCONTINUED | OUTPATIENT
Start: 2021-11-29 | End: 2021-11-29

## 2021-11-29 RX ORDER — SODIUM CHLORIDE 9 MG/ML
INJECTION, SOLUTION INTRAVENOUS ONCE
Status: DISCONTINUED | OUTPATIENT
Start: 2021-11-29 | End: 2021-11-29 | Stop reason: HOSPADM

## 2021-11-29 RX ORDER — SODIUM CHLORIDE 0.9 % (FLUSH) 0.9 %
10 SYRINGE (ML) INJECTION
Status: DISCONTINUED | OUTPATIENT
Start: 2021-11-29 | End: 2021-11-29 | Stop reason: HOSPADM

## 2021-11-29 RX ADMIN — SODIUM CHLORIDE: 9 INJECTION, SOLUTION INTRAVENOUS at 07:11

## 2021-11-29 RX ADMIN — PROPOFOL 100 MG: 10 INJECTION, EMULSION INTRAVENOUS at 07:11

## 2021-11-29 RX ADMIN — PROPOFOL 50 MG: 10 INJECTION, EMULSION INTRAVENOUS at 07:11

## 2021-12-08 ENCOUNTER — IMMUNIZATION (OUTPATIENT)
Dept: PRIMARY CARE CLINIC | Facility: CLINIC | Age: 56
End: 2021-12-08
Payer: COMMERCIAL

## 2021-12-08 DIAGNOSIS — Z23 NEED FOR VACCINATION: Primary | ICD-10-CM

## 2021-12-08 PROCEDURE — 0064A COVID-19, MRNA, LNP-S, PF, 100 MCG/0.25 ML DOSE VACCINE (MODERNA BOOSTER): CPT | Mod: CV19,PBBFAC | Performed by: FAMILY MEDICINE

## 2021-12-10 ENCOUNTER — OFFICE VISIT (OUTPATIENT)
Dept: CARDIOLOGY | Facility: CLINIC | Age: 56
End: 2021-12-10
Payer: COMMERCIAL

## 2021-12-10 VITALS
WEIGHT: 315 LBS | DIASTOLIC BLOOD PRESSURE: 62 MMHG | HEART RATE: 88 BPM | SYSTOLIC BLOOD PRESSURE: 110 MMHG | OXYGEN SATURATION: 95 % | BODY MASS INDEX: 47.54 KG/M2

## 2021-12-10 DIAGNOSIS — I10 PRIMARY HYPERTENSION: Chronic | ICD-10-CM

## 2021-12-10 DIAGNOSIS — I73.9 PVD (PERIPHERAL VASCULAR DISEASE): ICD-10-CM

## 2021-12-10 DIAGNOSIS — I51.7 LEFT ATRIAL ENLARGEMENT: ICD-10-CM

## 2021-12-10 DIAGNOSIS — E66.01 MORBID OBESITY WITH BMI OF 45.0-49.9, ADULT: Chronic | ICD-10-CM

## 2021-12-10 DIAGNOSIS — Z79.899 ON STATIN THERAPY DUE TO RISK OF FUTURE CARDIOVASCULAR EVENT: Chronic | ICD-10-CM

## 2021-12-10 DIAGNOSIS — I35.0 NONRHEUMATIC AORTIC VALVE STENOSIS: Primary | ICD-10-CM

## 2021-12-10 PROCEDURE — 99999 PR PBB SHADOW E&M-EST. PATIENT-LVL III: CPT | Mod: PBBFAC,,, | Performed by: INTERNAL MEDICINE

## 2021-12-10 PROCEDURE — 99999 PR PBB SHADOW E&M-EST. PATIENT-LVL III: ICD-10-PCS | Mod: PBBFAC,,, | Performed by: INTERNAL MEDICINE

## 2021-12-10 PROCEDURE — 99214 OFFICE O/P EST MOD 30 MIN: CPT | Mod: S$GLB,,, | Performed by: INTERNAL MEDICINE

## 2021-12-10 PROCEDURE — 4010F PR ACE/ARB THEARPY RXD/TAKEN: ICD-10-PCS | Mod: CPTII,S$GLB,, | Performed by: INTERNAL MEDICINE

## 2021-12-10 PROCEDURE — 99214 PR OFFICE/OUTPT VISIT, EST, LEVL IV, 30-39 MIN: ICD-10-PCS | Mod: S$GLB,,, | Performed by: INTERNAL MEDICINE

## 2021-12-10 PROCEDURE — 4010F ACE/ARB THERAPY RXD/TAKEN: CPT | Mod: CPTII,S$GLB,, | Performed by: INTERNAL MEDICINE

## 2022-02-14 ENCOUNTER — LAB VISIT (OUTPATIENT)
Dept: LAB | Facility: HOSPITAL | Age: 57
End: 2022-02-14
Attending: FAMILY MEDICINE
Payer: COMMERCIAL

## 2022-02-14 ENCOUNTER — OFFICE VISIT (OUTPATIENT)
Dept: INTERNAL MEDICINE | Facility: CLINIC | Age: 57
End: 2022-02-14
Payer: COMMERCIAL

## 2022-02-14 VITALS
SYSTOLIC BLOOD PRESSURE: 125 MMHG | HEIGHT: 71 IN | OXYGEN SATURATION: 97 % | BODY MASS INDEX: 44.1 KG/M2 | TEMPERATURE: 98 F | HEART RATE: 82 BPM | DIASTOLIC BLOOD PRESSURE: 64 MMHG | WEIGHT: 315 LBS

## 2022-02-14 DIAGNOSIS — Z79.899 ON STATIN THERAPY DUE TO RISK OF FUTURE CARDIOVASCULAR EVENT: Chronic | ICD-10-CM

## 2022-02-14 DIAGNOSIS — I10 PRIMARY HYPERTENSION: Primary | Chronic | ICD-10-CM

## 2022-02-14 DIAGNOSIS — I50.32 CHRONIC DIASTOLIC CONGESTIVE HEART FAILURE: ICD-10-CM

## 2022-02-14 DIAGNOSIS — E66.01 MORBID OBESITY WITH BMI OF 45.0-49.9, ADULT: Chronic | ICD-10-CM

## 2022-02-14 DIAGNOSIS — Z91.89 AT RISK FOR CARDIOVASCULAR EVENT: ICD-10-CM

## 2022-02-14 DIAGNOSIS — I10 PRIMARY HYPERTENSION: Chronic | ICD-10-CM

## 2022-02-14 DIAGNOSIS — R60.1 GENERALIZED EDEMA: ICD-10-CM

## 2022-02-14 DIAGNOSIS — G47.33 OBSTRUCTIVE SLEEP APNEA TREATED WITH CONTINUOUS POSITIVE AIRWAY PRESSURE (CPAP): Chronic | ICD-10-CM

## 2022-02-14 DIAGNOSIS — I73.9 PVD (PERIPHERAL VASCULAR DISEASE): ICD-10-CM

## 2022-02-14 LAB
ANION GAP SERPL CALC-SCNC: 10 MMOL/L (ref 8–16)
BUN SERPL-MCNC: 15 MG/DL (ref 6–20)
CALCIUM SERPL-MCNC: 9.3 MG/DL (ref 8.7–10.5)
CHLORIDE SERPL-SCNC: 98 MMOL/L (ref 95–110)
CO2 SERPL-SCNC: 27 MMOL/L (ref 23–29)
CREAT SERPL-MCNC: 0.9 MG/DL (ref 0.5–1.4)
EST. GFR  (AFRICAN AMERICAN): >60 ML/MIN/1.73 M^2
EST. GFR  (NON AFRICAN AMERICAN): >60 ML/MIN/1.73 M^2
GLUCOSE SERPL-MCNC: 87 MG/DL (ref 70–110)
POTASSIUM SERPL-SCNC: 4.3 MMOL/L (ref 3.5–5.1)
SODIUM SERPL-SCNC: 135 MMOL/L (ref 136–145)

## 2022-02-14 PROCEDURE — 1159F PR MEDICATION LIST DOCUMENTED IN MEDICAL RECORD: ICD-10-PCS | Mod: CPTII,S$GLB,, | Performed by: FAMILY MEDICINE

## 2022-02-14 PROCEDURE — 99214 OFFICE O/P EST MOD 30 MIN: CPT | Mod: S$GLB,,, | Performed by: FAMILY MEDICINE

## 2022-02-14 PROCEDURE — 3078F PR MOST RECENT DIASTOLIC BLOOD PRESSURE < 80 MM HG: ICD-10-PCS | Mod: CPTII,S$GLB,, | Performed by: FAMILY MEDICINE

## 2022-02-14 PROCEDURE — 1160F RVW MEDS BY RX/DR IN RCRD: CPT | Mod: CPTII,S$GLB,, | Performed by: FAMILY MEDICINE

## 2022-02-14 PROCEDURE — 3074F PR MOST RECENT SYSTOLIC BLOOD PRESSURE < 130 MM HG: ICD-10-PCS | Mod: CPTII,S$GLB,, | Performed by: FAMILY MEDICINE

## 2022-02-14 PROCEDURE — 3008F PR BODY MASS INDEX (BMI) DOCUMENTED: ICD-10-PCS | Mod: CPTII,S$GLB,, | Performed by: FAMILY MEDICINE

## 2022-02-14 PROCEDURE — 1159F MED LIST DOCD IN RCRD: CPT | Mod: CPTII,S$GLB,, | Performed by: FAMILY MEDICINE

## 2022-02-14 PROCEDURE — 36415 COLL VENOUS BLD VENIPUNCTURE: CPT | Performed by: FAMILY MEDICINE

## 2022-02-14 PROCEDURE — 99999 PR PBB SHADOW E&M-EST. PATIENT-LVL IV: CPT | Mod: PBBFAC,,, | Performed by: FAMILY MEDICINE

## 2022-02-14 PROCEDURE — 99214 PR OFFICE/OUTPT VISIT, EST, LEVL IV, 30-39 MIN: ICD-10-PCS | Mod: S$GLB,,, | Performed by: FAMILY MEDICINE

## 2022-02-14 PROCEDURE — 1160F PR REVIEW ALL MEDS BY PRESCRIBER/CLIN PHARMACIST DOCUMENTED: ICD-10-PCS | Mod: CPTII,S$GLB,, | Performed by: FAMILY MEDICINE

## 2022-02-14 PROCEDURE — 3078F DIAST BP <80 MM HG: CPT | Mod: CPTII,S$GLB,, | Performed by: FAMILY MEDICINE

## 2022-02-14 PROCEDURE — 99999 PR PBB SHADOW E&M-EST. PATIENT-LVL IV: ICD-10-PCS | Mod: PBBFAC,,, | Performed by: FAMILY MEDICINE

## 2022-02-14 PROCEDURE — 3008F BODY MASS INDEX DOCD: CPT | Mod: CPTII,S$GLB,, | Performed by: FAMILY MEDICINE

## 2022-02-14 PROCEDURE — 80048 BASIC METABOLIC PNL TOTAL CA: CPT | Performed by: FAMILY MEDICINE

## 2022-02-14 PROCEDURE — 3074F SYST BP LT 130 MM HG: CPT | Mod: CPTII,S$GLB,, | Performed by: FAMILY MEDICINE

## 2022-02-14 RX ORDER — LOSARTAN POTASSIUM AND HYDROCHLOROTHIAZIDE 25; 100 MG/1; MG/1
1 TABLET ORAL DAILY
Qty: 90 TABLET | Refills: 3 | Status: SHIPPED | OUTPATIENT
Start: 2022-02-14 | End: 2022-07-07

## 2022-02-14 RX ORDER — AMLODIPINE BESYLATE 2.5 MG/1
2.5 TABLET ORAL 2 TIMES DAILY
Qty: 180 TABLET | Refills: 3 | Status: SHIPPED | OUTPATIENT
Start: 2022-02-14 | End: 2022-07-07

## 2022-02-14 RX ORDER — FUROSEMIDE 20 MG/1
20 TABLET ORAL
Qty: 60 TABLET | Refills: 3 | Status: SHIPPED | OUTPATIENT
Start: 2022-02-14 | End: 2022-08-15 | Stop reason: SDUPTHER

## 2022-02-14 RX ORDER — ATORVASTATIN CALCIUM 20 MG/1
20 TABLET, FILM COATED ORAL NIGHTLY
Qty: 90 TABLET | Refills: 3 | Status: SHIPPED | OUTPATIENT
Start: 2022-02-14 | End: 2022-08-15 | Stop reason: SDUPTHER

## 2022-02-14 RX ORDER — SPIRONOLACTONE 25 MG/1
25 TABLET ORAL 2 TIMES DAILY
Qty: 180 TABLET | Refills: 3 | Status: SHIPPED | OUTPATIENT
Start: 2022-02-14 | End: 2022-07-07

## 2022-02-14 NOTE — ASSESSMENT & PLAN NOTE
"Wt Readings from Last 6 Encounters:   02/14/22 (!) 154.4 kg (340 lb 6.2 oz)   12/10/21 (!) 154.6 kg (340 lb 13.3 oz)   11/29/21 (!) 152 kg (335 lb)   11/15/21 (!) 152 kg (335 lb 1.6 oz)   11/12/21 (!) 151.5 kg (334 lb)   10/14/21 (!) 151.2 kg (333 lb 5.4 oz)     Estimated body mass index is 47.47 kg/m² as calculated from the following:    Height as of this encounter: 5' 11" (1.803 m).    Weight as of this encounter: 154.4 kg (340 lb 6.2 oz).    "

## 2022-02-14 NOTE — ASSESSMENT & PLAN NOTE
BP Readings from Last 6 Encounters:   02/14/22 125/64   12/10/21 110/62   11/29/21 129/60   11/15/21 120/62   11/12/21 120/60   10/14/21 136/75      Last 5 Patient Entered Readings                Current 30 Day Average: 136/65  Recent Readings 2/12/2022 2/10/2022 2/10/2022 2/1/2022 2/1/2022   SBP (mmHg) 124 126 127 145 139   DBP (mmHg) 52 66 64 77 71   Pulse 67 59 58 81 30               Lab Results   Component Value Date    ESTGFRAFRICA >60.0 11/12/2021    EGFRNONAA >60.0 11/12/2021    CREATININE 0.9 11/12/2021    BUN 14 11/12/2021    K 4.2 11/12/2021     (L) 11/12/2021     11/12/2021     Results for orders placed or performed during the hospital encounter of 11/12/21   SCHEDULED EKG 12-LEAD (to Muse)    Collection Time: 11/12/21  1:25 PM    Narrative    Test Reason : R06.00    Vent. Rate : 064 BPM     Atrial Rate : 064 BPM     P-R Int : 200 ms          QRS Dur : 116 ms      QT Int : 408 ms       P-R-T Axes : 016 008 057 degrees     QTc Int : 420 ms    Normal sinus rhythm  Incomplete left bundle branch block  Borderline Abnormal ECG  When compared with ECG of 15-MAR-2021 04:45,  No significant change was found  Confirmed by GLADYS ALFRED MD ([id number]) on 11/12/2021 4:50:55 PM    Referred By: DENYS KNUTSON           Confirmed By:GLADYS ALFRED MD

## 2022-02-28 NOTE — PROGRESS NOTES
OFFICE VISIT 2/14/22 10:30 AM CST  LAST ENCOUNTER WITH ME:  11/12/2021   CHIEF COMPLAINT: Follow-up    DIAGNOSES SPECIFICALLY EVALUATED AND TREATED THIS ENCOUNTER  1. Primary hypertension  - amLODIPine (NORVASC) 2.5 MG tablet; Take 1 tablet (2.5 mg total) by mouth 2 (two) times daily.  Dispense: 180 tablet; Refill: 3  - losartan-hydrochlorothiazide 100-25 mg (HYZAAR) 100-25 mg per tablet; Take 1 tablet by mouth once daily.  Dispense: 90 tablet; Refill: 3  - spironolactone (ALDACTONE) 25 MG tablet; Take 1 tablet (25 mg total) by mouth 2 (two) times daily.  Dispense: 180 tablet; Refill: 3  - furosemide (LASIX) 20 MG tablet; Take 1 tablet (20 mg total) by mouth every Mon, Tues, Wed, Thurs, Fri.  Dispense: 60 tablet; Refill: 3  - Basic Metabolic Panel; Future    2. Obstructive sleep apnea treated with continuous positive airway pressure (CPAP)    3. At risk for cardiovascular event    4. On statin therapy due to risk of future cardiovascular event  - atorvastatin (LIPITOR) 20 MG tablet; Take 1 tablet (20 mg total) by mouth every evening.  Dispense: 90 tablet; Refill: 3    5. Generalized edema  - spironolactone (ALDACTONE) 25 MG tablet; Take 1 tablet (25 mg total) by mouth 2 (two) times daily.  Dispense: 180 tablet; Refill: 3  - furosemide (LASIX) 20 MG tablet; Take 1 tablet (20 mg total) by mouth every Mon, Tues, Wed, Thurs, Fri.  Dispense: 60 tablet; Refill: 3    6. Morbid obesity with BMI of 45.0-49.9, adult    7. Chronic diastolic congestive heart failure    8. PVD (peripheral vascular disease)     Follow up in about 6 months (around 8/14/2022) for re-evaluate problem(s) discussed today.    Problem List Items Addressed This Visit     Obstructive sleep apnea treated with continuous positive airway pressure (CPAP) (Chronic)    Current Assessment & Plan     He reports compliance with use of CPAP for treatment of obstructive sleep apnea, and he reports perceived therapeutic benefit.            Morbid obesity with BMI of  "45.0-49.9, adult (Chronic)    Current Assessment & Plan     Wt Readings from Last 6 Encounters:   02/14/22 (!) 154.4 kg (340 lb 6.2 oz)   12/10/21 (!) 154.6 kg (340 lb 13.3 oz)   11/29/21 (!) 152 kg (335 lb)   11/15/21 (!) 152 kg (335 lb 1.6 oz)   11/12/21 (!) 151.5 kg (334 lb)   10/14/21 (!) 151.2 kg (333 lb 5.4 oz)     Estimated body mass index is 47.47 kg/m² as calculated from the following:    Height as of this encounter: 5' 11" (1.803 m).    Weight as of this encounter: 154.4 kg (340 lb 6.2 oz).             Primary hypertension - Primary (Chronic)    Current Assessment & Plan     BP Readings from Last 6 Encounters:   02/14/22 125/64   12/10/21 110/62   11/29/21 129/60   11/15/21 120/62   11/12/21 120/60   10/14/21 136/75      Last 5 Patient Entered Readings                Current 30 Day Average: 136/65  Recent Readings 2/12/2022 2/10/2022 2/10/2022 2/1/2022 2/1/2022   SBP (mmHg) 124 126 127 145 139   DBP (mmHg) 52 66 64 77 71   Pulse 67 59 58 81 30               Lab Results   Component Value Date    ESTGFRAFRICA >60.0 11/12/2021    EGFRNONAA >60.0 11/12/2021    CREATININE 0.9 11/12/2021    BUN 14 11/12/2021    K 4.2 11/12/2021     (L) 11/12/2021     11/12/2021     Results for orders placed or performed during the hospital encounter of 11/12/21   SCHEDULED EKG 12-LEAD (to Muse)    Collection Time: 11/12/21  1:25 PM    Narrative    Test Reason : R06.00    Vent. Rate : 064 BPM     Atrial Rate : 064 BPM     P-R Int : 200 ms          QRS Dur : 116 ms      QT Int : 408 ms       P-R-T Axes : 016 008 057 degrees     QTc Int : 420 ms    Normal sinus rhythm  Incomplete left bundle branch block  Borderline Abnormal ECG  When compared with ECG of 15-MAR-2021 04:45,  No significant change was found  Confirmed by GLADYS ALFRED MD ([id number]) on 11/12/2021 4:50:55 PM    Referred By: DENYS KNUTSON           Confirmed By:GLADYS ALFRED MD                Relevant Medications    amLODIPine (NORVASC) 2.5 MG tablet    " losartan-hydrochlorothiazide 100-25 mg (HYZAAR) 100-25 mg per tablet    spironolactone (ALDACTONE) 25 MG tablet    furosemide (LASIX) 20 MG tablet    Other Relevant Orders    Basic Metabolic Panel (Completed)    On statin therapy due to risk of future cardiovascular event (Chronic)    Relevant Medications    atorvastatin (LIPITOR) 20 MG tablet    Generalized edema    Relevant Medications    spironolactone (ALDACTONE) 25 MG tablet    furosemide (LASIX) 20 MG tablet    Chronic diastolic congestive heart failure    Overview     ECHOCARDIOGRAM 03/22/2021  ·The left ventricle is normal in size with normal systolic function. The estimated ejection fraction is 60%  ·Indeterminate left ventricular diastolic function.  ·Normal right ventricular size with normal right ventricular systolic function.  ·Mild left atrial enlargement.  ·Normal central venous pressure (3 mmHg).  ·There is severe aortic valve stenosis.  ·Aortic valve area is 1.25 cm2; peak velocity is 4.55 m/s; mean gradient is 50 mmHg.    ECHOCARDIOGRAM 10/18/2019  1 - Normal left ventricular systolic function (EF 60-65%).  2 - Impaired LV relaxation, elevated LAP (grade 2 diastolic dysfunction).  3 - Normal right ventricular systolic function.  4 - No wall motion abnormalities.  5 - Severe left atrial enlargement.  6 - Concentric hypertrophy.  7 - Mild aortic regurgitation.  8 - Moderate aortic stenosis, CRISTINO = 1.87 cm2, AVAi = 0.7 cm2/m2, peak velocity = 3.87 m/s, mean gradient = 34 mmHg.           Current Assessment & Plan     Compensated/controlled and stable.            PVD (peripheral vascular disease)    Current Assessment & Plan     Asymptomatic. Clinically stable.             At risk for cardiovascular event      Unless noted herein, any chronic conditions are represented as and appear compensated/controlled and stable, and no other significant complaints or concerns were reported.    PRESCRIPTION DRUG MANAGEMENT  Outpatient Medications Prior to Visit    Medication Sig Dispense Refill    doxycycline (MONODOX) 100 MG capsule TAKE ONCE DAILY WITH FOOD. MAY CAUSE UPSET STOMACH. 90 capsule 1    mometasone (NASONEX) 50 mcg/actuation nasal spray INSTILL 2 SPRAYS INTO EACH NOSTRIL ONCE DAILY. 1 each 1    sodium chloride (OCEAN) 0.65 % nasal spray 1 spray by Nasal route as needed for Congestion.      amLODIPine (NORVASC) 2.5 MG tablet Take 1 tablet (2.5 mg total) by mouth 2 (two) times daily. 180 tablet 4    atorvastatin (LIPITOR) 20 MG tablet Take 1 tablet (20 mg total) by mouth every evening. 90 tablet 3    furosemide (LASIX) 20 MG tablet Take 1 tablet (20 mg total) by mouth every Mon, Tues, Wed, Thurs, Fri. 60 tablet 3    losartan-hydrochlorothiazide 100-25 mg (HYZAAR) 100-25 mg per tablet Take 1 tablet by mouth once daily. 90 tablet 4    spironolactone (ALDACTONE) 25 MG tablet Take 1 tablet (25 mg total) by mouth 2 (two) times daily. 180 tablet 4     No facility-administered medications prior to visit.     Medications Discontinued During This Encounter   Medication Reason    amLODIPine (NORVASC) 2.5 MG tablet Reorder    losartan-hydrochlorothiazide 100-25 mg (HYZAAR) 100-25 mg per tablet Reorder    atorvastatin (LIPITOR) 20 MG tablet Reorder    spironolactone (ALDACTONE) 25 MG tablet Reorder    furosemide (LASIX) 20 MG tablet Reorder     Medications Ordered This Encounter   Medications    amLODIPine (NORVASC) 2.5 MG tablet     Sig: Take 1 tablet (2.5 mg total) by mouth 2 (two) times daily.     Dispense:  180 tablet     Refill:  3     DISCONTINUE any prescription for this drug issued prior to 02/14/2022.    atorvastatin (LIPITOR) 20 MG tablet     Sig: Take 1 tablet (20 mg total) by mouth every evening.     Dispense:  90 tablet     Refill:  3     DISCONTINUE any prescription for this drug issued prior to 02/14/2022.    furosemide (LASIX) 20 MG tablet     Sig: Take 1 tablet (20 mg total) by mouth every Mon, Tues, Wed, Thurs, Fri.     Dispense:  60 tablet  "    Refill:  3     DISCONTINUE any prescription for this drug issued prior to 02/14/2022.    losartan-hydrochlorothiazide 100-25 mg (HYZAAR) 100-25 mg per tablet     Sig: Take 1 tablet by mouth once daily.     Dispense:  90 tablet     Refill:  3     DISCONTINUE any prescription for this drug issued prior to 02/14/2022.    spironolactone (ALDACTONE) 25 MG tablet     Sig: Take 1 tablet (25 mg total) by mouth 2 (two) times daily.     Dispense:  180 tablet     Refill:  3     DISCONTINUE any prescription for this drug issued prior to 02/14/2022.     PHYSICAL EXAM  Vitals:    02/14/22 1017   BP: 125/64   BP Location: Right arm   Patient Position: Sitting   BP Method: Large (Manual)   Pulse: 82   Temp: 98 °F (36.7 °C)   TempSrc: Tympanic   SpO2: 97%   Weight: (!) 154.4 kg (340 lb 6.2 oz)   Height: 5' 11" (1.803 m)   CONSTITUTIONAL: Vital signs noted. No apparent distress. Does not appear acutely ill or septic. Appears adequately hydrated.  PULM: Lungs clear. Breathing unlabored.  HEART: Regular.  DERM: Skin warm and moist with normal turgor.  PSYCHIATRIC: Alert and conversant. Mood grossly neutral. Judgment and insight grossly intact.     Documentation entered by me for this encounter may have been done in part using speech-recognition technology. Although I have made an effort to ensure accuracy, "sound like" errors may exist and should be interpreted in context.   "

## 2022-02-28 NOTE — PROGRESS NOTES
"Hi, Dragan.    I'm happy to report that these results are fine and do not require a change in treatment at this point. We'll review these results at your next appointment. I look forward to seeing you then.     Thanks for letting me care for you, and thanks for trusting [facility] with your healthcare needs.    All the best,    DENYS Barba MD  P.S. Want to learn more about your test results and what they mean? It's as simple as 1, 2, 3.     (1) Log in to your [email or web address] account at [email or web address]     (2) From the "Test Results" tab, click on the test you want to know more about.     (3) Click on the "About This Test" link. "

## 2022-04-18 ENCOUNTER — OFFICE VISIT (OUTPATIENT)
Dept: PULMONOLOGY | Facility: CLINIC | Age: 57
End: 2022-04-18
Payer: COMMERCIAL

## 2022-04-18 VITALS
WEIGHT: 315 LBS | HEIGHT: 71 IN | RESPIRATION RATE: 18 BRPM | HEART RATE: 95 BPM | SYSTOLIC BLOOD PRESSURE: 120 MMHG | OXYGEN SATURATION: 95 % | BODY MASS INDEX: 44.1 KG/M2 | DIASTOLIC BLOOD PRESSURE: 78 MMHG

## 2022-04-18 DIAGNOSIS — J45.20 MILD INTERMITTENT ASTHMA WITHOUT COMPLICATION: ICD-10-CM

## 2022-04-18 DIAGNOSIS — G47.33 OSA (OBSTRUCTIVE SLEEP APNEA): Primary | ICD-10-CM

## 2022-04-18 PROCEDURE — 99999 PR PBB SHADOW E&M-EST. PATIENT-LVL III: ICD-10-PCS | Mod: PBBFAC,,, | Performed by: NURSE PRACTITIONER

## 2022-04-18 PROCEDURE — 99214 OFFICE O/P EST MOD 30 MIN: CPT | Mod: S$GLB,,, | Performed by: NURSE PRACTITIONER

## 2022-04-18 PROCEDURE — 3074F SYST BP LT 130 MM HG: CPT | Mod: CPTII,S$GLB,, | Performed by: NURSE PRACTITIONER

## 2022-04-18 PROCEDURE — 3078F DIAST BP <80 MM HG: CPT | Mod: CPTII,S$GLB,, | Performed by: NURSE PRACTITIONER

## 2022-04-18 PROCEDURE — 3008F BODY MASS INDEX DOCD: CPT | Mod: CPTII,S$GLB,, | Performed by: NURSE PRACTITIONER

## 2022-04-18 PROCEDURE — 99214 PR OFFICE/OUTPT VISIT, EST, LEVL IV, 30-39 MIN: ICD-10-PCS | Mod: S$GLB,,, | Performed by: NURSE PRACTITIONER

## 2022-04-18 PROCEDURE — 1160F PR REVIEW ALL MEDS BY PRESCRIBER/CLIN PHARMACIST DOCUMENTED: ICD-10-PCS | Mod: CPTII,S$GLB,, | Performed by: NURSE PRACTITIONER

## 2022-04-18 PROCEDURE — 3074F PR MOST RECENT SYSTOLIC BLOOD PRESSURE < 130 MM HG: ICD-10-PCS | Mod: CPTII,S$GLB,, | Performed by: NURSE PRACTITIONER

## 2022-04-18 PROCEDURE — 3078F PR MOST RECENT DIASTOLIC BLOOD PRESSURE < 80 MM HG: ICD-10-PCS | Mod: CPTII,S$GLB,, | Performed by: NURSE PRACTITIONER

## 2022-04-18 PROCEDURE — 1160F RVW MEDS BY RX/DR IN RCRD: CPT | Mod: CPTII,S$GLB,, | Performed by: NURSE PRACTITIONER

## 2022-04-18 PROCEDURE — 1159F MED LIST DOCD IN RCRD: CPT | Mod: CPTII,S$GLB,, | Performed by: NURSE PRACTITIONER

## 2022-04-18 PROCEDURE — 99999 PR PBB SHADOW E&M-EST. PATIENT-LVL III: CPT | Mod: PBBFAC,,, | Performed by: NURSE PRACTITIONER

## 2022-04-18 PROCEDURE — 1159F PR MEDICATION LIST DOCUMENTED IN MEDICAL RECORD: ICD-10-PCS | Mod: CPTII,S$GLB,, | Performed by: NURSE PRACTITIONER

## 2022-04-18 PROCEDURE — 3008F PR BODY MASS INDEX (BMI) DOCUMENTED: ICD-10-PCS | Mod: CPTII,S$GLB,, | Performed by: NURSE PRACTITIONER

## 2022-04-18 RX ORDER — ALBUTEROL SULFATE 90 UG/1
2 AEROSOL, METERED RESPIRATORY (INHALATION) EVERY 4 HOURS PRN
Qty: 6.7 G | Refills: 1 | Status: SHIPPED | OUTPATIENT
Start: 2022-04-18 | End: 2023-02-06

## 2022-04-18 NOTE — PROGRESS NOTES
"Subjective:      Patient ID: Dragan Man II is a 56 y.o. male.    Chief Complaint: Sleep Apnea    HPI  Presents to office for review of AutoPAP therapy. Very severe LAVERNE with .hr. Patient states improved symptoms with use of AutoPAP. Sleeping more soundly. Waking up feeling more refreshed. Improved daytime sleepiness. Patient states he is benefiting from use of the AutoPAP. 19lb weight gain from last year. BMI 48  Hx of asthma. He is having ROLLINS with pollen season. He bought Primatene mist OTC. He will stop that and I will send in albuterol    Patient Active Problem List   Diagnosis    Obstructive sleep apnea treated with continuous positive airway pressure (CPAP)    Advanced sleep phase syndrome    Behaviorally induced insufficient sleep syndrome    Sleep-related bruxism    Inadequate sleep hygiene    Morbid obesity with BMI of 45.0-49.9, adult    Primary hypertension    Actinic keratoses    Heart murmur    ROLLINS (dyspnea on exertion)    Seasonal allergic rhinitis due to pollen    On statin therapy due to risk of future cardiovascular event    Left atrial enlargement    Nonrheumatic aortic valve stenosis    Ulnar neuropathy of both upper extremities    Generalized edema    Chronic diastolic congestive heart failure    PVD (peripheral vascular disease)    Prediabetes    Mild anemia    Chronic bilateral low back pain without sciatica    Bilateral carpal tunnel syndrome    Right-sided low back pain without sciatica    Right foot pain    Lower extremity edema    At risk for cardiovascular event           /78   Pulse 95   Resp 18   Ht 5' 11" (1.803 m)   Wt (!) 158.1 kg (348 lb 8.8 oz)   SpO2 95%   BMI 48.61 kg/m²   Body mass index is 48.61 kg/m².    Review of Systems   Constitutional: Negative.    HENT: Negative.    Respiratory: Negative.    Cardiovascular: Negative.    Musculoskeletal: Negative.    Gastrointestinal: Negative.    Neurological: Negative.  "   Psychiatric/Behavioral: Negative.      Objective:      Physical Exam  Constitutional:       Appearance: He is well-developed. He is obese.   HENT:      Head: Normocephalic and atraumatic.      Mouth/Throat:      Comments: Wearing mask due to COVID-19 protocol  Neck:      Thyroid: No thyroid mass or thyromegaly.      Trachea: Trachea normal.   Cardiovascular:      Rate and Rhythm: Normal rate and regular rhythm.      Heart sounds: Normal heart sounds.   Pulmonary:      Effort: Pulmonary effort is normal.      Breath sounds: Normal breath sounds. No wheezing, rhonchi or rales.   Chest:      Chest wall: There is no dullness to percussion.   Abdominal:      Palpations: Abdomen is soft. There is no splenomegaly or mass.      Tenderness: There is no abdominal tenderness.   Musculoskeletal:         General: Normal range of motion.      Cervical back: Normal range of motion and neck supple.   Skin:     General: Skin is warm and dry.   Neurological:      Mental Status: He is alert and oriented to person, place, and time.   Psychiatric:         Mood and Affect: Mood normal.         Behavior: Behavior normal.       Personal Diagnostic Review  Review of PAP download. Special study media link attached to this encounter. Normal AHI and compliant.       Assessment:       1. LAVERNE (obstructive sleep apnea)    2. Mild intermittent asthma without complication    3. BMI 45.0-49.9, adult        Outpatient Encounter Medications as of 4/18/2022   Medication Sig Dispense Refill    amLODIPine (NORVASC) 2.5 MG tablet Take 1 tablet (2.5 mg total) by mouth 2 (two) times daily. 180 tablet 3    atorvastatin (LIPITOR) 20 MG tablet Take 1 tablet (20 mg total) by mouth every evening. 90 tablet 3    doxycycline (MONODOX) 100 MG capsule TAKE ONCE DAILY WITH FOOD. MAY CAUSE UPSET STOMACH. 90 capsule 1    furosemide (LASIX) 20 MG tablet Take 1 tablet (20 mg total) by mouth every Mon, Tues, Wed, Thurs, Fri. 60 tablet 3     losartan-hydrochlorothiazide 100-25 mg (HYZAAR) 100-25 mg per tablet Take 1 tablet by mouth once daily. 90 tablet 3    mometasone (NASONEX) 50 mcg/actuation nasal spray INSTILL 2 SPRAYS INTO EACH NOSTRIL ONCE DAILY. 1 each 1    sodium chloride (OCEAN) 0.65 % nasal spray 1 spray by Nasal route as needed for Congestion.      spironolactone (ALDACTONE) 25 MG tablet Take 1 tablet (25 mg total) by mouth 2 (two) times daily. 180 tablet 3    albuterol (PROVENTIL/VENTOLIN HFA) 90 mcg/actuation inhaler Inhale 2 puffs into the lungs every 4 (four) hours as needed for Wheezing. Rescue 6.7 g 1     No facility-administered encounter medications on file as of 4/18/2022.     Orders Placed This Encounter   Procedures    CPAP/BIPAP SUPPLIES     Order Specific Question:   Length of need (1-99 months):     Answer:   99     Order Specific Question:   Choose ONE mask type and its corresponding cushions and/or pillows:     Answer:    Full Face Mask, 1 per 90 days:  Full Face Cushion, (3 per 90 days)     Order Specific Question:   Choose EITHER Heated or Non-Heated Tubjing     Answer:    Non-Heated Tubing, 1 per 90 days     Order Specific Question:   All other supplies as needed as listed below:     Answer:    Headgear, 1 per 180 days     Order Specific Question:   All other supplies as needed as listed below:     Answer:    Disposable Filter, 6 per 90 days     Order Specific Question:   All other supplies as needed as listed below:     Answer:    Non-Disposable Filter, 1 per 180 days     Order Specific Question:   All other supplies as needed as listed below:     Answer:    Humidifier Chamber, 1 per 180 days     Plan:      Compliant with PAP and benefits from use. Follow up annually in the sleep clinic.    Problem List Items Addressed This Visit    None     Visit Diagnoses     LAVERNE (obstructive sleep apnea)    -  Primary    Relevant Orders    CPAP/BIPAP SUPPLIES    Mild intermittent asthma without  complication        Relevant Medications    albuterol (PROVENTIL/VENTOLIN HFA) 90 mcg/actuation inhaler    BMI 45.0-49.9, adult            Weight loss and exercise to improve overall health.

## 2022-06-03 ENCOUNTER — OFFICE VISIT (OUTPATIENT)
Dept: DERMATOLOGY | Facility: CLINIC | Age: 57
End: 2022-06-03
Payer: COMMERCIAL

## 2022-06-03 DIAGNOSIS — L90.5 SCAR CONDITIONS/SKIN FIBROSIS: Primary | ICD-10-CM

## 2022-06-03 DIAGNOSIS — L57.0 ACTINIC KERATOSES: ICD-10-CM

## 2022-06-03 DIAGNOSIS — L70.0 ACNE VULGARIS: ICD-10-CM

## 2022-06-03 DIAGNOSIS — L91.8 ACROCHORDON: ICD-10-CM

## 2022-06-03 DIAGNOSIS — Z85.828 HISTORY OF SKIN CANCER: ICD-10-CM

## 2022-06-03 DIAGNOSIS — Z12.83 SCREENING, MALIGNANT NEOPLASM, SKIN: ICD-10-CM

## 2022-06-03 PROCEDURE — 99213 PR OFFICE/OUTPT VISIT, EST, LEVL III, 20-29 MIN: ICD-10-PCS | Mod: 25,S$GLB,, | Performed by: DERMATOLOGY

## 2022-06-03 PROCEDURE — 99213 OFFICE O/P EST LOW 20 MIN: CPT | Mod: 25,S$GLB,, | Performed by: DERMATOLOGY

## 2022-06-03 PROCEDURE — 1159F PR MEDICATION LIST DOCUMENTED IN MEDICAL RECORD: ICD-10-PCS | Mod: CPTII,S$GLB,, | Performed by: DERMATOLOGY

## 2022-06-03 PROCEDURE — 1160F RVW MEDS BY RX/DR IN RCRD: CPT | Mod: CPTII,S$GLB,, | Performed by: DERMATOLOGY

## 2022-06-03 PROCEDURE — 17004 DESTROY PREMAL LESIONS 15/>: CPT | Mod: S$GLB,,, | Performed by: DERMATOLOGY

## 2022-06-03 PROCEDURE — 1160F PR REVIEW ALL MEDS BY PRESCRIBER/CLIN PHARMACIST DOCUMENTED: ICD-10-PCS | Mod: CPTII,S$GLB,, | Performed by: DERMATOLOGY

## 2022-06-03 PROCEDURE — 99999 PR PBB SHADOW E&M-EST. PATIENT-LVL III: CPT | Mod: PBBFAC,,, | Performed by: DERMATOLOGY

## 2022-06-03 PROCEDURE — 17004 PR DESTRUCTION, PREMALIGNANT LESIONS; 15 OR MORE LESIONS: ICD-10-PCS | Mod: S$GLB,,, | Performed by: DERMATOLOGY

## 2022-06-03 PROCEDURE — 99999 PR PBB SHADOW E&M-EST. PATIENT-LVL III: ICD-10-PCS | Mod: PBBFAC,,, | Performed by: DERMATOLOGY

## 2022-06-03 PROCEDURE — 1159F MED LIST DOCD IN RCRD: CPT | Mod: CPTII,S$GLB,, | Performed by: DERMATOLOGY

## 2022-06-03 RX ORDER — TRETINOIN 0.5 MG/G
CREAM TOPICAL
Qty: 20 G | Refills: 6 | Status: SHIPPED | OUTPATIENT
Start: 2022-06-03 | End: 2023-06-01

## 2022-06-03 NOTE — PROGRESS NOTES
Subjective:       Patient ID:  Dragan Man II is a 56 y.o. male who presents for   Chief Complaint   Patient presents with    Skin Tags    Skin Check     On previous cancer removals     Hx of SCC of the right superior brow (s/p Mohs on 8-9/2020?), SCCIS of the right nasal sidewall (s/p Mohs 1/13/20), SCC of the left temple (s/p Mohs on 5/29/20), SCCIS of the left medial cheek (s/p Mohs on 5/29/20), SCCIS of the right lower eyelid (s/p Mohs on 6/1/20 ?), last seen on 4/21/21.  He c/o several irritated tags of the bilateral axilla and neck.       Denies bleeding, tender, growing, or concerning lesions.      This is a high risk patient here to check for the development of new lesions.     For rosacea, he has tried doxy 100 mg qD and clinda swabs with improvement.       Review of Systems   Constitutional: Negative for fever and chills.   Gastrointestinal: Negative for nausea and vomiting.   Skin: Positive for activity-related sunscreen use. Negative for daily sunscreen use and recent sunburn.   Hematologic/Lymphatic: Does not bruise/bleed easily.        Objective:    Physical Exam   Constitutional: He appears well-developed and well-nourished. No distress.   Neurological: He is alert and oriented to person, place, and time. He is not disoriented.   Psychiatric: He has a normal mood and affect.   Skin:   Areas Examined (abnormalities noted in diagram):   Scalp / Hair Palpated and Inspected  Head / Face Inspection Performed  Neck Inspection Performed  Chest / Axilla Inspection Performed  Abdomen Inspection Performed  Back Inspection Performed  RUE Inspected  LUE Inspection Performed  Nails and Digits Inspection Performed                   Diagram Legend     Erythematous scaling macule/papule c/w actinic keratosis       Vascular papule c/w angioma      Pigmented verrucoid papule/plaque c/w seborrheic keratosis      Yellow umbilicated papule c/w sebaceous hyperplasia      Irregularly shaped tan macule c/w lentigo      1-2 mm smooth white papules consistent with Milia      Movable subcutaneous cyst with punctum c/w epidermal inclusion cyst      Subcutaneous movable cyst c/w pilar cyst      Firm pink to brown papule c/w dermatofibroma      Pedunculated fleshy papule(s) c/w skin tag(s)      Evenly pigmented macule c/w junctional nevus     Mildly variegated pigmented, slightly irregular-bordered macule c/w mildly atypical nevus      Flesh colored to evenly pigmented papule c/w intradermal nevus       Pink pearly papule/plaque c/w basal cell carcinoma      Erythematous hyperkeratotic cursted plaque c/w SCC      Surgical scar with no sign of skin cancer recurrence      Open and closed comedones      Inflammatory papules and pustules      Verrucoid papule consistent consistent with wart     Erythematous eczematous patches and plaques     Dystrophic onycholytic nail with subungual debris c/w onychomycosis     Umbilicated papule    Erythematous-base heme-crusted tan verrucoid plaque consistent with inflamed seborrheic keratosis     Erythematous Silvery Scaling Plaque c/w Psoriasis     See annotation      Assessment / Plan:        Scar conditions/skin fibrosis  Screening, malignant neoplasm, skin  History of skin cancer  Scar of the several sites, hx of NMSC.  No evidence of recurrence on physical exam today.  Continue routine skin surveillance. Daily sunscreen advised.    Acne vulgaris  -     tretinoin (RETIN-A) 0.05 % cream; Apply pea-sized amount to entire face at bedtime.  If dryness, use every third night and increase as tolerated to every night.  Dispense: 20 g; Refill: 6    Acrochordon  Reassurance given.  Lesions are benign.    Actinic keratoses  Cryosurgery Procedure Note    The patient is informed of the precancerous quality and need for treatment of these lesions. After risks, benefits and alternatives explained, including blistering, pain, hyper- and hypopigmentation, patient verbally consents to cryotherapy to precancerous  lesions. Liquid nitrogen cryosurgery is applied to the 18 actinic keratoses, as detailed in the physical exam, to produce a freeze injury. The patient is aware that blisters may form and is instructed on wound care with gentle cleansing and use of vaseline ointment to keep moist until healed. The patient is supplied a handout on cryosurgery and is instructed to call if lesions do not completely resolve.             Follow up in about 6 months (around 12/3/2022).

## 2022-06-03 NOTE — PATIENT INSTRUCTIONS

## 2022-06-13 ENCOUNTER — HOSPITAL ENCOUNTER (OUTPATIENT)
Dept: CARDIOLOGY | Facility: HOSPITAL | Age: 57
Discharge: HOME | End: 2022-06-13
Attending: INTERNAL MEDICINE
Payer: COMMERCIAL

## 2022-06-13 VITALS
WEIGHT: 315 LBS | BODY MASS INDEX: 44.1 KG/M2 | DIASTOLIC BLOOD PRESSURE: 78 MMHG | HEART RATE: 70 BPM | HEIGHT: 71 IN | SYSTOLIC BLOOD PRESSURE: 120 MMHG

## 2022-06-13 DIAGNOSIS — I35.0 NONRHEUMATIC AORTIC VALVE STENOSIS: ICD-10-CM

## 2022-06-13 LAB
AORTIC ROOT ANNULUS: 2.92 CM
ASCENDING AORTA: 2.81 CM
AV INDEX (PROSTH): 0.37
AV MEAN GRADIENT: 52 MMHG
AV PEAK GRADIENT: 85 MMHG
AV VALVE AREA: 1.22 CM2
AV VELOCITY RATIO: 0.33
BSA FOR ECHO PROCEDURE: 2.81 M2
CV ECHO LV RWT: 0.77 CM
DOP CALC AO PEAK VEL: 4.61 M/S
DOP CALC AO VTI: 97.5 CM
DOP CALC LVOT AREA: 3.3 CM2
DOP CALC LVOT DIAMETER: 2.04 CM
DOP CALC LVOT PEAK VEL: 1.53 M/S
DOP CALC LVOT STROKE VOLUME: 119.24 CM3
DOP CALC RVOT PEAK VEL: 0.9 M/S
DOP CALC RVOT VTI: 20 CM
DOP CALCLVOT PEAK VEL VTI: 36.5 CM
E WAVE DECELERATION TIME: 250.93 MSEC
E/A RATIO: 0.91
E/E' RATIO: 10.73 M/S
ECHO LV POSTERIOR WALL: 1.81 CM (ref 0.6–1.1)
EJECTION FRACTION: 60 %
FRACTIONAL SHORTENING: 41 % (ref 28–44)
INTERVENTRICULAR SEPTUM: 1.76 CM (ref 0.6–1.1)
IVRT: 68.51 MSEC
LA MAJOR: 5.53 CM
LA MINOR: 5.45 CM
LA WIDTH: 4 CM
LEFT ATRIUM SIZE: 4.85 CM
LEFT ATRIUM VOLUME INDEX MOD: 25.4 ML/M2
LEFT ATRIUM VOLUME INDEX: 33.9 ML/M2
LEFT ATRIUM VOLUME MOD: 67.9 CM3
LEFT ATRIUM VOLUME: 90.53 CM3
LEFT INTERNAL DIMENSION IN SYSTOLE: 2.79 CM (ref 2.1–4)
LEFT VENTRICLE DIASTOLIC VOLUME INDEX: 38.84 ML/M2
LEFT VENTRICLE DIASTOLIC VOLUME: 103.7 ML
LEFT VENTRICLE MASS INDEX: 145 G/M2
LEFT VENTRICLE SYSTOLIC VOLUME INDEX: 11 ML/M2
LEFT VENTRICLE SYSTOLIC VOLUME: 29.28 ML
LEFT VENTRICULAR INTERNAL DIMENSION IN DIASTOLE: 4.73 CM (ref 3.5–6)
LEFT VENTRICULAR MASS: 388.28 G
LV LATERAL E/E' RATIO: 11.8 M/S
LV SEPTAL E/E' RATIO: 9.83 M/S
LVOT MG: 6.42 MMHG
LVOT MV: 1.22 CM/S
MV PEAK A VEL: 1.3 M/S
MV PEAK E VEL: 1.18 M/S
MV STENOSIS PRESSURE HALF TIME: 72.77 MS
MV VALVE AREA P 1/2 METHOD: 3.02 CM2
PISA TR MAX VEL: 1.6 M/S
PULM VEIN S/D RATIO: 1.41
PV MEAN GRADIENT: 2.39 MMHG
PV PEAK D VEL: 0.36 M/S
PV PEAK S VEL: 0.51 M/S
PV PEAK VELOCITY: 1.19 CM/S
RA MAJOR: 4.76 CM
RA PRESSURE: 3 MMHG
RA WIDTH: 3.81 CM
RIGHT VENTRICULAR END-DIASTOLIC DIMENSION: 4.2 CM
SINUS: 2.48 CM
STJ: 2.35 CM
TDI LATERAL: 0.1 M/S
TDI SEPTAL: 0.12 M/S
TDI: 0.11 M/S
TR MAX PG: 10 MMHG
TV REST PULMONARY ARTERY PRESSURE: 13 MMHG

## 2022-06-13 PROCEDURE — 93306 TTE W/DOPPLER COMPLETE: CPT | Mod: 26,,, | Performed by: INTERNAL MEDICINE

## 2022-06-13 PROCEDURE — 93306 ECHO (CUPID ONLY): ICD-10-PCS | Mod: 26,,, | Performed by: INTERNAL MEDICINE

## 2022-06-13 PROCEDURE — 25500020 PHARM REV CODE 255: Performed by: INTERNAL MEDICINE

## 2022-06-13 PROCEDURE — C8929 TTE W OR WO FOL WCON,DOPPLER: HCPCS

## 2022-06-13 RX ORDER — SODIUM CHLORIDE 0.9 % (FLUSH) 0.9 %
10 SYRINGE (ML) INJECTION
Status: DISCONTINUED | OUTPATIENT
Start: 2022-06-13 | End: 2023-03-17 | Stop reason: HOSPADM

## 2022-06-13 RX ADMIN — HUMAN ALBUMIN MICROSPHERES AND PERFLUTREN 0.66 MG: 10; .22 INJECTION, SOLUTION INTRAVENOUS at 08:06

## 2022-06-14 ENCOUNTER — TELEPHONE (OUTPATIENT)
Dept: CARDIOLOGY | Facility: CLINIC | Age: 57
End: 2022-06-14
Payer: COMMERCIAL

## 2022-06-14 NOTE — TELEPHONE ENCOUNTER
Patient was notified of results. All questions were answered. Pt verbalized understanding. Pt will call back with any other questions or concerns.    ----- Message from Ron Shaikh MD sent at 6/13/2022 10:24 PM CDT -----  Echo showed mod to severe AS. No change compared to prior echo done one yr ago

## 2022-06-20 ENCOUNTER — HOSPITAL ENCOUNTER (OUTPATIENT)
Dept: CARDIOLOGY | Facility: HOSPITAL | Age: 57
Discharge: HOME | End: 2022-06-20
Attending: INTERNAL MEDICINE
Payer: COMMERCIAL

## 2022-06-20 ENCOUNTER — PATIENT MESSAGE (OUTPATIENT)
Dept: INTERNAL MEDICINE | Facility: CLINIC | Age: 57
End: 2022-06-20
Payer: COMMERCIAL

## 2022-06-20 ENCOUNTER — OFFICE VISIT (OUTPATIENT)
Dept: CARDIOLOGY | Facility: CLINIC | Age: 57
End: 2022-06-20
Payer: COMMERCIAL

## 2022-06-20 ENCOUNTER — PATIENT MESSAGE (OUTPATIENT)
Dept: OTHER | Facility: OTHER | Age: 57
End: 2022-06-20
Payer: COMMERCIAL

## 2022-06-20 VITALS
DIASTOLIC BLOOD PRESSURE: 76 MMHG | HEART RATE: 74 BPM | BODY MASS INDEX: 49.73 KG/M2 | WEIGHT: 315 LBS | SYSTOLIC BLOOD PRESSURE: 130 MMHG

## 2022-06-20 DIAGNOSIS — I73.9 PVD (PERIPHERAL VASCULAR DISEASE): ICD-10-CM

## 2022-06-20 DIAGNOSIS — I35.0 NONRHEUMATIC AORTIC VALVE STENOSIS: Primary | ICD-10-CM

## 2022-06-20 DIAGNOSIS — E66.01 MORBID OBESITY WITH BMI OF 45.0-49.9, ADULT: Chronic | ICD-10-CM

## 2022-06-20 DIAGNOSIS — I10 PRIMARY HYPERTENSION: Primary | ICD-10-CM

## 2022-06-20 DIAGNOSIS — G47.33 OBSTRUCTIVE SLEEP APNEA TREATED WITH CONTINUOUS POSITIVE AIRWAY PRESSURE (CPAP): Chronic | ICD-10-CM

## 2022-06-20 DIAGNOSIS — D68.00 VON WILLEBRAND DISEASE: ICD-10-CM

## 2022-06-20 DIAGNOSIS — I73.9 PVD (PERIPHERAL VASCULAR DISEASE): Primary | ICD-10-CM

## 2022-06-20 DIAGNOSIS — I10 BENIGN ESSENTIAL HYPERTENSION: ICD-10-CM

## 2022-06-20 DIAGNOSIS — I50.32 CHRONIC DIASTOLIC CONGESTIVE HEART FAILURE: ICD-10-CM

## 2022-06-20 DIAGNOSIS — E66.01 MORBID OBESITY WITH BMI OF 45.0-49.9, ADULT: Primary | ICD-10-CM

## 2022-06-20 DIAGNOSIS — I10 PRIMARY HYPERTENSION: Chronic | ICD-10-CM

## 2022-06-20 DIAGNOSIS — M79.609 PAIN IN EXTREMITY, UNSPECIFIED EXTREMITY: ICD-10-CM

## 2022-06-20 PROCEDURE — 99999 PR PBB SHADOW E&M-EST. PATIENT-LVL III: CPT | Mod: PBBFAC,,, | Performed by: INTERNAL MEDICINE

## 2022-06-20 PROCEDURE — 93005 ELECTROCARDIOGRAM TRACING: CPT

## 2022-06-20 PROCEDURE — 99215 PR OFFICE/OUTPT VISIT, EST, LEVL V, 40-54 MIN: ICD-10-PCS | Mod: S$GLB,,, | Performed by: INTERNAL MEDICINE

## 2022-06-20 PROCEDURE — 1160F PR REVIEW ALL MEDS BY PRESCRIBER/CLIN PHARMACIST DOCUMENTED: ICD-10-PCS | Mod: CPTII,S$GLB,, | Performed by: INTERNAL MEDICINE

## 2022-06-20 PROCEDURE — 3008F PR BODY MASS INDEX (BMI) DOCUMENTED: ICD-10-PCS | Mod: CPTII,S$GLB,, | Performed by: INTERNAL MEDICINE

## 2022-06-20 PROCEDURE — 3008F BODY MASS INDEX DOCD: CPT | Mod: CPTII,S$GLB,, | Performed by: INTERNAL MEDICINE

## 2022-06-20 PROCEDURE — 3078F DIAST BP <80 MM HG: CPT | Mod: CPTII,S$GLB,, | Performed by: INTERNAL MEDICINE

## 2022-06-20 PROCEDURE — 93010 ELECTROCARDIOGRAM REPORT: CPT | Mod: ,,, | Performed by: STUDENT IN AN ORGANIZED HEALTH CARE EDUCATION/TRAINING PROGRAM

## 2022-06-20 PROCEDURE — 1159F PR MEDICATION LIST DOCUMENTED IN MEDICAL RECORD: ICD-10-PCS | Mod: CPTII,S$GLB,, | Performed by: INTERNAL MEDICINE

## 2022-06-20 PROCEDURE — 3078F PR MOST RECENT DIASTOLIC BLOOD PRESSURE < 80 MM HG: ICD-10-PCS | Mod: CPTII,S$GLB,, | Performed by: INTERNAL MEDICINE

## 2022-06-20 PROCEDURE — 3075F SYST BP GE 130 - 139MM HG: CPT | Mod: CPTII,S$GLB,, | Performed by: INTERNAL MEDICINE

## 2022-06-20 PROCEDURE — 1160F RVW MEDS BY RX/DR IN RCRD: CPT | Mod: CPTII,S$GLB,, | Performed by: INTERNAL MEDICINE

## 2022-06-20 PROCEDURE — 93010 EKG 12-LEAD: ICD-10-PCS | Mod: ,,, | Performed by: STUDENT IN AN ORGANIZED HEALTH CARE EDUCATION/TRAINING PROGRAM

## 2022-06-20 PROCEDURE — 99999 PR PBB SHADOW E&M-EST. PATIENT-LVL III: ICD-10-PCS | Mod: PBBFAC,,, | Performed by: INTERNAL MEDICINE

## 2022-06-20 PROCEDURE — 1159F MED LIST DOCD IN RCRD: CPT | Mod: CPTII,S$GLB,, | Performed by: INTERNAL MEDICINE

## 2022-06-20 PROCEDURE — 99215 OFFICE O/P EST HI 40 MIN: CPT | Mod: S$GLB,,, | Performed by: INTERNAL MEDICINE

## 2022-06-20 PROCEDURE — 3075F PR MOST RECENT SYSTOLIC BLOOD PRESS GE 130-139MM HG: ICD-10-PCS | Mod: CPTII,S$GLB,, | Performed by: INTERNAL MEDICINE

## 2022-06-20 NOTE — PROGRESS NOTES
Subjective:   Patient ID:  Dragan Man II is a 56 y.o. male who presents for follow up of No chief complaint on file.      55 yo. Male, f/u for severe AS.. Selling the beer at the store,   PMH severe AS, HTN, LAVERNE on CPAP, obesit and Von Willebrand diseasey. No Dx of heart attack,DM,stroke and cancer. No smoking/drinking  Chronic ROLLINS. Recently worse with dizziness, left chest tightness. Occurred at workplace and physical activity. Improved the symptoms at home. Thought related to the temperature change from parking lot to cooler.   Treadmill stress ekg showed no stress induced EKG change. Peak  mmHG   ECHO showed normal EF, severe LAE, and mild to mod AS  No f/h premature CAD  In 2012, had episode of syncope when lifted up heavy stuff.  2012. MPI showed no ischemia and echo showed normal EF, dilated RV. Mild LAE and   Enrolled in HTN digit program    03/2021visit. States that ROLLINS slightly worse  No chest pain, dizziness palpitation, faint  Leg swelling worse in PM and better in AM    Gained the weight 10 pounds in 6 months.  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 Occasional ROLLINS. No faint syncope dizziness and chest pain  Sleeps on elevated bed due to LAVERNE. And leg swelling   Decreased exercise capacity at workplace  stationary BIKING. NO SMOKING.DRINKING  03/2021 echo normal EF and mod to severe AS. Aortic valve area is 1.25 cm2; peak velocity is 4.55 m/s; mean gradient is 50 mmHg.  No f/h valve disease and SCD   BNP and Cr wnl     visit  S/p CYNTHIA done in  revealing calcified tricuspid AV, mode AS and mild AI. Mild sore throat. No chest pain and dizziness  C/o hands and leg swelling     Interval history  Repeat echo in  Aortic valve area is 1.22 cm2; peak velocity is 4.61 m/s; mean gradient is 52 mmHg.  Chronic SOB and partially improved after breathing Rx. Severely obese  No faint chest pain and palpitation. ekg NSR and no acute  stt change        Past Medical History:   Diagnosis Date    Actinic keratoses 10/14/2019    Bilateral carpal tunnel syndrome 4/12/2021    Chronic bilateral low back pain without sciatica 4/12/2021    Depression     Morbid obesity with BMI of 40.0-44.9, adult 11/17/2017    LAVERNE (obstructive sleep apnea)     Prediabetes 4/12/2021    Seasonal allergic rhinitis due to pollen 10/14/2019    Ulnar neuropathy of both upper extremities 2/17/2020       Past Surgical History:   Procedure Laterality Date    None         Social History     Tobacco Use    Smoking status: Never Smoker    Smokeless tobacco: Former User    Tobacco comment: quit 15 years ago    Substance Use Topics    Alcohol use: Yes     Comment: socially // beer    Drug use: No       Family History   Problem Relation Age of Onset    Diabetes Father     Diabetes Paternal Grandfather     No Known Problems Mother          Review of Systems   Constitutional: Positive for malaise/fatigue. Negative for decreased appetite, diaphoresis, fever and night sweats.   HENT: Negative for nosebleeds.    Eyes: Negative for blurred vision and double vision.   Cardiovascular: Positive for dyspnea on exertion and leg swelling. Negative for claudication, irregular heartbeat, near-syncope, orthopnea, palpitations, paroxysmal nocturnal dyspnea and syncope.   Respiratory: Negative for cough, shortness of breath, sleep disturbances due to breathing, snoring, sputum production and wheezing.    Endocrine: Negative for cold intolerance and polyuria.   Hematologic/Lymphatic: Does not bruise/bleed easily.   Skin: Negative for rash.   Musculoskeletal: Negative for back pain, falls, joint pain, joint swelling and neck pain.   Gastrointestinal: Negative for abdominal pain, heartburn, nausea and vomiting.   Genitourinary: Negative for dysuria, frequency and hematuria.   Neurological: Positive for dizziness. Negative for difficulty with concentration, focal weakness, headaches,  light-headedness, numbness, seizures and weakness.   Psychiatric/Behavioral: Negative for depression, memory loss and substance abuse. The patient does not have insomnia.    Allergic/Immunologic: Negative for HIV exposure and hives.       Objective:   Physical Exam  HENT:      Head: Normocephalic.   Eyes:      Pupils: Pupils are equal, round, and reactive to light.   Neck:      Thyroid: No thyromegaly.      Vascular: Normal carotid pulses. No carotid bruit or JVD.   Cardiovascular:      Rate and Rhythm: Normal rate and regular rhythm.  No extrasystoles are present.     Chest Wall: PMI is not displaced.      Pulses: Normal pulses.      Heart sounds: Murmur: ESM 3/6 on bases and along LSB. up to the neck.     No gallop. No S3 sounds.   Pulmonary:      Effort: No respiratory distress.      Breath sounds: Normal breath sounds. No stridor.   Abdominal:      General: Bowel sounds are normal.      Palpations: Abdomen is soft.      Tenderness: There is no abdominal tenderness. There is no rebound.   Musculoskeletal:         General: Normal range of motion.   Skin:     Findings: No rash.   Neurological:      Mental Status: He is alert and oriented to person, place, and time.   Psychiatric:         Behavior: Behavior normal.         Lab Results   Component Value Date    CHOL 135 04/06/2021    CHOL 185 10/14/2019    CHOL 186 12/03/2018     Lab Results   Component Value Date    HDL 41 04/06/2021    HDL 38 (L) 10/14/2019    HDL 48 12/03/2018     Lab Results   Component Value Date    LDLCALC 70.8 04/06/2021    LDLCALC 80.8 10/14/2019    LDLCALC 103.4 12/03/2018     Lab Results   Component Value Date    TRIG 116 04/06/2021    TRIG 331 (H) 10/14/2019    TRIG 173 (H) 12/03/2018     Lab Results   Component Value Date    CHOLHDL 30.4 04/06/2021    CHOLHDL 20.5 10/14/2019    CHOLHDL 25.8 12/03/2018       Chemistry        Component Value Date/Time     (L) 02/14/2022 1132    K 4.3 02/14/2022 1132    CL 98 02/14/2022 1132    CO2 27  02/14/2022 1132    BUN 15 02/14/2022 1132    CREATININE 0.9 02/14/2022 1132    GLU 87 02/14/2022 1132        Component Value Date/Time    CALCIUM 9.3 02/14/2022 1132    ALKPHOS 72 11/12/2021 1211    AST 26 11/12/2021 1211    ALT 27 11/12/2021 1211    BILITOT 1.4 (H) 11/12/2021 1211    ESTGFRAFRICA >60.0 02/14/2022 1132    EGFRNONAA >60.0 02/14/2022 1132          Lab Results   Component Value Date    HGBA1C 5.8 (H) 04/06/2021     Lab Results   Component Value Date    TSH 3.980 10/14/2019     Lab Results   Component Value Date    INR 1.1 10/18/2012     Lab Results   Component Value Date    WBC 8.40 11/12/2021    HGB 12.3 (L) 11/12/2021    HCT 37.5 (L) 11/12/2021    MCV 97 11/12/2021     11/12/2021     BMP  Sodium   Date Value Ref Range Status   02/14/2022 135 (L) 136 - 145 mmol/L Final     Potassium   Date Value Ref Range Status   02/14/2022 4.3 3.5 - 5.1 mmol/L Final     Chloride   Date Value Ref Range Status   02/14/2022 98 95 - 110 mmol/L Final     CO2   Date Value Ref Range Status   02/14/2022 27 23 - 29 mmol/L Final     BUN   Date Value Ref Range Status   02/14/2022 15 6 - 20 mg/dL Final     Creatinine   Date Value Ref Range Status   02/14/2022 0.9 0.5 - 1.4 mg/dL Final     Calcium   Date Value Ref Range Status   02/14/2022 9.3 8.7 - 10.5 mg/dL Final     Anion Gap   Date Value Ref Range Status   02/14/2022 10 8 - 16 mmol/L Final     eGFR if    Date Value Ref Range Status   02/14/2022 >60.0 >60 mL/min/1.73 m^2 Final     eGFR if non    Date Value Ref Range Status   02/14/2022 >60.0 >60 mL/min/1.73 m^2 Final     Comment:     Calculation used to obtain the estimated glomerular filtration  rate (eGFR) is the CKD-EPI equation.        BNP  @LABRCNTIP(BNP,BNPTRIAGEBLO)@  @LABRCNTIP(troponini)@  CrCl cannot be calculated (Patient's most recent lab result is older than the maximum 7 days allowed.).  No results found in the last 24 hours.  No results found in the last 24 hours.  No  results found in the last 24 hours.    Assessment:      1. Nonrheumatic aortic valve stenosis    2. Chronic diastolic congestive heart failure    3. Primary hypertension    4. PVD (peripheral vascular disease)    5. Morbid obesity with BMI of 45.0-49.9, adult    6. Obstructive sleep apnea treated with continuous positive airway pressure (CPAP)      · Severe AS. Aortic valve area is 1.22 cm2; peak velocity is 4.61 m/s; mean gradient is 52 mmHg.  The gradient > 52 mmHG  SOB     Plan:   Will refer to cath lab for LHC/RHC to r/o ischemia etiology of SOB  And invasive evaluation of AS severity    Continue Losartan HCTZ Aldactone amlodipine statin and lasix for HLD HTN and edema  Advise to discuss with pCP for medical Rx if obesity  Counseled DASH  Check Lipid profile in 6 months  Recommend heart-healthy diet, weight control and regular exercise.  Fadumo. Risk modification.   I have reviewed all pertinent labs and cardiac studies independently. Plans and recommendations have been formulated under my direct supervision. All questions answered and patient voiced understanding.   If symptoms persist go to the ED  RTC after the procedure done

## 2022-06-20 NOTE — H&P (VIEW-ONLY)
Subjective:   Patient ID:  Dragan Man II is a 56 y.o. male who presents for follow up of No chief complaint on file.      57 yo. Male, f/u for severe AS.. Selling the beer at the store,   PMH severe AS, HTN, LAVERNE on CPAP, obesit and Von Willebrand diseasey. No Dx of heart attack,DM,stroke and cancer. No smoking/drinking  Chronic ROLLINS. Recently worse with dizziness, left chest tightness. Occurred at workplace and physical activity. Improved the symptoms at home. Thought related to the temperature change from parking lot to cooler.   Treadmill stress ekg showed no stress induced EKG change. Peak  mmHG   ECHO showed normal EF, severe LAE, and mild to mod AS  No f/h premature CAD  In 2012, had episode of syncope when lifted up heavy stuff.  2012. MPI showed no ischemia and echo showed normal EF, dilated RV. Mild LAE and   Enrolled in HTN digit program    03/2021visit. States that ROLLINS slightly worse  No chest pain, dizziness palpitation, faint  Leg swelling worse in PM and better in AM    Gained the weight 10 pounds in 6 months.  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 Occasional ROLLINS. No faint syncope dizziness and chest pain  Sleeps on elevated bed due to LAVERNE. And leg swelling   Decreased exercise capacity at workplace  stationary BIKING. NO SMOKING.DRINKING  03/2021 echo normal EF and mod to severe AS. Aortic valve area is 1.25 cm2; peak velocity is 4.55 m/s; mean gradient is 50 mmHg.  No f/h valve disease and SCD   BNP and Cr wnl     visit  S/p CYNTHIA done in  revealing calcified tricuspid AV, mode AS and mild AI. Mild sore throat. No chest pain and dizziness  C/o hands and leg swelling     Interval history  Repeat echo in  Aortic valve area is 1.22 cm2; peak velocity is 4.61 m/s; mean gradient is 52 mmHg.  Chronic SOB and partially improved after breathing Rx. Severely obese  No faint chest pain and palpitation. ekg NSR and no acute  stt change        Past Medical History:   Diagnosis Date    Actinic keratoses 10/14/2019    Bilateral carpal tunnel syndrome 4/12/2021    Chronic bilateral low back pain without sciatica 4/12/2021    Depression     Morbid obesity with BMI of 40.0-44.9, adult 11/17/2017    LAVERNE (obstructive sleep apnea)     Prediabetes 4/12/2021    Seasonal allergic rhinitis due to pollen 10/14/2019    Ulnar neuropathy of both upper extremities 2/17/2020       Past Surgical History:   Procedure Laterality Date    None         Social History     Tobacco Use    Smoking status: Never Smoker    Smokeless tobacco: Former User    Tobacco comment: quit 15 years ago    Substance Use Topics    Alcohol use: Yes     Comment: socially // beer    Drug use: No       Family History   Problem Relation Age of Onset    Diabetes Father     Diabetes Paternal Grandfather     No Known Problems Mother          Review of Systems   Constitutional: Positive for malaise/fatigue. Negative for decreased appetite, diaphoresis, fever and night sweats.   HENT: Negative for nosebleeds.    Eyes: Negative for blurred vision and double vision.   Cardiovascular: Positive for dyspnea on exertion and leg swelling. Negative for claudication, irregular heartbeat, near-syncope, orthopnea, palpitations, paroxysmal nocturnal dyspnea and syncope.   Respiratory: Negative for cough, shortness of breath, sleep disturbances due to breathing, snoring, sputum production and wheezing.    Endocrine: Negative for cold intolerance and polyuria.   Hematologic/Lymphatic: Does not bruise/bleed easily.   Skin: Negative for rash.   Musculoskeletal: Negative for back pain, falls, joint pain, joint swelling and neck pain.   Gastrointestinal: Negative for abdominal pain, heartburn, nausea and vomiting.   Genitourinary: Negative for dysuria, frequency and hematuria.   Neurological: Positive for dizziness. Negative for difficulty with concentration, focal weakness, headaches,  light-headedness, numbness, seizures and weakness.   Psychiatric/Behavioral: Negative for depression, memory loss and substance abuse. The patient does not have insomnia.    Allergic/Immunologic: Negative for HIV exposure and hives.       Objective:   Physical Exam  HENT:      Head: Normocephalic.   Eyes:      Pupils: Pupils are equal, round, and reactive to light.   Neck:      Thyroid: No thyromegaly.      Vascular: Normal carotid pulses. No carotid bruit or JVD.   Cardiovascular:      Rate and Rhythm: Normal rate and regular rhythm.  No extrasystoles are present.     Chest Wall: PMI is not displaced.      Pulses: Normal pulses.      Heart sounds: Murmur: ESM 3/6 on bases and along LSB. up to the neck.     No gallop. No S3 sounds.   Pulmonary:      Effort: No respiratory distress.      Breath sounds: Normal breath sounds. No stridor.   Abdominal:      General: Bowel sounds are normal.      Palpations: Abdomen is soft.      Tenderness: There is no abdominal tenderness. There is no rebound.   Musculoskeletal:         General: Normal range of motion.   Skin:     Findings: No rash.   Neurological:      Mental Status: He is alert and oriented to person, place, and time.   Psychiatric:         Behavior: Behavior normal.         Lab Results   Component Value Date    CHOL 135 04/06/2021    CHOL 185 10/14/2019    CHOL 186 12/03/2018     Lab Results   Component Value Date    HDL 41 04/06/2021    HDL 38 (L) 10/14/2019    HDL 48 12/03/2018     Lab Results   Component Value Date    LDLCALC 70.8 04/06/2021    LDLCALC 80.8 10/14/2019    LDLCALC 103.4 12/03/2018     Lab Results   Component Value Date    TRIG 116 04/06/2021    TRIG 331 (H) 10/14/2019    TRIG 173 (H) 12/03/2018     Lab Results   Component Value Date    CHOLHDL 30.4 04/06/2021    CHOLHDL 20.5 10/14/2019    CHOLHDL 25.8 12/03/2018       Chemistry        Component Value Date/Time     (L) 02/14/2022 1132    K 4.3 02/14/2022 1132    CL 98 02/14/2022 1132    CO2 27  02/14/2022 1132    BUN 15 02/14/2022 1132    CREATININE 0.9 02/14/2022 1132    GLU 87 02/14/2022 1132        Component Value Date/Time    CALCIUM 9.3 02/14/2022 1132    ALKPHOS 72 11/12/2021 1211    AST 26 11/12/2021 1211    ALT 27 11/12/2021 1211    BILITOT 1.4 (H) 11/12/2021 1211    ESTGFRAFRICA >60.0 02/14/2022 1132    EGFRNONAA >60.0 02/14/2022 1132          Lab Results   Component Value Date    HGBA1C 5.8 (H) 04/06/2021     Lab Results   Component Value Date    TSH 3.980 10/14/2019     Lab Results   Component Value Date    INR 1.1 10/18/2012     Lab Results   Component Value Date    WBC 8.40 11/12/2021    HGB 12.3 (L) 11/12/2021    HCT 37.5 (L) 11/12/2021    MCV 97 11/12/2021     11/12/2021     BMP  Sodium   Date Value Ref Range Status   02/14/2022 135 (L) 136 - 145 mmol/L Final     Potassium   Date Value Ref Range Status   02/14/2022 4.3 3.5 - 5.1 mmol/L Final     Chloride   Date Value Ref Range Status   02/14/2022 98 95 - 110 mmol/L Final     CO2   Date Value Ref Range Status   02/14/2022 27 23 - 29 mmol/L Final     BUN   Date Value Ref Range Status   02/14/2022 15 6 - 20 mg/dL Final     Creatinine   Date Value Ref Range Status   02/14/2022 0.9 0.5 - 1.4 mg/dL Final     Calcium   Date Value Ref Range Status   02/14/2022 9.3 8.7 - 10.5 mg/dL Final     Anion Gap   Date Value Ref Range Status   02/14/2022 10 8 - 16 mmol/L Final     eGFR if    Date Value Ref Range Status   02/14/2022 >60.0 >60 mL/min/1.73 m^2 Final     eGFR if non    Date Value Ref Range Status   02/14/2022 >60.0 >60 mL/min/1.73 m^2 Final     Comment:     Calculation used to obtain the estimated glomerular filtration  rate (eGFR) is the CKD-EPI equation.        BNP  @LABRCNTIP(BNP,BNPTRIAGEBLO)@  @LABRCNTIP(troponini)@  CrCl cannot be calculated (Patient's most recent lab result is older than the maximum 7 days allowed.).  No results found in the last 24 hours.  No results found in the last 24 hours.  No  results found in the last 24 hours.    Assessment:      1. Nonrheumatic aortic valve stenosis    2. Chronic diastolic congestive heart failure    3. Primary hypertension    4. PVD (peripheral vascular disease)    5. Morbid obesity with BMI of 45.0-49.9, adult    6. Obstructive sleep apnea treated with continuous positive airway pressure (CPAP)      · Severe AS. Aortic valve area is 1.22 cm2; peak velocity is 4.61 m/s; mean gradient is 52 mmHg.  The gradient > 52 mmHG  SOB     Plan:   Will refer to cath lab for LHC/RHC to r/o ischemia etiology of SOB  And invasive evaluation of AS severity    Continue Losartan HCTZ Aldactone amlodipine statin and lasix for HLD HTN and edema  Advise to discuss with pCP for medical Rx if obesity  Counseled DASH  Check Lipid profile in 6 months  Recommend heart-healthy diet, weight control and regular exercise.  Fadumo. Risk modification.   I have reviewed all pertinent labs and cardiac studies independently. Plans and recommendations have been formulated under my direct supervision. All questions answered and patient voiced understanding.   If symptoms persist go to the ED  RTC after the procedure done

## 2022-06-21 ENCOUNTER — PATIENT MESSAGE (OUTPATIENT)
Dept: CARDIOLOGY | Facility: CLINIC | Age: 57
End: 2022-06-21
Payer: COMMERCIAL

## 2022-06-21 PROBLEM — D68.00 VON WILLEBRAND DISEASE: Status: ACTIVE | Noted: 2022-06-21

## 2022-06-27 ENCOUNTER — TELEPHONE (OUTPATIENT)
Dept: CARDIOLOGY | Facility: CLINIC | Age: 57
End: 2022-06-27
Payer: COMMERCIAL

## 2022-06-27 NOTE — TELEPHONE ENCOUNTER
Telephoned patient with change in start time of heart cath for tomorrow new arrival time 8am for 930 start

## 2022-06-28 ENCOUNTER — HOSPITAL ENCOUNTER (OUTPATIENT)
Facility: HOSPITAL | Age: 57
Discharge: HOME | End: 2022-06-28
Attending: INTERNAL MEDICINE | Admitting: INTERNAL MEDICINE
Payer: COMMERCIAL

## 2022-06-28 DIAGNOSIS — I35.0 NONRHEUMATIC AORTIC (VALVE) STENOSIS: ICD-10-CM

## 2022-06-28 LAB
DELSYS: ABNORMAL
DELSYS: ABNORMAL
FIO2: 36
FIO2: 36
FLOW: 4
FLOW: 4
HCO3 UR-SCNC: 24.9 MMOL/L (ref 24–28)
HCO3 UR-SCNC: 27.1 MMOL/L (ref 24–28)
MODE: ABNORMAL
MODE: ABNORMAL
PCO2 BLDA: 45.8 MMHG (ref 35–45)
PCO2 BLDA: 49.7 MMHG (ref 35–45)
PH SMN: 7.34 [PH] (ref 7.35–7.45)
PH SMN: 7.34 [PH] (ref 7.35–7.45)
PO2 BLDA: 134 MMHG (ref 80–100)
PO2 BLDA: 53 MMHG (ref 40–60)
POC BE: -1 MMOL/L
POC BE: 1 MMOL/L
POC SATURATED O2: 84 % (ref 95–100)
POC SATURATED O2: 99 % (ref 95–100)
SAMPLE: ABNORMAL
SAMPLE: ABNORMAL
SITE: ABNORMAL

## 2022-06-28 PROCEDURE — C1887 CATHETER, GUIDING: HCPCS | Performed by: INTERNAL MEDICINE

## 2022-06-28 PROCEDURE — 99153 MOD SED SAME PHYS/QHP EA: CPT | Performed by: INTERNAL MEDICINE

## 2022-06-28 PROCEDURE — 37799 UNLISTED PX VASCULAR SURGERY: CPT

## 2022-06-28 PROCEDURE — C1769 GUIDE WIRE: HCPCS | Performed by: INTERNAL MEDICINE

## 2022-06-28 PROCEDURE — 99152 PR MOD CONSCIOUS SEDATION, SAME PHYS, 5+ YRS, FIRST 15 MIN: ICD-10-PCS | Mod: ,,, | Performed by: INTERNAL MEDICINE

## 2022-06-28 PROCEDURE — 99900035 HC TECH TIME PER 15 MIN (STAT)

## 2022-06-28 PROCEDURE — 27201423 OPTIME MED/SURG SUP & DEVICES STERILE SUPPLY: Performed by: INTERNAL MEDICINE

## 2022-06-28 PROCEDURE — 99152 MOD SED SAME PHYS/QHP 5/>YRS: CPT | Performed by: INTERNAL MEDICINE

## 2022-06-28 PROCEDURE — 25000003 PHARM REV CODE 250: Performed by: INTERNAL MEDICINE

## 2022-06-28 PROCEDURE — 93460 R&L HRT ART/VENTRICLE ANGIO: CPT | Performed by: INTERNAL MEDICINE

## 2022-06-28 PROCEDURE — 93460 R&L HRT ART/VENTRICLE ANGIO: CPT | Mod: 26,,, | Performed by: INTERNAL MEDICINE

## 2022-06-28 PROCEDURE — 63600175 PHARM REV CODE 636 W HCPCS: Performed by: INTERNAL MEDICINE

## 2022-06-28 PROCEDURE — 99152 MOD SED SAME PHYS/QHP 5/>YRS: CPT | Mod: ,,, | Performed by: INTERNAL MEDICINE

## 2022-06-28 PROCEDURE — C1894 INTRO/SHEATH, NON-LASER: HCPCS | Performed by: INTERNAL MEDICINE

## 2022-06-28 PROCEDURE — 93460 PR CATH PLACE/CORON ANGIO, IMG SUPER/INTERP,R&L HRT CATH, L HRT VENTRIC: ICD-10-PCS | Mod: 26,,, | Performed by: INTERNAL MEDICINE

## 2022-06-28 PROCEDURE — C1751 CATH, INF, PER/CENT/MIDLINE: HCPCS | Performed by: INTERNAL MEDICINE

## 2022-06-28 PROCEDURE — 82803 BLOOD GASES ANY COMBINATION: CPT

## 2022-06-28 RX ORDER — VERAPAMIL HYDROCHLORIDE 2.5 MG/ML
INJECTION, SOLUTION INTRAVENOUS
Status: DISCONTINUED | OUTPATIENT
Start: 2022-06-28 | End: 2022-06-29 | Stop reason: HOSPADM

## 2022-06-28 RX ORDER — ACETAMINOPHEN 325 MG/1
650 TABLET ORAL EVERY 4 HOURS PRN
Status: DISCONTINUED | OUTPATIENT
Start: 2022-06-28 | End: 2022-06-29 | Stop reason: HOSPADM

## 2022-06-28 RX ORDER — LIDOCAINE HYDROCHLORIDE 20 MG/ML
INJECTION, SOLUTION EPIDURAL; INFILTRATION; INTRACAUDAL; PERINEURAL
Status: DISCONTINUED | OUTPATIENT
Start: 2022-06-28 | End: 2022-06-29 | Stop reason: HOSPADM

## 2022-06-28 RX ORDER — SODIUM CHLORIDE 9 MG/ML
INJECTION, SOLUTION INTRAVENOUS CONTINUOUS
Status: ACTIVE | OUTPATIENT
Start: 2022-06-28 | End: 2022-06-28

## 2022-06-28 RX ORDER — HEPARIN SODIUM 1000 [USP'U]/ML
INJECTION, SOLUTION INTRAVENOUS; SUBCUTANEOUS
Status: DISCONTINUED | OUTPATIENT
Start: 2022-06-28 | End: 2022-06-29 | Stop reason: HOSPADM

## 2022-06-28 RX ORDER — DIPHENHYDRAMINE HCL 50 MG
50 CAPSULE ORAL ONCE
Status: COMPLETED | OUTPATIENT
Start: 2022-06-28 | End: 2022-06-28

## 2022-06-28 RX ORDER — SODIUM CHLORIDE 9 MG/ML
INJECTION, SOLUTION INTRAVENOUS CONTINUOUS
Status: DISCONTINUED | OUTPATIENT
Start: 2022-06-28 | End: 2022-06-28 | Stop reason: HOSPADM

## 2022-06-28 RX ORDER — MIDAZOLAM HYDROCHLORIDE 1 MG/ML
INJECTION, SOLUTION INTRAMUSCULAR; INTRAVENOUS
Status: DISCONTINUED | OUTPATIENT
Start: 2022-06-28 | End: 2022-06-29 | Stop reason: HOSPADM

## 2022-06-28 RX ORDER — DIAZEPAM 5 MG/1
5 TABLET ORAL
Status: DISCONTINUED | OUTPATIENT
Start: 2022-06-28 | End: 2022-06-29 | Stop reason: HOSPADM

## 2022-06-28 RX ORDER — NAPROXEN SODIUM 220 MG/1
81 TABLET, FILM COATED ORAL ONCE
Status: COMPLETED | OUTPATIENT
Start: 2022-06-28 | End: 2022-06-28

## 2022-06-28 RX ORDER — ONDANSETRON 8 MG/1
8 TABLET, ORALLY DISINTEGRATING ORAL EVERY 8 HOURS PRN
Status: DISCONTINUED | OUTPATIENT
Start: 2022-06-28 | End: 2022-06-29 | Stop reason: HOSPADM

## 2022-06-28 RX ORDER — FENTANYL CITRATE 50 UG/ML
INJECTION, SOLUTION INTRAMUSCULAR; INTRAVENOUS
Status: DISCONTINUED | OUTPATIENT
Start: 2022-06-28 | End: 2022-06-29 | Stop reason: HOSPADM

## 2022-06-28 RX ADMIN — DIAZEPAM 5 MG: 5 TABLET ORAL at 08:06

## 2022-06-28 RX ADMIN — ASPIRIN 81 MG CHEWABLE TABLET 81 MG: 81 TABLET CHEWABLE at 08:06

## 2022-06-28 RX ADMIN — SODIUM CHLORIDE: 0.9 INJECTION, SOLUTION INTRAVENOUS at 08:06

## 2022-06-28 RX ADMIN — DIPHENHYDRAMINE HYDROCHLORIDE 50 MG: 50 CAPSULE ORAL at 08:06

## 2022-06-28 NOTE — INTERVAL H&P NOTE
The patient has been examined and the H&P has been reviewed:    I concur with the findings and no changes have occurred since H&P was written.    Procedure risks, benefits and alternative options discussed and understood by patient/family.          Active Hospital Problems    Diagnosis  POA    Von Willebrand disease [D68.0]  Yes    Prediabetes [R73.03]  Yes    PVD (peripheral vascular disease) [I73.9]  Yes    Chronic diastolic congestive heart failure [I50.32]  Yes     ECHOCARDIOGRAM 03/22/2021  ·The left ventricle is normal in size with normal systolic function. The estimated ejection fraction is 60%  ·Indeterminate left ventricular diastolic function.  ·Normal right ventricular size with normal right ventricular systolic function.  ·Mild left atrial enlargement.  ·Normal central venous pressure (3 mmHg).  ·There is severe aortic valve stenosis.  ·Aortic valve area is 1.25 cm2; peak velocity is 4.55 m/s; mean gradient is 50 mmHg.    ECHOCARDIOGRAM 10/18/2019  1 - Normal left ventricular systolic function (EF 60-65%).  2 - Impaired LV relaxation, elevated LAP (grade 2 diastolic dysfunction).  3 - Normal right ventricular systolic function.  4 - No wall motion abnormalities.  5 - Severe left atrial enlargement.  6 - Concentric hypertrophy.  7 - Mild aortic regurgitation.  8 - Moderate aortic stenosis, CRISTINO = 1.87 cm2, AVAi = 0.7 cm2/m2, peak velocity = 3.87 m/s, mean gradient = 34 mmHg.        Nonrheumatic aortic valve stenosis [I35.0]  Yes     ECHOCARDIOGRAM 10/18/2019  1 - Normal left ventricular systolic function (EF 60-65%).  2 - Impaired LV relaxation, elevated LAP (grade 2 diastolic dysfunction).  3 - Normal right ventricular systolic function.  4 - No wall motion abnormalities.  5 - Severe left atrial enlargement.  6 - Concentric hypertrophy.  7 - Mild aortic regurgitation.  8 - Moderate aortic stenosis, CRISTINO = 1.87 cm2, AVAi = 0.7 cm2/m2, peak velocity = 3.87 m/s, mean gradient = 34 mmHg.        On statin  therapy due to risk of future cardiovascular event [Z79.899]  Not Applicable     Chronic    ROLLINS (dyspnea on exertion) [R06.00]  Yes    Primary hypertension [I10]  Yes     Chronic    Morbid obesity with BMI of 45.0-49.9, adult [E66.01, Z68.42]  Not Applicable     Chronic    Obstructive sleep apnea treated with continuous positive airway pressure (CPAP) [G47.33, Z99.89]  Not Applicable     Chronic      Resolved Hospital Problems   No resolved problems to display.

## 2022-06-28 NOTE — DISCHARGE INSTRUCTIONS
"    1. DIET: It is advisable for you to follow a diet that limits the intake of salt, sugar, saturated fats and cholesterol.     2. DRIVING: Due to sedation you received during your procedure, DO NOT drive or operate machinery for 24 hours. Avoid making critical decisions or signing legal documents until tomorrow.    3. ACTIVITY: AVOID activities that require bending of the affected arm/wrist for 3 days and submerging the site in water for 3 days. REMOVE the dressing the day after  the procedure, and shower.  Apply a bandaid to site after shower.  WEAR wrist immobilizer until tomorrow night.    You may RESUME your normal activities or prescribed exercise program as instructed by your physician after 3 days.                                                                                                                 4. WOUND CARE: It is not unusual to have a small amount of bruising to appear at or near the puncture site. It is also common to have a tender "knot" develop beneath the skin at the puncture site of the wrist/arm. This is usually scar tissue and is not a cause for concern or alarm. This tender knot may take several weeks to fully resolve. The bruise will usually spread over several days. However, if the lump gets bigger, call your doctor immediately.    5. DISCOMFORT: For general discomfort at the puncture site, you may take 1 or 2 Acetaminophen (Tylenol) tablets every 4 - 6 hours as needed. (Do not take more than 4000 mg a day)    6. SIGNS AND SYMPTOMS TO REPORT:  Call your physician immediately if any of the following occur:                                            1. Loss of feeling, warmth or color to the affected arm/wrist                                                                                                          2. Mild beeding from the site                 3. Pain that is sudden, sharp or persistent in the affected arm/wrist                 4. Swelling or a change in "lump" size, " increased redness or drainage at the puncture site                                                                               5. High fever (101 degrees or higher)    7. GO TO  THE EMERGENCY ROOM OR CALL 911 IF YOU HAVE: Chest pains or discomforts not relieved with 3 nitroglycerin doses (sublingual tablets or spray), numbness or severe pain of limb, if your limb becomes cold or discolored or if you develop uncontrolled bleeding from the puncture site (quickly apply firm, direct pressure above the site).

## 2022-06-28 NOTE — OP NOTE
INPATIENT Operative Note         SUMMARY     Surgery Date: 6/28/2022     Surgeon(s) and Role:     * Carlotta Gordon MD - Primary    ASSISTANT:none    Pre-op Diagnosis:  Nonrheumatic aortic (valve) stenosis [I35.0]Primary hypertension [I10]      Post-op Diagnosis:  Nonrheumatic aortic (valve) stenosis [I35.0]Primary hypertension [I10]    Procedure(s) (LRB):  CATHETERIZATION, HEART, BOTH LEFT AND RIGHT (N/A)    COMPLICATION:none    Anesthesia: RN IV Sedation    Findings/Key Components:  Normal coronaries  Elevated filling pressures  Moderate pulmonary htn  aov gradient 65 mmhg  Mild AI    Estimated Blood Loss: < 50 ML.         SPECIMEN: NONE    Devices/Prostetics: None    PLAN:  DISCUSS OPTION OF AVR

## 2022-06-28 NOTE — Clinical Note
The catheter was inserted into the aorta. An angiography was performed of the aorta. The angiography was performed via power injection. The injected amount was 26 mL contrast at 15 mL/s. The PSI from the power injection was 800.

## 2022-06-28 NOTE — Clinical Note
The DP pulses were 2+ bilaterally. The PT pulses were 2+ bilaterally. The radial pulses were +1 bilaterally.

## 2022-06-28 NOTE — NURSING
Patient showing signs of interest in changing diet.  Discussed food choicces/exercise options.  Noting generalized edema to legs and patient anxious to see Dr Shaikh next week to have meds reviewed.

## 2022-06-29 ENCOUNTER — PATIENT MESSAGE (OUTPATIENT)
Dept: PRIMARY CARE CLINIC | Facility: CLINIC | Age: 57
End: 2022-06-29

## 2022-06-29 ENCOUNTER — PATIENT MESSAGE (OUTPATIENT)
Dept: CARDIOLOGY | Facility: CLINIC | Age: 57
End: 2022-06-29
Payer: COMMERCIAL

## 2022-06-29 ENCOUNTER — OFFICE VISIT (OUTPATIENT)
Dept: PRIMARY CARE CLINIC | Facility: CLINIC | Age: 57
End: 2022-06-29
Payer: COMMERCIAL

## 2022-06-29 ENCOUNTER — DOCUMENTATION ONLY (OUTPATIENT)
Dept: PRIMARY CARE CLINIC | Facility: CLINIC | Age: 57
End: 2022-06-29

## 2022-06-29 VITALS
RESPIRATION RATE: 18 BRPM | HEART RATE: 80 BPM | TEMPERATURE: 98 F | WEIGHT: 315 LBS | OXYGEN SATURATION: 95 % | BODY MASS INDEX: 49.72 KG/M2

## 2022-06-29 DIAGNOSIS — I35.0 NONRHEUMATIC AORTIC VALVE STENOSIS: Chronic | ICD-10-CM

## 2022-06-29 DIAGNOSIS — R73.03 PREDIABETES: Primary | ICD-10-CM

## 2022-06-29 DIAGNOSIS — E66.01 MORBID OBESITY WITH BMI OF 45.0-49.9, ADULT: Chronic | ICD-10-CM

## 2022-06-29 DIAGNOSIS — G47.33 OBSTRUCTIVE SLEEP APNEA TREATED WITH CONTINUOUS POSITIVE AIRWAY PRESSURE (CPAP): Chronic | ICD-10-CM

## 2022-06-29 DIAGNOSIS — D68.00 VON WILLEBRAND DISEASE: Chronic | ICD-10-CM

## 2022-06-29 DIAGNOSIS — I10 PRIMARY HYPERTENSION: Chronic | ICD-10-CM

## 2022-06-29 DIAGNOSIS — R01.1 HEART MURMUR: Chronic | ICD-10-CM

## 2022-06-29 PROCEDURE — 99215 PR OFFICE/OUTPT VISIT, EST, LEVL V, 40-54 MIN: ICD-10-PCS | Mod: S$GLB,,, | Performed by: FAMILY MEDICINE

## 2022-06-29 PROCEDURE — 1159F MED LIST DOCD IN RCRD: CPT | Mod: CPTII,S$GLB,, | Performed by: FAMILY MEDICINE

## 2022-06-29 PROCEDURE — 3008F BODY MASS INDEX DOCD: CPT | Mod: CPTII,S$GLB,, | Performed by: FAMILY MEDICINE

## 2022-06-29 PROCEDURE — 1160F PR REVIEW ALL MEDS BY PRESCRIBER/CLIN PHARMACIST DOCUMENTED: ICD-10-PCS | Mod: CPTII,S$GLB,, | Performed by: FAMILY MEDICINE

## 2022-06-29 PROCEDURE — 99215 OFFICE O/P EST HI 40 MIN: CPT | Mod: S$GLB,,, | Performed by: FAMILY MEDICINE

## 2022-06-29 PROCEDURE — 1160F RVW MEDS BY RX/DR IN RCRD: CPT | Mod: CPTII,S$GLB,, | Performed by: FAMILY MEDICINE

## 2022-06-29 PROCEDURE — 99999 PR PBB SHADOW E&M-EST. PATIENT-LVL IV: ICD-10-PCS | Mod: PBBFAC,,, | Performed by: FAMILY MEDICINE

## 2022-06-29 PROCEDURE — 1159F PR MEDICATION LIST DOCUMENTED IN MEDICAL RECORD: ICD-10-PCS | Mod: CPTII,S$GLB,, | Performed by: FAMILY MEDICINE

## 2022-06-29 PROCEDURE — 99999 PR PBB SHADOW E&M-EST. PATIENT-LVL IV: CPT | Mod: PBBFAC,,, | Performed by: FAMILY MEDICINE

## 2022-06-29 PROCEDURE — 3008F PR BODY MASS INDEX (BMI) DOCUMENTED: ICD-10-PCS | Mod: CPTII,S$GLB,, | Performed by: FAMILY MEDICINE

## 2022-06-29 NOTE — TELEPHONE ENCOUNTER
Dragan Dela Cruz.    I see that you are currently in the hospital after catheterization for your aortic valve stenosis. I hope you are recovering well.    Yes, I support you in your goal to lose weight.    I've entered a referral order for you to see our [facility] , Chely Cochran MD, and her team. Someone from [facility] should be sending you a Patient Portal message soon to help schedule your appointment.    Be sure to see Dr. Cochran before your appointment with me on 08/15/22 so we can discuss your medication treatment options at your appointment    Thanks for letting me care for you, and thanks for trusting [facility] with your healthcare needs.    All the best,    DENYS Barba MD

## 2022-06-29 NOTE — PROGRESS NOTES
Subjective:       Patient ID: Dragan Man II is a 56 y.o. male.  Pmhx, fam hx, soc hx, surg hx, allergies, med list reviewed    Chief Complaint: No chief complaint on file.  Referring MD: Jameson  PCP: same  BMI noted 49  Diet: see note  Exercise/Activity: limited  Sleep: + LAVERNE  Stressors: health concerns  Anxiety/Depression Screen/PHQ-2: neg    Drinking 8-10 parker/day. Drinks unsweet tea and water. No soda. Not drinking as much beer. In last 4 years, no daily beer, occasional wine (eery 2-3 months).    Pt told as a child had Von Willibrand's.     Hx of Aortic Stenosis; has had     Has hx of CPAP; pt states is compliant. Has non alcoholic Heineken rarely, some LE edema. Was on amlodipine- pt stopped it; pt attributes weight gain to this.   Some leg cramps.     Pt has pre diabetes (labs from last year). Pt has had hard time losing weight.     Breakfast: cofee with honey and creamer; switched to sugar: has tried splenda; Tolerating.   Likes tuna; has some veggies.    HPI  Review of Systems   Constitutional: Negative for activity change, appetite change, fatigue and unexpected weight change.   HENT: Negative for mouth dryness.    Eyes: Negative for visual disturbance.   Respiratory: Positive for shortness of breath (exertional; no worsening). Negative for apnea and chest tightness.    Cardiovascular: Positive for leg swelling. Negative for chest pain and palpitations.   Gastrointestinal: Positive for constipation. Negative for abdominal pain.   Endocrine: Negative for polydipsia and polyphagia.   Musculoskeletal: Negative for arthralgias and myalgias.   Integumentary:  Negative for rash.   Allergic/Immunologic: Negative for food allergies.   Neurological: Negative for dizziness, syncope, weakness and headaches.   Psychiatric/Behavioral: Negative for behavioral problems and sleep disturbance. The patient is not nervous/anxious.          Objective:      Physical Exam  Vitals and nursing note reviewed.    Constitutional:       General: He is not in acute distress.  HENT:      Head: Normocephalic and atraumatic.      Mouth/Throat:      Pharynx: Oropharynx is clear.   Eyes:      General: No scleral icterus.     Pupils: Pupils are equal, round, and reactive to light.   Neck:      Comments: No TM  Cardiovascular:      Rate and Rhythm: Normal rate and regular rhythm.      Pulses: Normal pulses.      Heart sounds: Murmur (2-3/6 systolic) heard.     No friction rub. No gallop.   Pulmonary:      Effort: Pulmonary effort is normal. No respiratory distress.      Breath sounds: Normal breath sounds. No wheezing, rhonchi or rales.   Abdominal:      General: Bowel sounds are normal. There is no distension.      Palpations: Abdomen is soft.      Tenderness: There is no abdominal tenderness.   Musculoskeletal:         General: No swelling.      Cervical back: Normal range of motion and neck supple. No tenderness.   Lymphadenopathy:      Cervical: No cervical adenopathy.   Skin:     General: Skin is warm.      Capillary Refill: Capillary refill takes less than 2 seconds.      Findings: No erythema or rash.   Neurological:      Mental Status: He is alert and oriented to person, place, and time.   Psychiatric:         Mood and Affect: Mood normal.         Behavior: Behavior normal.         Assessment:       ICD-10-CM ICD-9-CM   1. Prediabetes  R73.03 790.29   2. Primary hypertension  I10 401.9   3. Obstructive sleep apnea treated with continuous positive airway pressure (CPAP)  G47.33 327.23    Z99.89 V46.8   4. Heart murmur  R01.1 785.2   5. Nonrheumatic aortic valve stenosis  I35.0 424.1         Plan:       Prediabetes  -     Hemoglobin A1C; Future; Expected date: 06/29/2022    Primary hypertension  Comments:  chronic/stable; digital med participant  d/w pt DASH principles; has begun lifestyle interventions; limit N < 2000 mg/day  Orders:  -     Hemoglobin A1C; Future; Expected date: 06/29/2022    Obstructive sleep apnea treated  with continuous positive airway pressure (CPAP)  Chronic/stable continue meds    Heart murmur  Comments:  secondary to #4    Nonrheumatic aortic valve stenosis  Comments:  recent [facility] (see cardiology notes) Sees Dr Min; anticipating surgery but needs to lose weight first    Von Willebrand disease  Comments:  Pt denies issues; anticipatory;     Morbid obesity with BMI of 45.0-49.9, adult  -     Hemoglobin A1C; Future; Expected date: 06/29/2022    Will check about metformin; pt to see if insurance will play for ozempic as pt could definitely benefit from glp1 agonist. With med hx, known prediabetes/metabolic syndrome, bmi, multiple comorbidities and pending surgery (hinging on weight loss) it would likely be best source. Will forward this note to Dr Min to make sure no objections to seeking PA for ozempic and   Also will do significant lifestyle interventions with close f/u.   If this is not successful then can consider bariatric consult--may lose weight on overall bariatric diet/plan as well  See mychart message  D/W pt risks/benefits/SEs of both.   No hx of lactic acidosis, pancreatitis, or personal/fam hx of MEN or medullary thyroid cancer  Med list: ozempic may increase effect of furosemide (pt is on low dose); sometimes hctz based meds may decrease effect of insulin sensitizers.   Extensive d/w pt and wife  Food log next week, will consider PA as soon as speak with Dr Min  Approximately 45 min spent with patient today

## 2022-06-29 NOTE — PROGRESS NOTES
(sent below [facility] to Dr Shaikh)    Dr Shaikh,    I had the pleasure of seeing Mr. Archibald today in our [facility]. We plan to do a considerable amount of lifestyle interventions (particularly dietary) but he would also benefit from   Pharmacologic intervention for insulin sensitization and weight loss.     Do you have any objections from a CV perspective to me trying to get a PA for ozempic/semaglutide? If his insurance would not approve we can consider metformin. He (and his wife) are motivated.      My cell (fyi if I can help with any of your patients) is [phone]. I hope to meet you in person soon.       Best regards,         Chely Cochran MD  [facility]ness

## 2022-06-29 NOTE — PATIENT INSTRUCTIONS
Please work on decreasing added sugars on a daily basis.   (No more than 25 grams daily).     Please send me a food log next week.

## 2022-06-30 ENCOUNTER — DOCUMENTATION ONLY (OUTPATIENT)
Dept: PRIMARY CARE CLINIC | Facility: CLINIC | Age: 57
End: 2022-06-30
Payer: COMMERCIAL

## 2022-06-30 ENCOUNTER — PATIENT MESSAGE (OUTPATIENT)
Dept: PRIMARY CARE CLINIC | Facility: CLINIC | Age: 57
End: 2022-06-30
Payer: COMMERCIAL

## 2022-06-30 DIAGNOSIS — E66.01 MORBID OBESITY WITH BMI OF 45.0-49.9, ADULT: ICD-10-CM

## 2022-06-30 DIAGNOSIS — R73.03 PREDIABETES: Primary | ICD-10-CM

## 2022-06-30 DIAGNOSIS — I10 PRIMARY HYPERTENSION: ICD-10-CM

## 2022-06-30 RX ORDER — SEMAGLUTIDE 1.34 MG/ML
INJECTION, SOLUTION SUBCUTANEOUS
Qty: 1 PEN | Refills: 1 | Status: SHIPPED | OUTPATIENT
Start: 2022-06-30 | End: 2022-07-29 | Stop reason: DRUGHIGH

## 2022-06-30 NOTE — PROGRESS NOTES
MD Chely Browne MD  Caller: Unspecified (Yesterday,  4:28 PM)  No problem from cardiology standpoint

## 2022-06-30 NOTE — DISCHARGE SUMMARY
[facility] - Cath Lab (Hospital)  Discharge Note  Short Stay    Procedure(s) (LRB):  CATHETERIZATION, HEART, BOTH LEFT AND RIGHT (N/A)    OUTCOME: Patient tolerated treatment/procedure well without complication and is now ready for discharge.    DISPOSITION: Home or Self Care    FINAL DIAGNOSIS:  Nonrheumatic aortic valve stenosis    FOLLOWUP: In clinic    DISCHARGE INSTRUCTIONS:    Discharge Procedure Orders   Diet general     Call MD for:  temperature >100.4     Call MD for:  persistent nausea and vomiting     Call MD for:  severe uncontrolled pain     Call MD for:  difficulty breathing, headache or visual disturbances     Call MD for:  redness, tenderness, or signs of infection (pain, swelling, redness, odor or green/yellow discharge around incision site)     Call MD for:  hives     Call MD for:  persistent dizziness or light-headedness     Call MD for:  extreme fatigue        TIME SPENT ON DISCHARGE: 15  minutes

## 2022-07-06 VITALS
RESPIRATION RATE: 23 BRPM | HEIGHT: 71 IN | OXYGEN SATURATION: 98 % | WEIGHT: 315 LBS | TEMPERATURE: 98 F | DIASTOLIC BLOOD PRESSURE: 70 MMHG | HEART RATE: 65 BPM | SYSTOLIC BLOOD PRESSURE: 137 MMHG | BODY MASS INDEX: 44.1 KG/M2

## 2022-07-07 ENCOUNTER — TELEPHONE (OUTPATIENT)
Dept: CARDIOLOGY | Facility: CLINIC | Age: 57
End: 2022-07-07

## 2022-07-07 ENCOUNTER — OFFICE VISIT (OUTPATIENT)
Dept: CARDIOLOGY | Facility: CLINIC | Age: 57
End: 2022-07-07
Payer: COMMERCIAL

## 2022-07-07 VITALS
WEIGHT: 315 LBS | HEART RATE: 84 BPM | SYSTOLIC BLOOD PRESSURE: 129 MMHG | DIASTOLIC BLOOD PRESSURE: 70 MMHG | BODY MASS INDEX: 47.94 KG/M2 | OXYGEN SATURATION: 96 %

## 2022-07-07 DIAGNOSIS — I35.0 NONRHEUMATIC AORTIC VALVE STENOSIS: Primary | ICD-10-CM

## 2022-07-07 DIAGNOSIS — Z79.899 ON STATIN THERAPY DUE TO RISK OF FUTURE CARDIOVASCULAR EVENT: Chronic | ICD-10-CM

## 2022-07-07 DIAGNOSIS — I73.9 PVD (PERIPHERAL VASCULAR DISEASE): ICD-10-CM

## 2022-07-07 DIAGNOSIS — I10 PRIMARY HYPERTENSION: Chronic | ICD-10-CM

## 2022-07-07 DIAGNOSIS — E66.01 MORBID OBESITY WITH BMI OF 45.0-49.9, ADULT: Chronic | ICD-10-CM

## 2022-07-07 PROCEDURE — 4010F ACE/ARB THERAPY RXD/TAKEN: CPT | Mod: CPTII,S$GLB,, | Performed by: INTERNAL MEDICINE

## 2022-07-07 PROCEDURE — 99214 OFFICE O/P EST MOD 30 MIN: CPT | Mod: S$GLB,,, | Performed by: INTERNAL MEDICINE

## 2022-07-07 PROCEDURE — 1160F RVW MEDS BY RX/DR IN RCRD: CPT | Mod: CPTII,S$GLB,, | Performed by: INTERNAL MEDICINE

## 2022-07-07 PROCEDURE — 1160F PR REVIEW ALL MEDS BY PRESCRIBER/CLIN PHARMACIST DOCUMENTED: ICD-10-PCS | Mod: CPTII,S$GLB,, | Performed by: INTERNAL MEDICINE

## 2022-07-07 PROCEDURE — 1159F MED LIST DOCD IN RCRD: CPT | Mod: CPTII,S$GLB,, | Performed by: INTERNAL MEDICINE

## 2022-07-07 PROCEDURE — 3074F SYST BP LT 130 MM HG: CPT | Mod: CPTII,S$GLB,, | Performed by: INTERNAL MEDICINE

## 2022-07-07 PROCEDURE — 99999 PR PBB SHADOW E&M-EST. PATIENT-LVL III: CPT | Mod: PBBFAC,,, | Performed by: INTERNAL MEDICINE

## 2022-07-07 PROCEDURE — 3078F DIAST BP <80 MM HG: CPT | Mod: CPTII,S$GLB,, | Performed by: INTERNAL MEDICINE

## 2022-07-07 PROCEDURE — 3074F PR MOST RECENT SYSTOLIC BLOOD PRESSURE < 130 MM HG: ICD-10-PCS | Mod: CPTII,S$GLB,, | Performed by: INTERNAL MEDICINE

## 2022-07-07 PROCEDURE — 3008F PR BODY MASS INDEX (BMI) DOCUMENTED: ICD-10-PCS | Mod: CPTII,S$GLB,, | Performed by: INTERNAL MEDICINE

## 2022-07-07 PROCEDURE — 99214 PR OFFICE/OUTPT VISIT, EST, LEVL IV, 30-39 MIN: ICD-10-PCS | Mod: S$GLB,,, | Performed by: INTERNAL MEDICINE

## 2022-07-07 PROCEDURE — 3078F PR MOST RECENT DIASTOLIC BLOOD PRESSURE < 80 MM HG: ICD-10-PCS | Mod: CPTII,S$GLB,, | Performed by: INTERNAL MEDICINE

## 2022-07-07 PROCEDURE — 1159F PR MEDICATION LIST DOCUMENTED IN MEDICAL RECORD: ICD-10-PCS | Mod: CPTII,S$GLB,, | Performed by: INTERNAL MEDICINE

## 2022-07-07 PROCEDURE — 3008F BODY MASS INDEX DOCD: CPT | Mod: CPTII,S$GLB,, | Performed by: INTERNAL MEDICINE

## 2022-07-07 PROCEDURE — 99999 PR PBB SHADOW E&M-EST. PATIENT-LVL III: ICD-10-PCS | Mod: PBBFAC,,, | Performed by: INTERNAL MEDICINE

## 2022-07-07 PROCEDURE — 4010F PR ACE/ARB THEARPY RXD/TAKEN: ICD-10-PCS | Mod: CPTII,S$GLB,, | Performed by: INTERNAL MEDICINE

## 2022-07-07 RX ORDER — LOSARTAN POTASSIUM 50 MG/1
50 TABLET ORAL DAILY
Qty: 30 TABLET | Refills: 11 | Status: SHIPPED | OUTPATIENT
Start: 2022-07-07 | End: 2022-08-15 | Stop reason: SDUPTHER

## 2022-07-07 RX ORDER — METOPROLOL SUCCINATE 50 MG/1
50 TABLET, EXTENDED RELEASE ORAL DAILY
Qty: 30 TABLET | Refills: 11 | Status: SHIPPED | OUTPATIENT
Start: 2022-07-07 | End: 2022-08-15 | Stop reason: SDUPTHER

## 2022-07-07 NOTE — PROGRESS NOTES
Subjective:   Patient ID:  Dragan Man II is a 56 y.o. male who presents for follow up of No chief complaint on file.      55 yo. Male, f/u for severe AS.. Selling the beer at the store,   PMH severe AS, HTN, LAVERNE on CPAP, obesit and Von Willebrand diseasey. No Dx of heart attack,DM,stroke and cancer. No smoking/drinking  Chronic ROLLINS. Recently worse with dizziness, left chest tightness. Occurred at workplace and physical activity. Improved the symptoms at home. Thought related to the temperature change from parking lot to cooler.   Treadmill stress ekg showed no stress induced EKG change. Peak  mmHG   ECHO showed normal EF, severe LAE, and mild to mod AS  No f/h premature CAD  In 2012, had episode of syncope when lifted up heavy stuff.  2012. MPI showed no ischemia and echo showed normal EF, dilated RV. Mild LAE and   Enrolled in HTN digit program    03/2021visit. States that ROLLINS slightly worse  No chest pain, dizziness palpitation, faint  Leg swelling worse in PM and better in AM    Gained the weight 10 pounds in 6 months.  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 Occasional ROLLINS. No faint syncope dizziness and chest pain  Sleeps on elevated bed due to LAVERNE. And leg swelling   Decreased exercise capacity at workplace  stationary BIKING. NO SMOKING.DRINKING  03/2021 echo normal EF and mod to severe AS. Aortic valve area is 1.25 cm2; peak velocity is 4.55 m/s; mean gradient is 50 mmHg.  No f/h valve disease and SCD   BNP and Cr wnl     visit  S/p CYNTHIA done in  revealing calcified tricuspid AV, mode AS and mild AI. Mild sore throat. No chest pain and dizziness  C/o hands and leg swelling   Repeat echo in  Aortic valve area is 1.22 cm2; peak velocity is 4.61 m/s; mean gradient is 52 mmHg.  Chronic SOB and partially improved after breathing Rx. Severely obese  No faint chest pain and palpitation. ekg NSR and no acute stt  change    Interval history  06/2022 LHC/RHC  · The coronary arteries were normal..  · The filling pressures on the right and left were moderately elevated. Pulmonary hypertension was moderate.  · 53 MMHG GRADIENT ACROSS AORTIC VAKLVE SUGGESTIVE OF SEVERE AORTIC STENOSIS.  On weight control program, working well. Leg swelling improved.  Pt has moderate aortic stenosis per planimetry and elevated gradient pressure in LVOT  He states that he lost 10 poudns now and feels better               Past Medical History:   Diagnosis Date    Actinic keratoses 10/14/2019    Bilateral carpal tunnel syndrome 4/12/2021    Chronic bilateral low back pain without sciatica 4/12/2021    Depression     Morbid obesity with BMI of 40.0-44.9, adult 11/17/2017    LAVERNE (obstructive sleep apnea)     Prediabetes 4/12/2021    Seasonal allergic rhinitis due to pollen 10/14/2019    Ulnar neuropathy of both upper extremities 2/17/2020       Past Surgical History:   Procedure Laterality Date    CATHETERIZATION OF BOTH LEFT AND RIGHT HEART N/A 6/28/2022    Procedure: CATHETERIZATION, HEART, BOTH LEFT AND RIGHT;  Surgeon: Carlotta Gordon MD;  Location: [facility] CATH LAB;  Service: Cardiology;  Laterality: N/A;    None         Social History     Tobacco Use    Smoking status: Never Smoker    Smokeless tobacco: Former User    Tobacco comment: quit 15 years ago    Substance Use Topics    Alcohol use: Yes     Comment: socially // beer    Drug use: No       Family History   Problem Relation Age of Onset    Diabetes Father     Diabetes Paternal Grandfather     No Known Problems Mother          Review of Systems   Constitutional: Positive for malaise/fatigue. Negative for decreased appetite, diaphoresis, fever and night sweats.   HENT: Negative for nosebleeds.    Eyes: Negative for blurred vision and double vision.   Cardiovascular: Positive for dyspnea on exertion and leg swelling. Negative for claudication, irregular heartbeat, near-syncope,  orthopnea, palpitations, paroxysmal nocturnal dyspnea and syncope.   Respiratory: Negative for cough, shortness of breath, sleep disturbances due to breathing, snoring, sputum production and wheezing.    Endocrine: Negative for cold intolerance and polyuria.   Hematologic/Lymphatic: Does not bruise/bleed easily.   Skin: Negative for rash.   Musculoskeletal: Negative for back pain, falls, joint pain, joint swelling and neck pain.   Gastrointestinal: Negative for abdominal pain, heartburn, nausea and vomiting.   Genitourinary: Negative for dysuria, frequency and hematuria.   Neurological: Positive for dizziness. Negative for difficulty with concentration, focal weakness, headaches, light-headedness, numbness, seizures and weakness.   Psychiatric/Behavioral: Negative for depression, memory loss and substance abuse. The patient does not have insomnia.    Allergic/Immunologic: Negative for HIV exposure and hives.       Objective:   Physical Exam  HENT:      Head: Normocephalic.   Eyes:      Pupils: Pupils are equal, round, and reactive to light.   Neck:      Thyroid: No thyromegaly.      Vascular: Normal carotid pulses. No carotid bruit or JVD.   Cardiovascular:      Rate and Rhythm: Normal rate and regular rhythm.  No extrasystoles are present.     Chest Wall: PMI is not displaced.      Pulses: Normal pulses.      Heart sounds: Murmur: ESM 3/6 on bases and along LSB. up to the neck.     No gallop. No S3 sounds.   Pulmonary:      Effort: No respiratory distress.      Breath sounds: Normal breath sounds. No stridor.   Abdominal:      General: Bowel sounds are normal.      Palpations: Abdomen is soft.      Tenderness: There is no abdominal tenderness. There is no rebound.   Musculoskeletal:         General: Normal range of motion.   Skin:     Findings: No rash.   Neurological:      Mental Status: He is alert and oriented to person, place, and time.   Psychiatric:         Behavior: Behavior normal.         Lab Results    Component Value Date    CHOL 135 04/06/2021    CHOL 185 10/14/2019    CHOL 186 12/03/2018     Lab Results   Component Value Date    HDL 41 04/06/2021    HDL 38 (L) 10/14/2019    HDL 48 12/03/2018     Lab Results   Component Value Date    LDLCALC 70.8 04/06/2021    LDLCALC 80.8 10/14/2019    LDLCALC 103.4 12/03/2018     Lab Results   Component Value Date    TRIG 116 04/06/2021    TRIG 331 (H) 10/14/2019    TRIG 173 (H) 12/03/2018     Lab Results   Component Value Date    CHOLHDL 30.4 04/06/2021    CHOLHDL 20.5 10/14/2019    CHOLHDL 25.8 12/03/2018       Chemistry        Component Value Date/Time     06/20/2022 1635    K 4.0 06/20/2022 1635     06/20/2022 1635    CO2 26 06/20/2022 1635    BUN 14 06/20/2022 1635    CREATININE 0.8 06/20/2022 1635    GLU 88 06/20/2022 1635        Component Value Date/Time    CALCIUM 9.5 06/20/2022 1635    ALKPHOS 72 11/12/2021 1211    AST 26 11/12/2021 1211    ALT 27 11/12/2021 1211    BILITOT 1.4 (H) 11/12/2021 1211    ESTGFRAFRICA >60.0 06/20/2022 1635    EGFRNONAA >60.0 06/20/2022 1635          Lab Results   Component Value Date    HGBA1C 5.8 (H) 04/06/2021     Lab Results   Component Value Date    TSH 3.980 10/14/2019     Lab Results   Component Value Date    INR 1.1 06/20/2022    INR 1.1 10/18/2012     Lab Results   Component Value Date    WBC 10.58 06/20/2022    HGB 12.5 (L) 06/20/2022    HCT 37.8 (L) 06/20/2022    MCV 98 06/20/2022     06/20/2022     BMP  Sodium   Date Value Ref Range Status   06/20/2022 139 136 - 145 mmol/L Final     Potassium   Date Value Ref Range Status   06/20/2022 4.0 3.5 - 5.1 mmol/L Final     Chloride   Date Value Ref Range Status   06/20/2022 104 95 - 110 mmol/L Final     CO2   Date Value Ref Range Status   06/20/2022 26 23 - 29 mmol/L Final     BUN   Date Value Ref Range Status   06/20/2022 14 6 - 20 mg/dL Final     Creatinine   Date Value Ref Range Status   06/20/2022 0.8 0.5 - 1.4 mg/dL Final     Calcium   Date Value Ref Range  Status   06/20/2022 9.5 8.7 - 10.5 mg/dL Final     Anion Gap   Date Value Ref Range Status   06/20/2022 9 8 - 16 mmol/L Final     eGFR if    Date Value Ref Range Status   06/20/2022 >60.0 >60 mL/min/1.73 m^2 Final     eGFR if non    Date Value Ref Range Status   06/20/2022 >60.0 >60 mL/min/1.73 m^2 Final     Comment:     Calculation used to obtain the estimated glomerular filtration  rate (eGFR) is the CKD-EPI equation.        BNP  @LABRCNTIP(BNP,BNPTRIAGEBLO)@  @LABRCNTIP(troponini)@  CrCl cannot be calculated (Patient's most recent lab result is older than the maximum 7 days allowed.).  No results found in the last 24 hours.  No results found in the last 24 hours.  No results found in the last 24 hours.    Assessment:      1. Nonrheumatic aortic valve stenosis    2. Primary hypertension    3. PVD (peripheral vascular disease)    4. On statin therapy due to risk of future cardiovascular event    5. Morbid obesity with BMI of 45.0-49.9, adult        Plan:   D/c hyzaar and Add Losartan 50 mg daily and metoprolol 50 mg daily  Continue lasix as needed and statin  Continue weight loss program     Counseled DASH  Check Lipid profile in 6 months  Recommend heart-healthy diet, weight control and regular exercise.  Fadumo. Risk modification.   I have reviewed all pertinent labs and cardiac studies independently. Plans and recommendations have been formulated under my direct supervision. All questions answered and patient voiced understanding.   If symptoms persist go to the ED  RTC in 3 months

## 2022-07-29 ENCOUNTER — OFFICE VISIT (OUTPATIENT)
Dept: PRIMARY CARE CLINIC | Facility: CLINIC | Age: 57
End: 2022-07-29
Payer: COMMERCIAL

## 2022-07-29 VITALS
HEART RATE: 80 BPM | OXYGEN SATURATION: 100 % | SYSTOLIC BLOOD PRESSURE: 132 MMHG | WEIGHT: 315 LBS | DIASTOLIC BLOOD PRESSURE: 66 MMHG | BODY MASS INDEX: 46.43 KG/M2 | RESPIRATION RATE: 18 BRPM

## 2022-07-29 DIAGNOSIS — R01.1 HEART MURMUR: Chronic | ICD-10-CM

## 2022-07-29 DIAGNOSIS — R60.0 LOWER EXTREMITY EDEMA: Chronic | ICD-10-CM

## 2022-07-29 DIAGNOSIS — I10 PRIMARY HYPERTENSION: Chronic | ICD-10-CM

## 2022-07-29 DIAGNOSIS — R73.03 PREDIABETES: Primary | ICD-10-CM

## 2022-07-29 DIAGNOSIS — G47.33 OBSTRUCTIVE SLEEP APNEA TREATED WITH CONTINUOUS POSITIVE AIRWAY PRESSURE (CPAP): Chronic | ICD-10-CM

## 2022-07-29 DIAGNOSIS — E66.01 MORBID OBESITY WITH BMI OF 45.0-49.9, ADULT: Chronic | ICD-10-CM

## 2022-07-29 PROCEDURE — 3078F DIAST BP <80 MM HG: CPT | Mod: CPTII,S$GLB,, | Performed by: FAMILY MEDICINE

## 2022-07-29 PROCEDURE — 4010F PR ACE/ARB THEARPY RXD/TAKEN: ICD-10-PCS | Mod: CPTII,S$GLB,, | Performed by: FAMILY MEDICINE

## 2022-07-29 PROCEDURE — 3075F PR MOST RECENT SYSTOLIC BLOOD PRESS GE 130-139MM HG: ICD-10-PCS | Mod: CPTII,S$GLB,, | Performed by: FAMILY MEDICINE

## 2022-07-29 PROCEDURE — 1160F PR REVIEW ALL MEDS BY PRESCRIBER/CLIN PHARMACIST DOCUMENTED: ICD-10-PCS | Mod: CPTII,S$GLB,, | Performed by: FAMILY MEDICINE

## 2022-07-29 PROCEDURE — 99999 PR PBB SHADOW E&M-EST. PATIENT-LVL IV: CPT | Mod: PBBFAC,,, | Performed by: FAMILY MEDICINE

## 2022-07-29 PROCEDURE — 99999 PR PBB SHADOW E&M-EST. PATIENT-LVL IV: ICD-10-PCS | Mod: PBBFAC,,, | Performed by: FAMILY MEDICINE

## 2022-07-29 PROCEDURE — 1159F MED LIST DOCD IN RCRD: CPT | Mod: CPTII,S$GLB,, | Performed by: FAMILY MEDICINE

## 2022-07-29 PROCEDURE — 3075F SYST BP GE 130 - 139MM HG: CPT | Mod: CPTII,S$GLB,, | Performed by: FAMILY MEDICINE

## 2022-07-29 PROCEDURE — 3008F BODY MASS INDEX DOCD: CPT | Mod: CPTII,S$GLB,, | Performed by: FAMILY MEDICINE

## 2022-07-29 PROCEDURE — 1159F PR MEDICATION LIST DOCUMENTED IN MEDICAL RECORD: ICD-10-PCS | Mod: CPTII,S$GLB,, | Performed by: FAMILY MEDICINE

## 2022-07-29 PROCEDURE — 99214 OFFICE O/P EST MOD 30 MIN: CPT | Mod: S$GLB,,, | Performed by: FAMILY MEDICINE

## 2022-07-29 PROCEDURE — 3008F PR BODY MASS INDEX (BMI) DOCUMENTED: ICD-10-PCS | Mod: CPTII,S$GLB,, | Performed by: FAMILY MEDICINE

## 2022-07-29 PROCEDURE — 4010F ACE/ARB THERAPY RXD/TAKEN: CPT | Mod: CPTII,S$GLB,, | Performed by: FAMILY MEDICINE

## 2022-07-29 PROCEDURE — 1160F RVW MEDS BY RX/DR IN RCRD: CPT | Mod: CPTII,S$GLB,, | Performed by: FAMILY MEDICINE

## 2022-07-29 PROCEDURE — 99214 PR OFFICE/OUTPT VISIT, EST, LEVL IV, 30-39 MIN: ICD-10-PCS | Mod: S$GLB,,, | Performed by: FAMILY MEDICINE

## 2022-07-29 PROCEDURE — 3078F PR MOST RECENT DIASTOLIC BLOOD PRESSURE < 80 MM HG: ICD-10-PCS | Mod: CPTII,S$GLB,, | Performed by: FAMILY MEDICINE

## 2022-07-29 RX ORDER — SEMAGLUTIDE 1.34 MG/ML
1 INJECTION, SOLUTION SUBCUTANEOUS
Qty: 1 PEN | Refills: 1 | Status: SHIPPED | OUTPATIENT
Start: 2022-07-29 | End: 2022-10-19 | Stop reason: DRUGHIGH

## 2022-07-29 NOTE — PATIENT INSTRUCTIONS
I am increasing your ozempic dosage to 1 mg. Please make sure to let me know if you have any tolerability issues.     I'm glad to hear your blood pressure is doing well and you overall have less fluid.     Please do you labs when you are able.     Keep up the good work with your lifestyle changes.   Follow up in 6-8 weeks/sooner if needed

## 2022-07-29 NOTE — PROGRESS NOTES
Subjective:       Patient ID: Dragan Man II is a 56 y.o. male.  Pmhx, fam hx, soc hx, surg hx, allergies, med list reviewed    Chief Complaint: F/U weight gain  Pt in good spirits today as he presents for f/u. Has been following a good meal plan. Packing lunches instead of plate lunches or eating as much fried foods in stores.     Had a good check up with Dr Shaikh for severe aortic valve stenosis. Had bp med adjusted and less swelling, more energy. Overall feeling well. Much less edema and sob since bp med change.  Wife is working with him. Pt is very excited and feels better as far as any edema or sob. Pt feels like significant weight loss was from fluid. Had lost 10 lbs with med change prior to starting. Denies cp.    Pt has cut back on coffee and almost eliminated soda. Occasional tea.   BP today is good.   Sleep overall is adequate. LAVERNE stable.       No intolerance of glp 1. NO gi upset. NO vision changes. NO ruq pain. NO change in stools/diarrhea. NO n/v. NO worsening gerd.         HPI  Review of Systems   Constitutional: Negative for activity change, appetite change, fatigue and unexpected weight change.   HENT: Negative for mouth dryness.    Eyes: Negative for visual disturbance.   Respiratory: Negative for apnea, chest tightness and shortness of breath.    Cardiovascular: Negative for chest pain.   Gastrointestinal: Negative for abdominal pain.   Musculoskeletal: Negative for arthralgias and myalgias.   Integumentary:  Negative for rash.   Allergic/Immunologic: Negative for food allergies.   Psychiatric/Behavioral: Negative for behavioral problems and sleep disturbance. The patient is not nervous/anxious.       Wt Readings from Last 3 Encounters:   07/29/22 1403 (!) 151 kg (332 lb 14.3 oz)   07/07/22 1522 (!) 155.9 kg (343 lb 11.2 oz)   06/29/22 1100 (!) 161.7 kg (356 lb 7.7 oz)         Objective:      Physical Exam  Vitals and nursing note reviewed.   Constitutional:       General: He is not in acute  distress.  HENT:      Head: Normocephalic and atraumatic.      Mouth/Throat:      Pharynx: Oropharynx is clear.   Eyes:      General: No scleral icterus.     Pupils: Pupils are equal, round, and reactive to light.   Neck:      Comments: No TM  Cardiovascular:      Rate and Rhythm: Normal rate and regular rhythm.      Pulses: Normal pulses.      Heart sounds:     No friction rub. No gallop.   Pulmonary:      Effort: Pulmonary effort is normal. No respiratory distress.      Breath sounds: Normal breath sounds. No wheezing, rhonchi or rales.   Abdominal:      General: Bowel sounds are normal. There is no distension.      Palpations: Abdomen is soft.      Tenderness: There is no abdominal tenderness.   Musculoskeletal:         General: No swelling (minimal sock line demarkation; overall improved from previous visit).      Cervical back: Normal range of motion and neck supple. No tenderness.   Lymphadenopathy:      Cervical: No cervical adenopathy.   Skin:     General: Skin is warm.      Capillary Refill: Capillary refill takes less than 2 seconds.      Findings: No erythema or rash.   Neurological:      Mental Status: He is alert and oriented to person, place, and time.   Psychiatric:         Mood and Affect: Mood normal.         Behavior: Behavior normal.         Assessment:       Problem List Items Addressed This Visit    None           ICD-10-CM ICD-9-CM   1. Prediabetes  R73.03 790.29   2. Heart murmur  R01.1 785.2   3. Primary hypertension  I10 401.9   4. Morbid obesity with BMI of 45.0-49.9, adult  E66.01 278.01    Z68.42 V85.42   5. Obstructive sleep apnea treated with continuous positive airway pressure (CPAP)  G47.33 327.23    Z99.89 V46.8   6. Lower extremity edema  R60.0 782.3     Plan:       Prediabetes  -     semaglutide (OZEMPIC) 1 mg/dose (4 mg/3 mL); Inject 1 mg into the skin every 7 days.  Dispense: 1 pen; Refill: 1  Pt advised to make sure to have labs done    Heart murmur  Comments:  stable/chronic;  due to AS; sees Dr Shaikh;     Primary hypertension  Comments:  stable; reading today good on newer regimen (metoprolol and losartan)    Morbid obesity with BMI of 45.0-49.9, adult  Comments:  as above  continued lifestyle changes; pt goal to lose 20 # by Jaquelin  Orders:  -     semaglutide (OZEMPIC) 1 mg/dose (4 mg/3 mL); Inject 1 mg into the skin every 7 days.  Dispense: 1 pen; Refill: 1  Increase dosage to 1 mg as tolerated; pt will likely remain on this dosage if tolerates until approaches weight loss goal  Pt reminded risks/benefits/side effects (common) and also advised pt should stop if any ruq pain, biliary dysfunction (pt has not had but wanted to make him aware)  Call if any pharmacy issues    Obstructive sleep apnea treated with continuous positive airway pressure (CPAP)  Comments:  chronic/stable; compliance encouraged    Lower extremity edema  Comments:  improved; continue current bp med and prn lasix    30 min spent with patient  F/u 6-8 weeks

## 2022-08-15 ENCOUNTER — OFFICE VISIT (OUTPATIENT)
Dept: INTERNAL MEDICINE | Facility: CLINIC | Age: 57
End: 2022-08-15
Payer: COMMERCIAL

## 2022-08-15 VITALS
HEIGHT: 71 IN | WEIGHT: 315 LBS | RESPIRATION RATE: 18 BRPM | SYSTOLIC BLOOD PRESSURE: 118 MMHG | HEART RATE: 70 BPM | BODY MASS INDEX: 44.1 KG/M2 | DIASTOLIC BLOOD PRESSURE: 62 MMHG | OXYGEN SATURATION: 95 %

## 2022-08-15 DIAGNOSIS — I10 PRIMARY HYPERTENSION: Primary | Chronic | ICD-10-CM

## 2022-08-15 DIAGNOSIS — R60.1 GENERALIZED EDEMA: ICD-10-CM

## 2022-08-15 DIAGNOSIS — Z79.899 ON STATIN THERAPY DUE TO RISK OF FUTURE CARDIOVASCULAR EVENT: Chronic | ICD-10-CM

## 2022-08-15 DIAGNOSIS — I35.0 NONRHEUMATIC AORTIC VALVE STENOSIS: ICD-10-CM

## 2022-08-15 DIAGNOSIS — Z23 NEED FOR COVID-19 VACCINE: ICD-10-CM

## 2022-08-15 DIAGNOSIS — I27.20 PULMONARY HYPERTENSION: Chronic | ICD-10-CM

## 2022-08-15 DIAGNOSIS — R73.03 PREDIABETES: ICD-10-CM

## 2022-08-15 DIAGNOSIS — Z12.5 SCREENING PSA (PROSTATE SPECIFIC ANTIGEN): ICD-10-CM

## 2022-08-15 DIAGNOSIS — E66.01 MORBID OBESITY WITH BMI OF 45.0-49.9, ADULT: Chronic | ICD-10-CM

## 2022-08-15 PROCEDURE — 84450 TRANSFERASE (AST) (SGOT): CPT | Performed by: FAMILY MEDICINE

## 2022-08-15 PROCEDURE — 3074F PR MOST RECENT SYSTOLIC BLOOD PRESSURE < 130 MM HG: ICD-10-PCS | Mod: CPTII,S$GLB,, | Performed by: FAMILY MEDICINE

## 2022-08-15 PROCEDURE — 4010F ACE/ARB THERAPY RXD/TAKEN: CPT | Mod: CPTII,S$GLB,, | Performed by: FAMILY MEDICINE

## 2022-08-15 PROCEDURE — 1159F MED LIST DOCD IN RCRD: CPT | Mod: CPTII,S$GLB,, | Performed by: FAMILY MEDICINE

## 2022-08-15 PROCEDURE — 1159F PR MEDICATION LIST DOCUMENTED IN MEDICAL RECORD: ICD-10-PCS | Mod: CPTII,S$GLB,, | Performed by: FAMILY MEDICINE

## 2022-08-15 PROCEDURE — 99214 PR OFFICE/OUTPT VISIT, EST, LEVL IV, 30-39 MIN: ICD-10-PCS | Mod: S$GLB,,, | Performed by: FAMILY MEDICINE

## 2022-08-15 PROCEDURE — 1160F RVW MEDS BY RX/DR IN RCRD: CPT | Mod: CPTII,S$GLB,, | Performed by: FAMILY MEDICINE

## 2022-08-15 PROCEDURE — 99999 PR PBB SHADOW E&M-EST. PATIENT-LVL IV: CPT | Mod: PBBFAC,,, | Performed by: FAMILY MEDICINE

## 2022-08-15 PROCEDURE — 84460 ALANINE AMINO (ALT) (SGPT): CPT | Performed by: FAMILY MEDICINE

## 2022-08-15 PROCEDURE — 99999 PR PBB SHADOW E&M-EST. PATIENT-LVL IV: ICD-10-PCS | Mod: PBBFAC,,, | Performed by: FAMILY MEDICINE

## 2022-08-15 PROCEDURE — 3078F PR MOST RECENT DIASTOLIC BLOOD PRESSURE < 80 MM HG: ICD-10-PCS | Mod: CPTII,S$GLB,, | Performed by: FAMILY MEDICINE

## 2022-08-15 PROCEDURE — 3008F BODY MASS INDEX DOCD: CPT | Mod: CPTII,S$GLB,, | Performed by: FAMILY MEDICINE

## 2022-08-15 PROCEDURE — 83036 HEMOGLOBIN GLYCOSYLATED A1C: CPT | Performed by: FAMILY MEDICINE

## 2022-08-15 PROCEDURE — 99214 OFFICE O/P EST MOD 30 MIN: CPT | Mod: S$GLB,,, | Performed by: FAMILY MEDICINE

## 2022-08-15 PROCEDURE — 80061 LIPID PANEL: CPT | Performed by: FAMILY MEDICINE

## 2022-08-15 PROCEDURE — 4010F PR ACE/ARB THEARPY RXD/TAKEN: ICD-10-PCS | Mod: CPTII,S$GLB,, | Performed by: FAMILY MEDICINE

## 2022-08-15 PROCEDURE — 84153 ASSAY OF PSA TOTAL: CPT | Performed by: FAMILY MEDICINE

## 2022-08-15 PROCEDURE — 3074F SYST BP LT 130 MM HG: CPT | Mod: CPTII,S$GLB,, | Performed by: FAMILY MEDICINE

## 2022-08-15 PROCEDURE — 3008F PR BODY MASS INDEX (BMI) DOCUMENTED: ICD-10-PCS | Mod: CPTII,S$GLB,, | Performed by: FAMILY MEDICINE

## 2022-08-15 PROCEDURE — 3078F DIAST BP <80 MM HG: CPT | Mod: CPTII,S$GLB,, | Performed by: FAMILY MEDICINE

## 2022-08-15 PROCEDURE — 1160F PR REVIEW ALL MEDS BY PRESCRIBER/CLIN PHARMACIST DOCUMENTED: ICD-10-PCS | Mod: CPTII,S$GLB,, | Performed by: FAMILY MEDICINE

## 2022-08-15 RX ORDER — METOPROLOL SUCCINATE 50 MG/1
50 TABLET, EXTENDED RELEASE ORAL DAILY
Qty: 90 TABLET | Refills: 3 | Status: SHIPPED | OUTPATIENT
Start: 2022-08-15 | End: 2022-10-26

## 2022-08-15 RX ORDER — ATORVASTATIN CALCIUM 20 MG/1
20 TABLET, FILM COATED ORAL NIGHTLY
Qty: 90 TABLET | Refills: 3 | Status: SHIPPED | OUTPATIENT
Start: 2022-08-15 | End: 2023-05-22 | Stop reason: SDUPTHER

## 2022-08-15 RX ORDER — FUROSEMIDE 20 MG/1
20 TABLET ORAL
Qty: 60 TABLET | Refills: 0 | Status: SHIPPED | OUTPATIENT
Start: 2022-08-15 | End: 2022-11-28

## 2022-08-15 RX ORDER — LOSARTAN POTASSIUM 50 MG/1
50 TABLET ORAL DAILY
Qty: 90 TABLET | Refills: 3 | Status: SHIPPED | OUTPATIENT
Start: 2022-08-15 | End: 2023-05-22 | Stop reason: SDUPTHER

## 2022-08-15 NOTE — PROGRESS NOTES
OFFICE VISIT 8/15/22 10:30 AM CDT    CHIEF COMPLAINT: Follow-up    Loosing weight through therapeutic lifestyle changes. Renata reports that his chronic conditions are stable, and he reports no new complaints or concerns. He says he is doing well on his current medicines, he reports preceiving no adverse side-effects and wants to continue current treatment.     DIAGNOSES SPECIFICALLY EVALUATED AND TREATED THIS ENCOUNTER  1. Primary hypertension  - furosemide (LASIX) 20 MG tablet; Take 1 tablet (20 mg total) by mouth every Mon, Tues, Wed, Thurs, Fri.  Dispense: 60 tablet; Refill: 0  - losartan (COZAAR) 50 MG tablet; Take 1 tablet (50 mg total) by mouth once daily.  Dispense: 90 tablet; Refill: 3  - metoprolol succinate (TOPROL-XL) 50 MG 24 hr tablet; Take 1 tablet (50 mg total) by mouth once daily.  Dispense: 90 tablet; Refill: 3    2. Prediabetes  - Hemoglobin A1C    3. On statin therapy due to risk of future cardiovascular event  - atorvastatin (LIPITOR) 20 MG tablet; Take 1 tablet (20 mg total) by mouth every evening.  Dispense: 90 tablet; Refill: 3  - Lipid Panel  - AST (SGOT)  - ALT (SGPT)    4. Generalized edema  - furosemide (LASIX) 20 MG tablet; Take 1 tablet (20 mg total) by mouth every Mon, Tues, Wed, Thurs, Fri.  Dispense: 60 tablet; Refill: 0    5. Pulmonary hypertension    6. Nonrheumatic aortic valve stenosis, severe  - losartan (COZAAR) 50 MG tablet; Take 1 tablet (50 mg total) by mouth once daily.  Dispense: 90 tablet; Refill: 3  - metoprolol succinate (TOPROL-XL) 50 MG 24 hr tablet; Take 1 tablet (50 mg total) by mouth once daily.  Dispense: 90 tablet; Refill: 3    7. Screening PSA (prostate specific antigen)  - PSA, Screening    8. Need for COVID-19 vaccine    9. Morbid obesity with BMI of 45.0-49.9, adult     --------------------------------------------------------------------------------   RENATA LUJAN II (MRN [id number], APPT: 8/15/22 10:30 AM CDT)  --------------------------------  Ready to  "check out. See orders.   Clinic collect labs.   Schedule in-office appointment with DIANA Partida about 5-6 months from now.  Thanks.  --------------------------------------------------------------------------------     Follow up in about 6 months (around 2/15/2023) for re-evaluate problem(s) discussed today.   Future Appointments   Date Time Provider Department Center   9/2/2022  1:00 PM Chely Cochran MD [facility] LIFE [facility]   10/11/2022  3:00 PM Ron Shaikh MD [facility] CARDIO [facility]   4/18/2023  3:40 PM Elizabeth Lejeune, NP [facility] SLEEP [facility]     Problem List Items Addressed This Visit     Morbid obesity with BMI of 45.0-49.9, adult (Chronic)    Current Assessment & Plan     Wt Readings from Last 6 Encounters:   08/15/22 (!) 151.5 kg (334 lb)   07/29/22 (!) 151 kg (332 lb 14.3 oz)   07/07/22 (!) 155.9 kg (343 lb 11.2 oz)   06/29/22 (!) 161.7 kg (356 lb 7.7 oz)   06/28/22 (!) 161.7 kg (356 lb 7.7 oz)   06/20/22 (!) 161.7 kg (356 lb 9.2 oz)     Estimated body mass index is 46.58 kg/m² as calculated from the following:    Height as of this encounter: 5' 11" (1.803 m).    Weight as of this encounter: 151.5 kg (334 lb).             Primary hypertension - Primary (Chronic)    Current Assessment & Plan     BP Readings from Last 6 Encounters:   08/15/22 118/62   07/29/22 132/66   07/07/22 129/70   06/28/22 137/70   06/20/22 130/76   06/13/22 120/78      Last 5 Patient Entered Readings                Current 30 Day Average: 137/72  Recent Readings 8/15/2022 8/15/2022 8/15/2022 8/13/2022 8/13/2022   SBP (mmHg) 137 140 141 136 133   DBP (mmHg) 79 77 75 63 59   Pulse 60 63 62 70 70                 Lab Results   Component Value Date    CREATININE 0.8 06/20/2022    BUN 14 06/20/2022    K 4.0 06/20/2022     06/20/2022     06/20/2022     Results for orders placed or performed during the hospital encounter of 06/20/22   EKG 12-lead    Collection Time: 06/20/22  3:17 PM    Narrative    Test Reason : " I73.9,    Vent. Rate : 076 BPM     Atrial Rate : 076 BPM     P-R Int : 178 ms          QRS Dur : 114 ms      QT Int : 366 ms       P-R-T Axes : 021 -04 099 degrees     QTc Int : 411 ms    Normal sinus rhythm  Minimal voltage criteria for LVH, may be normal variant ( Alvin product )  Septal infarct ,age undetermined  T wave abnormality, consider lateral ischemia  Abnormal ECG  When compared with ECG of 12-NOV-2021 13:25,  No significant change was found  Confirmed by Paco Addison MD ([id number]) on 6/21/2022 5:19:22 AM    Referred By: GILBERTO PINEDA           Confirmed By:Paco Addison MD                Relevant Medications    furosemide (LASIX) 20 MG tablet    losartan (COZAAR) 50 MG tablet    metoprolol succinate (TOPROL-XL) 50 MG 24 hr tablet    On statin therapy due to risk of future cardiovascular event (Chronic)    Current Assessment & Plan     Lab Results   Component Value Date    CHOL 135 04/06/2021    CHOL 185 10/14/2019    TRIG 116 04/06/2021    TRIG 331 (H) 10/14/2019    HDL 41 04/06/2021    HDL 38 (L) 10/14/2019    LDLCALC 70.8 04/06/2021    LDLCALC 80.8 10/14/2019    NONHDLCHOL 94 04/06/2021    NONHDLCHOL 147 10/14/2019    AST 26 11/12/2021    ALT 27 11/12/2021     The 10-year ASCVD risk score (Caty SMITH Jr., et al., 2013) is: 5.8%    Values used to calculate the score:      Age: 56 years      Sex: Male      Is Non- : No      Diabetic: No      Tobacco smoker: No      Systolic Blood Pressure: 118 mmHg      Is BP treated: Yes      HDL Cholesterol: 41 mg/dL      Total Cholesterol: 135 mg/dL            Relevant Medications    atorvastatin (LIPITOR) 20 MG tablet    Other Relevant Orders    Lipid Panel    AST (SGOT)    ALT (SGPT)    Nonrheumatic aortic valve stenosis, severe (Chronic)    Overview     CARDIAC CATH 06/28/22  - The pre-procedure left ventricular end diastolic pressure was 32.  - There was trivial (1+) aortic regurgitation.  - The estimated blood loss was none.  - There was  diastolic dysfunction.  - The coronary arteries were normal.  - The filling pressures on the right and left were moderately elevated. Pulmonary hypertension was moderate.  - 53 MMHG GRADIENT ACROSS AORTIC VAKLVE SUGGESTIVE OF SEVERE AORTIC STENOSIS.    ECHOCARDIOGRAM 10/18/2019  1 - Normal left ventricular systolic function (EF 60-65%).  2 - Impaired LV relaxation, elevated LAP (grade 2 diastolic dysfunction).  3 - Normal right ventricular systolic function.  4 - No wall motion abnormalities.  5 - Severe left atrial enlargement.  6 - Concentric hypertrophy.  7 - Mild aortic regurgitation.  8 - Moderate aortic stenosis, CRISTINO = 1.87 cm2, AVAi = 0.7 cm2/m2, peak velocity = 3.87 m/s, mean gradient = 34 mmHg.           Relevant Medications    losartan (COZAAR) 50 MG tablet    metoprolol succinate (TOPROL-XL) 50 MG 24 hr tablet    Pulmonary hypertension (Chronic)    Overview     CARDIAC CATH 06/28/22  - The pre-procedure left ventricular end diastolic pressure was 32.  - There was trivial (1+) aortic regurgitation.  - The estimated blood loss was none.  - There was diastolic dysfunction.  - The coronary arteries were normal.  - The filling pressures on the right and left were moderately elevated. Pulmonary hypertension was moderate.  - 53 MMHG GRADIENT ACROSS AORTIC VAKLVE SUGGESTIVE OF SEVERE AORTIC STENOSIS.           Generalized edema    Relevant Medications    furosemide (LASIX) 20 MG tablet    Prediabetes    Relevant Orders    Hemoglobin A1C      Other Visit Diagnoses     Screening PSA (prostate specific antigen)        Relevant Orders    PSA, Screening    Need for COVID-19 vaccine          Unless noted herein, any chronic conditions are represented as and appear compensated/controlled and stable, and no other significant complaints or concerns were reported.    PRESCRIPTION DRUG MANAGEMENT  Outpatient Medications Prior to Visit   Medication Sig Dispense Refill    albuterol (PROVENTIL/VENTOLIN HFA) 90 mcg/actuation  inhaler Inhale 2 puffs into the lungs every 4 (four) hours as needed for Wheezing. Rescue 6.7 g 1    doxycycline (MONODOX) 100 MG capsule TAKE ONCE DAILY WITH FOOD. MAY CAUSE UPSET STOMACH. 90 capsule 1    mometasone (NASONEX) 50 mcg/actuation nasal spray INSTILL 2 SPRAYS INTO EACH NOSTRIL ONCE DAILY. 1 each 1    semaglutide (OZEMPIC) 1 mg/dose (4 mg/3 mL) Inject 1 mg into the skin every 7 days. 1 pen 1    sodium chloride (OCEAN) 0.65 % nasal spray 1 spray by Nasal route as needed for Congestion.      tretinoin (RETIN-A) 0.05 % cream Apply pea-sized amount to entire face at bedtime.  If dryness, use every third night and increase as tolerated to every night. 20 g 6    atorvastatin (LIPITOR) 20 MG tablet Take 1 tablet (20 mg total) by mouth every evening. 90 tablet 3    furosemide (LASIX) 20 MG tablet Take 1 tablet (20 mg total) by mouth every Mon, Tues, Wed, Thurs, Fri. 60 tablet 3    losartan (COZAAR) 50 MG tablet Take 1 tablet (50 mg total) by mouth once daily. 30 tablet 11    metoprolol succinate (TOPROL-XL) 50 MG 24 hr tablet Take 1 tablet (50 mg total) by mouth once daily. 30 tablet 11     Facility-Administered Medications Prior to Visit   Medication Dose Route Frequency Provider Last Rate Last Admin    sodium chloride 0.9% flush 10 mL  10 mL Intravenous PRN Ron Shaikh MD         Medications Discontinued During This Encounter   Medication Reason    atorvastatin (LIPITOR) 20 MG tablet Reorder    furosemide (LASIX) 20 MG tablet Reorder    losartan (COZAAR) 50 MG tablet Reorder    metoprolol succinate (TOPROL-XL) 50 MG 24 hr tablet Reorder     Medications Ordered This Encounter   Medications    atorvastatin (LIPITOR) 20 MG tablet     Sig: Take 1 tablet (20 mg total) by mouth every evening.     Dispense:  90 tablet     Refill:  3    furosemide (LASIX) 20 MG tablet     Sig: Take 1 tablet (20 mg total) by mouth every Mon, Tues, Wed, Thurs, Fri.     Dispense:  60 tablet     Refill:  0    losartan  "(COZAAR) 50 MG tablet     Sig: Take 1 tablet (50 mg total) by mouth once daily.     Dispense:  90 tablet     Refill:  3    metoprolol succinate (TOPROL-XL) 50 MG 24 hr tablet     Sig: Take 1 tablet (50 mg total) by mouth once daily.     Dispense:  90 tablet     Refill:  3   Review of Systems   Respiratory: Negative for chest tightness and shortness of breath.    Cardiovascular: Negative for chest pain.   Endocrine: Negative for polydipsia and polyuria.      PHYSICAL EXAM  Vitals:    08/15/22 1015   BP: 118/62   Pulse: 70   Resp: 18   SpO2: 95%   Weight: (!) 151.5 kg (334 lb)   Height: 5' 11" (1.803 m)   CONSTITUTIONAL: Vital signs noted. No apparent distress. Does not appear acutely ill or septic. Appears adequately hydrated.  PULM: Lungs clear. Breathing unlabored.  HEART: Regular.  DERM: Skin warm and moist with normal turgor.  NEURO: There are no gross focal motor deficits.  PSYCHIATRIC: Alert and conversant. Mood grossly neutral. Judgment and insight grossly intact.    Documentation entered by me for this encounter may have been done in part using speech-recognition technology. Although I have made an effort to ensure accuracy, "sound like" errors may exist and should be interpreted in context.   "

## 2022-08-15 NOTE — ASSESSMENT & PLAN NOTE
"Wt Readings from Last 6 Encounters:   08/15/22 (!) 151.5 kg (334 lb)   07/29/22 (!) 151 kg (332 lb 14.3 oz)   07/07/22 (!) 155.9 kg (343 lb 11.2 oz)   06/29/22 (!) 161.7 kg (356 lb 7.7 oz)   06/28/22 (!) 161.7 kg (356 lb 7.7 oz)   06/20/22 (!) 161.7 kg (356 lb 9.2 oz)     Estimated body mass index is 46.58 kg/m² as calculated from the following:    Height as of this encounter: 5' 11" (1.803 m).    Weight as of this encounter: 151.5 kg (334 lb).    "

## 2022-08-15 NOTE — ASSESSMENT & PLAN NOTE
Lab Results   Component Value Date    CHOL 135 04/06/2021    CHOL 185 10/14/2019    TRIG 116 04/06/2021    TRIG 331 (H) 10/14/2019    HDL 41 04/06/2021    HDL 38 (L) 10/14/2019    LDLCALC 70.8 04/06/2021    LDLCALC 80.8 10/14/2019    NONHDLCHOL 94 04/06/2021    NONHDLCHOL 147 10/14/2019    AST 26 11/12/2021    ALT 27 11/12/2021     The 10-year ASCVD risk score (Caty SMITH Jr., et al., 2013) is: 5.8%    Values used to calculate the score:      Age: 56 years      Sex: Male      Is Non- : No      Diabetic: No      Tobacco smoker: No      Systolic Blood Pressure: 118 mmHg      Is BP treated: Yes      HDL Cholesterol: 41 mg/dL      Total Cholesterol: 135 mg/dL

## 2022-08-15 NOTE — ASSESSMENT & PLAN NOTE
BP Readings from Last 6 Encounters:   08/15/22 118/62   07/29/22 132/66   07/07/22 129/70   06/28/22 137/70   06/20/22 130/76   06/13/22 120/78      Last 5 Patient Entered Readings                Current 30 Day Average: 137/72  Recent Readings 8/15/2022 8/15/2022 8/15/2022 8/13/2022 8/13/2022   SBP (mmHg) 137 140 141 136 133   DBP (mmHg) 79 77 75 63 59   Pulse 60 63 62 70 70                 Lab Results   Component Value Date    CREATININE 0.8 06/20/2022    BUN 14 06/20/2022    K 4.0 06/20/2022     06/20/2022     06/20/2022     Results for orders placed or performed during the hospital encounter of 06/20/22   EKG 12-lead    Collection Time: 06/20/22  3:17 PM    Narrative    Test Reason : I73.9,    Vent. Rate : 076 BPM     Atrial Rate : 076 BPM     P-R Int : 178 ms          QRS Dur : 114 ms      QT Int : 366 ms       P-R-T Axes : 021 -04 099 degrees     QTc Int : 411 ms    Normal sinus rhythm  Minimal voltage criteria for LVH, may be normal variant ( Alvin product )  Septal infarct ,age undetermined  T wave abnormality, consider lateral ischemia  Abnormal ECG  When compared with ECG of 12-NOV-2021 13:25,  No significant change was found  Confirmed by Paco Addison MD ([id number]) on 6/21/2022 5:19:22 AM    Referred By: GILBERTO PINEDA           Confirmed By:Paco Addison MD

## 2022-08-15 NOTE — PATIENT INSTRUCTIONS
I recommend that you get your second COVID-19 vaccine booster dose.    You can learn more about the COVID-19 vaccine at [email or web address].    The quickest and easiest way to schedule an appointment for your COVID-19 booster vaccination is by using [facility] or by calling [phone].    Please take care of yourself. You are important to me!

## 2022-08-15 NOTE — Clinical Note
--------------------------------------------------------------------------------  RENATA LUJAN II (MRN [id number], APPT: 8/15/22 10:30 AM CDT) -------------------------------- Ready to check out. See orders. Clinic collect labs. Schedule in-office appointment with DIANA Partida about 5-6 months from now. Thanks. --------------------------------------------------------------------------------

## 2022-08-16 LAB
ALT SERPL W/O P-5'-P-CCNC: 31 U/L (ref 10–44)
AST SERPL-CCNC: 26 U/L (ref 10–40)
CHOLEST SERPL-MCNC: 110 MG/DL (ref 120–199)
CHOLEST/HDLC SERPL: 2.8 {RATIO} (ref 2–5)
COMPLEXED PSA SERPL-MCNC: 0.29 NG/ML (ref 0–4)
ESTIMATED AVG GLUCOSE: 114 MG/DL (ref 68–131)
HBA1C MFR BLD: 5.6 % (ref 4–5.6)
HDLC SERPL-MCNC: 40 MG/DL (ref 40–75)
HDLC SERPL: 36.4 % (ref 20–50)
LDLC SERPL CALC-MCNC: 54.4 MG/DL (ref 63–159)
NONHDLC SERPL-MCNC: 70 MG/DL
TRIGL SERPL-MCNC: 78 MG/DL (ref 30–150)

## 2022-08-28 NOTE — PROGRESS NOTES
"Results look GREAT!  Keep up the good work!  ----------  If you want to learn more about your test results and what they mean, it's as simple as 1, 2, 3.  1. Log in to [facility] using the [facility] mel or online at [email or web address].   2. From the "Test Results" tab, click on the test you want to know more about.  3. If using the mobile mel, click the "Get Info" icon. (The "Get Info" icon looks like a [place] with a lower case letter i inside it.) If using your computer, click the "Get Info" icon or the "About This Test" link."

## 2022-09-02 ENCOUNTER — OFFICE VISIT (OUTPATIENT)
Dept: PRIMARY CARE CLINIC | Facility: CLINIC | Age: 57
End: 2022-09-02
Payer: COMMERCIAL

## 2022-09-02 VITALS
RESPIRATION RATE: 18 BRPM | OXYGEN SATURATION: 100 % | HEART RATE: 67 BPM | SYSTOLIC BLOOD PRESSURE: 122 MMHG | WEIGHT: 315 LBS | DIASTOLIC BLOOD PRESSURE: 78 MMHG | BODY MASS INDEX: 46.43 KG/M2

## 2022-09-02 DIAGNOSIS — R73.03 PREDIABETES: Primary | ICD-10-CM

## 2022-09-02 DIAGNOSIS — R60.0 LOWER EXTREMITY EDEMA: ICD-10-CM

## 2022-09-02 DIAGNOSIS — G47.33 OBSTRUCTIVE SLEEP APNEA TREATED WITH CONTINUOUS POSITIVE AIRWAY PRESSURE (CPAP): Chronic | ICD-10-CM

## 2022-09-02 DIAGNOSIS — E66.01 MORBID OBESITY: ICD-10-CM

## 2022-09-02 DIAGNOSIS — I10 PRIMARY HYPERTENSION: Chronic | ICD-10-CM

## 2022-09-02 PROCEDURE — 3078F PR MOST RECENT DIASTOLIC BLOOD PRESSURE < 80 MM HG: ICD-10-PCS | Mod: CPTII,S$GLB,, | Performed by: FAMILY MEDICINE

## 2022-09-02 PROCEDURE — 3044F HG A1C LEVEL LT 7.0%: CPT | Mod: CPTII,S$GLB,, | Performed by: FAMILY MEDICINE

## 2022-09-02 PROCEDURE — 99214 OFFICE O/P EST MOD 30 MIN: CPT | Mod: S$GLB,,, | Performed by: FAMILY MEDICINE

## 2022-09-02 PROCEDURE — 3044F PR MOST RECENT HEMOGLOBIN A1C LEVEL <7.0%: ICD-10-PCS | Mod: CPTII,S$GLB,, | Performed by: FAMILY MEDICINE

## 2022-09-02 PROCEDURE — 3074F PR MOST RECENT SYSTOLIC BLOOD PRESSURE < 130 MM HG: ICD-10-PCS | Mod: CPTII,S$GLB,, | Performed by: FAMILY MEDICINE

## 2022-09-02 PROCEDURE — 1160F PR REVIEW ALL MEDS BY PRESCRIBER/CLIN PHARMACIST DOCUMENTED: ICD-10-PCS | Mod: CPTII,S$GLB,, | Performed by: FAMILY MEDICINE

## 2022-09-02 PROCEDURE — 4010F PR ACE/ARB THEARPY RXD/TAKEN: ICD-10-PCS | Mod: CPTII,S$GLB,, | Performed by: FAMILY MEDICINE

## 2022-09-02 PROCEDURE — 1159F PR MEDICATION LIST DOCUMENTED IN MEDICAL RECORD: ICD-10-PCS | Mod: CPTII,S$GLB,, | Performed by: FAMILY MEDICINE

## 2022-09-02 PROCEDURE — 3074F SYST BP LT 130 MM HG: CPT | Mod: CPTII,S$GLB,, | Performed by: FAMILY MEDICINE

## 2022-09-02 PROCEDURE — 4010F ACE/ARB THERAPY RXD/TAKEN: CPT | Mod: CPTII,S$GLB,, | Performed by: FAMILY MEDICINE

## 2022-09-02 PROCEDURE — 3078F DIAST BP <80 MM HG: CPT | Mod: CPTII,S$GLB,, | Performed by: FAMILY MEDICINE

## 2022-09-02 PROCEDURE — 3008F BODY MASS INDEX DOCD: CPT | Mod: CPTII,S$GLB,, | Performed by: FAMILY MEDICINE

## 2022-09-02 PROCEDURE — 99999 PR PBB SHADOW E&M-EST. PATIENT-LVL IV: CPT | Mod: PBBFAC,,, | Performed by: FAMILY MEDICINE

## 2022-09-02 PROCEDURE — 99214 PR OFFICE/OUTPT VISIT, EST, LEVL IV, 30-39 MIN: ICD-10-PCS | Mod: S$GLB,,, | Performed by: FAMILY MEDICINE

## 2022-09-02 PROCEDURE — 99999 PR PBB SHADOW E&M-EST. PATIENT-LVL IV: ICD-10-PCS | Mod: PBBFAC,,, | Performed by: FAMILY MEDICINE

## 2022-09-02 PROCEDURE — 1160F RVW MEDS BY RX/DR IN RCRD: CPT | Mod: CPTII,S$GLB,, | Performed by: FAMILY MEDICINE

## 2022-09-02 PROCEDURE — 1159F MED LIST DOCD IN RCRD: CPT | Mod: CPTII,S$GLB,, | Performed by: FAMILY MEDICINE

## 2022-09-02 PROCEDURE — 3008F PR BODY MASS INDEX (BMI) DOCUMENTED: ICD-10-PCS | Mod: CPTII,S$GLB,, | Performed by: FAMILY MEDICINE

## 2022-09-02 RX ORDER — SEMAGLUTIDE 2.68 MG/ML
2 INJECTION, SOLUTION SUBCUTANEOUS
Qty: 3 ML | Refills: 0 | Status: SHIPPED | OUTPATIENT
Start: 2022-09-02 | End: 2022-09-24 | Stop reason: SDUPTHER

## 2022-09-02 NOTE — PROGRESS NOTES
Subjective:       Patient ID: Dragan Man II is a 56 y.o. male.    Pmhx, fam hx, soc hx, surg hx, allergies, med list reviewed     Chief Complaint: Follow up pre diabetes    Pt on ozempic; here for f/u.   Reports doing well overall but more work stressors.   Occasional le edema; has been improved.     Tolerating the medication. Has been doing some muscle milk (not over 30 grams of protein). Had some tiffanie bars. NO nausea with the medication.   Had some constipation but improved with fiber pills. Packing his lunch.   Was 328 at home. NO gerd.   Working on implementing lifestyle changes with wife. Pt plans to implement exercise at first of year.   No known hypoglycemia. Sometimes needs to eat.     BP has been stable.   Compliant with CPAP      HPI  Review of Systems   Constitutional:  Negative for activity change, appetite change, fatigue and unexpected weight change.   HENT:  Negative for mouth dryness.    Eyes:  Negative for visual disturbance.   Respiratory:  Negative for apnea, chest tightness and shortness of breath.    Cardiovascular:  Negative for chest pain.   Gastrointestinal:  Negative for abdominal pain.   Musculoskeletal:  Negative for arthralgias and myalgias.   Integumentary:  Negative for rash.   Allergic/Immunologic: Negative for food allergies.   Psychiatric/Behavioral:  Positive for sleep disturbance (mild). Negative for behavioral problems. The patient is not nervous/anxious.        Objective:      Wt Readings from Last 3 Encounters:   09/02/22 0925 (!) 151 kg (332 lb 14.3 oz)   08/15/22 1015 (!) 151.5 kg (334 lb)   07/29/22 1403 (!) 151 kg (332 lb 14.3 oz)    Starting weight 356    Physical Exam  Vitals and nursing note reviewed.   Constitutional:       General: He is not in acute distress.  HENT:      Head: Normocephalic and atraumatic.   Eyes:      General: No scleral icterus.     Pupils: Pupils are equal, round, and reactive to light.   Neck:      Comments: No TM  Cardiovascular:      Rate  and Rhythm: Normal rate and regular rhythm.      Heart sounds: Murmur heard.     No friction rub. No gallop.   Pulmonary:      Effort: Pulmonary effort is normal. No respiratory distress.      Breath sounds: Normal breath sounds. No wheezing, rhonchi or rales.   Abdominal:      General: Bowel sounds are normal. There is no distension.      Palpations: Abdomen is soft.      Tenderness: There is no abdominal tenderness.   Musculoskeletal:         General: No swelling or tenderness.      Cervical back: Normal range of motion and neck supple. No tenderness.   Lymphadenopathy:      Cervical: No cervical adenopathy.   Skin:     General: Skin is warm.      Findings: No erythema or rash.   Neurological:      Mental Status: He is alert and oriented to person, place, and time.   Psychiatric:         Mood and Affect: Mood normal.         Behavior: Behavior normal.       Assessment:       Problem List Items Addressed This Visit                            1. Prediabetes    2. Primary hypertension    3. Obstructive sleep apnea treated with continuous positive airway pressure (CPAP)    4. Lower extremity edema        Plan:       Prediabetes  Comments:  on ozempic; implementing lifestyle changes  continue to work on decreasing sugars  Pt will let me know how is doing: will increase to 2 mg   Rx sent in   Ozempic 2 mg; again dw pt risks/benefits/side effects--will change to this after 1 mg completes if tolerates  (Will have had 2 months of 1 mg)    Primary hypertension  Comments:  chronic/stable  running 120s-140s systolic    Obstructive sleep apnea treated with continuous positive airway pressure (CPAP)  Comments:  compliant with cpap    Lower extremity edema  Comments:  improved      30 min total time spent with pt/coordination of care

## 2022-10-03 ENCOUNTER — PATIENT MESSAGE (OUTPATIENT)
Dept: INTERNAL MEDICINE | Facility: CLINIC | Age: 57
End: 2022-10-03

## 2022-10-03 ENCOUNTER — E-VISIT (OUTPATIENT)
Dept: INTERNAL MEDICINE | Facility: CLINIC | Age: 57
End: 2022-10-03
Payer: COMMERCIAL

## 2022-10-03 DIAGNOSIS — U07.1 COVID-19 VIRUS INFECTION: Primary | ICD-10-CM

## 2022-10-03 PROCEDURE — 99422 OL DIG E/M SVC 11-20 MIN: CPT | Mod: S$GLB,,, | Performed by: FAMILY MEDICINE

## 2022-10-03 PROCEDURE — 99422 PR E&M, ONLINE DIGIT, EST, < 7 DAYS,  11-20 MINS: ICD-10-PCS | Mod: S$GLB,,, | Performed by: FAMILY MEDICINE

## 2022-10-03 NOTE — PROGRESS NOTES
COVID Risk Score = 5/15  Symptom onset reported as today.    Dragan Dela Cruz.    I'm sorry to learn that you tested positive for COVID-19.     Based on your COVID-19 risk score, the date of onset of your symptoms, and consistent with National Institutes of Health Treatment Guidelines, [facility] [employer] is recommending Paxlovid as the first line treatment option for patients with mild to moderate COVID-19 AND who are at risk for progression to severe disease. You can learn more about Paxlovid at https://www.fda.gov/media/935167/download    I sent your prescription for Paxlovid to your pharmacy ([facility] in [place]). You need to start the Paxlovid ASAP. If your pharmacy doesn't have the medicine in stock, have your pharmacist send the prescription to another pharmacy. To find other pharmacies that have Paxlovid in stock, visit this link [email or web address]    IMPORTANT: Review all your current medications with your pharmacist before starting Paxlovid.    IMPORTANT: Paxlovid can have dangerous drug interactions if taken together with atorvastatin (Lipitor).  You need to stop taking atorvastatin (Lipitor) at least 12 hours before your first dose of Paxlovid. Do not re-start atorvastatin (Lipitor) until five days after you have finished taking Paxlovid.    Please take the time now to Sign up for [facility]s free COVID-19 Self-Care and Symptom Monitoring Program.  By completing a quick form on [facility], you will be automatically enrolled into our symptom monitoring program. If your symptoms worsen, our care team will be there to guide you on the next steps and treatment options.  For patients and community members who already have a [facility] account, log in to [facility] and search for the COVID-19 Self-Care and Symptom Monitoring Program.  Once in your [facility] account, click Menu, then COVID-19, scroll down to find Start a new COVID-19 self-assessment.    In the  "meantime, please take the time now to visit the following websites to learn important information about how to care for yourself and protect others while you are sick:  [email or web address]  https://www.cdc.gov/coronavirus/2019-ncov/if-you-are-sick/    I'll answer some of the most frequently asked questions about what to do if you get sick with COVID-19 below. You can always find the latest information online at [email or web address].    Im positive for COVID-19. What do I do?  Isolate immediately and follow CDC guidance.  https://www.cdc.gov/coronavirus/2019-ncov/your-health/quarantine-isolation.html  Drink plenty of fluids and take over-the-counter ibuprofen or acetaminophen if and as needed for headaches, fever, and other mild symptoms.  We recommend having a thermometer to check your temperature and a pulse oximeter, which measures your oxygen level. Thermometers and pulse oximeters are available at most major retailers, including [employer], [employer].  If you need guidance on your care or symptoms, please contact [facility] at [phone].  If you are experiencing a medical emergency, call 911.    When can I end isolation?  CDC guidance for ending isolation can be found on the CDC.gov website at the link listed below.   https://www.cdc.gov/coronavirus/2019-ncov/your-health/quarantine-isolation.html    Do I need to get re-tested for COVID-19 after I finish isolation?  No, you do not need to get re-tested for COVID-19 after you finish isolation.  Once you are infected with COVID-19 (coronavirus), your COVID-19 test result (especially PCR test results) can remain positive for up to two months after it is safe for your to end your isolation period.  Because of this, it is NOT recommended that people repeat their COVID-19 test to "test for cure" after they test positive.  A positive repeat test (especially PCR test results) within two months after you were diagnosed with " COVID-19 does NOT mean that you are still infected or that you are contagious.    Thanks for trusting [facility] with your healthcare needs.    I hope you feel better soon.    Sincerely,    GIL Barba MD     Medications Ordered This Encounter   Medications    nirmatrelvir-ritonavir 300 mg (150 mg x 2)-100 mg copackaged tablets (EUA)     Sig: Take 3 tablets by mouth 2 (two) times daily for 5 days. Each dose contains 2 nirmatrelvir (pink tablets) and 1 ritonavir (white tablet). Take all 3 tablets together     Dispense:  30 tablet     Refill:  0     Order Specific Question:   Per EUA criteria, patient has a positive COVID test AND symptom onset </= 5 days     Answer:   Yes        Lab Results   Component Value Date    CREATININE 0.8 06/20/2022     Current Outpatient Medications   Medication    albuterol (PROVENTIL/VENTOLIN HFA) 90 mcg/actuation inhaler    atorvastatin (LIPITOR) 20 MG tablet    doxycycline (MONODOX) 100 MG capsule    furosemide (LASIX) 20 MG tablet    losartan (COZAAR) 50 MG tablet    metoprolol succinate (TOPROL-XL) 50 MG 24 hr tablet    mometasone (NASONEX) 50 mcg/actuation nasal spray    semaglutide (OZEMPIC) 1 mg/dose (4 mg/3 mL)    semaglutide (OZEMPIC) 2 mg/dose (8 mg/3 mL) PnIj    sodium chloride (OCEAN) 0.65 % nasal spray    tretinoin (RETIN-A) 0.05 % cream     Current Facility-Administered Medications   Medication Frequency    sodium chloride 0.9% flush 10 mL PRN

## 2022-10-19 ENCOUNTER — OFFICE VISIT (OUTPATIENT)
Dept: PRIMARY CARE CLINIC | Facility: CLINIC | Age: 57
End: 2022-10-19
Payer: COMMERCIAL

## 2022-10-19 ENCOUNTER — IMMUNIZATION (OUTPATIENT)
Dept: INTERNAL MEDICINE | Facility: CLINIC | Age: 57
End: 2022-10-19
Payer: COMMERCIAL

## 2022-10-19 VITALS
RESPIRATION RATE: 18 BRPM | SYSTOLIC BLOOD PRESSURE: 116 MMHG | BODY MASS INDEX: 45.81 KG/M2 | DIASTOLIC BLOOD PRESSURE: 80 MMHG | WEIGHT: 315 LBS | OXYGEN SATURATION: 100 % | HEART RATE: 99 BPM

## 2022-10-19 DIAGNOSIS — R06.09 DOE (DYSPNEA ON EXERTION): ICD-10-CM

## 2022-10-19 DIAGNOSIS — R73.03 PREDIABETES: ICD-10-CM

## 2022-10-19 DIAGNOSIS — E66.01 MORBID OBESITY: ICD-10-CM

## 2022-10-19 DIAGNOSIS — E66.01 MORBID OBESITY WITH BMI OF 45.0-49.9, ADULT: Chronic | ICD-10-CM

## 2022-10-19 DIAGNOSIS — R73.03 PREDIABETES: Primary | ICD-10-CM

## 2022-10-19 DIAGNOSIS — G47.33 OBSTRUCTIVE SLEEP APNEA TREATED WITH CONTINUOUS POSITIVE AIRWAY PRESSURE (CPAP): Chronic | ICD-10-CM

## 2022-10-19 DIAGNOSIS — R01.1 HEART MURMUR: ICD-10-CM

## 2022-10-19 PROCEDURE — 3044F HG A1C LEVEL LT 7.0%: CPT | Mod: CPTII,S$GLB,, | Performed by: FAMILY MEDICINE

## 2022-10-19 PROCEDURE — 4010F ACE/ARB THERAPY RXD/TAKEN: CPT | Mod: CPTII,S$GLB,, | Performed by: FAMILY MEDICINE

## 2022-10-19 PROCEDURE — 99999 PR PBB SHADOW E&M-EST. PATIENT-LVL IV: CPT | Mod: PBBFAC,,, | Performed by: FAMILY MEDICINE

## 2022-10-19 PROCEDURE — 90471 FLU VACCINE (QUAD) GREATER THAN OR EQUAL TO 3YO PRESERVATIVE FREE IM: ICD-10-PCS | Mod: S$GLB,,, | Performed by: PEDIATRICS

## 2022-10-19 PROCEDURE — 90686 IIV4 VACC NO PRSV 0.5 ML IM: CPT | Mod: S$GLB,,, | Performed by: PEDIATRICS

## 2022-10-19 PROCEDURE — 3044F PR MOST RECENT HEMOGLOBIN A1C LEVEL <7.0%: ICD-10-PCS | Mod: CPTII,S$GLB,, | Performed by: FAMILY MEDICINE

## 2022-10-19 PROCEDURE — 99214 OFFICE O/P EST MOD 30 MIN: CPT | Mod: S$GLB,,, | Performed by: FAMILY MEDICINE

## 2022-10-19 PROCEDURE — 1159F PR MEDICATION LIST DOCUMENTED IN MEDICAL RECORD: ICD-10-PCS | Mod: CPTII,S$GLB,, | Performed by: FAMILY MEDICINE

## 2022-10-19 PROCEDURE — 90686 FLU VACCINE (QUAD) GREATER THAN OR EQUAL TO 3YO PRESERVATIVE FREE IM: ICD-10-PCS | Mod: S$GLB,,, | Performed by: PEDIATRICS

## 2022-10-19 PROCEDURE — 4010F PR ACE/ARB THEARPY RXD/TAKEN: ICD-10-PCS | Mod: CPTII,S$GLB,, | Performed by: FAMILY MEDICINE

## 2022-10-19 PROCEDURE — 1160F RVW MEDS BY RX/DR IN RCRD: CPT | Mod: CPTII,S$GLB,, | Performed by: FAMILY MEDICINE

## 2022-10-19 PROCEDURE — 90471 IMMUNIZATION ADMIN: CPT | Mod: S$GLB,,, | Performed by: PEDIATRICS

## 2022-10-19 PROCEDURE — 3074F PR MOST RECENT SYSTOLIC BLOOD PRESSURE < 130 MM HG: ICD-10-PCS | Mod: CPTII,S$GLB,, | Performed by: FAMILY MEDICINE

## 2022-10-19 PROCEDURE — 1159F MED LIST DOCD IN RCRD: CPT | Mod: CPTII,S$GLB,, | Performed by: FAMILY MEDICINE

## 2022-10-19 PROCEDURE — 3079F DIAST BP 80-89 MM HG: CPT | Mod: CPTII,S$GLB,, | Performed by: FAMILY MEDICINE

## 2022-10-19 PROCEDURE — 1160F PR REVIEW ALL MEDS BY PRESCRIBER/CLIN PHARMACIST DOCUMENTED: ICD-10-PCS | Mod: CPTII,S$GLB,, | Performed by: FAMILY MEDICINE

## 2022-10-19 PROCEDURE — 3079F PR MOST RECENT DIASTOLIC BLOOD PRESSURE 80-89 MM HG: ICD-10-PCS | Mod: CPTII,S$GLB,, | Performed by: FAMILY MEDICINE

## 2022-10-19 PROCEDURE — 99214 PR OFFICE/OUTPT VISIT, EST, LEVL IV, 30-39 MIN: ICD-10-PCS | Mod: S$GLB,,, | Performed by: FAMILY MEDICINE

## 2022-10-19 PROCEDURE — 99999 PR PBB SHADOW E&M-EST. PATIENT-LVL IV: ICD-10-PCS | Mod: PBBFAC,,, | Performed by: FAMILY MEDICINE

## 2022-10-19 PROCEDURE — 3074F SYST BP LT 130 MM HG: CPT | Mod: CPTII,S$GLB,, | Performed by: FAMILY MEDICINE

## 2022-10-19 RX ORDER — SEMAGLUTIDE 2.68 MG/ML
2 INJECTION, SOLUTION SUBCUTANEOUS
Qty: 3 ML | Refills: 2 | Status: SHIPPED | OUTPATIENT
Start: 2022-10-19 | End: 2022-12-14 | Stop reason: SDUPTHER

## 2022-10-19 NOTE — PROGRESS NOTES
Subjective:       Patient ID: Dragan Man II is a 57 y.o. male.  Pmhx, fam hx, soc hx, surg hx, allergies, med list reviewed    Chief Complaint: Follow-up (Pt presents to clinic for follow-up and medicine re-fill.)      Pt feeling well--had covid and is improved.   Weight has been up since off some fluid meds to be on paxlovid. Has restarted. Some mild exertional dyspnea but back at baseline. No significant fever or cough.    Usually doing well. Few dietary indulgences.   Bought some boudin and ice cream.  Overall has incorporated more veggies. Has not started exercise program yet. Has done some resistance training using water bottles. Weight still down. Was down to 310 prior to covid on home scales.     Tolerating ozempic with mild constipation. Takes fiber gummies which help.      Wt Readings from Last 3 Encounters:   10/19/22 0801 (!) 149 kg (328 lb 7.8 oz)   09/02/22 0925 (!) 151 kg (332 lb 14.3 oz)   08/15/22 1015 (!) 151.5 kg (334 lb)      HPI  Review of Systems   Constitutional:  Negative for activity change, appetite change, fatigue and unexpected weight change.   HENT:  Negative for mouth dryness.    Eyes:  Negative for visual disturbance.   Respiratory:  Negative for apnea, chest tightness and shortness of breath.    Cardiovascular:  Negative for chest pain.   Gastrointestinal:  Negative for abdominal pain.   Musculoskeletal:  Negative for arthralgias and myalgias.   Integumentary:  Negative for rash.   Allergic/Immunologic: Negative for food allergies.   Psychiatric/Behavioral:  Negative for behavioral problems and sleep disturbance. The patient is not nervous/anxious.        Objective:      Physical Exam  Vitals and nursing note reviewed.   Constitutional:       General: He is not in acute distress.  HENT:      Head: Normocephalic and atraumatic.      Mouth/Throat:      Pharynx: Oropharynx is clear.   Eyes:      General: No scleral icterus.     Pupils: Pupils are equal, round, and reactive to light.    Neck:      Comments: No TM  Cardiovascular:      Rate and Rhythm: Normal rate and regular rhythm.      Pulses: Normal pulses.      Heart sounds: Murmur heard.     No friction rub. No gallop.   Pulmonary:      Effort: Pulmonary effort is normal. No respiratory distress.      Breath sounds: Normal breath sounds. No wheezing, rhonchi or rales.   Abdominal:      General: Bowel sounds are normal. There is no distension.      Palpations: Abdomen is soft.      Tenderness: There is no abdominal tenderness.   Musculoskeletal:         General: No swelling.      Cervical back: Normal range of motion and neck supple. No tenderness.   Lymphadenopathy:      Cervical: No cervical adenopathy.   Skin:     General: Skin is warm.      Capillary Refill: Capillary refill takes less than 2 seconds.      Findings: No erythema or rash.   Neurological:      Mental Status: He is alert and oriented to person, place, and time.   Psychiatric:         Mood and Affect: Mood normal.         Behavior: Behavior normal.       Assessment:         ICD-10-CM ICD-9-CM   1. Prediabetes  R73.03 790.29   2. Obstructive sleep apnea treated with continuous positive airway pressure (CPAP)  G47.33 327.23    Z99.89 V46.8   3. Heart murmur  R01.1 785.2   4. ROLLINS (dyspnea on exertion)  R06.09 786.09   5. Morbid obesity with BMI of 45.0-49.9, adult  E66.01 278.01    Z68.42 V85.42            Plan:       Prediabetes  -     semaglutide (OZEMPIC) 2 mg/dose (8 mg/3 mL) PnIj; Inject 2 mg into the skin every 7 days.  Dispense: 3 mL; Refill: 2    Obstructive sleep apnea treated with continuous positive airway pressure (CPAP)  Pt remained compliant    Heart murmur  ROLLINS (dyspnea on exertion)  Has done well since med change per cardiologoy  Have dw pt watch bp--may need adjustments    Morbid obesity with BMI of 45.0-49.9, adult  Continue ozempic     Prediabetes  Comments:  on ozempic; implementing lifestyle changes  continue to work on decreasing sugars  Orders:  -      semaglutide (OZEMPIC) 2 mg/dose (8 mg/3 mL) PnIj; Inject 2 mg into the skin every 7 days.  Dispense: 3 mL; Refill: 2    Morbid obesity  -     semaglutide (OZEMPIC) 2 mg/dose (8 mg/3 mL) PnIj; Inject 2 mg into the skin every 7 days.  Dispense: 3 mL; Refill: 2    Pt/wife advised to have flu shot

## 2022-11-16 ENCOUNTER — IMMUNIZATION (OUTPATIENT)
Dept: PHARMACY | Facility: CLINIC | Age: 57
End: 2022-11-16
Payer: COMMERCIAL

## 2022-11-16 DIAGNOSIS — Z23 NEED FOR VACCINATION: Primary | ICD-10-CM

## 2022-12-12 ENCOUNTER — PATIENT MESSAGE (OUTPATIENT)
Dept: ADMINISTRATIVE | Facility: OTHER | Age: 57
End: 2022-12-12
Payer: COMMERCIAL

## 2022-12-13 ENCOUNTER — PATIENT MESSAGE (OUTPATIENT)
Dept: ADMINISTRATIVE | Facility: OTHER | Age: 57
End: 2022-12-13
Payer: COMMERCIAL

## 2022-12-14 ENCOUNTER — LAB VISIT (OUTPATIENT)
Dept: LAB | Facility: HOSPITAL | Age: 57
End: 2022-12-14
Attending: FAMILY MEDICINE
Payer: COMMERCIAL

## 2022-12-14 ENCOUNTER — OFFICE VISIT (OUTPATIENT)
Dept: PRIMARY CARE CLINIC | Facility: CLINIC | Age: 57
End: 2022-12-14
Payer: COMMERCIAL

## 2022-12-14 VITALS
HEART RATE: 68 BPM | RESPIRATION RATE: 18 BRPM | BODY MASS INDEX: 46 KG/M2 | WEIGHT: 315 LBS | OXYGEN SATURATION: 97 % | DIASTOLIC BLOOD PRESSURE: 80 MMHG | SYSTOLIC BLOOD PRESSURE: 118 MMHG

## 2022-12-14 DIAGNOSIS — R06.09 DOE (DYSPNEA ON EXERTION): ICD-10-CM

## 2022-12-14 DIAGNOSIS — E66.01 MORBID OBESITY WITH BMI OF 45.0-49.9, ADULT: Chronic | ICD-10-CM

## 2022-12-14 DIAGNOSIS — R73.03 PREDIABETES: ICD-10-CM

## 2022-12-14 DIAGNOSIS — I10 PRIMARY HYPERTENSION: Chronic | ICD-10-CM

## 2022-12-14 DIAGNOSIS — I35.0 NONRHEUMATIC AORTIC VALVE STENOSIS: Chronic | ICD-10-CM

## 2022-12-14 DIAGNOSIS — E66.01 MORBID OBESITY: ICD-10-CM

## 2022-12-14 DIAGNOSIS — M79.10 MYALGIA: ICD-10-CM

## 2022-12-14 DIAGNOSIS — R73.03 PREDIABETES: Primary | ICD-10-CM

## 2022-12-14 LAB
ALBUMIN SERPL BCP-MCNC: 3.9 G/DL (ref 3.5–5.2)
ALP SERPL-CCNC: 86 U/L (ref 55–135)
ALT SERPL W/O P-5'-P-CCNC: 27 U/L (ref 10–44)
ANION GAP SERPL CALC-SCNC: 7 MMOL/L (ref 8–16)
AST SERPL-CCNC: 19 U/L (ref 10–40)
BASOPHILS # BLD AUTO: 0.05 K/UL (ref 0–0.2)
BASOPHILS NFR BLD: 0.6 % (ref 0–1.9)
BILIRUB SERPL-MCNC: 1 MG/DL (ref 0.1–1)
BUN SERPL-MCNC: 12 MG/DL (ref 6–20)
CALCIUM SERPL-MCNC: 9.3 MG/DL (ref 8.7–10.5)
CHLORIDE SERPL-SCNC: 104 MMOL/L (ref 95–110)
CO2 SERPL-SCNC: 23 MMOL/L (ref 23–29)
CREAT SERPL-MCNC: 0.8 MG/DL (ref 0.5–1.4)
DIFFERENTIAL METHOD: ABNORMAL
EOSINOPHIL # BLD AUTO: 0.2 K/UL (ref 0–0.5)
EOSINOPHIL NFR BLD: 2.5 % (ref 0–8)
ERYTHROCYTE [DISTWIDTH] IN BLOOD BY AUTOMATED COUNT: 13.1 % (ref 11.5–14.5)
EST. GFR  (NO RACE VARIABLE): >60 ML/MIN/1.73 M^2
ESTIMATED AVG GLUCOSE: 108 MG/DL (ref 68–131)
GLUCOSE SERPL-MCNC: 94 MG/DL (ref 70–110)
HBA1C MFR BLD: 5.4 % (ref 4–5.6)
HCT VFR BLD AUTO: 40.6 % (ref 40–54)
HGB BLD-MCNC: 13.2 G/DL (ref 14–18)
IMM GRANULOCYTES # BLD AUTO: 0.04 K/UL (ref 0–0.04)
IMM GRANULOCYTES NFR BLD AUTO: 0.5 % (ref 0–0.5)
LYMPHOCYTES # BLD AUTO: 2.5 K/UL (ref 1–4.8)
LYMPHOCYTES NFR BLD: 27.7 % (ref 18–48)
MCH RBC QN AUTO: 31.4 PG (ref 27–31)
MCHC RBC AUTO-ENTMCNC: 32.5 G/DL (ref 32–36)
MCV RBC AUTO: 97 FL (ref 82–98)
MONOCYTES # BLD AUTO: 0.8 K/UL (ref 0.3–1)
MONOCYTES NFR BLD: 9 % (ref 4–15)
NEUTROPHILS # BLD AUTO: 5.3 K/UL (ref 1.8–7.7)
NEUTROPHILS NFR BLD: 59.7 % (ref 38–73)
NRBC BLD-RTO: 0 /100 WBC
PLATELET # BLD AUTO: 161 K/UL (ref 150–450)
PMV BLD AUTO: 10.9 FL (ref 9.2–12.9)
POTASSIUM SERPL-SCNC: 4.4 MMOL/L (ref 3.5–5.1)
PROT SERPL-MCNC: 6.9 G/DL (ref 6–8.4)
RBC # BLD AUTO: 4.2 M/UL (ref 4.6–6.2)
SODIUM SERPL-SCNC: 134 MMOL/L (ref 136–145)
WBC # BLD AUTO: 8.85 K/UL (ref 3.9–12.7)

## 2022-12-14 PROCEDURE — 99214 PR OFFICE/OUTPT VISIT, EST, LEVL IV, 30-39 MIN: ICD-10-PCS | Mod: S$GLB,,, | Performed by: FAMILY MEDICINE

## 2022-12-14 PROCEDURE — 3074F SYST BP LT 130 MM HG: CPT | Mod: CPTII,S$GLB,, | Performed by: FAMILY MEDICINE

## 2022-12-14 PROCEDURE — 3008F PR BODY MASS INDEX (BMI) DOCUMENTED: ICD-10-PCS | Mod: CPTII,S$GLB,, | Performed by: FAMILY MEDICINE

## 2022-12-14 PROCEDURE — 1159F MED LIST DOCD IN RCRD: CPT | Mod: CPTII,S$GLB,, | Performed by: FAMILY MEDICINE

## 2022-12-14 PROCEDURE — 80053 COMPREHEN METABOLIC PANEL: CPT | Performed by: FAMILY MEDICINE

## 2022-12-14 PROCEDURE — 4010F PR ACE/ARB THEARPY RXD/TAKEN: ICD-10-PCS | Mod: CPTII,S$GLB,, | Performed by: FAMILY MEDICINE

## 2022-12-14 PROCEDURE — 4010F ACE/ARB THERAPY RXD/TAKEN: CPT | Mod: CPTII,S$GLB,, | Performed by: FAMILY MEDICINE

## 2022-12-14 PROCEDURE — 1159F PR MEDICATION LIST DOCUMENTED IN MEDICAL RECORD: ICD-10-PCS | Mod: CPTII,S$GLB,, | Performed by: FAMILY MEDICINE

## 2022-12-14 PROCEDURE — 85025 COMPLETE CBC W/AUTO DIFF WBC: CPT | Performed by: FAMILY MEDICINE

## 2022-12-14 PROCEDURE — 3074F PR MOST RECENT SYSTOLIC BLOOD PRESSURE < 130 MM HG: ICD-10-PCS | Mod: CPTII,S$GLB,, | Performed by: FAMILY MEDICINE

## 2022-12-14 PROCEDURE — 3044F PR MOST RECENT HEMOGLOBIN A1C LEVEL <7.0%: ICD-10-PCS | Mod: CPTII,S$GLB,, | Performed by: FAMILY MEDICINE

## 2022-12-14 PROCEDURE — 3044F HG A1C LEVEL LT 7.0%: CPT | Mod: CPTII,S$GLB,, | Performed by: FAMILY MEDICINE

## 2022-12-14 PROCEDURE — 99999 PR PBB SHADOW E&M-EST. PATIENT-LVL IV: CPT | Mod: PBBFAC,,, | Performed by: FAMILY MEDICINE

## 2022-12-14 PROCEDURE — 99999 PR PBB SHADOW E&M-EST. PATIENT-LVL IV: ICD-10-PCS | Mod: PBBFAC,,, | Performed by: FAMILY MEDICINE

## 2022-12-14 PROCEDURE — 3079F DIAST BP 80-89 MM HG: CPT | Mod: CPTII,S$GLB,, | Performed by: FAMILY MEDICINE

## 2022-12-14 PROCEDURE — 83036 HEMOGLOBIN GLYCOSYLATED A1C: CPT | Performed by: FAMILY MEDICINE

## 2022-12-14 PROCEDURE — 3008F BODY MASS INDEX DOCD: CPT | Mod: CPTII,S$GLB,, | Performed by: FAMILY MEDICINE

## 2022-12-14 PROCEDURE — 36415 COLL VENOUS BLD VENIPUNCTURE: CPT | Performed by: FAMILY MEDICINE

## 2022-12-14 PROCEDURE — 99214 OFFICE O/P EST MOD 30 MIN: CPT | Mod: S$GLB,,, | Performed by: FAMILY MEDICINE

## 2022-12-14 PROCEDURE — 3079F PR MOST RECENT DIASTOLIC BLOOD PRESSURE 80-89 MM HG: ICD-10-PCS | Mod: CPTII,S$GLB,, | Performed by: FAMILY MEDICINE

## 2022-12-14 RX ORDER — SEMAGLUTIDE 2.68 MG/ML
2 INJECTION, SOLUTION SUBCUTANEOUS
Qty: 3 ML | Refills: 2 | Status: SHIPPED | OUTPATIENT
Start: 2022-12-14 | End: 2023-01-25 | Stop reason: SDUPTHER

## 2022-12-14 NOTE — PROGRESS NOTES
Subjective:       Patient ID: Dragan Man II is a 57 y.o. male.  Pmhx, fam hx, soc hx, surg hx, allergies, med list reviewed     Chief Complaint: Follow-up    Wt Readings from Last 3 Encounters:   12/14/22 0753 (!) 149.6 kg (329 lb 12.9 oz)   10/19/22 0801 (!) 149 kg (328 lb 7.8 oz)   09/02/22 0925 (!) 151 kg (332 lb 14.3 oz)      Pt reports tolerating ozempic.     Has noticed since had covid has had intermittent shortness of breath that is worse some days vs others. Felt better after rest. Taking fluid pills but still some edema. He has been up/down with salt intake. NO chest pain. No palpitations. No tachycardia. No dizziness. Pt would like me to send Dr. Shaikh a message. Hx of aortic valve stenosis and pulm htn. Pt has been checking bp at home/digital med readouts. He does continue to work. On vacation this week. Using albuterol inhaler prn. Partially helps. Since covid fatigues more easily. He feels like covid interfered with his progress in regards to weight loss and lifestyle changes.       Pt had some crampy lower abd pain yesterday and had large bm (had some constipation. Felt better once defecated. No blood. Did have some different tea. Did have a bm day prior. No n/v. No dehydration; drinking adequate water. Pt states no abd pain today. NO fever.     Pt was taking vitamin B/D when was sick. Also was on omega 3/fish oil. Pt has noticed more uop/nocturia. NO tea colored urine. No dysuria or renal pain.     Tolerating ozempic; appetite decreased but not as pronounced: cyclical--end of week more appetite. Was overeating energy bars/tiffanie bars/processed bars. He has stopped this. Wife supportive as well.     Compliant with cpap.     Last week had some leg cramping. Has not had labs checked in a few months. NO leg pain today.     HPI  Review of Systems   Constitutional:  Negative for activity change, appetite change, fatigue and unexpected weight change.   HENT:  Negative for mouth dryness.    Eyes:   Negative for visual disturbance.   Respiratory:  Positive for shortness of breath. Negative for apnea and chest tightness.    Cardiovascular:  Negative for chest pain.   Gastrointestinal:  Negative for abdominal pain.   Musculoskeletal:  Negative for arthralgias and myalgias.   Integumentary:  Negative for rash.   Allergic/Immunologic: Negative for food allergies.   Psychiatric/Behavioral:  Negative for behavioral problems and sleep disturbance. The patient is not nervous/anxious.        Objective:      Physical Exam  Vitals and nursing note reviewed.   Constitutional:       General: He is not in acute distress.  HENT:      Head: Normocephalic and atraumatic.   Eyes:      General: No scleral icterus.     Pupils: Pupils are equal, round, and reactive to light.   Neck:      Comments: No TM  Cardiovascular:      Rate and Rhythm: Normal rate and regular rhythm.      Pulses: Normal pulses.      Heart sounds: Murmur heard.     No friction rub. No gallop.   Pulmonary:      Effort: Pulmonary effort is normal. No respiratory distress.      Breath sounds: Normal breath sounds. No wheezing, rhonchi or rales.   Abdominal:      General: Bowel sounds are normal. There is no distension.      Palpations: Abdomen is soft.      Tenderness: There is no abdominal tenderness.   Musculoskeletal:         General: Swelling present.      Cervical back: Normal range of motion and neck supple. No tenderness.      Comments: Trace LE edema   Lymphadenopathy:      Cervical: No cervical adenopathy.   Skin:     General: Skin is warm.      Findings: No erythema or rash.   Neurological:      Mental Status: He is alert and oriented to person, place, and time.   Psychiatric:         Mood and Affect: Mood normal.         Behavior: Behavior normal.       Assessment:         ICD-10-CM ICD-9-CM   1. Prediabetes  R73.03 790.29   2. Primary hypertension  I10 401.9   3. ROLLINS (dyspnea on exertion)  R06.09 786.09   4. Nonrheumatic aortic valve stenosis, severe   I35.0 424.1   5. Morbid obesity with BMI of 45.0-49.9, adult  E66.01 278.01    Z68.42 V85.42   6. Myalgia  M79.10 729.1          Plan:       Prediabetes  -     Hemoglobin A1C; Future; Expected date: 12/14/2022  -     semaglutide (OZEMPIC) 2 mg/dose (8 mg/3 mL) PnIj; Inject 2 mg into the skin every 7 days.  Dispense: 3 mL; Refill: 2    Primary hypertension  -     Comprehensive Metabolic Panel; Future; Expected date: 12/14/2022  -     CBC Auto Differential; Future; Expected date: 12/14/2022    ROLLINS (dyspnea on exertion)  -     Comprehensive Metabolic Panel; Future; Expected date: 12/14/2022  -     CBC Auto Differential; Future; Expected date: 12/14/2022    Nonrheumatic aortic valve stenosis, severe  Chronic    Morbid obesity with BMI of 45.0-49.9, adult    Myalgia  -     Comprehensive Metabolic Panel; Future; Expected date: 12/14/2022  -     CBC Auto Differential; Future; Expected date: 12/14/2022    Prediabetes  Comments:  on ozempic; implementing lifestyle changes  continue to work on decreasing sugars  Orders:  -     Hemoglobin A1C; Future; Expected date: 12/14/2022  -     semaglutide (OZEMPIC) 2 mg/dose (8 mg/3 mL) PnIj; Inject 2 mg into the skin every 7 days.  Dispense: 3 mL; Refill: 2    Morbid obesity  -     semaglutide (OZEMPIC) 2 mg/dose (8 mg/3 mL) PnIj; Inject 2 mg into the skin every 7 days.  Dispense: 3 mL; Refill: 2      Will check labs today, pt given ER return precautions; will message Dr Shaikh after labs

## 2022-12-15 ENCOUNTER — TELEPHONE (OUTPATIENT)
Dept: CARDIOLOGY | Facility: CLINIC | Age: 57
End: 2022-12-15
Payer: COMMERCIAL

## 2022-12-19 ENCOUNTER — DOCUMENTATION ONLY (OUTPATIENT)
Dept: INTERNAL MEDICINE | Facility: CLINIC | Age: 57
End: 2022-12-19
Payer: COMMERCIAL

## 2022-12-19 NOTE — PROGRESS NOTES
MD Chely Browne MD; CAMILA Velasquez Staff  Caller: Unspecified (4 days ago,  7:20 AM)  Thanks.     Will arrange f/uy with me in 1 to 2 weeks.     -Z

## 2023-01-23 ENCOUNTER — HOSPITAL ENCOUNTER (OUTPATIENT)
Dept: CARDIOLOGY | Facility: HOSPITAL | Age: 58
Discharge: HOME | End: 2023-01-23
Attending: INTERNAL MEDICINE
Payer: COMMERCIAL

## 2023-01-23 ENCOUNTER — OFFICE VISIT (OUTPATIENT)
Dept: CARDIOLOGY | Facility: CLINIC | Age: 58
End: 2023-01-23
Payer: COMMERCIAL

## 2023-01-23 VITALS
HEART RATE: 84 BPM | BODY MASS INDEX: 44.1 KG/M2 | DIASTOLIC BLOOD PRESSURE: 74 MMHG | SYSTOLIC BLOOD PRESSURE: 129 MMHG | WEIGHT: 315 LBS | OXYGEN SATURATION: 96 % | HEIGHT: 71 IN

## 2023-01-23 VITALS
WEIGHT: 315 LBS | SYSTOLIC BLOOD PRESSURE: 129 MMHG | DIASTOLIC BLOOD PRESSURE: 74 MMHG | HEIGHT: 71 IN | BODY MASS INDEX: 44.1 KG/M2

## 2023-01-23 DIAGNOSIS — I10 PRIMARY HYPERTENSION: Chronic | ICD-10-CM

## 2023-01-23 DIAGNOSIS — I10 PRIMARY HYPERTENSION: Primary | Chronic | ICD-10-CM

## 2023-01-23 DIAGNOSIS — I35.0 NONRHEUMATIC AORTIC VALVE STENOSIS: Chronic | ICD-10-CM

## 2023-01-23 DIAGNOSIS — R01.1 HEART MURMUR: ICD-10-CM

## 2023-01-23 DIAGNOSIS — I35.0 AORTIC STENOSIS, MODERATE: Primary | ICD-10-CM

## 2023-01-23 DIAGNOSIS — I50.32 CHRONIC DIASTOLIC CONGESTIVE HEART FAILURE: ICD-10-CM

## 2023-01-23 DIAGNOSIS — I35.0 NONRHEUMATIC AORTIC VALVE STENOSIS: ICD-10-CM

## 2023-01-23 LAB
AORTIC ROOT ANNULUS: 2.9 CM
ASCENDING AORTA: 2.72 CM
AV INDEX (PROSTH): 0.28
AV MEAN GRADIENT: 47 MMHG
AV PEAK GRADIENT: 74 MMHG
AV REGURGITATION PRESSURE HALF TIME: 592.17 MS
AV VALVE AREA: 0.87 CM2
AV VELOCITY RATIO: 0.3
BSA FOR ECHO PROCEDURE: 2.75 M2
CV ECHO LV RWT: 0.45 CM
DOP CALC AO PEAK VEL: 4.3 M/S
DOP CALC AO VTI: 105.6 CM
DOP CALC LVOT AREA: 3.1 CM2
DOP CALC LVOT DIAMETER: 2 CM
DOP CALC LVOT PEAK VEL: 1.28 M/S
DOP CALC LVOT STROKE VOLUME: 92.32 CM3
DOP CALC RVOT PEAK VEL: 0.74 M/S
DOP CALC RVOT VTI: 16.5 CM
DOP CALCLVOT PEAK VEL VTI: 29.4 CM
E WAVE DECELERATION TIME: 280.53 MSEC
E/A RATIO: 0.94
E/E' RATIO: 12.82 M/S
ECHO LV POSTERIOR WALL: 1.23 CM (ref 0.6–1.1)
EJECTION FRACTION: 60 %
FRACTIONAL SHORTENING: 12 % (ref 28–44)
INTERVENTRICULAR SEPTUM: 1.33 CM (ref 0.6–1.1)
IVC DIAMETER: 1.73 CM
IVRT: 94.2 MSEC
LA MAJOR: 5.84 CM
LA MINOR: 5.68 CM
LA WIDTH: 4.4 CM
LEFT ATRIUM SIZE: 3.91 CM
LEFT ATRIUM VOLUME INDEX MOD: 24.8 ML/M2
LEFT ATRIUM VOLUME INDEX: 32.1 ML/M2
LEFT ATRIUM VOLUME MOD: 64.89 CM3
LEFT ATRIUM VOLUME: 84.21 CM3
LEFT INTERNAL DIMENSION IN SYSTOLE: 4.76 CM (ref 2.1–4)
LEFT VENTRICLE DIASTOLIC VOLUME INDEX: 54.48 ML/M2
LEFT VENTRICLE DIASTOLIC VOLUME: 142.74 ML
LEFT VENTRICLE MASS INDEX: 111 G/M2
LEFT VENTRICLE SYSTOLIC VOLUME INDEX: 18.2 ML/M2
LEFT VENTRICLE SYSTOLIC VOLUME: 47.58 ML
LEFT VENTRICULAR INTERNAL DIMENSION IN DIASTOLE: 5.42 CM (ref 3.5–6)
LEFT VENTRICULAR MASS: 290.93 G
LV LATERAL E/E' RATIO: 13.63 M/S
LV SEPTAL E/E' RATIO: 12.11 M/S
LVOT MG: 3.91 MMHG
LVOT MV: 0.93 CM/S
MV PEAK A VEL: 1.16 M/S
MV PEAK E VEL: 1.09 M/S
MV STENOSIS PRESSURE HALF TIME: 81.35 MS
MV VALVE AREA P 1/2 METHOD: 2.7 CM2
PISA AR MAX VEL: 3.72 M/S
PISA TR MAX VEL: 1.82 M/S
PV MEAN GRADIENT: 1.26 MMHG
PV PEAK GRADIENT: 2.21 MMHG
PV PEAK VELOCITY: 0.93 CM/S
RA MAJOR: 4.49 CM
RA PRESSURE: 3 MMHG
RA WIDTH: 4.22 CM
RIGHT VENTRICULAR END-DIASTOLIC DIMENSION: 3.94 CM
SINUS: 2.85 CM
STJ: 2.67 CM
TDI LATERAL: 0.08 M/S
TDI SEPTAL: 0.09 M/S
TDI: 0.09 M/S
TR MAX PG: 13 MMHG
TRICUSPID ANNULAR PLANE SYSTOLIC EXCURSION: 2.38 CM
TV REST PULMONARY ARTERY PRESSURE: 16 MMHG

## 2023-01-23 PROCEDURE — 1160F PR REVIEW ALL MEDS BY PRESCRIBER/CLIN PHARMACIST DOCUMENTED: ICD-10-PCS | Mod: CPTII,S$GLB,, | Performed by: INTERNAL MEDICINE

## 2023-01-23 PROCEDURE — 93306 TTE W/DOPPLER COMPLETE: CPT | Mod: 26,,, | Performed by: INTERNAL MEDICINE

## 2023-01-23 PROCEDURE — 3008F BODY MASS INDEX DOCD: CPT | Mod: CPTII,S$GLB,, | Performed by: INTERNAL MEDICINE

## 2023-01-23 PROCEDURE — 1159F PR MEDICATION LIST DOCUMENTED IN MEDICAL RECORD: ICD-10-PCS | Mod: CPTII,S$GLB,, | Performed by: INTERNAL MEDICINE

## 2023-01-23 PROCEDURE — 93005 ELECTROCARDIOGRAM TRACING: CPT

## 2023-01-23 PROCEDURE — 3074F SYST BP LT 130 MM HG: CPT | Mod: CPTII,S$GLB,, | Performed by: INTERNAL MEDICINE

## 2023-01-23 PROCEDURE — 99214 OFFICE O/P EST MOD 30 MIN: CPT | Mod: 25,S$GLB,, | Performed by: INTERNAL MEDICINE

## 2023-01-23 PROCEDURE — 99214 PR OFFICE/OUTPT VISIT, EST, LEVL IV, 30-39 MIN: ICD-10-PCS | Mod: 25,S$GLB,, | Performed by: INTERNAL MEDICINE

## 2023-01-23 PROCEDURE — 1160F RVW MEDS BY RX/DR IN RCRD: CPT | Mod: CPTII,S$GLB,, | Performed by: INTERNAL MEDICINE

## 2023-01-23 PROCEDURE — 3078F DIAST BP <80 MM HG: CPT | Mod: CPTII,S$GLB,, | Performed by: INTERNAL MEDICINE

## 2023-01-23 PROCEDURE — 1159F MED LIST DOCD IN RCRD: CPT | Mod: CPTII,S$GLB,, | Performed by: INTERNAL MEDICINE

## 2023-01-23 PROCEDURE — 3078F PR MOST RECENT DIASTOLIC BLOOD PRESSURE < 80 MM HG: ICD-10-PCS | Mod: CPTII,S$GLB,, | Performed by: INTERNAL MEDICINE

## 2023-01-23 PROCEDURE — 93306 ECHO (CUPID ONLY): ICD-10-PCS | Mod: 26,,, | Performed by: INTERNAL MEDICINE

## 2023-01-23 PROCEDURE — 93010 ELECTROCARDIOGRAM REPORT: CPT | Mod: ,,, | Performed by: INTERNAL MEDICINE

## 2023-01-23 PROCEDURE — 93306 TTE W/DOPPLER COMPLETE: CPT

## 2023-01-23 PROCEDURE — 99999 PR PBB SHADOW E&M-EST. PATIENT-LVL III: ICD-10-PCS | Mod: PBBFAC,,, | Performed by: INTERNAL MEDICINE

## 2023-01-23 PROCEDURE — 99999 PR PBB SHADOW E&M-EST. PATIENT-LVL III: CPT | Mod: PBBFAC,,, | Performed by: INTERNAL MEDICINE

## 2023-01-23 PROCEDURE — 93010 EKG 12-LEAD: ICD-10-PCS | Mod: ,,, | Performed by: INTERNAL MEDICINE

## 2023-01-23 PROCEDURE — 3008F PR BODY MASS INDEX (BMI) DOCUMENTED: ICD-10-PCS | Mod: CPTII,S$GLB,, | Performed by: INTERNAL MEDICINE

## 2023-01-23 PROCEDURE — 3074F PR MOST RECENT SYSTOLIC BLOOD PRESSURE < 130 MM HG: ICD-10-PCS | Mod: CPTII,S$GLB,, | Performed by: INTERNAL MEDICINE

## 2023-01-23 NOTE — PROGRESS NOTES
Subjective:   Patient ID:  Dragan Man II is a 57 y.o. male who presents for follow up of No chief complaint on file.      57 yo. Male, f/u for severe AS.. Selling the beer at the store,   PMH severe AS, HTN, LAVERNE on CPAP, obesit and Von Willebrand diseasey. No Dx of heart attack,DM,stroke and cancer. No smoking/drinking  Chronic ROLLINS. Recently worse with dizziness, left chest tightness. Occurred at workplace and physical activity. Improved the symptoms at home. Thought related to the temperature change from parking lot to cooler.   Treadmill stress ekg showed no stress induced EKG change. Peak  mmHG   ECHO showed normal EF, severe LAE, and mild to mod AS  No f/h premature CAD  In 2012, had episode of syncope when lifted up heavy stuff.  2012. MPI showed no ischemia and echo showed normal EF, dilated RV. Mild LAE and   Enrolled in HTN digit program    03/2021visit. States that ROLLINS slightly worse  No chest pain, dizziness palpitation, faint  Leg swelling worse in PM and better in AM    Gained the weight 10 pounds in 6 months.  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 Occasional ROLLINS. No faint syncope dizziness and chest pain  Sleeps on elevated bed due to LAVERNE. And leg swelling   Decreased exercise capacity at workplace  stationary BIKING. NO SMOKING.DRINKING  03/2021 echo normal EF and mod to severe AS. Aortic valve area is 1.25 cm2; peak velocity is 4.55 m/s; mean gradient is 50 mmHg.  No f/h valve disease and SCD   BNP and Cr wnl     visit  S/p CYNTHIA done in  revealing calcified tricuspid AV, mode AS and mild AI. Mild sore throat. No chest pain and dizziness  C/o hands and leg swelling   Repeat echo in  Aortic valve area is 1.22 cm2; peak velocity is 4.61 m/s; mean gradient is 52 mmHg.  Chronic SOB and partially improved after breathing Rx. Severely obese  No faint chest pain and palpitation. ekg NSR and no acute stt  change     visit  06/2022 LHC/RHC  · The coronary arteries were normal..  · The filling pressures on the right and left were moderately elevated. Pulmonary hypertension was moderate.  · 53 MMHG GRADIENT ACROSS AORTIC VAKLVE SUGGESTIVE OF SEVERE AORTIC STENOSIS.  On weight control program, working well. Leg swelling improved.  Pt has moderate aortic stenosis per planimetry and elevated gradient pressure in LVOT  He states that he lost 10 poudns now and feels better     Interval history  Loat 20 lbs in 6 months, on Ozempic.  Remains ROLLINS and on inhaler, worse at allergy season.  No chest pain, faint dizziness. Switches to light duty work with a lot of walking. Energy ok   Ekg NSr LVH  ms  His echo showed elevated velocity at LVOT > 1.5 m/s. And calculated CRISTINO per continuity equation and planimetry per CYNTHIA > 1 cm2. In mod AS level              Past Medical History:   Diagnosis Date    Actinic keratoses 10/14/2019    Bilateral carpal tunnel syndrome 4/12/2021    Chronic bilateral low back pain without sciatica 4/12/2021    Depression     Morbid obesity with BMI of 40.0-44.9, adult 11/17/2017    LAVERNE (obstructive sleep apnea)     Prediabetes 4/12/2021    Seasonal allergic rhinitis due to pollen 10/14/2019    Ulnar neuropathy of both upper extremities 2/17/2020       Past Surgical History:   Procedure Laterality Date    CATHETERIZATION OF BOTH LEFT AND RIGHT HEART N/A 6/28/2022    Procedure: CATHETERIZATION, HEART, BOTH LEFT AND RIGHT;  Surgeon: Carlotta Gordon MD;  Location: [facility] CATH LAB;  Service: Cardiology;  Laterality: N/A;    None         Social History     Tobacco Use    Smoking status: Never    Smokeless tobacco: Former    Tobacco comments:     quit 15 years ago    Substance Use Topics    Alcohol use: Yes     Comment: socially // beer    Drug use: No       Family History   Problem Relation Age of Onset    Diabetes Father     Diabetes Paternal Grandfather     No Known Problems Mother          Review of  Systems   Constitutional: Positive for malaise/fatigue. Negative for decreased appetite, diaphoresis, fever and night sweats.   HENT:  Negative for nosebleeds.    Eyes:  Negative for blurred vision and double vision.   Cardiovascular:  Positive for dyspnea on exertion and leg swelling. Negative for claudication, irregular heartbeat, near-syncope, orthopnea, palpitations, paroxysmal nocturnal dyspnea and syncope.   Respiratory:  Negative for cough, shortness of breath, sleep disturbances due to breathing, snoring, sputum production and wheezing.    Endocrine: Negative for cold intolerance and polyuria.   Hematologic/Lymphatic: Does not bruise/bleed easily.   Skin:  Negative for rash.   Musculoskeletal:  Negative for back pain, falls, joint pain, joint swelling and neck pain.   Gastrointestinal:  Negative for abdominal pain, heartburn, nausea and vomiting.   Genitourinary:  Negative for dysuria, frequency and hematuria.   Neurological:  Positive for dizziness. Negative for difficulty with concentration, focal weakness, headaches, light-headedness, numbness, seizures and weakness.   Psychiatric/Behavioral:  Negative for depression, memory loss and substance abuse. The patient does not have insomnia.    Allergic/Immunologic: Negative for HIV exposure and hives.     Objective:   Physical Exam  HENT:      Head: Normocephalic.   Eyes:      Pupils: Pupils are equal, round, and reactive to light.   Neck:      Thyroid: No thyromegaly.      Vascular: Normal carotid pulses. No carotid bruit or JVD.   Cardiovascular:      Rate and Rhythm: Normal rate and regular rhythm. No extrasystoles are present.     Chest Wall: PMI is not displaced.      Pulses: Normal pulses.      Heart sounds: Murmur: ESM 3/6 on bases and along LSB. up to the neck.     No gallop. No S3 sounds.   Pulmonary:      Effort: No respiratory distress.      Breath sounds: Normal breath sounds. No stridor.   Abdominal:      General: Bowel sounds are normal.       Palpations: Abdomen is soft.      Tenderness: There is no abdominal tenderness. There is no rebound.   Musculoskeletal:         General: Normal range of motion.   Skin:     Findings: No rash.   Neurological:      Mental Status: He is alert and oriented to person, place, and time.   Psychiatric:         Behavior: Behavior normal.       Lab Results   Component Value Date    CHOL 110 (L) 08/15/2022    CHOL 135 04/06/2021    CHOL 185 10/14/2019     Lab Results   Component Value Date    HDL 40 08/15/2022    HDL 41 04/06/2021    HDL 38 (L) 10/14/2019     Lab Results   Component Value Date    LDLCALC 54.4 (L) 08/15/2022    LDLCALC 70.8 04/06/2021    LDLCALC 80.8 10/14/2019     Lab Results   Component Value Date    TRIG 78 08/15/2022    TRIG 116 04/06/2021    TRIG 331 (H) 10/14/2019     Lab Results   Component Value Date    CHOLHDL 36.4 08/15/2022    CHOLHDL 30.4 04/06/2021    CHOLHDL 20.5 10/14/2019       Chemistry        Component Value Date/Time     (L) 12/14/2022 0919    K 4.4 12/14/2022 0919     12/14/2022 0919    CO2 23 12/14/2022 0919    BUN 12 12/14/2022 0919    CREATININE 0.8 12/14/2022 0919    GLU 94 12/14/2022 0919        Component Value Date/Time    CALCIUM 9.3 12/14/2022 0919    ALKPHOS 86 12/14/2022 0919    AST 19 12/14/2022 0919    ALT 27 12/14/2022 0919    BILITOT 1.0 12/14/2022 0919    ESTGFRAFRICA >60.0 06/20/2022 1635    EGFRNONAA >60.0 06/20/2022 1635          Lab Results   Component Value Date    HGBA1C 5.4 12/14/2022     Lab Results   Component Value Date    TSH 3.980 10/14/2019     Lab Results   Component Value Date    INR 1.1 06/20/2022    INR 1.1 10/18/2012     Lab Results   Component Value Date    WBC 8.85 12/14/2022    HGB 13.2 (L) 12/14/2022    HCT 40.6 12/14/2022    MCV 97 12/14/2022     12/14/2022     BMP  Sodium   Date Value Ref Range Status   12/14/2022 134 (L) 136 - 145 mmol/L Final     Potassium   Date Value Ref Range Status   12/14/2022 4.4 3.5 - 5.1 mmol/L Final      Chloride   Date Value Ref Range Status   12/14/2022 104 95 - 110 mmol/L Final     CO2   Date Value Ref Range Status   12/14/2022 23 23 - 29 mmol/L Final     BUN   Date Value Ref Range Status   12/14/2022 12 6 - 20 mg/dL Final     Creatinine   Date Value Ref Range Status   12/14/2022 0.8 0.5 - 1.4 mg/dL Final     Calcium   Date Value Ref Range Status   12/14/2022 9.3 8.7 - 10.5 mg/dL Final     Anion Gap   Date Value Ref Range Status   12/14/2022 7 (L) 8 - 16 mmol/L Final     eGFR if    Date Value Ref Range Status   06/20/2022 >60.0 >60 mL/min/1.73 m^2 Final     eGFR if non    Date Value Ref Range Status   06/20/2022 >60.0 >60 mL/min/1.73 m^2 Final     Comment:     Calculation used to obtain the estimated glomerular filtration  rate (eGFR) is the CKD-EPI equation.        BNP  @LABRCNTIP(BNP,BNPTRIAGEBLO)@  @LABRCNTIP(troponini)@  CrCl cannot be calculated (Patient's most recent lab result is older than the maximum 7 days allowed.).  No results found in the last 24 hours.  No results found in the last 24 hours.  No results found in the last 24 hours.    Assessment:      1. Aortic stenosis, moderate    2. Heart murmur    3. Primary hypertension    4. Chronic diastolic congestive heart failure        Plan:   Repeat echo for severity of AS  Continue weight loss  Continue Losartan 50 mg daily and metoprolol 50 mg daily  Continue lasix as needed and statin  RTC in 6 months

## 2023-01-25 ENCOUNTER — OFFICE VISIT (OUTPATIENT)
Dept: PRIMARY CARE CLINIC | Facility: CLINIC | Age: 58
End: 2023-01-25
Payer: COMMERCIAL

## 2023-01-25 VITALS
RESPIRATION RATE: 18 BRPM | DIASTOLIC BLOOD PRESSURE: 78 MMHG | SYSTOLIC BLOOD PRESSURE: 118 MMHG | HEART RATE: 79 BPM | WEIGHT: 315 LBS | BODY MASS INDEX: 46.12 KG/M2 | OXYGEN SATURATION: 100 %

## 2023-01-25 DIAGNOSIS — I50.32 CHRONIC DIASTOLIC CONGESTIVE HEART FAILURE: ICD-10-CM

## 2023-01-25 DIAGNOSIS — E66.2 CLASS 3 OBESITY WITH ALVEOLAR HYPOVENTILATION, SERIOUS COMORBIDITY, AND BODY MASS INDEX (BMI) OF 45.0 TO 49.9 IN ADULT: ICD-10-CM

## 2023-01-25 DIAGNOSIS — R73.03 PREDIABETES: Primary | ICD-10-CM

## 2023-01-25 DIAGNOSIS — I35.0 NONRHEUMATIC AORTIC VALVE STENOSIS: Primary | Chronic | ICD-10-CM

## 2023-01-25 DIAGNOSIS — R73.03 PREDIABETES: ICD-10-CM

## 2023-01-25 DIAGNOSIS — G47.33 OBSTRUCTIVE SLEEP APNEA TREATED WITH CONTINUOUS POSITIVE AIRWAY PRESSURE (CPAP): Chronic | ICD-10-CM

## 2023-01-25 DIAGNOSIS — E66.01 MORBID OBESITY: ICD-10-CM

## 2023-01-25 DIAGNOSIS — I10 PRIMARY HYPERTENSION: Chronic | ICD-10-CM

## 2023-01-25 PROBLEM — E66.813 CLASS 3 OBESITY WITH ALVEOLAR HYPOVENTILATION, SERIOUS COMORBIDITY, AND BODY MASS INDEX (BMI) OF 45.0 TO 49.9 IN ADULT: Status: ACTIVE | Noted: 2023-01-25

## 2023-01-25 PROCEDURE — 1160F RVW MEDS BY RX/DR IN RCRD: CPT | Mod: CPTII,S$GLB,, | Performed by: FAMILY MEDICINE

## 2023-01-25 PROCEDURE — 3078F PR MOST RECENT DIASTOLIC BLOOD PRESSURE < 80 MM HG: ICD-10-PCS | Mod: CPTII,S$GLB,, | Performed by: FAMILY MEDICINE

## 2023-01-25 PROCEDURE — 3074F SYST BP LT 130 MM HG: CPT | Mod: CPTII,S$GLB,, | Performed by: FAMILY MEDICINE

## 2023-01-25 PROCEDURE — 3008F PR BODY MASS INDEX (BMI) DOCUMENTED: ICD-10-PCS | Mod: CPTII,S$GLB,, | Performed by: FAMILY MEDICINE

## 2023-01-25 PROCEDURE — 1159F PR MEDICATION LIST DOCUMENTED IN MEDICAL RECORD: ICD-10-PCS | Mod: CPTII,S$GLB,, | Performed by: FAMILY MEDICINE

## 2023-01-25 PROCEDURE — 1160F PR REVIEW ALL MEDS BY PRESCRIBER/CLIN PHARMACIST DOCUMENTED: ICD-10-PCS | Mod: CPTII,S$GLB,, | Performed by: FAMILY MEDICINE

## 2023-01-25 PROCEDURE — 99214 OFFICE O/P EST MOD 30 MIN: CPT | Mod: S$GLB,,, | Performed by: FAMILY MEDICINE

## 2023-01-25 PROCEDURE — 3074F PR MOST RECENT SYSTOLIC BLOOD PRESSURE < 130 MM HG: ICD-10-PCS | Mod: CPTII,S$GLB,, | Performed by: FAMILY MEDICINE

## 2023-01-25 PROCEDURE — 1159F MED LIST DOCD IN RCRD: CPT | Mod: CPTII,S$GLB,, | Performed by: FAMILY MEDICINE

## 2023-01-25 PROCEDURE — 3078F DIAST BP <80 MM HG: CPT | Mod: CPTII,S$GLB,, | Performed by: FAMILY MEDICINE

## 2023-01-25 PROCEDURE — 3008F BODY MASS INDEX DOCD: CPT | Mod: CPTII,S$GLB,, | Performed by: FAMILY MEDICINE

## 2023-01-25 PROCEDURE — 99999 PR PBB SHADOW E&M-EST. PATIENT-LVL IV: CPT | Mod: PBBFAC,,, | Performed by: FAMILY MEDICINE

## 2023-01-25 PROCEDURE — 99999 PR PBB SHADOW E&M-EST. PATIENT-LVL IV: ICD-10-PCS | Mod: PBBFAC,,, | Performed by: FAMILY MEDICINE

## 2023-01-25 PROCEDURE — 99214 PR OFFICE/OUTPT VISIT, EST, LEVL IV, 30-39 MIN: ICD-10-PCS | Mod: S$GLB,,, | Performed by: FAMILY MEDICINE

## 2023-01-25 RX ORDER — SEMAGLUTIDE 2.68 MG/ML
2 INJECTION, SOLUTION SUBCUTANEOUS
Qty: 1 PEN | Refills: 2 | Status: SHIPPED | OUTPATIENT
Start: 2023-01-25 | End: 2023-08-07

## 2023-01-25 NOTE — PROGRESS NOTES
Subjective:       Patient ID: Dragan Man II is a 57 y.o. male.  Pmhx, fam hx, soc hx, surg hx, allergies, med list reviewed  Pt has appt with Primary Care on the 16th.    Wt Readings from Last 3 Encounters:   01/25/23 0851 (!) 150 kg (330 lb 11 oz)   01/23/23 1114 (!) 150.6 kg (332 lb)   01/23/23 0847 (!) 150.6 kg (332 lb 0.2 oz)       Chief Complaint: Follow-up  Pt overall doing well.     BM are good. Truvia brown sugar helped.    Intermittent swelling improved overall.   Spoke with Dr. Min yesterday: EF still 60% and had hoped with weight loss   He will be referred to a team. On 2 mg of the ozempic. Pt is trying to work on more weight loss. Cardiology wanted more weight loss.     He reports has been following basic med diet meal plan. Some quick foods/not as much as before.    BP overall has been stable    LAVERNE stable.     HPI  Review of Systems   Constitutional:  Negative for activity change, appetite change, fatigue and unexpected weight change.   HENT:  Negative for mouth dryness.    Eyes:  Negative for visual disturbance.   Respiratory:  Negative for apnea, chest tightness and shortness of breath.    Cardiovascular:  Negative for chest pain.   Gastrointestinal:  Negative for abdominal pain.   Musculoskeletal:  Negative for arthralgias and myalgias.   Integumentary:  Negative for rash.   Allergic/Immunologic: Negative for food allergies.   Psychiatric/Behavioral:  Negative for behavioral problems and sleep disturbance. The patient is not nervous/anxious.        Objective:      Physical Exam  Vitals and nursing note reviewed.   Constitutional:       General: He is not in acute distress.  HENT:      Head: Normocephalic and atraumatic.   Eyes:      General: No scleral icterus.     Pupils: Pupils are equal, round, and reactive to light.   Neck:      Comments: No TM  Cardiovascular:      Rate and Rhythm: Normal rate and regular rhythm.      Pulses: Normal pulses.      Heart sounds: Murmur (2/6 GRZEGORZ) heard.      No friction rub. No gallop.   Pulmonary:      Effort: Pulmonary effort is normal. No respiratory distress.      Breath sounds: Normal breath sounds. No wheezing, rhonchi or rales.   Abdominal:      General: Bowel sounds are normal. There is no distension.      Palpations: Abdomen is soft.      Tenderness: There is no abdominal tenderness.   Musculoskeletal:         General: No swelling.      Cervical back: Normal range of motion and neck supple. No tenderness.   Lymphadenopathy:      Cervical: No cervical adenopathy.   Skin:     General: Skin is warm.      Findings: No erythema or rash.   Neurological:      Mental Status: He is alert and oriented to person, place, and time.   Psychiatric:         Mood and Affect: Mood normal.         Behavior: Behavior normal.       Assessment:       1. Prediabetes    2. Primary hypertension    3. Obstructive sleep apnea treated with continuous positive airway pressure (CPAP)    4. Class 3 obesity with alveolar hypoventilation, serious comorbidity, and body mass index (BMI) of 45.0 to 49.9 in adult    5. Prediabetes    6. Morbid obesity              Plan:       Prediabetes  -     semaglutide (OZEMPIC) 2 mg/dose (8 mg/3 mL) PnIj; Inject 2 mg into the skin every 7 days.  Dispense: 1 pen; Refill: 2  May consider sglt-2  Keep pcp appt    Primary hypertension  Chronic/stable    Obstructive sleep apnea treated with continuous positive airway pressure (CPAP)  Chronic/stable    Class 3 obesity with alveolar hypoventilation, serious comorbidity, and body mass index (BMI) of 45.0 to 49.9 in adult  As above    Prediabetes  Comments:  on ozempic; implementing lifestyle changes  continue to work on decreasing sugars  Orders:  -     semaglutide (OZEMPIC) 2 mg/dose (8 mg/3 mL) PnIj; Inject 2 mg into the skin every 7 days.  Dispense: 1 pen; Refill: 2    Morbid obesity  -     semaglutide (OZEMPIC) 2 mg/dose (8 mg/3 mL) PnIj; Inject 2 mg into the skin every 7 days.  Dispense: 1 pen; Refill: 2           May consider sglt-2

## 2023-02-09 ENCOUNTER — HOSPITAL ENCOUNTER (OUTPATIENT)
Dept: RADIOLOGY | Facility: HOSPITAL | Age: 58
Discharge: HOME | End: 2023-02-09
Attending: INTERNAL MEDICINE
Payer: COMMERCIAL

## 2023-02-09 DIAGNOSIS — I50.32 CHRONIC DIASTOLIC CONGESTIVE HEART FAILURE: ICD-10-CM

## 2023-02-09 DIAGNOSIS — I35.0 NONRHEUMATIC AORTIC VALVE STENOSIS: Chronic | ICD-10-CM

## 2023-02-09 PROCEDURE — 25500020 PHARM REV CODE 255: Performed by: INTERNAL MEDICINE

## 2023-02-09 PROCEDURE — 71275 CTA CARDIAC TAVR_PARTNERS (XPD): ICD-10-PCS | Mod: 26,,, | Performed by: STUDENT IN AN ORGANIZED HEALTH CARE EDUCATION/TRAINING PROGRAM

## 2023-02-09 PROCEDURE — 74174 CTA ABD&PLVS W/CONTRAST: CPT | Mod: 26,,, | Performed by: STUDENT IN AN ORGANIZED HEALTH CARE EDUCATION/TRAINING PROGRAM

## 2023-02-09 PROCEDURE — 74174 CTA CARDIAC TAVR_PARTNERS (XPD): ICD-10-PCS | Mod: 26,,, | Performed by: STUDENT IN AN ORGANIZED HEALTH CARE EDUCATION/TRAINING PROGRAM

## 2023-02-09 PROCEDURE — 71275 CT ANGIOGRAPHY CHEST: CPT | Mod: TC

## 2023-02-09 PROCEDURE — 71275 CT ANGIOGRAPHY CHEST: CPT | Mod: 26,,, | Performed by: STUDENT IN AN ORGANIZED HEALTH CARE EDUCATION/TRAINING PROGRAM

## 2023-02-09 PROCEDURE — 74174 CTA ABD&PLVS W/CONTRAST: CPT | Mod: TC

## 2023-02-09 RX ADMIN — IOHEXOL 150 ML: 350 INJECTION, SOLUTION INTRAVENOUS at 11:02

## 2023-02-16 ENCOUNTER — OFFICE VISIT (OUTPATIENT)
Dept: INTERNAL MEDICINE | Facility: CLINIC | Age: 58
End: 2023-02-16
Payer: COMMERCIAL

## 2023-02-16 VITALS
SYSTOLIC BLOOD PRESSURE: 132 MMHG | HEART RATE: 71 BPM | TEMPERATURE: 98 F | WEIGHT: 315 LBS | OXYGEN SATURATION: 98 % | BODY MASS INDEX: 44.1 KG/M2 | HEIGHT: 71 IN | DIASTOLIC BLOOD PRESSURE: 82 MMHG

## 2023-02-16 DIAGNOSIS — G47.33 OSA ON CPAP: ICD-10-CM

## 2023-02-16 DIAGNOSIS — I35.0 NONRHEUMATIC AORTIC VALVE STENOSIS: Primary | ICD-10-CM

## 2023-02-16 DIAGNOSIS — E66.01 MORBID OBESITY WITH BMI OF 45.0-49.9, ADULT: ICD-10-CM

## 2023-02-16 PROCEDURE — 1159F MED LIST DOCD IN RCRD: CPT | Mod: CPTII,S$GLB,, | Performed by: EMERGENCY MEDICINE

## 2023-02-16 PROCEDURE — 3079F PR MOST RECENT DIASTOLIC BLOOD PRESSURE 80-89 MM HG: ICD-10-PCS | Mod: CPTII,S$GLB,, | Performed by: EMERGENCY MEDICINE

## 2023-02-16 PROCEDURE — 3008F BODY MASS INDEX DOCD: CPT | Mod: CPTII,S$GLB,, | Performed by: EMERGENCY MEDICINE

## 2023-02-16 PROCEDURE — 3075F PR MOST RECENT SYSTOLIC BLOOD PRESS GE 130-139MM HG: ICD-10-PCS | Mod: CPTII,S$GLB,, | Performed by: EMERGENCY MEDICINE

## 2023-02-16 PROCEDURE — 3075F SYST BP GE 130 - 139MM HG: CPT | Mod: CPTII,S$GLB,, | Performed by: EMERGENCY MEDICINE

## 2023-02-16 PROCEDURE — 3008F PR BODY MASS INDEX (BMI) DOCUMENTED: ICD-10-PCS | Mod: CPTII,S$GLB,, | Performed by: EMERGENCY MEDICINE

## 2023-02-16 PROCEDURE — 1159F PR MEDICATION LIST DOCUMENTED IN MEDICAL RECORD: ICD-10-PCS | Mod: CPTII,S$GLB,, | Performed by: EMERGENCY MEDICINE

## 2023-02-16 PROCEDURE — 99999 PR PBB SHADOW E&M-EST. PATIENT-LVL IV: ICD-10-PCS | Mod: PBBFAC,,, | Performed by: EMERGENCY MEDICINE

## 2023-02-16 PROCEDURE — 99214 OFFICE O/P EST MOD 30 MIN: CPT | Mod: S$GLB,,, | Performed by: EMERGENCY MEDICINE

## 2023-02-16 PROCEDURE — 99999 PR PBB SHADOW E&M-EST. PATIENT-LVL IV: CPT | Mod: PBBFAC,,, | Performed by: EMERGENCY MEDICINE

## 2023-02-16 PROCEDURE — 99214 PR OFFICE/OUTPT VISIT, EST, LEVL IV, 30-39 MIN: ICD-10-PCS | Mod: S$GLB,,, | Performed by: EMERGENCY MEDICINE

## 2023-02-16 PROCEDURE — 3079F DIAST BP 80-89 MM HG: CPT | Mod: CPTII,S$GLB,, | Performed by: EMERGENCY MEDICINE

## 2023-02-16 NOTE — PROGRESS NOTES
Subjective:       Patient ID: Dragan Man II is a 57 y.o. male.    Chief Complaint: Follow-up    Follow-up  Associated symptoms include fatigue. Pertinent negatives include no chest pain, coughing, diaphoresis or fever.   57 yr old CM. Morbid Obesity with mod severe AS, HTN, LAVERNE on CPAP, and Von Willebrand disease, Depression, Ch LBP, here for routine f/u. Has been on Ozempic and diet control and has lost 30 lbs. No smoking/drinking, walks 15-20 mins twice daily. ROLLINS renee in the afternoon, starts yawning, does take frequent naps on the weekends. Labs from last look good, A1c 5.8. Cardiology will see him next month to decide about TAVR vs AVR. Uses CPAP every night.       Past Medical History:   Diagnosis Date    Actinic keratoses 10/14/2019    Bilateral carpal tunnel syndrome 4/12/2021    Chronic bilateral low back pain without sciatica 4/12/2021    Depression     Morbid obesity with BMI of 40.0-44.9, adult 11/17/2017    LAVERNE (obstructive sleep apnea)     Prediabetes 4/12/2021    Seasonal allergic rhinitis due to pollen 10/14/2019    Ulnar neuropathy of both upper extremities 2/17/2020     Past Surgical History:   Procedure Laterality Date    CATHETERIZATION OF BOTH LEFT AND RIGHT HEART N/A 6/28/2022    Procedure: CATHETERIZATION, HEART, BOTH LEFT AND RIGHT;  Surgeon: Carlotta Gordon MD;  Location: [facility] CATH LAB;  Service: Cardiology;  Laterality: N/A;    None       Past Surgical History:   Procedure Laterality Date    CATHETERIZATION OF BOTH LEFT AND RIGHT HEART N/A 6/28/2022    Procedure: CATHETERIZATION, HEART, BOTH LEFT AND RIGHT;  Surgeon: Carlotta Gordon MD;  Location: [facility] CATH LAB;  Service: Cardiology;  Laterality: N/A;    None       Social History     Socioeconomic History    Marital status:    Tobacco Use    Smoking status: Never    Smokeless tobacco: Former    Tobacco comments:     quit 15 years ago    Substance and Sexual Activity    Alcohol use: Yes     Comment: socially // beer    Drug use:  No    Sexual activity: Yes     Partners: Female   Social History Narrative    No smokers in household, 3 dogs.     Review of patient's allergies indicates:   Allergen Reactions    Olmesartan Other (See Comments)     Numbness and tingling in fingers and knee joint pain    Chlorthalidone Other (See Comments)     Patient states it made him depressed.      Current Outpatient Medications   Medication Sig    albuterol (PROVENTIL/VENTOLIN HFA) 90 mcg/actuation inhaler INHALE 2 PUFFS BY MOUTH INTO THE LUNGS EVERY 4 HOURS AS NEEDED FOR WHEEZING. RESCUE    atorvastatin (LIPITOR) 20 MG tablet Take 1 tablet (20 mg total) by mouth every evening.    doxycycline (MONODOX) 100 MG capsule TAKE ONCE DAILY WITH FOOD. MAY CAUSE UPSET STOMACH.    furosemide (LASIX) 20 MG tablet TAKE 1 TABLET (20 MG TOTAL) BY MOUTH EVERY MON, TUES, WED, THURS, FRI.    losartan (COZAAR) 50 MG tablet Take 1 tablet (50 mg total) by mouth once daily.    metoprolol succinate (TOPROL-XL) 50 MG 24 hr tablet TAKE 1 TABLET BY MOUTH EVERY DAY    mometasone (NASONEX) 50 mcg/actuation nasal spray INSTILL 2 SPRAYS INTO EACH NOSTRIL ONCE DAILY.    semaglutide (OZEMPIC) 2 mg/dose (8 mg/3 mL) PnIj Inject 2 mg into the skin every 7 days.    sodium chloride (OCEAN) 0.65 % nasal spray 1 spray by Nasal route as needed for Congestion.    tretinoin (RETIN-A) 0.05 % cream Apply pea-sized amount to entire face at bedtime.  If dryness, use every third night and increase as tolerated to every night.     Current Facility-Administered Medications   Medication    sodium chloride 0.9% flush 10 mL           Review of Systems   Constitutional:  Positive for fatigue. Negative for diaphoresis and fever.   HENT: Negative.     Respiratory:  Positive for chest tightness and shortness of breath. Negative for cough and choking.    Cardiovascular:  Positive for leg swelling. Negative for chest pain and palpitations.   Gastrointestinal: Negative.    Genitourinary: Negative.    Musculoskeletal:  Negative.    Neurological: Negative.    Psychiatric/Behavioral: Negative.       Objective:      Physical Exam  Vitals and nursing note reviewed.   Constitutional:       General: He is not in acute distress.     Appearance: Normal appearance. He is obese. He is not ill-appearing.   HENT:      Head: Normocephalic and atraumatic.      Right Ear: External ear normal.      Left Ear: External ear normal.      Nose: No congestion or rhinorrhea.      Mouth/Throat:      Mouth: Mucous membranes are moist.      Pharynx: Oropharynx is clear.   Eyes:      Extraocular Movements: Extraocular movements intact.      Pupils: Pupils are equal, round, and reactive to light.   Cardiovascular:      Rate and Rhythm: Normal rate and regular rhythm.      Pulses: Normal pulses.      Heart sounds: Murmur heard.     No friction rub. No gallop.      Comments: 3/6 systolic murmur over Aortic area- RSB   Pulmonary:      Effort: No respiratory distress.      Breath sounds: Normal breath sounds. No wheezing or rales.   Musculoskeletal:      Right lower leg: No edema.      Left lower leg: No edema.   Neurological:      Mental Status: He is alert.       Assessment:     Vitals:    02/16/23 0829   BP: 132/82   Pulse: 71   Temp: 97.6 °F (36.4 °C)     1. Nonrheumatic aortic valve stenosis, severe  Overview:  CARDIAC CATH 06/28/22  - The pre-procedure left ventricular end diastolic pressure was 32.  - There was trivial (1+) aortic regurgitation.  - The estimated blood loss was none.  - There was diastolic dysfunction.  - The coronary arteries were normal.  - The filling pressures on the right and left were moderately elevated. Pulmonary hypertension was moderate.  - 53 MMHG GRADIENT ACROSS AORTIC VAKLVE SUGGESTIVE OF SEVERE AORTIC STENOSIS.    ECHOCARDIOGRAM 10/18/2019  1 - Normal left ventricular systolic function (EF 60-65%).  2 - Impaired LV relaxation, elevated LAP (grade 2 diastolic dysfunction).  3 - Normal right ventricular systolic function.  4 -  No wall motion abnormalities.  5 - Severe left atrial enlargement.  6 - Concentric hypertrophy.  7 - Mild aortic regurgitation.  8 - Moderate aortic stenosis, CRISTINO = 1.87 cm2, AVAi = 0.7 cm2/m2, peak velocity = 3.87 m/s, mean gradient = 34 mmHg.    Await Cardiology f/u next month to decide about AVR vs TAVR    2. LAVERNE on CPAP: consider Provigil- Pulm consulted    3. Morbid obesity with BMI of 45.0-49.9, adult; continue Ozempic, so far modest weight loss, may benefit more from Sleeve Gastrectomy. Will refer to bariatric surgery.

## 2023-02-28 DIAGNOSIS — I35.0 SEVERE AORTIC STENOSIS: Primary | ICD-10-CM

## 2023-02-28 DIAGNOSIS — I50.32 CHRONIC DIASTOLIC CONGESTIVE HEART FAILURE: ICD-10-CM

## 2023-02-28 PROBLEM — I70.0 AORTIC ATHEROSCLEROSIS: Status: ACTIVE | Noted: 2023-02-28

## 2023-02-28 RX ORDER — DIPHENHYDRAMINE HCL 50 MG
50 CAPSULE ORAL ONCE
Status: CANCELLED | OUTPATIENT
Start: 2023-02-28 | End: 2023-02-28

## 2023-02-28 RX ORDER — SODIUM CHLORIDE 9 MG/ML
INJECTION, SOLUTION INTRAVENOUS CONTINUOUS
Status: CANCELLED | OUTPATIENT
Start: 2023-02-28 | End: 2023-02-28

## 2023-02-28 RX ORDER — SODIUM CHLORIDE 0.9 % (FLUSH) 0.9 %
10 SYRINGE (ML) INJECTION
Status: DISCONTINUED | OUTPATIENT
Start: 2023-02-28 | End: 2023-03-17 | Stop reason: HOSPADM

## 2023-02-28 NOTE — PROGRESS NOTES
Subjective:     Referring Physician: Dr Aldair STONE  Dragan Man II is a 57 y.o. male who presents for evaluation of aortic stenosis.     Dragan Man II is a 57 y.o. male referred by Dr Shaikh for evaluation of severe AS (NYHA Class III sx).    The patient has undergone the following TAVR work-up:   ECHO (Date 1/23/23): CRISTINO= 0.87 cm2, MG= 47 mmHg, Peak Cristian= 4.30 m/s, EF= 60%.   LHC (Date 6/28/22; [facility]idi): normal coronaries   STS: 2%   Frailty: 0/4   Iliacs are >10 on R and > 8.7 on L   LVOT area by CTA is 4.21 cm2 (26 mm X 21 mm) and Avg Diameter is 23.2 per Dr Prather  Incidental findings on CT: 4 mm pulmonary nodule. Sent to PCP.   CT Surgery risk assessment: Moderate risk, per Dr Johnson due to vWV disease, morbid obesity  Rhythm issues: incomplete RBBB,   PFTs: FEV1 64.9% predicted, DLCO 84.5% predicted.  KCCQ/5 meter walk: Done  Comorbidities: obesity, LAVERNE on CPAP, Von Willebrand (told has a kid, hasn't seen hematology)      Dragan Man II is a 29 mm  EvolutFX valve candidate via RTF access.      NYHA:III CCS:0    ROS       Past Medical History:   Diagnosis Date    Actinic keratoses 10/14/2019    Bilateral carpal tunnel syndrome 4/12/2021    Chronic bilateral low back pain without sciatica 4/12/2021    Depression     Morbid obesity with BMI of 40.0-44.9, adult 11/17/2017    LAVERNE (obstructive sleep apnea)     Prediabetes 4/12/2021    Seasonal allergic rhinitis due to pollen 10/14/2019    Ulnar neuropathy of both upper extremities 2/17/2020       Current Outpatient Medications:     albuterol (PROVENTIL/VENTOLIN HFA) 90 mcg/actuation inhaler, INHALE 2 PUFFS BY MOUTH INTO THE LUNGS EVERY 4 HOURS AS NEEDED FOR WHEEZING. RESCUE, Disp: 6.7 g, Rfl: 1    atorvastatin (LIPITOR) 20 MG tablet, Take 1 tablet (20 mg total) by mouth every evening., Disp: 90 tablet, Rfl: 3    doxycycline (MONODOX) 100 MG capsule, TAKE ONCE DAILY WITH FOOD. MAY CAUSE UPSET STOMACH., Disp: 90 capsule, Rfl: 1    furosemide  "(LASIX) 20 MG tablet, TAKE 1 TABLET (20 MG TOTAL) BY MOUTH EVERY MON, TUES, WED, THURS, FRI., Disp: 60 tablet, Rfl: 3    losartan (COZAAR) 50 MG tablet, Take 1 tablet (50 mg total) by mouth once daily., Disp: 90 tablet, Rfl: 3    metoprolol succinate (TOPROL-XL) 50 MG 24 hr tablet, TAKE 1 TABLET BY MOUTH EVERY DAY, Disp: 90 tablet, Rfl: 3    mometasone (NASONEX) 50 mcg/actuation nasal spray, INSTILL 2 SPRAYS INTO EACH NOSTRIL ONCE DAILY., Disp: 1 each, Rfl: 1    semaglutide (OZEMPIC) 2 mg/dose (8 mg/3 mL) PnIj, Inject 2 mg into the skin every 7 days., Disp: 1 pen, Rfl: 2    sodium chloride (OCEAN) 0.65 % nasal spray, 1 spray by Nasal route as needed for Congestion., Disp: , Rfl:     tretinoin (RETIN-A) 0.05 % cream, Apply pea-sized amount to entire face at bedtime.  If dryness, use every third night and increase as tolerated to every night., Disp: 20 g, Rfl: 6    fluticasone furoate (ARNUITY ELLIPTA) 100 mcg/actuation inhaler, Inhale 1 puff into the lungs once daily. (Patient not taking: Reported on 3/1/2023), Disp: 30 each, Rfl: 11    Current Facility-Administered Medications:     sodium chloride 0.9% flush 10 mL, 10 mL, Intravenous, PRN, Ron Shaikh MD    sodium chloride 0.9% flush 10 mL, 10 mL, Intravenous, PRN, Adan Greene MD    Objective:    Physical Exam        Vitals:    03/01/23 1306   BP: 138/68   Pulse: 71   SpO2: 98%   Weight: (!) 148.3 kg (326 lb 15.1 oz)   Height: 5' 11" (1.803 m)     Body mass index is 45.6 kg/m².    Test(s) Reviewed  I have reviewed the following in detail:  [] Stress test   [] Angiography   [x] Echocardiogram   [x] Labs   [] Other       Chemistry        Component Value Date/Time     02/09/2023 1017    K 4.8 02/09/2023 1017     02/09/2023 1017    CO2 23 02/09/2023 1017    BUN 15 02/09/2023 1017    CREATININE 0.8 02/09/2023 1017    GLU 99 02/09/2023 1017        Component Value Date/Time    CALCIUM 9.7 02/09/2023 1017    ALKPHOS 86 12/14/2022 0919    AST 19 12/14/2022 " 0919    ALT 27 12/14/2022 0919    BILITOT 1.0 12/14/2022 0919    ESTGFRAFRICA >60.0 06/20/2022 1635    EGFRNONAA >60.0 06/20/2022 1635            TTE 03/01/2023  Reviewed    Assessment:   Nonrheumatic aortic valve stenosis, severe  Severe, sympotmatic.     TAVR work-up:   ECHO (Date 1/23/23): CRISTINO= 0.87 cm2, MG= 47 mmHg, Peak Cristian= 4.30 m/s, EF= 60%.   LHC (Date 6/28/22; [facility]idi): normal coronaries   STS: 2%   Frailty: 0/4   Iliacs are >10 on R and > 8.7 on L   LVOT area by CTA is 4.21 cm2 (26 mm X 21 mm) and Avg Diameter is 23.2 per Dr Prather  Incidental findings on CT: 4 mm pulmonary nodule. Sent to PCP.   CT Surgery risk assessment: Moderate risk, per Dr Johnson due to vWV disease, morbid obesity  Rhythm issues: incomplete RBBB,   PFTs: FEV1 64.9% predicted, DLCO 84.5% predicted.  KCCQ/5 meter walk: Done  Comorbidities: obesity, LAVERNE on CPAP, Von Willebrand (told has a kid, hasn't seen hematology)    Dragan Man II is a 29 mm  EvolutFX valve candidate via RTF access.    PVD (peripheral vascular disease)  --    Aortic atherosclerosis  See imaging. Follow up with Cardiology.     Von Willebrand disease  Told as a kid. No recent workup done.     Morbid obesity with BMI of 45.0-49.9, adult  Educated on diet and exercise.     Obstructive sleep apnea treated with continuous positive airway pressure (CPAP)  Uses CPAP.     Chronic diastolic congestive heart failure  Patient is identified as having Diastolic (HFpEF) heart failure that is Chronic. CHF is currently uncontrolled due to Rales/crackles on pulmonary exam. Latest ECHO performed and demonstrates- Results for orders placed during the hospital encounter of 01/23/23    Echo    Interpretation Summary  · Concentric remodeling and normal systolic function.  · Mild mitral regurgitation.  · Mild aortic regurgitation.  · The estimated ejection fraction is 60%.  · Normal left ventricular diastolic function.  · Normal right ventricular size with normal right  ventricular systolic function.  · There is severe aortic valve stenosis.  · Aortic valve area is 0.87 cm2; peak velocity is 4.30 m/s; mean gradient is 47 mmHg.  · Mild tricuspid regurgitation.  · Normal central venous pressure (3 mmHg).  · The estimated PA systolic pressure is 16 mmHg.  . Continue Beta Blocker, ACE/ARB and Furosemide and monitor clinical status closely. Monitor on telemetry. Patient is off CHF pathway.  Monitor strict Is&Os and daily weights.  Place on fluid restriction of 2 L. Continue to stress to patient importance of self efficacy and  on diet for CHF. Last BNP reviewed- and noted below @LABRCNTIP(BNP,BNPTRIAGEBLO)@.      Plan:       Transcatheter Aortic valve replacement   Valve: 29 mm EvolutFX  ECMO:  On Call  TAVR access: RTF  Valvuloplasty balloon: 18 mm  Viabahn size if needed: 10X5  Antithrombotic therapy: ASA only  Temporary pacing wire: No, Will pace from TAVR wire  Pacemaker risk factors: Incomplete LBBB   Post op destination: Stepdown  Antibiotics given: (to be done during procedure)  CONSENTING:  The patient was told that the procedure carries around a 12.5% risk of permanent pacemaker requirement and that risk depends on the patients underlying conduction system.  Prior to the clinc visit, all available records from referring provider were reviewed.  I have personally reviewed all the lab tests available related to the patient's case  The patient's images were reviewed by myself and the procedural planning was done with my own interpretation of the iliac and aortic anatomy based on CTA, angiography or CYNTHIA. I have reviewed all other imaging studies relevant to the patient including echocardiography and coronary angiography.  Patient was educated abut the pathophysiology and natural history of severe aortic stenosis and was educated about the treatment options including surgical and transcatheter valve replacement. She agrees to be full code for the forseable future and to  undergo workup for treatment of severe AS.   The risks, benefits, and alternatives of transcatheter aortic valve replacement were discussed with the patient. All questions were answered and informed consent was obtained. I had a detailed discussion with the patient regarding risk of stroke, MI, bleeding access site complications including limb loss, allergy, kidney failure including dialysis and death.  The patient understands the risks and benefits and wishes to go ahead with the procedure.  The referring Cardiologist was notified of the plan  All patient's questions were answered    Shared Decision Making:  This patient was seen today by a multidisciplinary heart team involving both a Structural Heart Specialist (Interventional Cardiologist) and a Cardiothoracic Surgeon. The patient was involved in shared decision-making to define clearer goals for treatment and align health decisions with their current values.  The patient understands the risks and expected benefits of their treatment options.

## 2023-02-28 NOTE — ASSESSMENT & PLAN NOTE
Severe, sympotmatic.     TAVR work-up:    ECHO (Date 1/23/23): CRISTINO= 0.87 cm2, MG= 47 mmHg, Peak Cristian= 4.30 m/s, EF= 60%.    LHC (Date 6/28/22; [facility]): normal coronaries    STS: 2%    Frailty: 0/4    Iliacs are >10 on R and > 8.7 on L    LVOT area by CTA is 4.21 cm2 (26 mm X 21 mm) and Avg Diameter is 23.2 per Dr Prather   Incidental findings on CT: 4 mm pulmonary nodule. Sent to PCP.    CT Surgery risk assessment: Moderate risk, per Dr Johnson due to vWV disease, morbid obesity   Rhythm issues: incomplete RBBB,    PFTs: FEV1 64.9% predicted, DLCO 84.5% predicted.   KCCQ/5 meter walk: Done   Comorbidities: obesity, LAVERNE on CPAP, Von Willebrand (told has a kid, hasn't seen hematology)    Dragan Man II is a 29 mm  EvolutFX valve candidate via RTF access.

## 2023-03-01 ENCOUNTER — CLINICAL SUPPORT (OUTPATIENT)
Dept: CARDIOLOGY | Facility: CLINIC | Age: 58
End: 2023-03-01
Payer: COMMERCIAL

## 2023-03-01 ENCOUNTER — OFFICE VISIT (OUTPATIENT)
Dept: SLEEP MEDICINE | Facility: CLINIC | Age: 58
End: 2023-03-01
Payer: COMMERCIAL

## 2023-03-01 ENCOUNTER — OFFICE VISIT (OUTPATIENT)
Dept: CARDIOLOGY | Facility: CLINIC | Age: 58
End: 2023-03-01
Payer: COMMERCIAL

## 2023-03-01 ENCOUNTER — LAB VISIT (OUTPATIENT)
Dept: LAB | Facility: HOSPITAL | Age: 58
End: 2023-03-01
Attending: NURSE PRACTITIONER
Payer: COMMERCIAL

## 2023-03-01 ENCOUNTER — CLINICAL SUPPORT (OUTPATIENT)
Dept: PULMONOLOGY | Facility: CLINIC | Age: 58
End: 2023-03-01
Payer: COMMERCIAL

## 2023-03-01 ENCOUNTER — EDUCATION (OUTPATIENT)
Dept: CARDIOLOGY | Facility: CLINIC | Age: 58
End: 2023-03-01
Payer: COMMERCIAL

## 2023-03-01 VITALS
HEIGHT: 71 IN | OXYGEN SATURATION: 98 % | RESPIRATION RATE: 16 BRPM | WEIGHT: 315 LBS | DIASTOLIC BLOOD PRESSURE: 72 MMHG | SYSTOLIC BLOOD PRESSURE: 130 MMHG | HEART RATE: 68 BPM | BODY MASS INDEX: 44.1 KG/M2

## 2023-03-01 VITALS
HEART RATE: 71 BPM | BODY MASS INDEX: 44.1 KG/M2 | DIASTOLIC BLOOD PRESSURE: 68 MMHG | SYSTOLIC BLOOD PRESSURE: 138 MMHG | HEIGHT: 71 IN | OXYGEN SATURATION: 98 % | WEIGHT: 315 LBS

## 2023-03-01 DIAGNOSIS — E66.01 MORBID OBESITY WITH BMI OF 45.0-49.9, ADULT: Chronic | ICD-10-CM

## 2023-03-01 DIAGNOSIS — J45.909 ASTHMA, UNSPECIFIED ASTHMA SEVERITY, UNSPECIFIED WHETHER COMPLICATED, UNSPECIFIED WHETHER PERSISTENT: ICD-10-CM

## 2023-03-01 DIAGNOSIS — D68.00 VON WILLEBRAND DISEASE: ICD-10-CM

## 2023-03-01 DIAGNOSIS — R06.02 SOB (SHORTNESS OF BREATH): ICD-10-CM

## 2023-03-01 DIAGNOSIS — R53.83 FATIGUE, UNSPECIFIED TYPE: ICD-10-CM

## 2023-03-01 DIAGNOSIS — G47.33 OSA (OBSTRUCTIVE SLEEP APNEA): Primary | ICD-10-CM

## 2023-03-01 DIAGNOSIS — G47.33 OBSTRUCTIVE SLEEP APNEA TREATED WITH CONTINUOUS POSITIVE AIRWAY PRESSURE (CPAP): ICD-10-CM

## 2023-03-01 DIAGNOSIS — I73.9 PVD (PERIPHERAL VASCULAR DISEASE): ICD-10-CM

## 2023-03-01 DIAGNOSIS — I35.0 NONRHEUMATIC AORTIC VALVE STENOSIS: Chronic | ICD-10-CM

## 2023-03-01 DIAGNOSIS — I70.0 AORTIC ATHEROSCLEROSIS: ICD-10-CM

## 2023-03-01 DIAGNOSIS — I50.32 CHRONIC DIASTOLIC CONGESTIVE HEART FAILURE: ICD-10-CM

## 2023-03-01 DIAGNOSIS — I10 PRIMARY HYPERTENSION: ICD-10-CM

## 2023-03-01 DIAGNOSIS — G47.22 ADVANCED SLEEP PHASE SYNDROME: ICD-10-CM

## 2023-03-01 DIAGNOSIS — E66.01 MORBID OBESITY WITH BMI OF 45.0-49.9, ADULT: ICD-10-CM

## 2023-03-01 DIAGNOSIS — I35.0 NONRHEUMATIC AORTIC VALVE STENOSIS: Primary | ICD-10-CM

## 2023-03-01 DIAGNOSIS — G47.33 OBSTRUCTIVE SLEEP APNEA TREATED WITH CONTINUOUS POSITIVE AIRWAY PRESSURE (CPAP): Chronic | ICD-10-CM

## 2023-03-01 PROCEDURE — 3075F PR MOST RECENT SYSTOLIC BLOOD PRESS GE 130-139MM HG: ICD-10-PCS | Mod: CPTII,S$GLB,, | Performed by: INTERNAL MEDICINE

## 2023-03-01 PROCEDURE — 99215 OFFICE O/P EST HI 40 MIN: CPT | Mod: S$GLB,,, | Performed by: NURSE PRACTITIONER

## 2023-03-01 PROCEDURE — 95012 NITRIC OXIDE EXP GAS DETER: CPT | Mod: S$GLB,,, | Performed by: INTERNAL MEDICINE

## 2023-03-01 PROCEDURE — 84403 ASSAY OF TOTAL TESTOSTERONE: CPT | Performed by: NURSE PRACTITIONER

## 2023-03-01 PROCEDURE — 99999 PR PBB SHADOW E&M-EST. PATIENT-LVL I: ICD-10-PCS | Mod: PBBFAC,,,

## 2023-03-01 PROCEDURE — 84443 ASSAY THYROID STIM HORMONE: CPT | Performed by: NURSE PRACTITIONER

## 2023-03-01 PROCEDURE — 3075F SYST BP GE 130 - 139MM HG: CPT | Mod: CPTII,S$GLB,, | Performed by: INTERNAL MEDICINE

## 2023-03-01 PROCEDURE — 99215 OFFICE O/P EST HI 40 MIN: CPT | Mod: S$GLB,,, | Performed by: INTERNAL MEDICINE

## 2023-03-01 PROCEDURE — 1160F RVW MEDS BY RX/DR IN RCRD: CPT | Mod: CPTII,S$GLB,, | Performed by: NURSE PRACTITIONER

## 2023-03-01 PROCEDURE — 99999 PR PBB SHADOW E&M-EST. PATIENT-LVL IV: ICD-10-PCS | Mod: PBBFAC,,, | Performed by: NURSE PRACTITIONER

## 2023-03-01 PROCEDURE — 99999 PR PBB SHADOW E&M-EST. PATIENT-LVL I: CPT | Mod: PBBFAC,,,

## 2023-03-01 PROCEDURE — 3078F PR MOST RECENT DIASTOLIC BLOOD PRESSURE < 80 MM HG: ICD-10-PCS | Mod: CPTII,S$GLB,, | Performed by: INTERNAL MEDICINE

## 2023-03-01 PROCEDURE — 99215 PR OFFICE/OUTPT VISIT, EST, LEVL V, 40-54 MIN: ICD-10-PCS | Mod: S$GLB,,, | Performed by: NURSE PRACTITIONER

## 2023-03-01 PROCEDURE — 1159F MED LIST DOCD IN RCRD: CPT | Mod: CPTII,S$GLB,, | Performed by: NURSE PRACTITIONER

## 2023-03-01 PROCEDURE — 3008F BODY MASS INDEX DOCD: CPT | Mod: CPTII,S$GLB,, | Performed by: NURSE PRACTITIONER

## 2023-03-01 PROCEDURE — 99999 PR PBB SHADOW E&M-EST. PATIENT-LVL III: CPT | Mod: PBBFAC,,, | Performed by: INTERNAL MEDICINE

## 2023-03-01 PROCEDURE — 84439 ASSAY OF FREE THYROXINE: CPT | Performed by: NURSE PRACTITIONER

## 2023-03-01 PROCEDURE — 1159F PR MEDICATION LIST DOCUMENTED IN MEDICAL RECORD: ICD-10-PCS | Mod: CPTII,S$GLB,, | Performed by: INTERNAL MEDICINE

## 2023-03-01 PROCEDURE — 95012 PR NITRIC OXIDE EXPIRED GAS DETERMINATION: ICD-10-PCS | Mod: S$GLB,,, | Performed by: INTERNAL MEDICINE

## 2023-03-01 PROCEDURE — 1160F PR REVIEW ALL MEDS BY PRESCRIBER/CLIN PHARMACIST DOCUMENTED: ICD-10-PCS | Mod: CPTII,S$GLB,, | Performed by: NURSE PRACTITIONER

## 2023-03-01 PROCEDURE — 3078F DIAST BP <80 MM HG: CPT | Mod: CPTII,S$GLB,, | Performed by: NURSE PRACTITIONER

## 2023-03-01 PROCEDURE — 99204 OFFICE O/P NEW MOD 45 MIN: CPT | Mod: S$GLB,,, | Performed by: THORACIC SURGERY (CARDIOTHORACIC VASCULAR SURGERY)

## 2023-03-01 PROCEDURE — 3008F PR BODY MASS INDEX (BMI) DOCUMENTED: ICD-10-PCS | Mod: CPTII,S$GLB,, | Performed by: INTERNAL MEDICINE

## 2023-03-01 PROCEDURE — 99204 PR OFFICE/OUTPT VISIT, NEW, LEVL IV, 45-59 MIN: ICD-10-PCS | Mod: S$GLB,,, | Performed by: THORACIC SURGERY (CARDIOTHORACIC VASCULAR SURGERY)

## 2023-03-01 PROCEDURE — 99999 PR PBB SHADOW E&M-EST. PATIENT-LVL III: ICD-10-PCS | Mod: PBBFAC,,, | Performed by: INTERNAL MEDICINE

## 2023-03-01 PROCEDURE — 1159F PR MEDICATION LIST DOCUMENTED IN MEDICAL RECORD: ICD-10-PCS | Mod: CPTII,S$GLB,, | Performed by: NURSE PRACTITIONER

## 2023-03-01 PROCEDURE — 3008F BODY MASS INDEX DOCD: CPT | Mod: CPTII,S$GLB,, | Performed by: INTERNAL MEDICINE

## 2023-03-01 PROCEDURE — 99999 PR PBB SHADOW E&M-EST. PATIENT-LVL IV: CPT | Mod: PBBFAC,,, | Performed by: NURSE PRACTITIONER

## 2023-03-01 PROCEDURE — 82607 VITAMIN B-12: CPT | Performed by: NURSE PRACTITIONER

## 2023-03-01 PROCEDURE — 1159F MED LIST DOCD IN RCRD: CPT | Mod: CPTII,S$GLB,, | Performed by: INTERNAL MEDICINE

## 2023-03-01 PROCEDURE — 3008F PR BODY MASS INDEX (BMI) DOCUMENTED: ICD-10-PCS | Mod: CPTII,S$GLB,, | Performed by: NURSE PRACTITIONER

## 2023-03-01 PROCEDURE — 36415 COLL VENOUS BLD VENIPUNCTURE: CPT | Performed by: NURSE PRACTITIONER

## 2023-03-01 PROCEDURE — 3075F SYST BP GE 130 - 139MM HG: CPT | Mod: CPTII,S$GLB,, | Performed by: NURSE PRACTITIONER

## 2023-03-01 PROCEDURE — 3075F PR MOST RECENT SYSTOLIC BLOOD PRESS GE 130-139MM HG: ICD-10-PCS | Mod: CPTII,S$GLB,, | Performed by: NURSE PRACTITIONER

## 2023-03-01 PROCEDURE — 99215 PR OFFICE/OUTPT VISIT, EST, LEVL V, 40-54 MIN: ICD-10-PCS | Mod: S$GLB,,, | Performed by: INTERNAL MEDICINE

## 2023-03-01 PROCEDURE — 3078F PR MOST RECENT DIASTOLIC BLOOD PRESSURE < 80 MM HG: ICD-10-PCS | Mod: CPTII,S$GLB,, | Performed by: NURSE PRACTITIONER

## 2023-03-01 PROCEDURE — 3078F DIAST BP <80 MM HG: CPT | Mod: CPTII,S$GLB,, | Performed by: INTERNAL MEDICINE

## 2023-03-01 PROCEDURE — 84480 ASSAY TRIIODOTHYRONINE (T3): CPT | Performed by: NURSE PRACTITIONER

## 2023-03-01 RX ORDER — FLUTICASONE FUROATE 100 UG/1
1 POWDER RESPIRATORY (INHALATION) DAILY
Qty: 30 EACH | Refills: 11 | Status: SHIPPED | OUTPATIENT
Start: 2023-03-01 | End: 2023-08-21

## 2023-03-01 NOTE — PROGRESS NOTES
Transcatheter Valve Replacement (TAVR)    You have been scheduled for your valve replacement on Thursday, March 16, 2023.     Please report to the Cardiology Waiting Area on the Third floor of the hospital and check in at 10 AM.   You will then be taken to the SSCU (Short Stay Cardiac Unit) and prepared for your procedure. Please be aware that this is not the time of your procedure but the time you are to arrive.     Preperations for your procedure:  Shower with Dial soap the night before and the morning of your procedure.  No solid foods 8 hours prior to your procedure.  You may have clear liquids until the time of your admission which should be 2 hours prior to your procedure.  You are encouraged to have at least 8 ounces of clear liquids prior to admission to SSCU.  Patients with gastric emptying issues should be fasting for 6- 8 hours prior to the procedure.  Clear liquids include water, black coffee, clear juices, and performance drinks - no pulp or milk.    Heart failure or dialysis patients will be limited to 8 ounces (1 cup) of clear liquids up until 2 hours of the procedure.  You may take your regular morning medications         with as much water as necessary.   Bring your cane and/or walker with you to the hospital.  Bring CPAP/BiPAP, or oxygen if you are on oxygen at home.  Call the office for any signs of infection ( fever, cough, pneumonia, urinary tract, etc.) We cannot implant a device in an infected patient.    Medications:  You may take your regular scheduled medications the morning of your procedure with as much water as necessary.  If you are diabetic and on Metformin (Glucophage), do not take it the day before, the day of, and 2 days after your procedure.  If you are on Pradaxa, Xarelto, Eliquis, or Coumadin hold 3 days prior to your procedure.     How long will the procedure take?  The entire procedure takes three to four hours.  After the procedure, you will go to an ICU where you will be  monitored closely for the next several hours.  You will remain in the ICU overnight.        If you walk with a cane or walker, please bring it with you to the hospital so that we can get you out of bed several hours after your valve replacement.  Our goal is to get you up in a chair and moving as quickly as possible as long as it is safe for you to do so.    When can I go home?  Your length of stay in the hospital, will be determined by your physician.  Be prepared to stay 1-3 days.  You will be discharged when your physician thinks it is safe for you to go home.  You must have someone to drive you home when you are discharged.    Follow Up After Valve Replacement:  It is required that you return to [facility] for the follow up in one month and one year with an echo, lab work, and a clinic visit.  If you are in a research trial, your follow up will be slightly different and according to the trial requirements.  You can follow up with your regular cardiologist regarding any other heart issues.    Contacts one of our nurses at [phone] for any questions.  Kalpana Darling, PADDY Rios RN        THE ABOVE INSTRUCTIONS WERE GIVEN TO THE PATIENT VERBALLY AND THEY VERBALIZED UNDERSTANDING.  THEY DO NOT REQUIRE ANY SPECIAL NEEDS AND DO NOT HAVE ANY LEARNING BARRIERS.          Directions for Reporting to Cardiology Waiting Area in the Hospital  If you park in the Parking Garage:  Take elevators to the1st floor of the parking garage.  Continue past the gift shop, coffee shop, and piano.  Take a right and go to the gold elevators. (Elevator B)  Take the elevator to the 3rd floor.  Follow the arrow on the sign on the wall that says Cath Lab Registration/EP Lab Registration.  Follow the long hallway all the way around until you come to a big open area.  This is the registration area.  Check in at Reception Desk.    OR    If family is dropping you off:  Have them drop you off at the front of the Hospital under the green  overhang.  Enter through the doors and take a right.  Take the E elevators to the 3rd floor Cardiology Waiting Area.  Check in at the Reception Desk in the waiting room.

## 2023-03-01 NOTE — ASSESSMENT & PLAN NOTE
Patient is identified as having Diastolic (HFpEF) heart failure that is Chronic. CHF is currently uncontrolled due to Rales/crackles on pulmonary exam. Latest ECHO performed and demonstrates- Results for orders placed during the hospital encounter of 01/23/23    Echo    Interpretation Summary  · Concentric remodeling and normal systolic function.  · Mild mitral regurgitation.  · Mild aortic regurgitation.  · The estimated ejection fraction is 60%.  · Normal left ventricular diastolic function.  · Normal right ventricular size with normal right ventricular systolic function.  · There is severe aortic valve stenosis.  · Aortic valve area is 0.87 cm2; peak velocity is 4.30 m/s; mean gradient is 47 mmHg.  · Mild tricuspid regurgitation.  · Normal central venous pressure (3 mmHg).  · The estimated PA systolic pressure is 16 mmHg.  . Continue Beta Blocker, ACE/ARB and Furosemide and monitor clinical status closely. Monitor on telemetry. Patient is off CHF pathway.  Monitor strict Is&Os and daily weights.  Place on fluid restriction of 2 L. Continue to stress to patient importance of self efficacy and  on diet for CHF. Last BNP reviewed- and noted below @LABRCNTIP(BNP,BNPTRIAGEBLO)@.

## 2023-03-01 NOTE — PROGRESS NOTES
Subjective:      Patient ID: Dragan Man II is a 57 y.o. male.    Chief Complaint: No chief complaint on file.    HPI:  57-year-old male with a history of heart murmur for a long time started having dyspnea on exertion was referred for structural heart Clinic because of his severe aortic stenosis.  The patient is morbidly obese trying to lose weight with a BMI of 46 and has a longstanding history of von Willebrand's disease and obstructive sleep apnea from his obesity.  History of hypertension and type 2 diabetes mellitus.    Review of patient's allergies indicates:   Allergen Reactions    Olmesartan Other (See Comments)     Numbness and tingling in fingers and knee joint pain    Chlorthalidone Other (See Comments)     Patient states it made him depressed.        Past Medical History:   Diagnosis Date    Actinic keratoses 10/14/2019    Bilateral carpal tunnel syndrome 4/12/2021    Chronic bilateral low back pain without sciatica 4/12/2021    Depression     Morbid obesity with BMI of 40.0-44.9, adult 11/17/2017    LAVERNE (obstructive sleep apnea)     Prediabetes 4/12/2021    Seasonal allergic rhinitis due to pollen 10/14/2019    Ulnar neuropathy of both upper extremities 2/17/2020       Family History   Problem Relation Age of Onset    Diabetes Father     Diabetes Paternal Grandfather     No Known Problems Mother        Social History     Socioeconomic History    Marital status:    Tobacco Use    Smoking status: Never    Smokeless tobacco: Former    Tobacco comments:     quit 15 years ago    Substance and Sexual Activity    Alcohol use: Yes     Comment: socially // beer    Drug use: No    Sexual activity: Yes     Partners: Female   Social History Narrative    No smokers in household, 3 dogs.       Past Surgical History:   Procedure Laterality Date    CATHETERIZATION OF BOTH LEFT AND RIGHT HEART N/A 6/28/2022    Procedure: CATHETERIZATION, HEART, BOTH LEFT AND RIGHT;  Surgeon: Carlotta Gordon MD;  Location:  [facility] CATH LAB;  Service: Cardiology;  Laterality: N/A;    None         Review of Systems   Constitutional:  Positive for activity change. Negative for appetite change.   HENT:  Negative for dental problem, nosebleeds and sore throat.    Eyes:  Negative for discharge and visual disturbance.   Respiratory:  Positive for shortness of breath. Negative for cough, chest tightness and stridor.    Cardiovascular:  Negative for leg swelling.   Gastrointestinal:  Negative for abdominal distention and abdominal pain.   Genitourinary:  Negative for difficulty urinating and dysuria.   Musculoskeletal:  Negative for arthralgias, back pain and joint swelling.   Allergic/Immunologic: Negative for environmental allergies.   Neurological:  Negative for dizziness, syncope and headaches.   Hematological:  Does not bruise/bleed easily.   Psychiatric/Behavioral:  Negative for behavioral problems.         Objective:   There were no vitals taken for this visit.    CTA Cardiac TAVR_Partners (xpd)  Narrative: EXAM:  CTA CARDIAC TAVR_PARTNERS (XPD)    CLINICAL HISTORY: Aortic stenosis    TECHNIQUE: Contrast-enhanced CT of the chest, abdomen, and pelvis was performed after the administration of     intravenous contrast.      All CT scans at this location are performed using dose modulation techniques as appropriate to a performed exam including the following: automated exposure control; adjustment of the mA and/or kV according to patient size (this includes techniques or standardized protocols for targeted exams where dose is matched to indication / reason for exam; i.e. extremities or head); use of iterative reconstruction technique.    COMPARISON:  No prior CT is available for comparison.    FINDINGS:    TAVR Measurements    Left ventricle outflow tract:  1.7 x 2.4 cm  CTA smallest diameter:  Infrarenal abdominal aorta:  1.8 cm  Left common iliac artery:  0.9 cm  Left external iliac artery:  0.9 cm  Left common femoral artery:  0.9 cm  Right  common Iliac artery:  1.2 cm  Right external Iliac artery:  0.8 cm  Right common femoral artery:  0.8 cm    CHEST:   Soft tissue structures at the base of the neck are within normal limits.    No aortic aneurysm or dissection.  There is minimal aortoiliac atherosclerosis.  There is moderate coronary artery atherosclerosis.  There is no cardiomegaly or pericardial effusion.  No pathologically enlarged mediastinal, hilar, or axillary lymph nodes.    The trachea and proximal airways are patent.  No endobronchial or endotracheal lesions are evident.  The lungs are clear without large consolidation.  There is mild bilateral dependent density suggesting atelectasis or fibrosis.  There is a 4 mm right lower lobe pulmonary nodule (axial series 2, image 79).  No significant pleural fluid, pleural thickening, or pneumothorax.    ABDOMEN AND PELVIS:  The liver, spleen, pancreas, and adrenal glands are not unusual in contrast-enhanced CT appearance.  The gallbladder and bile ducts are within normal limits.    The kidneys enhance homogeneously.  No hydronephrosis or nephrolithiasis.  The ureters are unremarkable.  There is mild diffuse urinary bladder wall thickening which may relate to underdistention versus cystitis or chronic outlet obstruction.  The reproductive organs are not unusual in CT appearance.    The stomach and small bowel are unremarkable.  There are few colonic diverticula without inflammatory change of diverticulitis.  No evidence of bowel obstruction or acute bowel inflammation.  The appendix is normal.  No free fluid or free air.    No aortic aneurysm identified.  No pathologically enlarged lymph nodes.  There is a small fat-containing left inguinal hernia.    OSSEOUS STRUCTURES: No acute or concerning osseous abnormality.  There is mild degenerative change of spine.  Impression: Extensive calcification of the aortic valve with TAVR measurements as above.    4 mm right lower lobe pulmonary nodule.  In a  low-risk individual, no further follow-up required.  An high-risk individual, consider optional CT at 12 months to ensure stability.    Mild diffuse urinary bladder wall thickening which may relate to nondistention versus cystitis or chronic outlet obstruction.    Report Date: 2/9/2023 1:30 PM    Finalized on: 2/9/2023 1:30 PM By:  Klaudia Velasquez MD  RG# [id number]      2023-02-09 13:[id number]    [facility]         Physical Exam  Vitals and nursing note reviewed.   Constitutional:       Appearance: He is obese.   HENT:      Head: Normocephalic and atraumatic.      Mouth/Throat:      Mouth: Mucous membranes are moist.   Eyes:      Extraocular Movements: Extraocular movements intact.      Pupils: Pupils are equal, round, and reactive to light.   Cardiovascular:      Rate and Rhythm: Normal rate and regular rhythm.   Pulmonary:      Effort: Pulmonary effort is normal.      Breath sounds: Normal breath sounds.   Abdominal:      Palpations: Abdomen is soft.   Musculoskeletal:         General: Normal range of motion.      Cervical back: Normal range of motion and neck supple.   Skin:     General: Skin is warm.      Capillary Refill: Capillary refill takes less than 2 seconds.   Neurological:      General: No focal deficit present.      Mental Status: He is alert and oriented to person, place, and time.   Psychiatric:         Mood and Affect: Mood normal.       Assessment:     1. Nonrheumatic aortic valve stenosis, severe    2. Von Willebrand disease    3. Morbid obesity with BMI of 45.0-49.9, adult    4. Obstructive sleep apnea treated with continuous positive airway pressure (CPAP)    5. PVD (peripheral vascular disease)    6. Primary hypertension        Plan   He is a moderate risk surgical candidate at this time from his comorbidities including morbid obesity sleep apnea syndrome 1 Willebrand disease.  He is also not a candidate for mechanical aortic valve replacement because of his bleeding risk from his 1 Willebrand  disease and need for lifelong anticoagulation.  He has a strong motivation to lose weight after his valve replacement and weighing all his options we will start with a TAVR as a choice at this time keep surgical aortic valve replacement as the next option when the TAVR valve fails.          Kimberly Jones MD,   [facility]  [place]

## 2023-03-01 NOTE — PROCEDURES
Clinical Guide to Interpretation or FeNO Levels :    FeNO  (ppb) LOW INTERMEDIATE HIGH   ADULT VALUES < 25 25-50          > 50   Th2-driven Inflammation Unlikely Likely Significant     Patients FeNO level at this visit : __33__ (ppb)    Interpretation of FeNO measurement in adults:  [] FENO is less than 25 ppb implies non eosinophilic airway inflammation or the absence of airway inflammation.    Comment: Low FENO (<25 ppb) in adult asthmatics with persistent symptoms suggests other etiologies for these symptoms and a lower likelihood of benefit from adding or increasing inhaled glucocorticoids.    [x] FENO between 25 and 50 ppb in adults should be interpreted cautiously with reference to the clinical situation (eg, symptomatic, on or off therapy, current smoking).    [] FENO greater than 50 ppb in adults  suggests eosinophilic airway inflammation   Comment: High FENO (>50 ppb) in adult asthmatics even with atypical symptoms suggests glucocorticoid responsiveness. High FENO (>50 ppb) can help identify poor adherence or uncontrolled inflammation in asthma patients with otherwise seemingly controlled asthma.    Discussion:  A FENO less than 25 ppb in adults and less than 20 ppb in children younger than 12 years of age implies noneosinophilic airway inflammation or the absence of airway inflammation.  A FENO greater than 50 ppb in adults or greater than 35 ppb in children suggests eosinophilic airway inflammation.   Values of FENO between 25 and 50 ppb in adults (20 to 35 ppb in children) should be interpreted cautiously with reference to the clinical situation (eg, symptomatic, on or off therapy, current smoking).  A rising FENO with a greater than 20 percent change and more than 25 ppb (20 ppb in children) from a previously stable level suggests increasing eosinophilic airway inflammation, but there are wide inter-individual differences.  A decrease in FENO greater than 20 percent for values over 50 ppb or more than  10 ppb for values less than 50 ppb may be clinically important.  ?FENO in other respiratory diseases - Several other diseases are associated with altered levels of exhaled NO: low levels of FENO have been noted in cystic fibrosis, current smoking, pulmonary hypertension, hypothermia, primary ciliary dyskinesia, and bronchopulmonary dysplasia. Elevated FENO has been noted in atopy, nonasthmatic eosinophilic bronchitis, COPD exacerbations, noncystic fibrosis bronchiectasis, and viral upper respiratory infections.    REFERENCE:  ATS Board of Directors, December 2004, and by the ERS Executive Committee, June 2004. ATS/ERS Recommendations for Standardized Procedures for the Online and Offline Measurement of Exhaled Lower Respiratory Nitric Oxide and Nasal Nitric Oxide. Guideline 2005

## 2023-03-01 NOTE — PROGRESS NOTES
Subjective:      Patient ID: Dragan Man II is a 57 y.o. male.    Chief Complaint: Sleep Apnea    HPI  Presents for very severe sleep apnea and increased daytime fatigue.  His original AHI is 119 events per hour.  Wears his auto PAP nightly.  Unable to sleep without his auto Pap.  He is sleeping longer and not motivated over the last few months.  Amery Sleepiness Scale score 5.  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  He has an appointment with Cardiology to evaluate for aortic valve replacement.  He has dyspnea with exertion. Edema.  History of asthma-PFT with restriction due to obesity.  BMI 45 with significant abdominal obesity.  He is working on weight loss.  He is interested in a new APAP machine. His humidifier is broken.      Patient Active Problem List   Diagnosis    Obstructive sleep apnea treated with continuous positive airway pressure (CPAP)    Advanced sleep phase syndrome    Behaviorally induced insufficient sleep syndrome    Sleep-related bruxism    Inadequate sleep hygiene    Morbid obesity with BMI of 45.0-49.9, adult    Primary hypertension    Actinic keratoses    Heart murmur    ROLLINS (dyspnea on exertion)    Seasonal allergic rhinitis due to pollen    On statin therapy due to risk of future cardiovascular event    Left atrial enlargement    Nonrheumatic aortic valve stenosis, severe    Ulnar neuropathy of both upper extremities    Generalized edema    Chronic diastolic congestive heart failure    PVD (peripheral vascular disease)    Prediabetes    Mild anemia    Chronic bilateral low back pain without sciatica    Bilateral carpal tunnel syndrome    Right-sided low back pain without sciatica    Right foot pain    Lower extremity edema    At risk for cardiovascular event    Von Willebrand disease    Pulmonary hypertension    Class 3 obesity with alveolar hypoventilation, serious comorbidity, and body mass index (BMI) of 45.0 to 49.9 in adult    Aortic atherosclerosis     BP  "130/72   Pulse 68   Resp 16   Ht 5' 11" (1.803 m)   Wt (!) 148.3 kg (326 lb 15.1 oz)   SpO2 98%   BMI 45.60 kg/m²   Body mass index is 45.6 kg/m².    Review of Systems   Constitutional:  Positive for fatigue.   HENT:  Positive for congestion.    Respiratory:  Positive for dyspnea on extertion and somnolence.    Musculoskeletal:  Positive for arthralgias.   All other systems reviewed and are negative.  Objective:      Physical Exam  Constitutional:       Appearance: He is obese.   HENT:      Head: Normocephalic and atraumatic.      Nose: Nose normal.   Cardiovascular:      Rate and Rhythm: Normal rate and regular rhythm.      Heart sounds: Murmur heard.   Pulmonary:      Effort: Pulmonary effort is normal.      Breath sounds: Normal breath sounds.   Abdominal:      General: There is distension.      Palpations: Abdomen is soft.      Comments: obese   Musculoskeletal:         General: Normal range of motion.      Cervical back: Normal range of motion.   Skin:     General: Skin is warm and dry.   Neurological:      General: No focal deficit present.      Mental Status: He is oriented to person, place, and time.   Psychiatric:         Mood and Affect: Mood normal.         Behavior: Behavior normal.     Personal Diagnostic Review  Compliance Summary  1/23/2023 - 2/21/2023 (30 days)  Days with Device Usage 25 days  Days without Device Usage 5 days  Percent Days with Device Usage 83.3%  Cumulative Usage 8 days 23 hrs. 41 mins. 9 secs.  Maximum Usage (1 Day) 13 hrs. 32 mins. 27 secs.  Average Usage (All Days) 7 hrs. 11 mins. 22 secs.  Average Usage (Days Used) 8 hrs. 37 mins. 38 secs.  Minimum Usage (1 Day) 2 hrs. 55 mins. 55 secs.  Percent of Days with Usage >= 4 Hours 80.0%  Percent of Days with Usage < 4 Hours 20.0%  Date Range  Total Blower Time 8 days 23 hrs. 42 mins. 24 secs.  Average AHI 0.9  Auto-CPAP Summary  Auto-CPAP Mean Pressure 12.9 cmH2O  Auto-CPAP Peak Average Pressure 18.0 cmH2O  Device Pressure <= " "90% of Time 15.0 cmH2O  Average Time in Large Leak Per Day 3 mins. 5 secs.    Interpretation of FeNO measurement in adults: 33  [ ] FENO is less than 25 ppb implies non eosinophilic airway inflammation or the absence of airway inflammation.   Comment: Low FENO (<25 ppb) in adult asthmatics with persistent symptoms suggests other etiologies for these symptoms and a lower likelihood of benefit from adding or increasing inhaled glucocorticoids.     [ X] FENO between 25 and 50 ppb in adults should be interpreted cautiously with reference to the clinical situation (eg, symptomatic, on or off therapy, current smoking).     [ ] FENO greater than 50 ppb in adults  suggests eosinophilic airway inflammation   Comment: High FENO (>50 ppb) in adult asthmatics even with atypical symptoms suggests glucocorticoid responsiveness. High FENO (>50 ppb) can help identify poor adherence or uncontrolled inflammation in asthma patients with otherwise seemingly "controlled" asthma.     Discussion:   ·A FENO less than 25 ppb in adults and less than 20 ppb in children younger than 12 years of age implies noneosinophilic airway inflammation or the absence of airway inflammation.   ·A FENO greater than 50 ppb in adults or greater than 35 ppb in children suggests eosinophilic airway inflammation.   ·Values of FENO between 25 and 50 ppb in adults (20 to 35 ppb in children) should be interpreted cautiously with reference to the clinical situation (eg, symptomatic, on or off therapy, current smoking).   ·A rising FENO with a greater than 20 percent change and more than 25 ppb (20 ppb in children) from a previously stable level suggests increasing eosinophilic airway inflammation, but there are wide inter-individual differences.   ·A decrease in FENO greater than 20 percent for values over 50 ppb or more than 10 ppb for values less than 50 ppb may be clinically important.   FENO in other respiratory diseases - Several other diseases are associated " with altered levels of exhaled NO: low levels of FENO have been noted in cystic fibrosis, current smoking, pulmonary hypertension, hypothermia, primary ciliary dyskinesia, and bronchopulmonary dysplasia. Elevated FENO has been noted in atopy, nonasthmatic eosinophilic bronchitis, COPD exacerbations, noncystic fibrosis bronchiectasis, and viral upper respiratory infections.         Assessment:       1. LAVERNE (obstructive sleep apnea)    2. SOB (shortness of breath)    3. Fatigue, unspecified type    4. Advanced sleep phase syndrome    5. Nonrheumatic aortic valve stenosis, severe        Outpatient Encounter Medications as of 3/1/2023   Medication Sig Dispense Refill    albuterol (PROVENTIL/VENTOLIN HFA) 90 mcg/actuation inhaler INHALE 2 PUFFS BY MOUTH INTO THE LUNGS EVERY 4 HOURS AS NEEDED FOR WHEEZING. RESCUE 6.7 g 1    atorvastatin (LIPITOR) 20 MG tablet Take 1 tablet (20 mg total) by mouth every evening. 90 tablet 3    doxycycline (MONODOX) 100 MG capsule TAKE ONCE DAILY WITH FOOD. MAY CAUSE UPSET STOMACH. 90 capsule 1    furosemide (LASIX) 20 MG tablet TAKE 1 TABLET (20 MG TOTAL) BY MOUTH EVERY MON, TUES, WED, THURS, FRI. 60 tablet 3    losartan (COZAAR) 50 MG tablet Take 1 tablet (50 mg total) by mouth once daily. 90 tablet 3    metoprolol succinate (TOPROL-XL) 50 MG 24 hr tablet TAKE 1 TABLET BY MOUTH EVERY DAY 90 tablet 3    mometasone (NASONEX) 50 mcg/actuation nasal spray INSTILL 2 SPRAYS INTO EACH NOSTRIL ONCE DAILY. 1 each 1    semaglutide (OZEMPIC) 2 mg/dose (8 mg/3 mL) PnIj Inject 2 mg into the skin every 7 days. 1 pen 2    sodium chloride (OCEAN) 0.65 % nasal spray 1 spray by Nasal route as needed for Congestion.      tretinoin (RETIN-A) 0.05 % cream Apply pea-sized amount to entire face at bedtime.  If dryness, use every third night and increase as tolerated to every night. 20 g 6     Facility-Administered Encounter Medications as of 3/1/2023   Medication Dose Route Frequency Provider Last Rate Last  Admin    sodium chloride 0.9% flush 10 mL  10 mL Intravenous PRN Ron Shaikh MD        sodium chloride 0.9% flush 10 mL  10 mL Intravenous PRN Adan Greene MD         Orders Placed This Encounter   Procedures    CPAP FOR HOME USE     Order Specific Question:   Length of need (1-99 months):     Answer:   99     Order Specific Question:   Type ():     Answer:   Auto CPAP     Order Specific Question:   Auto CPAP pressure setting range (cmH20):     Answer:   12-20     Order Specific Question:   Fulfillment Priority:     Answer:   Level 4:  all others     Order Specific Question:   Humidification ():     Answer:   Heated     Order Specific Question:   Choose ONE mask type and its corresponding cushions and/or pillows:     Answer:    Full Face Mask, 1 per 90 days:  Full Face Cushion, (3 per 90 days)     Order Specific Question:   Choose EITHER Heated or Non-Heated Tubjing     Answer:    Non-Heated Tubing, 1 per 90 days     Order Specific Question:   All other supplies as needed as listed below:     Answer:    Headgear, 1 per 180 days     Order Specific Question:   All other supplies as needed as listed below:     Answer:    Disposable Filter, 6 per 90 days     Order Specific Question:   All other supplies as needed as listed below:     Answer:    Non-Disposable Filter, 1 per 180 days     Order Specific Question:   All other supplies as needed as listed below:     Answer:    Humidifier Chamber, 1 per 180 days     Order Specific Question:   All other supplies as needed as listed below:     Answer:    Chin Strap, 1 per 180 days    TSH     Standing Status:   Future     Standing Expiration Date:   4/29/2024    T3     Standing Status:   Future     Standing Expiration Date:   4/29/2024    T4, FREE     Standing Status:   Future     Standing Expiration Date:   4/29/2024    TESTOSTERONE     Standing Status:   Future     Standing Expiration Date:   4/29/2024    VITAMIN B12      Standing Status:   Future     Standing Expiration Date:   2024    Fraction of  Nitric Oxide     Standing Status:   Future     Standing Expiration Date:   3/1/2024     Order Specific Question:   Release to patient     Answer:   Immediate     Plan:     His increased fatigue over the last few months does not seem to be related to LAVERNE. /hr - improved to 0.9/hr on therapy. Compliant.  Hold of on stimulant for daytime sleepiness for now.   Labs ordered.   Appt with cardiology today to review valve.   Order for new APAP machine.  FeNo: 33. FENO between 25 and 50 ppb in adults should be interpreted cautiously with reference to the clinical situation (eg, symptomatic, on or off therapy, current smoking). May possibly benefit from ICS but no significant inflammation.    Problem List Items Addressed This Visit          Cardiac/Vascular    Nonrheumatic aortic valve stenosis, severe (Chronic)    Overview     CARDIAC CATH 22  - The pre-procedure left ventricular end diastolic pressure was 32.  - There was trivial (1+) aortic regurgitation.  - The estimated blood loss was none.  - There was diastolic dysfunction.  - The coronary arteries were normal.  - The filling pressures on the right and left were moderately elevated. Pulmonary hypertension was moderate.  - 53 MMHG GRADIENT ACROSS AORTIC VAKLVE SUGGESTIVE OF SEVERE AORTIC STENOSIS.    ECHOCARDIOGRAM 10/18/2019  1 - Normal left ventricular systolic function (EF 60-65%).  2 - Impaired LV relaxation, elevated LAP (grade 2 diastolic dysfunction).  3 - Normal right ventricular systolic function.  4 - No wall motion abnormalities.  5 - Severe left atrial enlargement.  6 - Concentric hypertrophy.  7 - Mild aortic regurgitation.  8 - Moderate aortic stenosis, CRISTINO = 1.87 cm2, AVAi = 0.7 cm2/m2, peak velocity = 3.87 m/s, mean gradient = 34 mmHg.            Other    Advanced sleep phase syndrome     Other Visit Diagnoses       LAVERNE (obstructive sleep apnea)    -   Primary    Relevant Orders    CPAP FOR HOME USE    SOB (shortness of breath)        Relevant Orders    Fraction of  Nitric Oxide    Fatigue, unspecified type        Relevant Orders    TSH    T3    T4, FREE    TESTOSTERONE    VITAMIN B12          I spent a total of 41 minutes on the day of the visit.  This includes face to face time and non-face to face time preparing to see the patient (eg, review of tests), obtaining and/or reviewing separately obtained history, documenting clinical information in the electronic or other health record, independently interpreting results and communicating results to the patient/family/caregiver, or care coordinator.

## 2023-03-02 LAB
T3 SERPL-MCNC: 85 NG/DL (ref 60–180)
T4 FREE SERPL-MCNC: 0.89 NG/DL (ref 0.71–1.51)
TESTOST SERPL-MCNC: 403 NG/DL (ref 304–1227)
TSH SERPL DL<=0.005 MIU/L-ACNC: 2.06 UIU/ML (ref 0.4–4)
VIT B12 SERPL-MCNC: 374 PG/ML (ref 210–950)

## 2023-03-06 ENCOUNTER — OFFICE VISIT (OUTPATIENT)
Dept: PRIMARY CARE CLINIC | Facility: CLINIC | Age: 58
End: 2023-03-06
Payer: COMMERCIAL

## 2023-03-06 VITALS
WEIGHT: 315 LBS | TEMPERATURE: 98 F | BODY MASS INDEX: 44.1 KG/M2 | OXYGEN SATURATION: 97 % | HEIGHT: 71 IN | HEART RATE: 85 BPM | DIASTOLIC BLOOD PRESSURE: 78 MMHG | SYSTOLIC BLOOD PRESSURE: 134 MMHG

## 2023-03-06 DIAGNOSIS — Z95.2 S/P TAVR (TRANSCATHETER AORTIC VALVE REPLACEMENT): Primary | ICD-10-CM

## 2023-03-06 DIAGNOSIS — I10 PRIMARY HYPERTENSION: Chronic | ICD-10-CM

## 2023-03-06 DIAGNOSIS — R73.03 PREDIABETES: ICD-10-CM

## 2023-03-06 DIAGNOSIS — E66.01 CLASS 3 SEVERE OBESITY DUE TO EXCESS CALORIES WITH SERIOUS COMORBIDITY AND BODY MASS INDEX (BMI) OF 45.0 TO 49.9 IN ADULT: ICD-10-CM

## 2023-03-06 DIAGNOSIS — D68.00 VON WILLEBRAND DISEASE: ICD-10-CM

## 2023-03-06 DIAGNOSIS — R01.1 HEART MURMUR: ICD-10-CM

## 2023-03-06 DIAGNOSIS — I35.0 NONRHEUMATIC AORTIC VALVE STENOSIS: Chronic | ICD-10-CM

## 2023-03-06 PROCEDURE — 99999 PR PBB SHADOW E&M-EST. PATIENT-LVL IV: ICD-10-PCS | Mod: PBBFAC,,, | Performed by: FAMILY MEDICINE

## 2023-03-06 PROCEDURE — 3075F SYST BP GE 130 - 139MM HG: CPT | Mod: CPTII,S$GLB,, | Performed by: FAMILY MEDICINE

## 2023-03-06 PROCEDURE — 3078F DIAST BP <80 MM HG: CPT | Mod: CPTII,S$GLB,, | Performed by: FAMILY MEDICINE

## 2023-03-06 PROCEDURE — 3008F PR BODY MASS INDEX (BMI) DOCUMENTED: ICD-10-PCS | Mod: CPTII,S$GLB,, | Performed by: FAMILY MEDICINE

## 2023-03-06 PROCEDURE — 3008F BODY MASS INDEX DOCD: CPT | Mod: CPTII,S$GLB,, | Performed by: FAMILY MEDICINE

## 2023-03-06 PROCEDURE — 1159F PR MEDICATION LIST DOCUMENTED IN MEDICAL RECORD: ICD-10-PCS | Mod: CPTII,S$GLB,, | Performed by: FAMILY MEDICINE

## 2023-03-06 PROCEDURE — 3075F PR MOST RECENT SYSTOLIC BLOOD PRESS GE 130-139MM HG: ICD-10-PCS | Mod: CPTII,S$GLB,, | Performed by: FAMILY MEDICINE

## 2023-03-06 PROCEDURE — 1160F PR REVIEW ALL MEDS BY PRESCRIBER/CLIN PHARMACIST DOCUMENTED: ICD-10-PCS | Mod: CPTII,S$GLB,, | Performed by: FAMILY MEDICINE

## 2023-03-06 PROCEDURE — 99999 PR PBB SHADOW E&M-EST. PATIENT-LVL IV: CPT | Mod: PBBFAC,,, | Performed by: FAMILY MEDICINE

## 2023-03-06 PROCEDURE — 1159F MED LIST DOCD IN RCRD: CPT | Mod: CPTII,S$GLB,, | Performed by: FAMILY MEDICINE

## 2023-03-06 PROCEDURE — 99214 OFFICE O/P EST MOD 30 MIN: CPT | Mod: S$GLB,,, | Performed by: FAMILY MEDICINE

## 2023-03-06 PROCEDURE — 99214 PR OFFICE/OUTPT VISIT, EST, LEVL IV, 30-39 MIN: ICD-10-PCS | Mod: S$GLB,,, | Performed by: FAMILY MEDICINE

## 2023-03-06 PROCEDURE — 3078F PR MOST RECENT DIASTOLIC BLOOD PRESSURE < 80 MM HG: ICD-10-PCS | Mod: CPTII,S$GLB,, | Performed by: FAMILY MEDICINE

## 2023-03-06 PROCEDURE — 1160F RVW MEDS BY RX/DR IN RCRD: CPT | Mod: CPTII,S$GLB,, | Performed by: FAMILY MEDICINE

## 2023-03-06 NOTE — PROGRESS NOTES
Subjective:       Patient ID: Dragan Man II is a 57 y.o. male.  Pmhx, fam hx, soc hx, surg hx, allergies, med list reviewed    Wt Readings from Last 3 Encounters:   03/06/23 1541 (!) 149.1 kg (328 lb 11.3 oz)   03/01/23 1306 (!) 148.3 kg (326 lb 15.1 oz)   03/01/23 1150 (!) 148.3 kg (326 lb 15.1 oz)       Chief Complaint: Follow-up (Follow up)    Recent TAVR w/up.     At our last visit dw pt consideration of bariatric referral.     Pt hx of heart failure and severe aortic stenosis.   He was considering but needed additional input from Cardiac team. Pt/wife report today that he will proceed with transcatheter approach. (Note structural heart cardiology reviewed). Surgery scheduled 3/16--1-2 nights in hospital. Pt will take off 10 days from work.     Pt weight same as last few visits.     Pt has still had persistent sob and LE edema.    Hx of pre diabetes, LAVERNE--compliant with meds.   Tolerating ozempic--no nv. Some constipation--has improved with more veggies and fiber.    Unable to exercise. Pt has been eating salads last few days with tuna. Pt has not had as much sugar. Tried artificial candies and had gi distress (Cher) - drank a regular Illumagear Dry ginger ale. This resolved.     Started new inhaler last week--inhaled steroid. Fatigue but not specifically LAVRENE related.    HPI  Review of Systems   Constitutional:  Positive for fatigue. Negative for activity change, appetite change and unexpected weight change.   HENT:  Negative for mouth dryness.    Eyes:  Negative for visual disturbance.   Respiratory:  Positive for shortness of breath (chronic). Negative for apnea and chest tightness.    Cardiovascular:  Negative for chest pain.   Gastrointestinal:  Negative for abdominal pain.   Musculoskeletal:  Negative for arthralgias and myalgias.   Integumentary:  Negative for rash.   Allergic/Immunologic: Negative for food allergies.   Psychiatric/Behavioral:  Negative for behavioral problems and sleep disturbance. The  patient is not nervous/anxious.        Objective:      Physical Exam  Vitals and nursing note reviewed.   Constitutional:       General: He is not in acute distress.  HENT:      Head: Normocephalic and atraumatic.   Eyes:      General: No scleral icterus.     Pupils: Pupils are equal, round, and reactive to light.   Neck:      Comments: No TM  Cardiovascular:      Rate and Rhythm: Normal rate and regular rhythm.      Pulses: Normal pulses.      Heart sounds: Murmur heard.     No friction rub. No gallop.   Pulmonary:      Effort: Pulmonary effort is normal. No respiratory distress.      Breath sounds: Normal breath sounds. No wheezing, rhonchi or rales.   Abdominal:      General: Bowel sounds are normal. There is no distension.      Palpations: Abdomen is soft.      Tenderness: There is no abdominal tenderness.   Musculoskeletal:         General: No swelling.      Cervical back: Normal range of motion and neck supple. No tenderness.   Lymphadenopathy:      Cervical: No cervical adenopathy.   Skin:     General: Skin is warm.      Capillary Refill: Capillary refill takes less than 2 seconds.      Findings: No erythema or rash.   Neurological:      Mental Status: He is alert and oriented to person, place, and time.   Psychiatric:         Mood and Affect: Mood normal.         Behavior: Behavior normal.       Assessment:             ICD-10-CM ICD-9-CM   1. Prediabetes  R73.03 790.29   2. Primary hypertension  I10 401.9   3. Class 3 severe obesity due to excess calories with serious comorbidity and body mass index (BMI) of 45.0 to 49.9 in adult  E66.01 278.01    Z68.42 V85.42   4. Nonrheumatic aortic valve stenosis, severe  I35.0 424.1   5. Von Willebrand disease  D68.00 286.4   6. Heart murmur  R01.1 785.2      Plan:       Prediabetes  Comments:  some dietary non compliance  stressed importance whole foods      Primary hypertension  Comments:  chronic/stable    Class 3 severe obesity due to excess calories with serious  comorbidity and body mass index (BMI) of 45.0 to 49.9 in adult  Comments:  modest improvement in weight with ozempic (on max dose)    Nonrheumatic aortic valve stenosis, severe  Comments:  upcoming TAVR    Von Willebrand disease  Comments:  self reported; chronic/stable    Heart murmur    31 min with pt + 5 min chart review  Counseling food choices, planning for after surgery lifestyle interventions  Thus far tolerating meds

## 2023-03-16 ENCOUNTER — ANESTHESIA EVENT (OUTPATIENT)
Dept: MEDSURG UNIT | Facility: HOSPITAL | Age: 58
DRG: 267 | End: 2023-03-16
Payer: COMMERCIAL

## 2023-03-16 ENCOUNTER — HOSPITAL ENCOUNTER (INPATIENT)
Facility: HOSPITAL | Age: 58
LOS: 2 days | Discharge: HOME | DRG: 267 | End: 2023-03-18
Attending: INTERNAL MEDICINE | Admitting: INTERNAL MEDICINE
Payer: COMMERCIAL

## 2023-03-16 ENCOUNTER — ANESTHESIA (OUTPATIENT)
Dept: MEDSURG UNIT | Facility: HOSPITAL | Age: 58
DRG: 267 | End: 2023-03-16
Payer: COMMERCIAL

## 2023-03-16 DIAGNOSIS — Z95.2 S/P TAVR (TRANSCATHETER AORTIC VALVE REPLACEMENT): ICD-10-CM

## 2023-03-16 DIAGNOSIS — I35.0 AORTIC STENOSIS: Primary | ICD-10-CM

## 2023-03-16 DIAGNOSIS — I70.0 AORTIC ATHEROSCLEROSIS: ICD-10-CM

## 2023-03-16 DIAGNOSIS — I35.0 SEVERE AORTIC STENOSIS: ICD-10-CM

## 2023-03-16 DIAGNOSIS — R00.1 BRADYCARDIA: ICD-10-CM

## 2023-03-16 DIAGNOSIS — I44.2 CHB (COMPLETE HEART BLOCK): ICD-10-CM

## 2023-03-16 DIAGNOSIS — I49.9 ARRHYTHMIA: ICD-10-CM

## 2023-03-16 DIAGNOSIS — I35.0 NONRHEUMATIC AORTIC VALVE STENOSIS: Chronic | ICD-10-CM

## 2023-03-16 DIAGNOSIS — I35.0 AORTIC VALVE STENOSIS, ETIOLOGY OF CARDIAC VALVE DISEASE UNSPECIFIED: ICD-10-CM

## 2023-03-16 DIAGNOSIS — I50.32 CHRONIC DIASTOLIC CONGESTIVE HEART FAILURE: ICD-10-CM

## 2023-03-16 DIAGNOSIS — I44.2 COMPLETE HEART BLOCK: ICD-10-CM

## 2023-03-16 LAB
ABO + RH BLD: NORMAL
ALBUMIN SERPL BCP-MCNC: 3.6 G/DL (ref 3.5–5.2)
ALP SERPL-CCNC: 74 U/L (ref 55–135)
ALT SERPL W/O P-5'-P-CCNC: 27 U/L (ref 10–44)
ANION GAP SERPL CALC-SCNC: 11 MMOL/L (ref 8–16)
ANION GAP SERPL CALC-SCNC: 9 MMOL/L (ref 8–16)
APTT BLDCRRT: 34.2 SEC (ref 21–32)
AST SERPL-CCNC: 25 U/L (ref 10–40)
BASOPHILS # BLD AUTO: 0.04 K/UL (ref 0–0.2)
BASOPHILS NFR BLD: 0.4 % (ref 0–1.9)
BILIRUB SERPL-MCNC: 1.2 MG/DL (ref 0.1–1)
BLD GP AB SCN CELLS X3 SERPL QL: NORMAL
BNP SERPL-MCNC: 11 PG/ML (ref 0–99)
BUN SERPL-MCNC: 15 MG/DL (ref 6–20)
BUN SERPL-MCNC: 17 MG/DL (ref 6–20)
CALCIUM SERPL-MCNC: 9.2 MG/DL (ref 8.7–10.5)
CALCIUM SERPL-MCNC: 9.8 MG/DL (ref 8.7–10.5)
CHLORIDE SERPL-SCNC: 105 MMOL/L (ref 95–110)
CHLORIDE SERPL-SCNC: 106 MMOL/L (ref 95–110)
CO2 SERPL-SCNC: 22 MMOL/L (ref 23–29)
CO2 SERPL-SCNC: 24 MMOL/L (ref 23–29)
CREAT SERPL-MCNC: 0.8 MG/DL (ref 0.5–1.4)
CREAT SERPL-MCNC: 0.8 MG/DL (ref 0.5–1.4)
DIFFERENTIAL METHOD: ABNORMAL
EOSINOPHIL # BLD AUTO: 0.1 K/UL (ref 0–0.5)
EOSINOPHIL NFR BLD: 1.3 % (ref 0–8)
ERYTHROCYTE [DISTWIDTH] IN BLOOD BY AUTOMATED COUNT: 12.2 % (ref 11.5–14.5)
ERYTHROCYTE [DISTWIDTH] IN BLOOD BY AUTOMATED COUNT: 12.5 % (ref 11.5–14.5)
EST. GFR  (NO RACE VARIABLE): >60 ML/MIN/1.73 M^2
EST. GFR  (NO RACE VARIABLE): >60 ML/MIN/1.73 M^2
GLUCOSE SERPL-MCNC: 100 MG/DL (ref 70–110)
GLUCOSE SERPL-MCNC: 147 MG/DL (ref 70–110)
HCT VFR BLD AUTO: 37.6 % (ref 40–54)
HCT VFR BLD AUTO: 39.8 % (ref 40–54)
HGB BLD-MCNC: 12.8 G/DL (ref 14–18)
HGB BLD-MCNC: 13.3 G/DL (ref 14–18)
IMM GRANULOCYTES # BLD AUTO: 0.09 K/UL (ref 0–0.04)
IMM GRANULOCYTES NFR BLD AUTO: 0.9 % (ref 0–0.5)
INR PPP: 1 (ref 0.8–1.2)
LYMPHOCYTES # BLD AUTO: 2.2 K/UL (ref 1–4.8)
LYMPHOCYTES NFR BLD: 22.5 % (ref 18–48)
MAGNESIUM SERPL-MCNC: 1.7 MG/DL (ref 1.6–2.6)
MCH RBC QN AUTO: 32.2 PG (ref 27–31)
MCH RBC QN AUTO: 32.8 PG (ref 27–31)
MCHC RBC AUTO-ENTMCNC: 33.4 G/DL (ref 32–36)
MCHC RBC AUTO-ENTMCNC: 34 G/DL (ref 32–36)
MCV RBC AUTO: 96 FL (ref 82–98)
MCV RBC AUTO: 96 FL (ref 82–98)
MONOCYTES # BLD AUTO: 0.7 K/UL (ref 0.3–1)
MONOCYTES NFR BLD: 6.6 % (ref 4–15)
NEUTROPHILS # BLD AUTO: 6.7 K/UL (ref 1.8–7.7)
NEUTROPHILS NFR BLD: 68.3 % (ref 38–73)
NRBC BLD-RTO: 0 /100 WBC
PHOSPHATE SERPL-MCNC: 3.2 MG/DL (ref 2.7–4.5)
PLATELET # BLD AUTO: 116 K/UL (ref 150–450)
PLATELET # BLD AUTO: 146 K/UL (ref 150–450)
PMV BLD AUTO: 10.2 FL (ref 9.2–12.9)
PMV BLD AUTO: 10.5 FL (ref 9.2–12.9)
POTASSIUM SERPL-SCNC: 4.1 MMOL/L (ref 3.5–5.1)
POTASSIUM SERPL-SCNC: 4.4 MMOL/L (ref 3.5–5.1)
PROT SERPL-MCNC: 6.7 G/DL (ref 6–8.4)
PROTHROMBIN TIME: 10.7 SEC (ref 9–12.5)
RBC # BLD AUTO: 3.9 M/UL (ref 4.6–6.2)
RBC # BLD AUTO: 4.13 M/UL (ref 4.6–6.2)
SODIUM SERPL-SCNC: 138 MMOL/L (ref 136–145)
SODIUM SERPL-SCNC: 139 MMOL/L (ref 136–145)
WBC # BLD AUTO: 7.53 K/UL (ref 3.9–12.7)
WBC # BLD AUTO: 9.79 K/UL (ref 3.9–12.7)

## 2023-03-16 PROCEDURE — 93010 ELECTROCARDIOGRAM REPORT: CPT | Mod: ,,, | Performed by: INTERNAL MEDICINE

## 2023-03-16 PROCEDURE — 85025 COMPLETE CBC W/AUTO DIFF WBC: CPT | Performed by: STUDENT IN AN ORGANIZED HEALTH CARE EDUCATION/TRAINING PROGRAM

## 2023-03-16 PROCEDURE — 33361 REPLACE AORTIC VALVE PERQ: CPT | Mod: Q0 | Performed by: INTERNAL MEDICINE

## 2023-03-16 PROCEDURE — 93010 EKG 12-LEAD: ICD-10-PCS | Mod: ,,, | Performed by: INTERNAL MEDICINE

## 2023-03-16 PROCEDURE — 63600175 PHARM REV CODE 636 W HCPCS: Performed by: STUDENT IN AN ORGANIZED HEALTH CARE EDUCATION/TRAINING PROGRAM

## 2023-03-16 PROCEDURE — 37000009 HC ANESTHESIA EA ADD 15 MINS: Performed by: INTERNAL MEDICINE

## 2023-03-16 PROCEDURE — 83735 ASSAY OF MAGNESIUM: CPT | Performed by: STUDENT IN AN ORGANIZED HEALTH CARE EDUCATION/TRAINING PROGRAM

## 2023-03-16 PROCEDURE — 25000003 PHARM REV CODE 250: Performed by: INTERNAL MEDICINE

## 2023-03-16 PROCEDURE — C1760 CLOSURE DEV, VASC: HCPCS | Performed by: INTERNAL MEDICINE

## 2023-03-16 PROCEDURE — 85027 COMPLETE CBC AUTOMATED: CPT | Performed by: INTERNAL MEDICINE

## 2023-03-16 PROCEDURE — 33361 PR TAVR, PERCUTANEOUS FEMORAL: ICD-10-PCS | Mod: 62,Q0,, | Performed by: THORACIC SURGERY (CARDIOTHORACIC VASCULAR SURGERY)

## 2023-03-16 PROCEDURE — 27201423 OPTIME MED/SURG SUP & DEVICES STERILE SUPPLY: Performed by: INTERNAL MEDICINE

## 2023-03-16 PROCEDURE — 36620 ARTERIAL: ICD-10-PCS | Mod: 59,Q0,, | Performed by: ANESTHESIOLOGY

## 2023-03-16 PROCEDURE — 25000003 PHARM REV CODE 250: Performed by: STUDENT IN AN ORGANIZED HEALTH CARE EDUCATION/TRAINING PROGRAM

## 2023-03-16 PROCEDURE — 25000003 PHARM REV CODE 250: Performed by: PHYSICIAN ASSISTANT

## 2023-03-16 PROCEDURE — 36415 COLL VENOUS BLD VENIPUNCTURE: CPT | Performed by: INTERNAL MEDICINE

## 2023-03-16 PROCEDURE — 33361 REPLACE AORTIC VALVE PERQ: CPT | Mod: 62,Q0,, | Performed by: INTERNAL MEDICINE

## 2023-03-16 PROCEDURE — 99900035 HC TECH TIME PER 15 MIN (STAT)

## 2023-03-16 PROCEDURE — C1894 INTRO/SHEATH, NON-LASER: HCPCS | Performed by: INTERNAL MEDICINE

## 2023-03-16 PROCEDURE — 85610 PROTHROMBIN TIME: CPT | Performed by: INTERNAL MEDICINE

## 2023-03-16 PROCEDURE — 93005 ELECTROCARDIOGRAM TRACING: CPT

## 2023-03-16 PROCEDURE — 83880 ASSAY OF NATRIURETIC PEPTIDE: CPT | Performed by: INTERNAL MEDICINE

## 2023-03-16 PROCEDURE — 27800903 OPTIME MED/SURG SUP & DEVICES OTHER IMPLANTS: Performed by: INTERNAL MEDICINE

## 2023-03-16 PROCEDURE — C1769 GUIDE WIRE: HCPCS | Performed by: INTERNAL MEDICINE

## 2023-03-16 PROCEDURE — 86900 BLOOD TYPING SEROLOGIC ABO: CPT | Performed by: INTERNAL MEDICINE

## 2023-03-16 PROCEDURE — 84100 ASSAY OF PHOSPHORUS: CPT | Performed by: STUDENT IN AN ORGANIZED HEALTH CARE EDUCATION/TRAINING PROGRAM

## 2023-03-16 PROCEDURE — 27201037 HC PRESSURE MONITORING SET UP

## 2023-03-16 PROCEDURE — 25500020 PHARM REV CODE 255: Performed by: INTERNAL MEDICINE

## 2023-03-16 PROCEDURE — 99223 PR INITIAL HOSPITAL CARE,LEVL III: ICD-10-PCS | Mod: ,,, | Performed by: STUDENT IN AN ORGANIZED HEALTH CARE EDUCATION/TRAINING PROGRAM

## 2023-03-16 PROCEDURE — 85730 THROMBOPLASTIN TIME PARTIAL: CPT | Performed by: INTERNAL MEDICINE

## 2023-03-16 PROCEDURE — 33361 PR TAVR, PERCUTANEOUS FEMORAL: ICD-10-PCS | Mod: 62,Q0,, | Performed by: INTERNAL MEDICINE

## 2023-03-16 PROCEDURE — 20000000 HC ICU ROOM

## 2023-03-16 PROCEDURE — 37000008 HC ANESTHESIA 1ST 15 MINUTES: Performed by: INTERNAL MEDICINE

## 2023-03-16 PROCEDURE — 33361 REPLACE AORTIC VALVE PERQ: CPT | Mod: 62,Q0,, | Performed by: THORACIC SURGERY (CARDIOTHORACIC VASCULAR SURGERY)

## 2023-03-16 PROCEDURE — 80053 COMPREHEN METABOLIC PANEL: CPT | Performed by: STUDENT IN AN ORGANIZED HEALTH CARE EDUCATION/TRAINING PROGRAM

## 2023-03-16 PROCEDURE — C1725 CATH, TRANSLUMIN NON-LASER: HCPCS | Performed by: INTERNAL MEDICINE

## 2023-03-16 PROCEDURE — 99223 1ST HOSP IP/OBS HIGH 75: CPT | Mod: ,,, | Performed by: STUDENT IN AN ORGANIZED HEALTH CARE EDUCATION/TRAINING PROGRAM

## 2023-03-16 PROCEDURE — 80048 BASIC METABOLIC PNL TOTAL CA: CPT | Performed by: INTERNAL MEDICINE

## 2023-03-16 PROCEDURE — D9220A PRA ANESTHESIA: ICD-10-PCS | Mod: Q0,,, | Performed by: ANESTHESIOLOGY

## 2023-03-16 PROCEDURE — 36620 INSERTION CATHETER ARTERY: CPT | Mod: 59,Q0,, | Performed by: ANESTHESIOLOGY

## 2023-03-16 PROCEDURE — D9220A PRA ANESTHESIA: Mod: Q0,,, | Performed by: ANESTHESIOLOGY

## 2023-03-16 DEVICE — SYS EVOLUT FX LOADING 23-29MM: Type: IMPLANTABLE DEVICE | Site: OTHER (ADD COMMENT) | Status: FUNCTIONAL

## 2023-03-16 DEVICE — SYS EVOLUT FX DELIVERY 23-29MM: Type: IMPLANTABLE DEVICE | Site: OTHER (ADD COMMENT) | Status: FUNCTIONAL

## 2023-03-16 DEVICE — VALVE EVOLUT TM FX AORTIC 29MM: Type: IMPLANTABLE DEVICE | Site: HEART | Status: FUNCTIONAL

## 2023-03-16 RX ORDER — FENTANYL CITRATE 50 UG/ML
INJECTION, SOLUTION INTRAMUSCULAR; INTRAVENOUS
Status: DISCONTINUED | OUTPATIENT
Start: 2023-03-16 | End: 2023-03-16

## 2023-03-16 RX ORDER — LIDOCAINE HYDROCHLORIDE 10 MG/ML
INJECTION INFILTRATION; PERINEURAL
Status: DISCONTINUED | OUTPATIENT
Start: 2023-03-16 | End: 2023-03-16 | Stop reason: HOSPADM

## 2023-03-16 RX ORDER — ACETAMINOPHEN 325 MG/1
650 TABLET ORAL EVERY 6 HOURS PRN
Status: DISCONTINUED | OUTPATIENT
Start: 2023-03-16 | End: 2023-03-18 | Stop reason: HOSPADM

## 2023-03-16 RX ORDER — ASPIRIN 81 MG/1
81 TABLET ORAL DAILY
Status: DISCONTINUED | OUTPATIENT
Start: 2023-03-17 | End: 2023-03-18 | Stop reason: HOSPADM

## 2023-03-16 RX ORDER — DEXMEDETOMIDINE HYDROCHLORIDE 100 UG/ML
INJECTION, SOLUTION INTRAVENOUS CONTINUOUS PRN
Status: DISCONTINUED | OUTPATIENT
Start: 2023-03-16 | End: 2023-03-16

## 2023-03-16 RX ORDER — FENTANYL CITRATE 50 UG/ML
25 INJECTION, SOLUTION INTRAMUSCULAR; INTRAVENOUS EVERY 5 MIN PRN
Status: DISCONTINUED | OUTPATIENT
Start: 2023-03-16 | End: 2023-03-16 | Stop reason: HOSPADM

## 2023-03-16 RX ORDER — CEFAZOLIN SODIUM 1 G/3ML
INJECTION, POWDER, FOR SOLUTION INTRAMUSCULAR; INTRAVENOUS
Status: DISCONTINUED | OUTPATIENT
Start: 2023-03-16 | End: 2023-03-16

## 2023-03-16 RX ORDER — SODIUM CHLORIDE 9 MG/ML
INJECTION, SOLUTION INTRAVENOUS CONTINUOUS
Status: ACTIVE | OUTPATIENT
Start: 2023-03-16 | End: 2023-03-16

## 2023-03-16 RX ORDER — MIDAZOLAM HYDROCHLORIDE 1 MG/ML
INJECTION, SOLUTION INTRAMUSCULAR; INTRAVENOUS
Status: DISCONTINUED | OUTPATIENT
Start: 2023-03-16 | End: 2023-03-16

## 2023-03-16 RX ORDER — HEPARIN SODIUM 1000 [USP'U]/ML
INJECTION, SOLUTION INTRAVENOUS; SUBCUTANEOUS
Status: DISCONTINUED | OUTPATIENT
Start: 2023-03-16 | End: 2023-03-16

## 2023-03-16 RX ORDER — MAGNESIUM SULFATE HEPTAHYDRATE 40 MG/ML
2 INJECTION, SOLUTION INTRAVENOUS ONCE
Status: COMPLETED | OUTPATIENT
Start: 2023-03-16 | End: 2023-03-17

## 2023-03-16 RX ORDER — LOSARTAN POTASSIUM 50 MG/1
50 TABLET ORAL DAILY
Status: DISCONTINUED | OUTPATIENT
Start: 2023-03-17 | End: 2023-03-17

## 2023-03-16 RX ORDER — SODIUM CHLORIDE 9 MG/ML
INJECTION, SOLUTION INTRAVENOUS CONTINUOUS
Status: DISCONTINUED | OUTPATIENT
Start: 2023-03-16 | End: 2023-03-18 | Stop reason: HOSPADM

## 2023-03-16 RX ORDER — ATORVASTATIN CALCIUM 10 MG/1
20 TABLET, FILM COATED ORAL NIGHTLY
Status: DISCONTINUED | OUTPATIENT
Start: 2023-03-16 | End: 2023-03-18 | Stop reason: HOSPADM

## 2023-03-16 RX ORDER — HEPARIN SOD,PORCINE/0.9 % NACL 1000/500ML
INTRAVENOUS SOLUTION INTRAVENOUS
Status: DISCONTINUED | OUTPATIENT
Start: 2023-03-16 | End: 2023-03-16 | Stop reason: HOSPADM

## 2023-03-16 RX ORDER — DIPHENHYDRAMINE HCL 50 MG
50 CAPSULE ORAL ONCE
Status: COMPLETED | OUTPATIENT
Start: 2023-03-16 | End: 2023-03-16

## 2023-03-16 RX ORDER — ASPIRIN 81 MG/1
81 TABLET ORAL DAILY
COMMUNITY

## 2023-03-16 RX ORDER — ONDANSETRON 2 MG/ML
4 INJECTION INTRAMUSCULAR; INTRAVENOUS DAILY PRN
Status: DISCONTINUED | OUTPATIENT
Start: 2023-03-16 | End: 2023-03-16 | Stop reason: HOSPADM

## 2023-03-16 RX ORDER — PROTAMINE SULFATE 10 MG/ML
INJECTION, SOLUTION INTRAVENOUS
Status: DISCONTINUED | OUTPATIENT
Start: 2023-03-16 | End: 2023-03-16

## 2023-03-16 RX ORDER — MUPIROCIN 20 MG/G
OINTMENT TOPICAL 2 TIMES DAILY
Status: DISCONTINUED | OUTPATIENT
Start: 2023-03-17 | End: 2023-03-18 | Stop reason: HOSPADM

## 2023-03-16 RX ORDER — FUROSEMIDE 20 MG/1
20 TABLET ORAL
Status: DISCONTINUED | OUTPATIENT
Start: 2023-03-16 | End: 2023-03-18 | Stop reason: HOSPADM

## 2023-03-16 RX ORDER — LIDOCAINE HYDROCHLORIDE 20 MG/ML
INJECTION, SOLUTION EPIDURAL; INFILTRATION; INTRACAUDAL; PERINEURAL
Status: DISCONTINUED | OUTPATIENT
Start: 2023-03-16 | End: 2023-03-16

## 2023-03-16 RX ADMIN — FENTANYL CITRATE 25 MCG: 50 INJECTION, SOLUTION INTRAMUSCULAR; INTRAVENOUS at 02:03

## 2023-03-16 RX ADMIN — DEXMEDETOMIDINE 1 MCG/KG/HR: 100 INJECTION, SOLUTION INTRAVENOUS at 02:03

## 2023-03-16 RX ADMIN — DIPHENHYDRAMINE HYDROCHLORIDE 50 MG: 50 CAPSULE ORAL at 11:03

## 2023-03-16 RX ADMIN — CEFAZOLIN 3 G: 330 INJECTION, POWDER, FOR SOLUTION INTRAMUSCULAR; INTRAVENOUS at 02:03

## 2023-03-16 RX ADMIN — SODIUM CHLORIDE: 9 INJECTION, SOLUTION INTRAVENOUS at 09:03

## 2023-03-16 RX ADMIN — MIDAZOLAM 1 MG: 1 INJECTION INTRAMUSCULAR; INTRAVENOUS at 02:03

## 2023-03-16 RX ADMIN — LIDOCAINE HYDROCHLORIDE 30 MG: 20 INJECTION, SOLUTION EPIDURAL; INFILTRATION; INTRACAUDAL at 03:03

## 2023-03-16 RX ADMIN — ATORVASTATIN CALCIUM 20 MG: 10 TABLET, FILM COATED ORAL at 08:03

## 2023-03-16 RX ADMIN — SODIUM CHLORIDE: 9 INJECTION, SOLUTION INTRAVENOUS at 11:03

## 2023-03-16 RX ADMIN — HEPARIN SODIUM 12000 UNITS: 1000 INJECTION, SOLUTION INTRAVENOUS; SUBCUTANEOUS at 03:03

## 2023-03-16 RX ADMIN — ACETAMINOPHEN 650 MG: 325 TABLET ORAL at 05:03

## 2023-03-16 RX ADMIN — FUROSEMIDE 20 MG: 20 TABLET ORAL at 06:03

## 2023-03-16 RX ADMIN — MAGNESIUM SULFATE HEPTAHYDRATE 2 G: 40 INJECTION, SOLUTION INTRAVENOUS at 11:03

## 2023-03-16 RX ADMIN — PROTAMINE SULFATE 120 MG: 10 INJECTION, SOLUTION INTRAVENOUS at 03:03

## 2023-03-16 RX ADMIN — LIDOCAINE HYDROCHLORIDE 70 MG: 20 INJECTION, SOLUTION EPIDURAL; INFILTRATION; INTRACAUDAL at 02:03

## 2023-03-16 NOTE — Clinical Note
15 ml of contrast were injected throughout the case. 85 mL of contrast was the total wasted during the case. 100 mL was the total amount used during the case.

## 2023-03-16 NOTE — CONSULTS
[facility]  EP  Consult Note       HPI: Dragan Man II is a 57 y.o. male with past medical history of:  -Von Willebrand Disease  -LAVERNE on CPAP  -Obesity    Who presented for previously scheduled TAVR today. During the procedure, it was reported to us that the patient went into complete heart block for 2-3 minutes. At baseline his EKG shows sinus rhythm with IVCD. Post procedure, patient's EKG showed normal sinus rhythm with complete heart block and wide ventricular escape ( msec). Patient with no new complaints post procedure. Denies chest pain, shortness of breath, palpitations.     EP History:    EP: None  Cardiologist: Neel    Device: None      Last Echo 1/23/23:  Concentric remodeling and normal systolic function.  Mild mitral regurgitation.  Mild aortic regurgitation.  The estimated ejection fraction is 60%.  Normal left ventricular diastolic function.  Normal right ventricular size with normal right ventricular systolic function.  There is severe aortic valve stenosis.  Aortic valve area is 0.87 cm2; peak velocity is 4.30 m/s; mean gradient is 47 mmHg.  Mild tricuspid regurgitation.  Normal central venous pressure (3 mmHg).  The estimated PA systolic pressure is 16 mmHg.    Previous AAD: None  Anticoagulation: None    ROS: Patient denies angina, ROLLINS, lower extremity edema, orthopnea, PND, palpitations, presyncope or syncope. All other ROS negative except as stated above.    PMHx:  As above    PSHx:   As above    Family Hx:  Family History   Problem Relation Age of Onset    Diabetes Father     Diabetes Paternal Grandfather     No Known Problems Mother        Social Hx:   Social History     Tobacco Use    Smoking status: Never    Smokeless tobacco: Former     Types: Snuff     Quit date: 2003    Tobacco comments:     quit 15 years ago    Substance Use Topics    Alcohol use: Yes     Comment: socially // beer       Allergies:  Review of patient's allergies indicates:   Allergen  Reactions    Olmesartan Other (See Comments)     Numbness and tingling in fingers and knee joint pain    Chlorthalidone Other (See Comments)     Patient states it made him depressed.        Home Meds:  Current Outpatient Medications   Medication Instructions    albuterol (PROVENTIL/VENTOLIN HFA) 90 mcg/actuation inhaler INHALE 2 PUFFS BY MOUTH INTO THE LUNGS EVERY 4 HOURS AS NEEDED FOR WHEEZING. RESCUE    aspirin (ECOTRIN) 81 mg, Oral, Daily    atorvastatin (LIPITOR) 20 mg, Oral, Nightly    doxycycline (MONODOX) 100 MG capsule TAKE ONCE DAILY WITH FOOD. MAY CAUSE UPSET STOMACH.    fluticasone furoate (ARNUITY ELLIPTA) 100 mcg/actuation inhaler 1 puff, Inhalation, Daily    furosemide (LASIX) 20 mg, Oral, Every Mon, Tues, Wed, Thurs, Fri    losartan (COZAAR) 50 mg, Oral, Daily    metoprolol succinate (TOPROL-XL) 50 MG 24 hr tablet TAKE 1 TABLET BY MOUTH EVERY DAY    mometasone (NASONEX) 50 mcg/actuation nasal spray INSTILL 2 SPRAYS INTO EACH NOSTRIL ONCE DAILY.    OZEMPIC 2 mg, Subcutaneous, Every 7 days    sodium chloride (OCEAN) 0.65 % nasal spray 1 spray, Nasal, As needed (PRN)    tretinoin (RETIN-A) 0.05 % cream Apply pea-sized amount to entire face at bedtime.  If dryness, use every third night and increase as tolerated to every night.       Vitals:  Temp:  [98.1 °F (36.7 °C)-99.1 °F (37.3 °C)] 98.1 °F (36.7 °C)  Pulse:  [50-68] 50  Resp:  [16-22] 22  SpO2:  [96 %-99 %] 99 %  BP: (105-124)/(58-70) 105/58  Arterial Line BP: (108)/(50) 108/50    Physical Exam  Gen: No apparent distress, resting comfortably  HEENT: Pupils equal and reactive to light  Cardio: Regular rate, point of maximal impulse not displaced, no murmur noted, 2+ radial pulses bilaterally, 2+ DP pulses bilaterally  Resp: CTAB, no wheezing  Abd: Soft, non-tender, non-distended  Skin: Warm, dry, TVP in place  Neuro: Alert and oriented x3  Psych: Normal mood and affect    Labs:  Recent Labs   Lab 03/16/23  1111   WBC 7.53   HGB 13.3*   HCT 39.8*   PLT  146*     Recent Labs   Lab 03/16/23  1111      K 4.4      CO2 24   BUN 15   CREATININE 0.8      CALCIUM 9.8         Current Meds:   [START ON 3/17/2023] aspirin  81 mg Oral Daily    atorvastatin  20 mg Oral QHS    furosemide  20 mg Oral Every Mon, Tues, Wed, Thurs, Fri    [START ON 3/17/2023] losartan  50 mg Oral Daily       Assessment and Plan:    Complete heart block s/p TAVR  -Will plan for dcPPM versus CRT-P depending on results of Echo  -NPO at midnight tonight  -Risks/benefits of procedure discussed with patient today  -TVP in place, threshold 8 mA, back up rate set at 40 bpm  -Avoid AV robinson blocking agents  -Monitor in CCU overnight        Zbigniew Kwon MD  [facility] PGY-5    Pager number: [phone]    Staff attestation to follow.    This note was prepared using voice recognition system and may have sound alike errors that may have been overlooked even with proof reading.

## 2023-03-16 NOTE — Clinical Note
The catheter was inserted in the right ventricle. The transvenous pacing lead was secured in place in the RV and was placed and capture was confirmed. The pacer was paced at 40 BPM 10 mA 0.8 mV.

## 2023-03-16 NOTE — PROCEDURES
Brief Operative Note:    : Adan Greene MD     Referring Physician: Adan Murillo     All Operators: Surgeon(s):  MD Adan De Paz MD Khaldia Khaled, MD Stephen R. Ramee, MD     Preoperative Diagnosis: Severe aortic stenosis [I35.0]  Chronic diastolic congestive heart failure [I50.32]     Postop Diagnosis: Severe aortic stenosis [I35.0]  Chronic diastolic congestive heart failure [I50.32]    Treatments/Procedures: Procedure(s) (LRB):  REPLACEMENT, AORTIC VALVE, TRANSCATHETER (TAVR) (N/A)  Cardiac Cath Cosurgeon (N/A)    Access: Right CFA, Left CFA    Findings:Severe structural heart disease is present.     See catheterization report for full details.    Intervention:   S/p 29mm Evolut  LVEDP 30mmHg, consistent with acute on chronic heart failure and patients history on presentation (orthopnea and PND).    See catheterization report for full details.    Closure device: Perclose       Plan:  - Post cath protocol   - Bed rest x 2 hours   - Continue aspirin 81 mg daily indefinitely  - Follow up with outpatient cardiologist    Estimated Blood loss: 20 cc    Specimens removed: Iris Mcdowell MD  Interventional Cardiology PGY-8  03/16/2023

## 2023-03-16 NOTE — Clinical Note
The chest was prepped. The site was prepped with ChloraPrep and Ioban. The site was clipped. The patient was draped. The patient was positioned supine.

## 2023-03-16 NOTE — Clinical Note
The groin was prepped. The site was prepped with ChloraPrep. The site was clipped. The patient was draped.

## 2023-03-16 NOTE — TRANSFER OF CARE
Anesthesia Transfer of Care Note    Patient: Dragan Man II    Procedure(s) Performed: Procedure(s) (LRB):  REPLACEMENT, AORTIC VALVE, TRANSCATHETER (TAVR) (N/A)  Cardiac Cath Cosurgeon (N/A)    Patient location: PACU    Transport from OR: Transported from OR on 6-10 L/min O2 by face mask with adequate spontaneous ventilation    Post pain: adequate analgesia    Post assessment: no apparent anesthetic complications    Post vital signs: stable    Level of consciousness: awake    Nausea/Vomiting: no nausea/vomiting    Complications: none    Transfer of care protocol was followed      Last vitals:   Visit Vitals  /70 (BP Location: Right arm, Patient Position: Lying)   Pulse 68   Temp 37.3 °C (99.1 °F) (Temporal)   Resp 16   Ht 6' (1.829 m)   Wt (!) 145.2 kg (320 lb)   SpO2 96%   BMI 43.40 kg/m²

## 2023-03-16 NOTE — ANESTHESIA PREPROCEDURE EVALUATION
[facility]  Anesthesia Pre-Operative Evaluation        Patient Name: Dragan Man II  YOB: 1965  MRN: [id number]    SUBJECTIVE:     Pre-operative Evaluation for Procedure(s) (LRB):  REPLACEMENT, AORTIC VALVE, TRANSCATHETER (TAVR) (N/A)     03/16/2023    Dragan Man II is a 57 y.o. male with a PMHx significant for HTN, pHTN, LAVERNE, PVD, severe aortic stenosis (AV 0.87 cm2, PV 4.3 m/s, MG 47 mmHg).    Patient presenting with worsening shortness of breath, consistent with acute heart failure.    The patient now presents for the above procedure(s).    TTE  Results for orders placed during the hospital encounter of 01/23/23  Interpretation Summary  · Concentric remodeling and normal systolic function.  · Mild mitral regurgitation.  · Mild aortic regurgitation.  · The estimated ejection fraction is 60%.  · Normal left ventricular diastolic function.  · Normal right ventricular size with normal right ventricular systolic function.  · There is severe aortic valve stenosis.  · Aortic valve area is 0.87 cm2; peak velocity is 4.30 m/s; mean gradient is 47 mmHg.  · Mild tricuspid regurgitation.  · Normal central venous pressure (3 mmHg).  · The estimated PA systolic pressure is 16 mmHg.    Previous Airway: None documented.    Patient Active Problem List   Diagnosis    Obstructive sleep apnea treated with continuous positive airway pressure (CPAP)    Advanced sleep phase syndrome    Behaviorally induced insufficient sleep syndrome    Sleep-related bruxism    Inadequate sleep hygiene    Morbid obesity with BMI of 45.0-49.9, adult    Primary hypertension    Actinic keratoses    Heart murmur    ROLLINS (dyspnea on exertion)    Seasonal allergic rhinitis due to pollen    On statin therapy due to risk of future cardiovascular event    Left atrial enlargement    Nonrheumatic aortic valve stenosis, severe    Ulnar neuropathy of both upper extremities    Generalized edema    Chronic  diastolic congestive heart failure    PVD (peripheral vascular disease)    Prediabetes    Mild anemia    Chronic bilateral low back pain without sciatica    Bilateral carpal tunnel syndrome    Right-sided low back pain without sciatica    Right foot pain    Lower extremity edema    At risk for cardiovascular event    Von Willebrand disease    Pulmonary hypertension    Class 3 severe obesity due to excess calories with serious comorbidity and body mass index (BMI) of 45.0 to 49.9 in adult    Aortic atherosclerosis       Past Medical History:   Diagnosis Date    Actinic keratoses 10/14/2019    Bilateral carpal tunnel syndrome 4/12/2021    Chronic bilateral low back pain without sciatica 4/12/2021    Depression     Morbid obesity with BMI of 40.0-44.9, adult 11/17/2017    LAVERNE (obstructive sleep apnea)     Prediabetes 4/12/2021    Seasonal allergic rhinitis due to pollen 10/14/2019    Ulnar neuropathy of both upper extremities 2/17/2020       Review of patient's allergies indicates:   Allergen Reactions    Olmesartan Other (See Comments)     Numbness and tingling in fingers and knee joint pain    Chlorthalidone Other (See Comments)     Patient states it made him depressed.        Current Outpatient Medications   Medication Instructions    albuterol (PROVENTIL/VENTOLIN HFA) 90 mcg/actuation inhaler INHALE 2 PUFFS BY MOUTH INTO THE LUNGS EVERY 4 HOURS AS NEEDED FOR WHEEZING. RESCUE    atorvastatin (LIPITOR) 20 mg, Oral, Nightly    doxycycline (MONODOX) 100 MG capsule TAKE ONCE DAILY WITH FOOD. MAY CAUSE UPSET STOMACH.    fluticasone furoate (ARNUITY ELLIPTA) 100 mcg/actuation inhaler 1 puff, Inhalation, Daily    furosemide (LASIX) 20 mg, Oral, Every Mon, Tues, Wed, Thurs, Fri    losartan (COZAAR) 50 mg, Oral, Daily    metoprolol succinate (TOPROL-XL) 50 MG 24 hr tablet TAKE 1 TABLET BY MOUTH EVERY DAY    mometasone (NASONEX) 50 mcg/actuation nasal spray INSTILL 2 SPRAYS INTO EACH NOSTRIL  ONCE DAILY.    OZEMPIC 2 mg, Subcutaneous, Every 7 days    sodium chloride (OCEAN) 0.65 % nasal spray 1 spray, Nasal, As needed (PRN)    tretinoin (RETIN-A) 0.05 % cream Apply pea-sized amount to entire face at bedtime.  If dryness, use every third night and increase as tolerated to every night.       Past Surgical History:   Procedure Laterality Date    CATHETERIZATION OF BOTH LEFT AND RIGHT HEART N/A 6/28/2022    Procedure: CATHETERIZATION, HEART, BOTH LEFT AND RIGHT;  Surgeon: Carlotta Gordon MD;  [facility];  Service: Cardiology;  Laterality: N/A;    None         Social History     Substance and Sexual Activity   Drug Use No     Alcohol Use: Not on file     Tobacco Use: Medium Risk    Smoking Tobacco Use: Never    Smokeless Tobacco Use: Former    Passive Exposure: Not on file       OBJECTIVE:     Vital Signs Range (Last 24H):         Significant Labs    Heme Profile  Lab Results   Component Value Date    WBC 7.52 02/09/2023    HGB 13.3 (L) 02/09/2023    HCT 38.4 (L) 02/09/2023     (L) 02/09/2023       Coagulation Studies  Lab Results   Component Value Date    LABPROT 11.0 02/09/2023    INR 1.0 02/09/2023    APTT 37.3 (H) 02/09/2023       BMP  Lab Results   Component Value Date     02/09/2023    K 4.8 02/09/2023     02/09/2023    CO2 23 02/09/2023    BUN 15 02/09/2023    CREATININE 0.8 02/09/2023    PHOS 3.0 11/12/2021       Liver Function Tests  Lab Results   Component Value Date    AST 19 12/14/2022    ALT 27 12/14/2022    ALKPHOS 86 12/14/2022    BILITOT 1.0 12/14/2022    PROT 6.9 12/14/2022    ALBUMIN 4.0 02/09/2023       Lipid Profile  Lab Results   Component Value Date    CHOL 110 (L) 08/15/2022    HDL 40 08/15/2022    TRIG 78 08/15/2022       Endocrine Profile  Lab Results   Component Value Date    HGBA1C 5.4 12/14/2022    TSH 2.059 03/01/2023         Cardiac Studies    EKG:   Results for orders placed or performed during the hospital encounter of 01/23/23   EKG  12-lead    Collection Time: 01/23/23  8:42 AM    Narrative    Test Reason : I10,    Vent. Rate : 074 BPM     Atrial Rate : 074 BPM     P-R Int : 198 ms          QRS Dur : 110 ms      QT Int : 344 ms       P-R-T Axes : 013 -05 071 degrees     QTc Int : 381 ms    Normal sinus rhythm  Incomplete left bundle branch block  Minimal voltage criteria for LVH, may be normal variant ( Alvin product )  Borderline Abnormal ECG  When compared with ECG of 20-JUN-2022 15:17,  No significant change was found  Confirmed by MD EDWIN, GILBERTO ([id number]) on 1/24/2023 6:43:23 AM    Referred By: GILBERTO PINEDA           Confirmed By:GILBERTO PINEDA MD       CYNTHIA  No results found for this or any previous visit.      TTE  Results for orders placed during the hospital encounter of 01/23/23  Interpretation Summary  · Concentric remodeling and normal systolic function.  · Mild mitral regurgitation.  · Mild aortic regurgitation.  · The estimated ejection fraction is 60%.  · Normal left ventricular diastolic function.  · Normal right ventricular size with normal right ventricular systolic function.  · There is severe aortic valve stenosis.  · Aortic valve area is 0.87 cm2; peak velocity is 4.30 m/s; mean gradient is 47 mmHg.  · Mild tricuspid regurgitation.  · Normal central venous pressure (3 mmHg).  · The estimated PA systolic pressure is 16 mmHg.      Cardiac Catheterization  Results for orders placed during the hospital encounter of 06/28/22    Cardiac catheterization    Conclusion  · The pre-procedure left ventricular end diastolic pressure was 32.  · There was trivial (1+) aortic regurgitation.  · The estimated blood loss was none.  · There was diastolic dysfunction.  · The coronary arteries were normal..  · The filling pressures on the right and left were moderately elevated. Pulmonary hypertension was moderate.  · 51 [employer] GRADIENT ACROSS AORTIC VAKLVE SUGGESTIVE OF SEVERE AORTIC STENOSIS.    The procedure log was documented by Documenter: Jerman ALEGRIA  RT Devan and verified by Deepthi Gordon MD.    Date: 6/28/2022  Time: 1:14 PM      ASSESSMENT/PLAN:         Pre-op Assessment    I have reviewed the Patient Summary Reports.     I have reviewed the Nursing Notes. I have reviewed the NPO Status.   I have reviewed the Medications.     Review of Systems  Anesthesia Hx:  No problems with previous Anesthesia   Denies Personal Hx of Anesthesia complications.   Hematology/Oncology:  Hematology Normal   Oncology Normal     EENT/Dental:EENT/Dental Normal   Cardiovascular:   Hypertension CHF ROLLINS  Valvular Heart Disease: Aortic Stenosis (AS)    Hypertension    Pulmonary:   Shortness of breath Sleep Apnea    Renal/:  Renal/ Normal     Hepatic/GI:  Hepatic/GI Normal    Musculoskeletal:  Musculoskeletal Normal    Neurological:  Neurology Normal    Endocrine:  Endocrine Normal    Dermatological:  Skin Normal    Psych:  Psychiatric Normal           Physical Exam  General: Well nourished, Cooperative and Alert    Airway:  Mallampati: II   Mouth Opening: Normal  TM Distance: Normal  Tongue: Normal  Neck ROM: Normal ROM    Dental:  Intact    Chest/Lungs:  Respiratory Distress    Heart:  Rate: Normal  Rhythm: Regular Rhythm        Anesthesia Plan  Type of Anesthesia, risks & benefits discussed:    Anesthesia Type: Gen ETT, Gen Natural Airway, MAC  Intra-op Monitoring Plan: Standard ASA Monitors, Central Line and Art Line  Post Op Pain Control Plan: multimodal analgesia and IV/PO Opioids PRN  Induction:  IV  Airway Plan: Direct, Post-Induction  Informed Consent: Informed consent signed with the Patient and all parties understand the risks and agree with anesthesia plan.  All questions answered.   ASA Score: 4  Day of Surgery Review of History & Physical: H&P Update referred to the surgeon/provider.    Ready For Surgery From Anesthesia Perspective.     .

## 2023-03-16 NOTE — ANESTHESIA PROCEDURE NOTES
Arterial    Diagnosis: AS    Patient location during procedure: done in OR    Staffing  Authorizing Provider: Edmund Hutchison MD  Performing Provider: Jagjit Dyer MD    Anesthesiologist was present at the time of the procedure.    Preanesthetic Checklist  Completed: patient identified, IV checked, site marked, risks and benefits discussed, surgical consent, monitors and equipment checked, pre-op evaluation, timeout performed and anesthesia consent givenArterial  Skin Prep: chlorhexidine gluconate and isopropyl alcohol  Local Infiltration: lidocaine  Orientation: right  Location: radial    Catheter Size: 20 G  Catheter placement by Anatomical landmarks. Heme positive aspiration all ports. Insertion Attempts: 1  Assessment  Dressing: secured with tape and tegaderm  Patient: Tolerated well

## 2023-03-16 NOTE — INTERVAL H&P NOTE
The patient has been examined and the H&P has been reviewed:    I concur with the findings and no changes have occurred since H&P was written.    Procedure risks, benefits and alternative options discussed and understood by patient/family.    Dragan Man II is a 57 y.o. male with severe aortic stenosis who presents as an outpatient for TAVR. He denies chest pain. He does have shortness of breath when he lays flat and a cough. He sleeps with a CPAP, breathing worsens if he is not wearing his CPAP.       There are no hospital problems to display for this patient.

## 2023-03-17 ENCOUNTER — TELEPHONE (OUTPATIENT)
Dept: CARDIOLOGY | Facility: HOSPITAL | Age: 58
End: 2023-03-17
Payer: COMMERCIAL

## 2023-03-17 ENCOUNTER — ANESTHESIA (OUTPATIENT)
Dept: MEDSURG UNIT | Facility: HOSPITAL | Age: 58
DRG: 267 | End: 2023-03-17
Payer: COMMERCIAL

## 2023-03-17 DIAGNOSIS — Z95.2 S/P TAVR (TRANSCATHETER AORTIC VALVE REPLACEMENT): ICD-10-CM

## 2023-03-17 DIAGNOSIS — Z95.0 CARDIAC PACEMAKER IN SITU: Primary | ICD-10-CM

## 2023-03-17 DIAGNOSIS — I44.2 CHB (COMPLETE HEART BLOCK): ICD-10-CM

## 2023-03-17 PROBLEM — Z95.3 S/P TAVR (TRANSCATHETER AORTIC VALVE REPLACEMENT): Status: ACTIVE | Noted: 2023-03-17

## 2023-03-17 LAB
ALBUMIN SERPL BCP-MCNC: 3.8 G/DL (ref 3.5–5.2)
ALP SERPL-CCNC: 74 U/L (ref 55–135)
ALT SERPL W/O P-5'-P-CCNC: 27 U/L (ref 10–44)
ANION GAP SERPL CALC-SCNC: 11 MMOL/L (ref 8–16)
AST SERPL-CCNC: 26 U/L (ref 10–40)
AV INDEX (PROSTH): 0.47
AV PEAK GRADIENT: 41 MMHG
AV VALVE AREA: 3.06 CM2
AV VELOCITY RATIO: 0.47
BASOPHILS # BLD AUTO: 0.05 K/UL (ref 0–0.2)
BASOPHILS NFR BLD: 0.5 % (ref 0–1.9)
BILIRUB SERPL-MCNC: 1.3 MG/DL (ref 0.1–1)
BSA FOR ECHO PROCEDURE: 2.71 M2
BUN SERPL-MCNC: 17 MG/DL (ref 6–20)
CALCIUM SERPL-MCNC: 9.2 MG/DL (ref 8.7–10.5)
CHLORIDE SERPL-SCNC: 107 MMOL/L (ref 95–110)
CO2 SERPL-SCNC: 21 MMOL/L (ref 23–29)
CREAT SERPL-MCNC: 0.9 MG/DL (ref 0.5–1.4)
DIFFERENTIAL METHOD: ABNORMAL
DOP CALC AO PEAK VEL: 3.2 M/S
DOP CALC AO VTI: 74 CM
DOP CALC LVOT AREA: 6.5 CM2
DOP CALC LVOT DIAMETER: 2.87 CM
DOP CALC LVOT PEAK VEL: 1.5 M/S
DOP CALC LVOT STROKE VOLUME: 226.31 CM3
DOP CALCLVOT PEAK VEL VTI: 35 CM
EJECTION FRACTION: 65 %
EOSINOPHIL # BLD AUTO: 0.2 K/UL (ref 0–0.5)
EOSINOPHIL NFR BLD: 1.7 % (ref 0–8)
ERYTHROCYTE [DISTWIDTH] IN BLOOD BY AUTOMATED COUNT: 12.3 % (ref 11.5–14.5)
EST. GFR  (NO RACE VARIABLE): >60 ML/MIN/1.73 M^2
FRACTIONAL SHORTENING: 40 % (ref 28–44)
GLUCOSE SERPL-MCNC: 113 MG/DL (ref 70–110)
HCT VFR BLD AUTO: 39 % (ref 40–54)
HGB BLD-MCNC: 13 G/DL (ref 14–18)
IMM GRANULOCYTES # BLD AUTO: 0.03 K/UL (ref 0–0.04)
IMM GRANULOCYTES NFR BLD AUTO: 0.3 % (ref 0–0.5)
IVRT: 125.59 MSEC
LEFT INTERNAL DIMENSION IN SYSTOLE: 3.17 CM (ref 2.1–4)
LEFT VENTRICLE DIASTOLIC VOLUME INDEX: 52.22 ML/M2
LEFT VENTRICLE DIASTOLIC VOLUME: 135.78 ML
LEFT VENTRICLE SYSTOLIC VOLUME INDEX: 15.4 ML/M2
LEFT VENTRICLE SYSTOLIC VOLUME: 40 ML
LEFT VENTRICULAR INTERNAL DIMENSION IN DIASTOLE: 5.31 CM (ref 3.5–6)
LYMPHOCYTES # BLD AUTO: 2.1 K/UL (ref 1–4.8)
LYMPHOCYTES NFR BLD: 21.2 % (ref 18–48)
MAGNESIUM SERPL-MCNC: 2.2 MG/DL (ref 1.6–2.6)
MCH RBC QN AUTO: 32.4 PG (ref 27–31)
MCHC RBC AUTO-ENTMCNC: 33.3 G/DL (ref 32–36)
MCV RBC AUTO: 97 FL (ref 82–98)
MONOCYTES # BLD AUTO: 1 K/UL (ref 0.3–1)
MONOCYTES NFR BLD: 9.9 % (ref 4–15)
NEUTROPHILS # BLD AUTO: 6.5 K/UL (ref 1.8–7.7)
NEUTROPHILS NFR BLD: 66.4 % (ref 38–73)
NRBC BLD-RTO: 0 /100 WBC
PHOSPHATE SERPL-MCNC: 3.7 MG/DL (ref 2.7–4.5)
PLATELET # BLD AUTO: 138 K/UL (ref 150–450)
PMV BLD AUTO: 10.3 FL (ref 9.2–12.9)
POC ACTIVATED CLOTTING TIME K: 161 SEC (ref 74–137)
POC ACTIVATED CLOTTING TIME K: 299 SEC (ref 74–137)
POTASSIUM SERPL-SCNC: 4.5 MMOL/L (ref 3.5–5.1)
PROT SERPL-MCNC: 7 G/DL (ref 6–8.4)
RA PRESSURE: 3 MMHG
RBC # BLD AUTO: 4.01 M/UL (ref 4.6–6.2)
SAMPLE: ABNORMAL
SAMPLE: ABNORMAL
SODIUM SERPL-SCNC: 139 MMOL/L (ref 136–145)
WBC # BLD AUTO: 9.72 K/UL (ref 3.9–12.7)

## 2023-03-17 PROCEDURE — 93010 EKG 12-LEAD: ICD-10-PCS | Mod: ,,, | Performed by: INTERNAL MEDICINE

## 2023-03-17 PROCEDURE — 84295 ASSAY OF SERUM SODIUM: CPT

## 2023-03-17 PROCEDURE — D9220A PRA ANESTHESIA: ICD-10-PCS | Mod: CRNA,,, | Performed by: NURSE ANESTHETIST, CERTIFIED REGISTERED

## 2023-03-17 PROCEDURE — 25500020 PHARM REV CODE 255: Performed by: INTERNAL MEDICINE

## 2023-03-17 PROCEDURE — 83735 ASSAY OF MAGNESIUM: CPT | Performed by: STUDENT IN AN ORGANIZED HEALTH CARE EDUCATION/TRAINING PROGRAM

## 2023-03-17 PROCEDURE — D9220A PRA ANESTHESIA: ICD-10-PCS | Mod: ANES,,, | Performed by: ANESTHESIOLOGY

## 2023-03-17 PROCEDURE — 27800903 OPTIME MED/SURG SUP & DEVICES OTHER IMPLANTS: Performed by: INTERNAL MEDICINE

## 2023-03-17 PROCEDURE — 82800 BLOOD PH: CPT

## 2023-03-17 PROCEDURE — 25500020 PHARM REV CODE 255: Performed by: NURSE ANESTHETIST, CERTIFIED REGISTERED

## 2023-03-17 PROCEDURE — 33208 INSRT HEART PM ATRIAL & VENT: CPT | Mod: KX,,, | Performed by: INTERNAL MEDICINE

## 2023-03-17 PROCEDURE — C1898 LEAD, PMKR, OTHER THAN TRANS: HCPCS | Performed by: INTERNAL MEDICINE

## 2023-03-17 PROCEDURE — 83605 ASSAY OF LACTIC ACID: CPT

## 2023-03-17 PROCEDURE — C1785 PMKR, DUAL, RATE-RESP: HCPCS | Performed by: INTERNAL MEDICINE

## 2023-03-17 PROCEDURE — 93005 ELECTROCARDIOGRAM TRACING: CPT

## 2023-03-17 PROCEDURE — 84132 ASSAY OF SERUM POTASSIUM: CPT

## 2023-03-17 PROCEDURE — 27201423 OPTIME MED/SURG SUP & DEVICES STERILE SUPPLY: Performed by: INTERNAL MEDICINE

## 2023-03-17 PROCEDURE — 25000003 PHARM REV CODE 250: Performed by: STUDENT IN AN ORGANIZED HEALTH CARE EDUCATION/TRAINING PROGRAM

## 2023-03-17 PROCEDURE — 27000491 HC MATTRESS, SOFT CARE OVERLAY

## 2023-03-17 PROCEDURE — 20000000 HC ICU ROOM

## 2023-03-17 PROCEDURE — 93010 ELECTROCARDIOGRAM REPORT: CPT | Mod: ,,, | Performed by: INTERNAL MEDICINE

## 2023-03-17 PROCEDURE — 85025 COMPLETE CBC W/AUTO DIFF WBC: CPT | Performed by: STUDENT IN AN ORGANIZED HEALTH CARE EDUCATION/TRAINING PROGRAM

## 2023-03-17 PROCEDURE — 25000003 PHARM REV CODE 250: Performed by: NURSE ANESTHETIST, CERTIFIED REGISTERED

## 2023-03-17 PROCEDURE — 33208 PR INSER HART PACER XVENOUS ATR/VENTR: ICD-10-PCS | Mod: KX,,, | Performed by: INTERNAL MEDICINE

## 2023-03-17 PROCEDURE — 63600175 PHARM REV CODE 636 W HCPCS: Performed by: INTERNAL MEDICINE

## 2023-03-17 PROCEDURE — 37000009 HC ANESTHESIA EA ADD 15 MINS: Performed by: INTERNAL MEDICINE

## 2023-03-17 PROCEDURE — 99900035 HC TECH TIME PER 15 MIN (STAT)

## 2023-03-17 PROCEDURE — 94761 N-INVAS EAR/PLS OXIMETRY MLT: CPT

## 2023-03-17 PROCEDURE — 25000003 PHARM REV CODE 250: Performed by: INTERNAL MEDICINE

## 2023-03-17 PROCEDURE — 63600175 PHARM REV CODE 636 W HCPCS: Performed by: STUDENT IN AN ORGANIZED HEALTH CARE EDUCATION/TRAINING PROGRAM

## 2023-03-17 PROCEDURE — 36600 WITHDRAWAL OF ARTERIAL BLOOD: CPT

## 2023-03-17 PROCEDURE — 37000008 HC ANESTHESIA 1ST 15 MINUTES: Performed by: INTERNAL MEDICINE

## 2023-03-17 PROCEDURE — 99291 PR CRITICAL CARE, E/M 30-74 MINUTES: ICD-10-PCS | Mod: 25,,, | Performed by: INTERNAL MEDICINE

## 2023-03-17 PROCEDURE — 27000492 HC SLEEVE, SCD T/L

## 2023-03-17 PROCEDURE — 63600175 PHARM REV CODE 636 W HCPCS: Performed by: NURSE ANESTHETIST, CERTIFIED REGISTERED

## 2023-03-17 PROCEDURE — 82565 ASSAY OF CREATININE: CPT

## 2023-03-17 PROCEDURE — 99233 SBSQ HOSP IP/OBS HIGH 50: CPT | Mod: ,,, | Performed by: INTERNAL MEDICINE

## 2023-03-17 PROCEDURE — 93010 ELECTROCARDIOGRAM REPORT: CPT | Mod: 76,,, | Performed by: INTERNAL MEDICINE

## 2023-03-17 PROCEDURE — D9220A PRA ANESTHESIA: Mod: ANES,,, | Performed by: ANESTHESIOLOGY

## 2023-03-17 PROCEDURE — 80053 COMPREHEN METABOLIC PANEL: CPT | Performed by: STUDENT IN AN ORGANIZED HEALTH CARE EDUCATION/TRAINING PROGRAM

## 2023-03-17 PROCEDURE — C1894 INTRO/SHEATH, NON-LASER: HCPCS | Performed by: INTERNAL MEDICINE

## 2023-03-17 PROCEDURE — D9220A PRA ANESTHESIA: Mod: CRNA,,, | Performed by: NURSE ANESTHETIST, CERTIFIED REGISTERED

## 2023-03-17 PROCEDURE — 99233 PR SUBSEQUENT HOSPITAL CARE,LEVL III: ICD-10-PCS | Mod: ,,, | Performed by: INTERNAL MEDICINE

## 2023-03-17 PROCEDURE — 93010 EKG 12-LEAD: ICD-10-PCS | Mod: 76,,, | Performed by: INTERNAL MEDICINE

## 2023-03-17 PROCEDURE — 82330 ASSAY OF CALCIUM: CPT

## 2023-03-17 PROCEDURE — 33208 INSRT HEART PM ATRIAL & VENT: CPT | Performed by: INTERNAL MEDICINE

## 2023-03-17 PROCEDURE — 99291 CRITICAL CARE FIRST HOUR: CPT | Mod: 25,,, | Performed by: INTERNAL MEDICINE

## 2023-03-17 PROCEDURE — 85014 HEMATOCRIT: CPT

## 2023-03-17 PROCEDURE — 82803 BLOOD GASES ANY COMBINATION: CPT

## 2023-03-17 PROCEDURE — 84100 ASSAY OF PHOSPHORUS: CPT | Performed by: STUDENT IN AN ORGANIZED HEALTH CARE EDUCATION/TRAINING PROGRAM

## 2023-03-17 DEVICE — ENVELOPE CMRM6122 ABSORB MED MR
Type: IMPLANTABLE DEVICE | Site: HEART | Status: FUNCTIONAL
Brand: TYRX™

## 2023-03-17 DEVICE — LEAD 5076-58 MRI US RCMCRD
Type: IMPLANTABLE DEVICE | Site: HEART | Status: FUNCTIONAL
Brand: CAPSUREFIX NOVUS MRI™ SURESCAN®

## 2023-03-17 DEVICE — IPG W3DR01 AZURE S DR MRI USA
Type: IMPLANTABLE DEVICE | Site: CHEST | Status: FUNCTIONAL
Brand: AZURE™ S DR MRI SURESCAN™

## 2023-03-17 RX ORDER — LIDOCAINE HYDROCHLORIDE 10 MG/ML
INJECTION INFILTRATION; PERINEURAL
Status: DISCONTINUED | OUTPATIENT
Start: 2023-03-17 | End: 2023-03-18 | Stop reason: HOSPADM

## 2023-03-17 RX ORDER — OXYCODONE HYDROCHLORIDE 5 MG/1
5 TABLET ORAL EVERY 4 HOURS PRN
Status: DISCONTINUED | OUTPATIENT
Start: 2023-03-17 | End: 2023-03-18

## 2023-03-17 RX ORDER — LIDOCAINE HYDROCHLORIDE 20 MG/ML
INJECTION INTRAVENOUS
Status: DISCONTINUED | OUTPATIENT
Start: 2023-03-17 | End: 2023-03-17

## 2023-03-17 RX ORDER — KETAMINE HCL IN 0.9 % NACL 50 MG/5 ML
SYRINGE (ML) INTRAVENOUS
Status: DISCONTINUED | OUTPATIENT
Start: 2023-03-17 | End: 2023-03-17

## 2023-03-17 RX ORDER — CEFAZOLIN SODIUM 1 G/3ML
INJECTION, POWDER, FOR SOLUTION INTRAMUSCULAR; INTRAVENOUS
Status: DISCONTINUED | OUTPATIENT
Start: 2023-03-17 | End: 2023-03-17

## 2023-03-17 RX ORDER — PROPOFOL 10 MG/ML
VIAL (ML) INTRAVENOUS CONTINUOUS PRN
Status: DISCONTINUED | OUTPATIENT
Start: 2023-03-17 | End: 2023-03-17

## 2023-03-17 RX ORDER — VANCOMYCIN HYDROCHLORIDE 1 G/20ML
INJECTION, POWDER, LYOPHILIZED, FOR SOLUTION INTRAVENOUS
Status: DISCONTINUED | OUTPATIENT
Start: 2023-03-17 | End: 2023-03-18 | Stop reason: HOSPADM

## 2023-03-17 RX ORDER — CEFAZOLIN SODIUM 2 G/50ML
2 SOLUTION INTRAVENOUS ONCE
Status: DISCONTINUED | OUTPATIENT
Start: 2023-03-17 | End: 2023-03-17

## 2023-03-17 RX ORDER — ONDANSETRON 2 MG/ML
INJECTION INTRAMUSCULAR; INTRAVENOUS
Status: DISCONTINUED | OUTPATIENT
Start: 2023-03-17 | End: 2023-03-17

## 2023-03-17 RX ORDER — BUPIVACAINE HYDROCHLORIDE 2.5 MG/ML
INJECTION, SOLUTION EPIDURAL; INFILTRATION; INTRACAUDAL
Status: DISCONTINUED | OUTPATIENT
Start: 2023-03-17 | End: 2023-03-18 | Stop reason: HOSPADM

## 2023-03-17 RX ORDER — DOXYCYCLINE HYCLATE 100 MG
100 TABLET ORAL EVERY 12 HOURS
Status: DISCONTINUED | OUTPATIENT
Start: 2023-03-18 | End: 2023-03-18 | Stop reason: HOSPADM

## 2023-03-17 RX ORDER — SODIUM CHLORIDE 0.9 G/100ML
IRRIGANT IRRIGATION
Status: DISCONTINUED | OUTPATIENT
Start: 2023-03-17 | End: 2023-03-18 | Stop reason: HOSPADM

## 2023-03-17 RX ORDER — PROPOFOL 10 MG/ML
VIAL (ML) INTRAVENOUS
Status: DISCONTINUED | OUTPATIENT
Start: 2023-03-17 | End: 2023-03-17

## 2023-03-17 RX ORDER — FENTANYL CITRATE 50 UG/ML
INJECTION, SOLUTION INTRAMUSCULAR; INTRAVENOUS
Status: DISCONTINUED | OUTPATIENT
Start: 2023-03-17 | End: 2023-03-17

## 2023-03-17 RX ORDER — LOSARTAN POTASSIUM 50 MG/1
50 TABLET ORAL DAILY
Status: DISCONTINUED | OUTPATIENT
Start: 2023-03-17 | End: 2023-03-18 | Stop reason: HOSPADM

## 2023-03-17 RX ORDER — IODIXANOL 320 MG/ML
INJECTION, SOLUTION INTRAVASCULAR
Status: DISCONTINUED | OUTPATIENT
Start: 2023-03-17 | End: 2023-03-17

## 2023-03-17 RX ADMIN — Medication 10 MG: at 02:03

## 2023-03-17 RX ADMIN — Medication 50 MG: at 01:03

## 2023-03-17 RX ADMIN — Medication 20 MG: at 01:03

## 2023-03-17 RX ADMIN — LIDOCAINE HYDROCHLORIDE 40 MG: 20 INJECTION INTRAVENOUS at 01:03

## 2023-03-17 RX ADMIN — Medication 10 MG: at 01:03

## 2023-03-17 RX ADMIN — CEFAZOLIN 2 G: 2 INJECTION, POWDER, FOR SOLUTION INTRAMUSCULAR; INTRAVENOUS at 10:03

## 2023-03-17 RX ADMIN — LIDOCAINE HYDROCHLORIDE 60 MG: 20 INJECTION INTRAVENOUS at 01:03

## 2023-03-17 RX ADMIN — Medication 10 MG: at 03:03

## 2023-03-17 RX ADMIN — ASPIRIN 81 MG: 81 TABLET, COATED ORAL at 09:03

## 2023-03-17 RX ADMIN — FENTANYL CITRATE 25 MCG: 50 INJECTION, SOLUTION INTRAMUSCULAR; INTRAVENOUS at 01:03

## 2023-03-17 RX ADMIN — FENTANYL CITRATE 25 MCG: 50 INJECTION, SOLUTION INTRAMUSCULAR; INTRAVENOUS at 02:03

## 2023-03-17 RX ADMIN — GLYCOPYRROLATE 0.2 MG: 0.2 INJECTION INTRAMUSCULAR; INTRAVENOUS at 01:03

## 2023-03-17 RX ADMIN — FUROSEMIDE 20 MG: 20 TABLET ORAL at 04:03

## 2023-03-17 RX ADMIN — OXYCODONE HYDROCHLORIDE 5 MG: 5 TABLET ORAL at 04:03

## 2023-03-17 RX ADMIN — CEFAZOLIN 3 G: 330 INJECTION, POWDER, FOR SOLUTION INTRAMUSCULAR; INTRAVENOUS at 01:03

## 2023-03-17 RX ADMIN — HUMAN ALBUMIN MICROSPHERES AND PERFLUTREN 0.11 MG: 10; .22 INJECTION, SOLUTION INTRAVENOUS at 08:03

## 2023-03-17 RX ADMIN — LOSARTAN POTASSIUM 50 MG: 50 TABLET, FILM COATED ORAL at 04:03

## 2023-03-17 RX ADMIN — IODIXANOL 10 ML: 320 INJECTION, SOLUTION INTRAVASCULAR at 02:03

## 2023-03-17 RX ADMIN — ONDANSETRON 4 MG: 2 INJECTION INTRAMUSCULAR; INTRAVENOUS at 03:03

## 2023-03-17 RX ADMIN — SODIUM CHLORIDE: 0.9 INJECTION, SOLUTION INTRAVENOUS at 01:03

## 2023-03-17 RX ADMIN — ATORVASTATIN CALCIUM 20 MG: 10 TABLET, FILM COATED ORAL at 09:03

## 2023-03-17 RX ADMIN — PROPOFOL 50 MCG/KG/MIN: 10 INJECTION, EMULSION INTRAVENOUS at 01:03

## 2023-03-17 RX ADMIN — MUPIROCIN: 20 OINTMENT TOPICAL at 09:03

## 2023-03-17 NOTE — PROGRESS NOTES
[facility]  Cardiac Electrophysiology  Progress Note    Admission Date: 3/16/2023  Code Status: Prior   Attending Physician: Adan Greene MD   Expected Discharge Date: 3/27/2023  Principal Problem:S/P TAVR (transcatheter aortic valve replacement)    Subjective:     Interval History:   Remains in CHB with wide ventricular escape. V paced at 60 bpm overnight.     Review of Systems   Constitutional: Positive for malaise/fatigue.   Objective:     Vital Signs (Most Recent):  Temp: 98 °F (36.7 °C) (03/17/23 1100)  Pulse: (!) 52 (03/17/23 1200)  Resp: (!) 22 (03/17/23 1200)  BP: (!) 146/66 (03/17/23 1200)  SpO2: 96 % (03/17/23 1200)   Vital Signs (24h Range):  Temp:  [97.1 °F (36.2 °C)-98.1 °F (36.7 °C)] 98 °F (36.7 °C)  Pulse:  [42-79] 52  Resp:  [11-39] 22  SpO2:  [90 %-100 %] 96 %  BP: ()/(50-76) 146/66  Arterial Line BP: ()/(40-60) 163/55     Weight: (!) 144.7 kg (319 lb)  Body mass index is 43.26 kg/m².     SpO2: 96 %       Physical Exam  Constitutional:       General: He is not in acute distress.     Appearance: Normal appearance. He is not ill-appearing.   HENT:      Head: Normocephalic and atraumatic.      Nose: No congestion.      Mouth/Throat:      Mouth: Mucous membranes are moist.   Eyes:      Conjunctiva/sclera: Conjunctivae normal.   Neck:      Comments: TVP in RIJ  Cardiovascular:      Rate and Rhythm: Normal rate and regular rhythm.      Pulses: Normal pulses.      Comments: V paced  Pulmonary:      Effort: Pulmonary effort is normal. No respiratory distress.   Abdominal:      General: Abdomen is flat. There is no distension.   Musculoskeletal:      Cervical back: Normal range of motion.   Skin:     Capillary Refill: Capillary refill takes less than 2 seconds.      Findings: No rash.   Neurological:      Mental Status: He is alert and oriented to person, place, and time.       Significant Labs: All pertinent lab results from the last 24 hours have been  reviewed.        Assessment and Plan:     Complete heart block  S/p TAVR with 29mm Evolut with complete heart block resultant following implantation. He had a baseline iLBBB with , post-TAVR widened IVCD with  with CHB.     Remains in CHB with wide ventricular escape. V paced at 60 bpm overnight. EF normal today. Plan to proceed with dc PPM today   Avoid heparin products including DOACs    Results for orders placed during the hospital encounter of 03/16/23    Echo    Interpretation Summary  · Limited study to evaluate LVEF.  · The left ventricle is normal in size with normal systolic function. The estimated ejection fraction is 65%.  · There is a 29 mm EvolutFX transcutaneously-placed aortic bioprosthesis present. Trace aortic regurgitation.  · Normal right ventricular size with normal right ventricular systolic function.  · Normal central venous pressure (3 mmHg).          Erik Wadsworth MD  Cardiac Electrophysiology  [facility]

## 2023-03-17 NOTE — NURSING
Pt V. Paced at 50bpm, monitor alarming a sustained HR of 42bpm. MD called to bedside. Pt remains asymptomatic with no reports of lightheadedness, palpations, or dizziness. MD increased back up rate to 60bpm.

## 2023-03-17 NOTE — NURSING TRANSFER
Nursing Transfer Note      3/16/2023     Reason patient is being transferred: to SICU for continued monitoring    Transfer To: [id number]    Transfer via bed    Transfer with o2 @ 2L/nc to O2, cardiac monitoring    Transported by RN x2    Medicines sent: NS @ KVO to cordis    Any special needs or follow-up needed: pt needs repeat EKG and echo    Chart send with patient: Yes    Notified: spouse    Patient reassessed at: to be done by receiving RN (date, time)    Upon arrival to floor: cardiac monitor applied, patient oriented to room, call bell in reach, and bed in lowest position

## 2023-03-17 NOTE — PLAN OF CARE
Pt AAOx4. Pt remains V paced @ 60, but HR on monitor occasionally reading 49bpm, MAP goal >65 maintained, SpO2 reading >95% on RA and CPAP overnight. Upon pt moving, monitor reads V. Tach./V. Fib, pt remains asymptomatic during event, MD made aware. UOP 725cc/shift. Plan for Pacemaker placement in the AM. POC reviewed with pt and all questions and concerns addressed. Refer to flowsheet for VS and further assessment.

## 2023-03-17 NOTE — PROGRESS NOTES
[facility]  Interventional Cardiology  Progress Note    Patient Name: Dragan Man II  MRN: [id number]  Admission Date: 3/16/2023  Hospital Length of Stay: 1 days  Code Status: Prior   Attending Physician: Adan Greene MD   Primary Care Physician: GIL Barba MD  Principal Problem:S/P TAVR (transcatheter aortic valve replacement)    Subjective:   Referring Physician: Dr Aldair STONE  Dragan Man II is a 57 y.o. male who presents for evaluation of aortic stenosis.      Dragan Man II is a 57 y.o. male referred by Dr Shaikh for evaluation of severe AS (NYHA Class III sx).     The patient has undergone the following TAVR work-up:    ECHO (Date 1/23/23): CRISTINO= 0.87 cm2, MG= 47 mmHg, Peak Cristian= 4.30 m/s, EF= 60%.    LHC (Date 6/28/22; Heidi): normal coronaries    STS: 2%    Frailty: 0/4    Iliacs are >10 on R and > 8.7 on L    LVOT area by CTA is 4.21 cm2 (26 mm X 21 mm) and Avg Diameter is 23.2 per Dr Prather   Incidental findings on CT: 4 mm pulmonary nodule. Sent to PCP.    CT Surgery risk assessment: Moderate risk, per Dr Johnson due to vWV disease, morbid obesity   Rhythm issues: incomplete RBBB,    PFTs: FEV1 64.9% predicted, DLCO 84.5% predicted.   KCCQ/5 meter walk: Done   Comorbidities: obesity, LAVERNE on CPAP, Von Willebrand (told has a kid, hasn't seen hematology)        Dragan Man II is a 29 mm  EvolutFX valve candidate via RTF access.    Hospital Course  Dragan Man II was admitted and underwent successful placement of a 29 mm EvolutFX TAVR via TF access under MAC sedation on 3/16/23. Please see full cath report for details. A transthoracic echo was performed immediately post procedure which showed trace paravalvular leak. An aortic valve mean gradient of 5 mmHg and a maximum velocity through the aortic valve of 1.1 m/s. Complete heart block post TAVR. A TVP was left in place and will watch overnight. EP was consulted.     Interval  History: Patient remains in CHB this AM. TVP in place and being monitored by EP. Plan for PPM implantation this afternoon. Please keep NPO. Patient is without complaints.     Objective:     Vital Signs (Most Recent):  Temp: 98 °F (36.7 °C) (03/17/23 0700)  Pulse: (!) 50 (03/17/23 0900)  Resp: (!) 25 (03/17/23 0900)  BP: 127/60 (03/17/23 0900)  SpO2: 98 % (03/17/23 0900)   Vital Signs (24h Range):  Temp:  [97.1 °F (36.2 °C)-99.1 °F (37.3 °C)] 98 °F (36.7 °C)  Pulse:  [42-79] 50  Resp:  [11-39] 25  SpO2:  [90 %-99 %] 98 %  BP: ()/(50-76) 127/60  Arterial Line BP: ()/(40-60) 147/52     Weight: (!) 145 kg (319 lb 10.7 oz)  Body mass index is 43.35 kg/m².    SpO2: 98 %         Intake/Output Summary (Last 24 hours) at 3/17/2023 0952  Last data filed at 3/17/2023 0900  Gross per 24 hour   Intake 482.67 ml   Output 1205 ml   Net -722.33 ml       Lines/Drains/Airways       Central Venous Catheter Line  Duration              Introducer Single Lumen 03/16/23 1600 Subclavian Right <1 day              Arterial Line  Duration             Arterial Line 03/16/23 1444 Right Radial <1 day              Peripheral Intravenous Line  Duration                  Peripheral IV - Single Lumen Right Antecubital -- days         Peripheral IV - Single Lumen 03/16/23 1140 20 G Anterior;Left Forearm <1 day                    Physical Exam  Vitals reviewed.   Constitutional:       General: He is not in acute distress.     Appearance: He is well-developed. He is not diaphoretic.   HENT:      Head: Normocephalic and atraumatic.   Neck:      Vascular: No JVD.   Cardiovascular:      Rate and Rhythm: Normal rate and regular rhythm.      Pulses: Intact distal pulses.      Heart sounds: No murmur heard.  Pulmonary:      Effort: Pulmonary effort is normal. No respiratory distress.   Musculoskeletal:      Cervical back: Normal range of motion.      Right lower leg: No edema.      Left lower leg: No edema.   Skin:     General: Skin is warm and  dry.   Neurological:      Mental Status: He is alert and oriented to person, place, and time.       Significant Labs: BMP:   Recent Labs   Lab 03/16/23  1111 03/16/23  2157 03/17/23  0313    147* 113*    139 139   K 4.4 4.1 4.5    106 107   CO2 24 22* 21*   BUN 15 17 17   CREATININE 0.8 0.8 0.9   CALCIUM 9.8 9.2 9.2   MG  --  1.7 2.2   , CBC   Recent Labs   Lab 03/16/23  1111 03/16/23 2157 03/17/23  0313   WBC 7.53 9.79 9.72   HGB 13.3* 12.8* 13.0*   HCT 39.8* 37.6* 39.0*   * 116* 138*   , and All pertinent lab results from the last 24 hours have been reviewed.      Assessment and Plan:     Patient is a 57 y.o. male presenting with:    Cardiac/Vascular  * S/P TAVR (transcatheter aortic valve replacement)  Successful transfemoral aortic valve replacement with a 29 mm EvolutFX valve.   A transthoracic echo was performed immediately post procedure which showed trace paravalvular leak. An aortic valve mean gradient of 5 mmHg and a maximum velocity through the aortic valve of 1.1 m/s.     Discharge Plans:  1. Follow up with [facility] in 1 month and 1 year with labs and Echo.  2. ASA indefinitely.   3. No non sterile procedures which could cause endocarditis for 6 months post TAVR including dental work, endoscopy, colonoscopy, and  procedures.   4. SBE prophylaxis for life.    Chronic diastolic congestive heart failure  Chronic. Controlled. Follow up with Cardiology/PCP.    Severe aortic stenosis  See above    Complete heart block  TVP in place. Plan for PPM this afternoon with EP.         VTE Risk Mitigation (From admission, onward)    None          Lalita Ballesteros PA-C  Interventional Cardiology  [facility]

## 2023-03-17 NOTE — SUBJECTIVE & OBJECTIVE
Interval History:   Remains in CHB with wide ventricular escape. V paced at 60 bpm overnight. EF today normal. Plant to proceed with dc PPM    Review of Systems   Constitutional: Positive for malaise/fatigue.   Objective:     Vital Signs (Most Recent):  Temp: 98 °F (36.7 °C) (03/17/23 1100)  Pulse: (!) 52 (03/17/23 1200)  Resp: (!) 22 (03/17/23 1200)  BP: (!) 146/66 (03/17/23 1200)  SpO2: 96 % (03/17/23 1200)   Vital Signs (24h Range):  Temp:  [97.1 °F (36.2 °C)-98.1 °F (36.7 °C)] 98 °F (36.7 °C)  Pulse:  [42-79] 52  Resp:  [11-39] 22  SpO2:  [90 %-100 %] 96 %  BP: ()/(50-76) 146/66  Arterial Line BP: ()/(40-60) 163/55     Weight: (!) 144.7 kg (319 lb)  Body mass index is 43.26 kg/m².     SpO2: 96 %       Physical Exam  Constitutional:       General: He is not in acute distress.     Appearance: Normal appearance. He is not ill-appearing.   HENT:      Head: Normocephalic and atraumatic.      Nose: No congestion.      Mouth/Throat:      Mouth: Mucous membranes are moist.   Eyes:      Conjunctiva/sclera: Conjunctivae normal.   Neck:      Comments: TVP in RIJ  Cardiovascular:      Rate and Rhythm: Normal rate and regular rhythm.      Pulses: Normal pulses.      Comments: V paced  Pulmonary:      Effort: Pulmonary effort is normal. No respiratory distress.   Abdominal:      General: Abdomen is flat. There is no distension.   Musculoskeletal:      Cervical back: Normal range of motion.   Skin:     Capillary Refill: Capillary refill takes less than 2 seconds.      Findings: No rash.   Neurological:      Mental Status: He is alert and oriented to person, place, and time.       Significant Labs: All pertinent lab results from the last 24 hours have been reviewed.

## 2023-03-17 NOTE — BRIEF OP NOTE
Attending: Royce Liao MD  Date of Procedure: 03/17/2023    Post-operative Diagnosis: CHB    Procedure Performed: Dual chamber permanent pacemaker implantation    Description of Procedure: The patient was brought to the EP lab in the fasting state. Prepped and draped in sterile fashion. Safety timeout was performed. Sedation administered by anesthesia staff. Selective venogram of the left axillary and cephalic veins performed via left ac IV. Lidocaine used for local anesthetic. Fluoroscopic guided axillary access utilized. Guide/J Wire advanced and confirmed in IVC. Incision made. Blunt and electrocautery dissection performed. Pocket made. Second fluoroscopic guided axillary access obtained. Guide/J wire advanced and confirmed in IVC. Peel away sheath advanced over J wire. RV lead advanced into RV. R waves and injury pattern adequate. Lead fixed into place and sutured in place. Second peel away sheath advanced over J wire. RA lead advanced into RA via peel away sheath. After adequate p waves and current of injury detected, the RA lead was fixed into place in the RA appendage. Peel away sheath removed and RA lead sutured into place. Pocket washed using antibiotic solution. Leads connected to generator. Generator placed into pocket, sutured in place, and washed with antibiotic solution. Deep layer closed with interrupted 3-0 suture. Intermediate layer closed with running 3-0 suture. Superficial layer closed with running 4-0 suture. Skin closed with Dermabond. Meplex dressing to be placed after dermabond has dried.     EBL: <10 mL    Specimens: none  Complications: no immediate    Post-CIED implantation instructions:  Cefazolin x 1 tonight  Doxycycline 100 mg BID x 5 days starting tomorrow morning  Avoid all heparin products (e.g., DOACs, enoxaparin, heparin) for 5 days following implant. If the patient is taking coumadin, this can be continued uninterrupted  CXR & ECG (ordered)  Aquacel dressing (brown patch  attached to patient's skin) to remain in place for 1 week. This is be removed by the device clinic in 1 week. Pressure dressing (white tape over chest/back) to be removed by EP tomorrow morning (if hospitalized) or by patient (if discharged home same day). Patient can shower after pressure dressing is removed  Sling to arm for first 48 hours continuously, then only nightly for 6 weeks  No lifting elbow above shoulder height on arm ipsilateral to implant device for 6 weeks  No lifting over 5 lbs. for 2 weeks with arm ipsilateral to implanted device  No driving for 1 week if patient uses arm contralateral to implantation and 4 weeks if patient uses arm ipsilateral to implantation  Patient can shower the day after procedure (POD #1) -- see #4 for details. Do not submerge (e.g., bathing or swimming) the surgical site for at least 1 month or when the wound has fully healed.  Patient will be scheduled for device clinic follow up in 1 week to assess surgical site and device interrogation  Please contact EP for any questions or concerns at [phone] or page EP fellow on call  Patient is to seek immediate medical attention for acute onset of chest pain, shortness of breath, syncope, or evidence of surgical site infection or hematoma.    Transfer back to IC service      The attending physician was present for entire duration of the procedure    Phillip Walker MD, PGY7  Electrophysiology

## 2023-03-17 NOTE — ANESTHESIA PREPROCEDURE EVALUATION
[facility]  Anesthesia Pre-Operative Evaluation   03/16/2023        Dragan Man II, 1965  [id number]  Procedure(s) (LRB):  INSERTION, CARDIAC PACEMAKER, DUAL CHAMBER (N/A)    Subjective    Dragan Man II is a 57 y.o. male w/ a significant PMHx of  HTN, LAVERNE, PVD, severe aortic stenosis s/p TAVR.    During the procedure, it was reported to us that the patient went into complete heart block for 2-3 minutes. At baseline his EKG shows sinus rhythm with IVCD. Post procedure, patient's EKG showed normal sinus rhythm with complete heart block and wide ventricular escape. Patient now presents for above procedure(s).     Prev Airway: None documented    LDA:    Introducer Single Lumen 03/16/23 1600 Subclavian Right (Active)   Line Necessity Review Other (comment) 03/16/23 1615   Site Assessment No drainage;No redness;No swelling;No warmth 03/16/23 1615   Line Securement Device Secured with sutures 03/16/23 1615   Dressing Type Biopatch in place;Central line dressing 03/16/23 1815   Dressing Status Clean;Dry;Intact 03/16/23 1815   Dressing Intervention Integrity maintained 03/16/23 1815   Distal Patency/Care flushed w/o difficulty;infusing;blood return present 03/16/23 1815   Number of days: 0            Peripheral IV - Single Lumen 03/16/23 1140 20 G Anterior;Left Forearm (Active)   Site Assessment Clean;Dry;Intact 03/16/23 1815   Extremity Assessment Distal to IV No abnormal discoloration;No redness;No swelling;No warmth 03/16/23 1815   Line Status Saline locked 03/16/23 1815   Dressing Status Clean;Dry;Intact 03/16/23 1815   Dressing Intervention Integrity maintained 03/16/23 1815   Number of days: 0       Arterial Line 03/16/23 1444 Right Radial (Active)   Site Assessment Clean;Dry;Intact 03/16/23 1815   Line Status Pulsatile blood flow 03/16/23 1815   Art Line Waveform Dampened 03/16/23 1815   Arterial Line Interventions Zeroed and calibrated;Leveled;Flushed per protocol 03/16/23 1815    Color/Movement/Sensation Capillary refill less than 3 sec 03/16/23 1815   Dressing Type Transparent (Tegaderm) 03/16/23 1815   Dressing Status Clean;Dry;Intact 03/16/23 1815   Dressing Intervention Integrity maintained 03/16/23 1815   Number of days: 0       Drips: None documented.      Patient Active Problem List   Diagnosis    Obstructive sleep apnea treated with continuous positive airway pressure (CPAP)    Advanced sleep phase syndrome    Behaviorally induced insufficient sleep syndrome    Sleep-related bruxism    Inadequate sleep hygiene    Morbid obesity with BMI of 45.0-49.9, adult    Primary hypertension    Actinic keratoses    Heart murmur    ROLLINS (dyspnea on exertion)    Seasonal allergic rhinitis due to pollen    On statin therapy due to risk of future cardiovascular event    Left atrial enlargement    Nonrheumatic aortic valve stenosis, severe    Ulnar neuropathy of both upper extremities    Generalized edema    Chronic diastolic congestive heart failure    PVD (peripheral vascular disease)    Prediabetes    Mild anemia    Chronic bilateral low back pain without sciatica    Bilateral carpal tunnel syndrome    Right-sided low back pain without sciatica    Right foot pain    Lower extremity edema    At risk for cardiovascular event    Von Willebrand disease    Pulmonary hypertension    Class 3 severe obesity due to excess calories with serious comorbidity and body mass index (BMI) of 45.0 to 49.9 in adult    Aortic atherosclerosis       Review of patient's allergies indicates:   Allergen Reactions    Olmesartan Other (See Comments)     Numbness and tingling in fingers and knee joint pain    Chlorthalidone Other (See Comments)     Patient states it made him depressed.        Current Inpatient Medications:    [START ON 3/17/2023] aspirin  81 mg Oral Daily    atorvastatin  20 mg Oral QHS    furosemide  20 mg Oral Every Mon, Tues, Wed, Thurs, Fri    [START ON 3/17/2023]  losartan  50 mg Oral Daily       No current facility-administered medications on file prior to encounter.     Current Outpatient Medications on File Prior to Encounter   Medication Sig Dispense Refill    albuterol (PROVENTIL/VENTOLIN HFA) 90 mcg/actuation inhaler INHALE 2 PUFFS BY MOUTH INTO THE LUNGS EVERY 4 HOURS AS NEEDED FOR WHEEZING. RESCUE 6.7 g 1    aspirin (ECOTRIN) 81 MG EC tablet Take 81 mg by mouth once daily.      atorvastatin (LIPITOR) 20 MG tablet Take 1 tablet (20 mg total) by mouth every evening. 90 tablet 3    doxycycline (MONODOX) 100 MG capsule TAKE ONCE DAILY WITH FOOD. MAY CAUSE UPSET STOMACH. 90 capsule 1    furosemide (LASIX) 20 MG tablet TAKE 1 TABLET (20 MG TOTAL) BY MOUTH EVERY MON, TUES, WED, THURS, FRI. 60 tablet 3    losartan (COZAAR) 50 MG tablet Take 1 tablet (50 mg total) by mouth once daily. 90 tablet 3    metoprolol succinate (TOPROL-XL) 50 MG 24 hr tablet TAKE 1 TABLET BY MOUTH EVERY DAY 90 tablet 3    mometasone (NASONEX) 50 mcg/actuation nasal spray INSTILL 2 SPRAYS INTO EACH NOSTRIL ONCE DAILY. 1 each 1    tretinoin (RETIN-A) 0.05 % cream Apply pea-sized amount to entire face at bedtime.  If dryness, use every third night and increase as tolerated to every night. 20 g 6    semaglutide (OZEMPIC) 2 mg/dose (8 mg/3 mL) PnIj Inject 2 mg into the skin every 7 days. 1 pen 2    sodium chloride (OCEAN) 0.65 % nasal spray 1 spray by Nasal route as needed for Congestion.         Past Surgical History:   Procedure Laterality Date    CATHETERIZATION OF BOTH LEFT AND RIGHT HEART N/A 6/28/2022    Procedure: CATHETERIZATION, HEART, BOTH LEFT AND RIGHT;  Surgeon: Carlotta Gordon MD;  Location: [facility] CATH LAB;  Service: Cardiology;  Laterality: N/A;    None         Social History:  Tobacco Use: Medium Risk    Smoking Tobacco Use: Never    Smokeless Tobacco Use: Former    Passive Exposure: Not on file       Alcohol Use: Not on file       Objective    Vital Signs Range:  BMI  Readings from Last 1 Encounters:   03/16/23 43.40 kg/m²       Temp:  [36.3 °C (97.4 °F)-37.3 °C (99.1 °F)]   Pulse:  [42-68]   Resp:  [12-39]   BP: ()/(50-70)   SpO2:  [90 %-99 %]   Arterial Line BP: ()/(40-55)        Significant Labs:        Component Value Date/Time    WBC 7.53 03/16/2023 1111    HGB 13.3 (L) 03/16/2023 1111    HCT 39.8 (L) 03/16/2023 1111     (L) 03/16/2023 1111     03/16/2023 1111    K 4.4 03/16/2023 1111     03/16/2023 1111    CO2 24 03/16/2023 1111     03/16/2023 1111    BUN 15 03/16/2023 1111    CREATININE 0.8 03/16/2023 1111    PHOS 3.0 11/12/2021 1211    CALCIUM 9.8 03/16/2023 1111    ALBUMIN 4.0 02/09/2023 1017    PROT 6.9 12/14/2022 0919    ALKPHOS 86 12/14/2022 0919    BILITOT 1.0 12/14/2022 0919    AST 19 12/14/2022 0919    ALT 27 12/14/2022 0919    INR 1.0 03/16/2023 1111    HGBA1C 5.4 12/14/2022 0919        Please see Results Review for additional labs.     Diagnostic Studies: All relevant studies, reviewed.      EKG:   Results for orders placed or performed during the hospital encounter of 03/16/23   EKG 12-lead    Collection Time: 03/16/23 11:18 AM    Narrative    Test Reason : I35.0,I50.32,    Vent. Rate : 069 BPM     Atrial Rate : 069 BPM     P-R Int : 194 ms          QRS Dur : 112 ms      QT Int : 378 ms       P-R-T Axes : 020 -14 076 degrees     QTc Int : 405 ms    Normal sinus rhythm  LVH with repolarization abnormality ( R in aVL , Alvin product )  Low septal forces - Cannot rule out Septal infarct ,age undetermined but  doubt  Abnormal ECG  When compared with ECG of 23-MICHAEL-2023 08:42,  No change  Confirmed by LAVIE MD, Igor ([id number]) on 3/16/2023 2:22:37 PM    Referred By: NNEKA GUTIERREZ           Confirmed By:Igor VALENCIA MD       ECHO:  Results for orders placed during the hospital encounter of 01/23/23    Echo    Interpretation Summary  · Concentric remodeling and normal systolic function.  · Mild mitral regurgitation.  · Mild aortic  regurgitation.  · The estimated ejection fraction is 60%.  · Normal left ventricular diastolic function.  · Normal right ventricular size with normal right ventricular systolic function.  · There is severe aortic valve stenosis.  · Aortic valve area is 0.87 cm2; peak velocity is 4.30 m/s; mean gradient is 47 mmHg.  · Mild tricuspid regurgitation.  · Normal central venous pressure (3 mmHg).  · The estimated PA systolic pressure is 16 mmHg.        Pre-op Assessment    I have reviewed the Patient Summary Reports.     I have reviewed the Nursing Notes. I have reviewed the NPO Status.   I have reviewed the Medications.     Review of Systems  Anesthesia Hx:  No problems with previous Anesthesia   Denies Personal Hx of Anesthesia complications.   Hematology/Oncology:  Hematology Normal   Oncology Normal     EENT/Dental:EENT/Dental Normal   Cardiovascular:   Hypertension CHF ROLLINS  Valvular Heart Disease: Aortic Stenosis (AS)    Hypertension    Pulmonary:   Shortness of breath Sleep Apnea    Renal/:  Renal/ Normal     Hepatic/GI:  Hepatic/GI Normal    Musculoskeletal:  Musculoskeletal Normal    Neurological:  Neurology Normal    Endocrine:  Endocrine Normal    Dermatological:  Skin Normal    Psych:  Psychiatric Normal           Physical Exam  General: Well nourished, Cooperative and Alert    Airway:  Mallampati: II   Mouth Opening: Normal  TM Distance: Normal  Tongue: Normal  Neck ROM: Normal ROM    Dental:  Intact        Anesthesia Plan  Type of Anesthesia, risks & benefits discussed:    Anesthesia Type: Gen ETT  Intra-op Monitoring Plan: Standard ASA Monitors  Post Op Pain Control Plan: multimodal analgesia and IV/PO Opioids PRN  Induction:  IV  Airway Plan: Direct, Post-Induction  Informed Consent: Informed consent signed with the Patient and all parties understand the risks and agree with anesthesia plan.  All questions answered.   ASA Score: 3  Day of Surgery Review of History & Physical: H&P Update referred to the  surgeon/provider.    Ready For Surgery From Anesthesia Perspective.     .

## 2023-03-17 NOTE — OP NOTE
[facility]  Cardiothoracic Surgery Operative Report    Patient Name:  Dragan Man II; [id number]    DATE OF PROCEDURE: 03/17/2023   ATTENDING SURGEONS: Mitchell Underwood M.D., Adan Prather M.D., and Mitchell Kingston M.D.  PREOPERATIVE DIAGNOSIS:  Severe aortic stenosis.  POSTOPERATIVE DIAGNOSIS:  Severe aortic stenosis  ?  OPERATION PERFORMED: Transcatheter aortic valve insertion via transfemoral approach using a 29mm [employer]tronic Evolut bioprosthesis  ANESTHESIA: General.  ESTIMATED BLOOD LOSS: 10 mL.  BRIEF HISTORY: This patient is a 57 year old male with symptomatic severe aortic stenosis.  The patient has had progressive dyspnea on exertion. This prompted a thoughtful and thorough evaluation, which demonstrated severe aortic stenosis. Given the comorbid conditions, a transcatheter valve insertion was recommended. The patient now presents for transcatheter aortic valve insertion.  PROCEDURE: After obtaining informed and written consent, the patient was brought to the cath lab and placed on the cath table in supine position. After induction of adequate anesthesia, a transvenous pacing wire was placed.  Bilateral femoral arterial and femoral venous access was obtained. A wire was advanced across the aortic valve. It was exchanged for stiff wire, and an aortic balloon was placed. Under rapid ventricular pacing, balloon valvuloplasty was performed. The balloon was then withdrawn, and a valve was advanced to the level of the aortic annulus. Once the team was satisfied that the valve was in proper position, it was deployed. Excellent positioning was obtained. Post-procedure echo demonstrated no aortic insufficiency. The femoral access sites were controlled using percutaneous techniques. The patient tolerated the procedure well, there were no complications. At the conclusion of the case, sponge and instrument counts were correct.

## 2023-03-17 NOTE — PLAN OF CARE
[facility]  Initial Discharge Assessment       Primary Care Provider: GIL Barba MD    Admission Diagnosis: Severe aortic stenosis [I35.0]  Chronic diastolic congestive heart failure [I50.32]  Aortic stenosis [I35.0]    Admission Date: 3/16/2023  Expected Discharge Date: 3/27/2023    Discharge Barriers Identified: None    Payor: BLUE CROSS BLUE SHIELD / Plan: BCBS ALL OUT OF STATE / Product Type: PPO /     Extended Emergency Contact Information  Primary Emergency Contact: Jean Man  Address: [address] [place] [address]  Home Phone: [phone]  Mobile Phone: [phone]  Relation: Spouse    Discharge Plan A: Home with family  Discharge Plan B: Home      [facility] [place] [address]  Phone: [phone] Fax: [phone]    [facility] [address] [address]  Phone: [phone] Fax: [phone]    [facility] [address] [place] [facility] [place], [place] - [address] [facility]  [address] [facility] [address]  Phone: [phone] Fax: [phone]      Initial Assessment (most recent)       Adult Discharge Assessment - 03/17/23 1013          Discharge Assessment    Assessment Type Discharge Planning Assessment     Confirmed/corrected address, phone number and insurance Yes     Confirmed Demographics Correct on Facesheet     Source of Information family     Communicated PHILLIP with patient/caregiver Date not available/Unable to determine     Reason For Admission S/P TAVR (transcatheter aortic valve replacement)     People in Home spouse     Do you expect to return to your current living situation? Yes     Do you have help at home or someone to help you manage your care at home? Yes     Who are your caregiver(s) and their phone number(s)? Jean Man  [phone]      Equipment Currently Used at Home CPAP     Patient currently being followed by outpatient case management? No     Do you currently have service(s) that help you manage your care at home? No     Do you take prescription medications? Yes     Do you have prescription coverage? Yes     Coverage BLUE CROSS BLUE SHIELD - BCBS ALL OUT OF STATE     Is the patient taking medications as prescribed? yes     Who is going to help you get home at discharge? Millie Man     Are you on dialysis? No     Do you take coumadin? No     Discharge Plan A Home with family     Discharge Plan B Home     DME Needed Upon Discharge  other (see comments)   TBD    Discharge Plan discussed with: Spouse/sig other     Name(s) and Number(s) Millie Man [phone]     Discharge Barriers Identified None                   This SW met with patient and his wife ( Millie Man)  at bedside to complete DPA. Questions answered / contact numbers provided.  Use PREFERRED PHARMACY / BEDSIDE DELIVERY for any necessary medications at time of discharge. The patient is independent with all ADLs - does use a CPAP, is not on HD or BTs.  The patient's wife will be assisting with help upon discharge. The patient's wife  will be providing transportation home. Hospital follow up will be scheduled with PCP. Will continue to follow for course of hospitalization.     Tyler George LMSW  Case Management [facility]  Ext: [phone]

## 2023-03-17 NOTE — TRANSFER OF CARE
Anesthesia Transfer of Care Note    Patient: Dragan Man II    Procedure(s) Performed: Procedure(s) (LRB):  INSERTION, CARDIAC PACEMAKER, DUAL CHAMBER (N/A)    Patient location: ICU    Anesthesia Type: general    Transport from OR: Transported from OR on 6-10 L/min O2 by face mask with adequate spontaneous ventilation. Continuous ECG monitoring in transport. Continuous SpO2 monitoring in transport. Continuos invasive BP monitoring in transport    Post pain: adequate analgesia    Post assessment: no apparent anesthetic complications and tolerated procedure well    Post vital signs: stable    Level of consciousness: awake, alert and oriented    Nausea/Vomiting: no nausea/vomiting    Complications: none    Transfer of care protocol was followed      Last vitals:   Visit Vitals  BP (!) 146/66 (BP Location: Left arm, Patient Position: Lying)   Pulse (!) 53   Temp 36.7 °C (98 °F) (Oral)   Resp (!) 22   Ht 6' (1.829 m)   Wt (!) 144.7 kg (319 lb)   SpO2 97%   BMI 43.26 kg/m²

## 2023-03-17 NOTE — NURSING
Pt admitted to SICU [address]. Pt 100% dependently transvenously V. Paced @ 50. MAPs reading >65. SpO2 reading >95% on RA. Bilateral groin incision sites marked with scant drainage, no pain reported by pt. POC reviewed with pt and all questions and concerns addressed. Refer to flowsheet for VS and further assessment.

## 2023-03-17 NOTE — HPI
Referring Physician: Dr Aldair STONE  Dragan Man II is a 57 y.o. male who presents for evaluation of aortic stenosis.      Dragan Man II is a 57 y.o. male referred by Dr Shaikh for evaluation of severe AS (NYHA Class III sx).     The patient has undergone the following TAVR work-up:   ECHO (Date 1/23/23): CRISTINO= 0.87 cm2, MG= 47 mmHg, Peak Cristian= 4.30 m/s, EF= 60%.   LHC (Date 6/28/22; Heidi): normal coronaries   STS: 2%   Frailty: 0/4   Iliacs are >10 on R and > 8.7 on L   LVOT area by CTA is 4.21 cm2 (26 mm X 21 mm) and Avg Diameter is 23.2 per Dr Parther  Incidental findings on CT: 4 mm pulmonary nodule. Sent to PCP.   CT Surgery risk assessment: Moderate risk, per Dr Johnson due to vWV disease, morbid obesity  Rhythm issues: incomplete RBBB,   PFTs: FEV1 64.9% predicted, DLCO 84.5% predicted.  KCCQ/5 meter walk: Done  Comorbidities: obesity, LAVERNE on CPAP, Von Willebrand (told has a kid, hasn't seen hematology)        Dragan Man II is a 29 mm  EvolutFX valve candidate via RTF access.

## 2023-03-17 NOTE — NURSING
Notified Dr. Esqueda about pt sat on side of bed to use urinal and got SOB, light headed, and grey in face.  Placed  2L NC on pt for comfort and got pt back in bed.  Pt stated feeling better but did feel weird at that time.

## 2023-03-17 NOTE — NURSING
Pt remains V paced @ 60bpm. Monitor reading HR of 49bpm. 12-lead EKG obtained confirming a V paced HR of 51bpm. MD made aware.

## 2023-03-17 NOTE — PROGRESS NOTES
EKG changes noted on bedside monitor. VSS, pt states he is feeling well. vIeth SHEN present at bedside, notified, and reviewed post proc. EKG.  No new orders received at this time.

## 2023-03-17 NOTE — ASSESSMENT & PLAN NOTE
Successful transfemoral aortic valve replacement with a 29 mm EvolutFX valve.   A transthoracic echo was performed immediately post procedure which showed trace paravalvular leak. An aortic valve mean gradient of 5 mmHg and a maximum velocity through the aortic valve of 1.1 m/s.     Discharge Plans:  1. Follow up with [facility] in 1 month and 1 year with labs and Echo.  2. ASA indefinitely.   3. No non sterile procedures which could cause endocarditis for 6 months post TAVR including dental work, endoscopy, colonoscopy, and  procedures.   4. SBE prophylaxis for life.

## 2023-03-17 NOTE — ASSESSMENT & PLAN NOTE
S/p TAVR with 29mm Evolut with complete heart block resultant following implantation. He had a baseline iLBBB with , post-TAVR widened IVCD with  with CHB.     Remains in CHB with wide ventricular escape. V paced at 60 bpm overnight. EF today normal. Plan to proceed with dc PPM today   Avoid heparin products including DOACs    Results for orders placed during the hospital encounter of 03/16/23    Echo    Interpretation Summary  · Limited study to evaluate LVEF.  · The left ventricle is normal in size with normal systolic function. The estimated ejection fraction is 65%.  · There is a 29 mm EvolutFX transcutaneously-placed aortic bioprosthesis present. Trace aortic regurgitation.  · Normal right ventricular size with normal right ventricular systolic function.  · Normal central venous pressure (3 mmHg).

## 2023-03-17 NOTE — PROGRESS NOTES
Order noted for stat echo. Dr. Calderon notified and said he was currently in an urgent situation but would see pt as soon as he was able to.

## 2023-03-17 NOTE — NURSING
Nurses Note -- 4 Eyes      3/16/2023   8:22 PM      Skin assessed during: Admit      [x] No Pressure Injuries Present    []Prevention Measures Documented      [] Yes- Altered Skin Integrity Present or Discovered   [] LDA Added if Not in Epic (Describe Wound)   [] New Altered Skin Integrity was Present on Admit and Documented in LDA   [] Wound Image Taken    Wound Care Consulted? No    Attending Nurse:  Fredy Gasca RN     Second RN/Staff Member:  Iwona Jones RN

## 2023-03-17 NOTE — HOSPITAL COURSE
Dragan aMn II was admitted and underwent successful placement of a 29 mm EvolutFX TAVR via TF access under MAC sedation on 3/16/23. Please see full cath report for details. A transthoracic echo was performed immediately post procedure which showed trace paravalvular leak. An aortic valve mean gradient of 5 mmHg and a maximum velocity through the aortic valve of 1.1 m/s. Complete heart block post TAVR. A TVP was left in place and will watch overnight. EP was consulted.

## 2023-03-17 NOTE — PROGRESS NOTES
Dr. Bernardo came to bedside and noted pt to be in complete heart block.  MD adjusted TVP settings: VVI, capturing, V mA increased to 20, rate set at 50, sensitivity 0.8. Pt being 100% paced after changes.  Pt remains AAOx4. VSS. No apparent distress noted.

## 2023-03-17 NOTE — SUBJECTIVE & OBJECTIVE
Interval History: Patient remains in CHB this AM. TVP in place and being monitored by EP. Plan for PPM implantation this afternoon. Please keep NPO. Patient is without complaints.     Objective:     Vital Signs (Most Recent):  Temp: 98 °F (36.7 °C) (03/17/23 0700)  Pulse: (!) 50 (03/17/23 0900)  Resp: (!) 25 (03/17/23 0900)  BP: 127/60 (03/17/23 0900)  SpO2: 98 % (03/17/23 0900)   Vital Signs (24h Range):  Temp:  [97.1 °F (36.2 °C)-99.1 °F (37.3 °C)] 98 °F (36.7 °C)  Pulse:  [42-79] 50  Resp:  [11-39] 25  SpO2:  [90 %-99 %] 98 %  BP: ()/(50-76) 127/60  Arterial Line BP: ()/(40-60) 147/52     Weight: (!) 145 kg (319 lb 10.7 oz)  Body mass index is 43.35 kg/m².    SpO2: 98 %         Intake/Output Summary (Last 24 hours) at 3/17/2023 0952  Last data filed at 3/17/2023 0900  Gross per 24 hour   Intake 482.67 ml   Output 1205 ml   Net -722.33 ml       Lines/Drains/Airways       Central Venous Catheter Line  Duration              Introducer Single Lumen 03/16/23 1600 Subclavian Right <1 day              Arterial Line  Duration             Arterial Line 03/16/23 1444 Right Radial <1 day              Peripheral Intravenous Line  Duration                  Peripheral IV - Single Lumen Right Antecubital -- days         Peripheral IV - Single Lumen 03/16/23 1140 20 G Anterior;Left Forearm <1 day                    Physical Exam  Vitals reviewed.   Constitutional:       General: He is not in acute distress.     Appearance: He is well-developed. He is not diaphoretic.   HENT:      Head: Normocephalic and atraumatic.   Neck:      Vascular: No JVD.   Cardiovascular:      Rate and Rhythm: Normal rate and regular rhythm.      Pulses: Intact distal pulses.      Heart sounds: No murmur heard.  Pulmonary:      Effort: Pulmonary effort is normal. No respiratory distress.   Musculoskeletal:      Cervical back: Normal range of motion.      Right lower leg: No edema.      Left lower leg: No edema.   Skin:     General: Skin is  warm and dry.   Neurological:      Mental Status: He is alert and oriented to person, place, and time.       Significant Labs: BMP:   Recent Labs   Lab 03/16/23  1111 03/16/23 2157 03/17/23  0313    147* 113*    139 139   K 4.4 4.1 4.5    106 107   CO2 24 22* 21*   BUN 15 17 17   CREATININE 0.8 0.8 0.9   CALCIUM 9.8 9.2 9.2   MG  --  1.7 2.2   , CBC   Recent Labs   Lab 03/16/23  1111 03/16/23 2157 03/17/23 0313   WBC 7.53 9.79 9.72   HGB 13.3* 12.8* 13.0*   HCT 39.8* 37.6* 39.0*   * 116* 138*   , and All pertinent lab results from the last 24 hours have been reviewed.

## 2023-03-18 VITALS
HEART RATE: 93 BPM | WEIGHT: 315 LBS | DIASTOLIC BLOOD PRESSURE: 72 MMHG | BODY MASS INDEX: 42.66 KG/M2 | SYSTOLIC BLOOD PRESSURE: 147 MMHG | TEMPERATURE: 98 F | HEIGHT: 72 IN | OXYGEN SATURATION: 94 % | RESPIRATION RATE: 29 BRPM

## 2023-03-18 PROCEDURE — 94761 N-INVAS EAR/PLS OXIMETRY MLT: CPT

## 2023-03-18 PROCEDURE — 99900035 HC TECH TIME PER 15 MIN (STAT)

## 2023-03-18 NOTE — PLAN OF CARE
Patient cleared for discharge from case management standpoint.       03/18/23 1116   Final Note   Assessment Type Final Discharge Note   Anticipated Discharge Disposition Home   What phone number can be called within the next 1-3 days to see how you are doing after discharge? [phone]   Hospital Resources/Appts/Education Provided Appointments scheduled and added to AVS;Community resources provided;Provided patient/caregiver with written discharge plan information

## 2023-03-18 NOTE — ASSESSMENT & PLAN NOTE
S/p TAVR with 29mm Evolut with complete heart block resultant following implantation. He had a baseline iLBBB with , post-TAVR widened IVCD with  with CHB.     S/p dc PPM on 3/17 with Dr. Liao, no complications, tolerated well   Okay to DC home   Complete 5 days of doxycyline 100mg BID post device  No heparin products or DOACs for 5 days post device  Post device instructions reviewed with patient and put in AVS discharge summary   Follow up device clinic 1 week, follow up in EP clinic with Dr. Liao

## 2023-03-18 NOTE — DISCHARGE SUMMARY
Discharge Summary  Interventional Cardiology      Admit Date: 3/16/2023    Discharge Date:  3/18/2023    Attending Physician: Adan Greene MD    Discharge Physician: Adan Greene MD    Principal Diagnoses: S/P TAVR (transcatheter aortic valve replacement)  Indication for Admission: INSERTION, CARDIAC PACEMAKER, DUAL CHAMBER (N/A)    Discharged Condition: Good    Hospital Course:   Patient presented for outpatient TAVR which went without complication. Patient underwent successful implantation of 29 mm Evolut FX TAVR. See full cath report in Epic for details. Hemostasis of patient's  R & L CFA  access site was achieved with Perclosure. Patient was monitored per post-cath protocol, and his  R & L groin  access site was c/d/i with no hematoma. Patient was able to ambulate without difficulty. He was feeling well and anticipating discharge home today.     Outpatient Plan:  - There were no medication changes    Diet: Cardiac diet    Activity: Ad salbador, wound care instructions provided    Disposition: Home or Self Care    Follow Up: as scheduled      Discharge Medications:      Medication List        CONTINUE taking these medications      albuterol 90 mcg/actuation inhaler  Commonly known as: PROVENTIL/VENTOLIN HFA  INHALE 2 PUFFS BY MOUTH INTO THE LUNGS EVERY 4 HOURS AS NEEDED FOR WHEEZING. RESCUE     ARNUITY ELLIPTA 100 mcg/actuation inhaler  Generic drug: fluticasone furoate  Inhale 1 puff into the lungs once daily.     aspirin 81 MG EC tablet  Commonly known as: ECOTRIN     atorvastatin 20 MG tablet  Commonly known as: LIPITOR  Take 1 tablet (20 mg total) by mouth every evening.     doxycycline 100 MG capsule  Commonly known as: MONODOX  TAKE ONCE DAILY WITH FOOD. MAY CAUSE UPSET STOMACH.     furosemide 20 MG tablet  Commonly known as: LASIX  TAKE 1 TABLET (20 MG TOTAL) BY MOUTH EVERY MON, TUES, WED, THURS, FRI.     losartan 50 MG tablet  Commonly known as: COZAAR  Take 1 tablet (50 mg total) by mouth once  daily.     metoprolol succinate 50 MG 24 hr tablet  Commonly known as: TOPROL-XL  TAKE 1 TABLET BY MOUTH EVERY DAY     mometasone 50 mcg/actuation nasal spray  Commonly known as: NASONEX  INSTILL 2 SPRAYS INTO EACH NOSTRIL ONCE DAILY.     OZEMPIC 2 mg/dose (8 mg/3 mL) Pnij  Generic drug: semaglutide  Inject 2 mg into the skin every 7 days.     sodium chloride 0.65 % nasal spray  Commonly known as: OCEAN     tretinoin 0.05 % cream  Commonly known as: RETIN-A  Apply pea-sized amount to entire face at bedtime.  If dryness, use every third night and increase as tolerated to every night.          Emmanuel Esqueda  Interventional Cardiology Fellow PGY-7  03/18/2023

## 2023-03-18 NOTE — PROGRESS NOTES
[facility]  Cardiac Electrophysiology  Progress Note    Admission Date: 3/16/2023  Code Status: Full Code   Attending Physician: Adan Greene MD   Expected Discharge Date: 3/18/2023  Principal Problem:S/P TAVR (transcatheter aortic valve replacement)    Subjective:     Interval History:   S/p DC PPM yesterday. Pocket site with no hematoma, occasional a pacing noted this AM. Did well overnight    Review of Systems   Constitutional: Positive for malaise/fatigue.   Objective:     Vital Signs (Most Recent):  Temp: 98.2 °F (36.8 °C) (03/18/23 1100)  Pulse: 93 (03/18/23 1100)  Resp: (!) 23 (03/18/23 1100)  BP: (!) 143/65 (03/18/23 1100)  SpO2: 95 % (03/18/23 1100)   Vital Signs (24h Range):  Temp:  [97.5 °F (36.4 °C)-98.5 °F (36.9 °C)] 98.2 °F (36.8 °C)  Pulse:  [65-94] 93  Resp:  [8-28] 23  SpO2:  [93 %-100 %] 95 %  BP: (128-170)/(57-79) 143/65  Arterial Line BP: (132-169)/(48-62) 136/48     Weight: (!) 144.7 kg (319 lb)  Body mass index is 43.26 kg/m².     SpO2: 95 %       Physical Exam  Constitutional:       General: He is not in acute distress.     Appearance: Normal appearance. He is not ill-appearing.   HENT:      Head: Normocephalic and atraumatic.      Nose: No congestion.      Mouth/Throat:      Mouth: Mucous membranes are moist.   Eyes:      Conjunctiva/sclera: Conjunctivae normal.   Cardiovascular:      Rate and Rhythm: Normal rate and regular rhythm.      Pulses: Normal pulses.      Comments: Pocket with no hematoma, pressure dressing removed, aquacel in place  Pulmonary:      Effort: Pulmonary effort is normal. No respiratory distress.   Abdominal:      General: Abdomen is flat. There is no distension.   Musculoskeletal:      Cervical back: Normal range of motion.   Skin:     Capillary Refill: Capillary refill takes less than 2 seconds.      Findings: No rash.   Neurological:      Mental Status: He is alert and oriented to person, place, and time.       Significant Labs: All  pertinent lab results from the last 24 hours have been reviewed.        Assessment and Plan:     Complete heart block  S/p TAVR with 29mm Evolut with complete heart block resultant following implantation. He had a baseline iLBBB with , post-TAVR widened IVCD with  with CHB.     S/p dc PPM on 3/17 with Dr. Liao, no complications, tolerated well   Okay to DC home   Complete 5 days of doxycyline 100mg BID post device  No heparin products or DOACs for 5 days post device  Post device instructions reviewed with patient and put in AVS discharge summary   Follow up device clinic 1 week, follow up in EP clinic with Dr. Keshawn Valencia , MD  Cardiac Electrophysiology  [facility] - Surgical Intensive Care

## 2023-03-18 NOTE — SUBJECTIVE & OBJECTIVE
Interval History:   S/p DC PPM yesterday. Pocket site with no hematoma, occasional a pacing noted this AM. Did well overnight    Review of Systems   Constitutional: Positive for malaise/fatigue.   Objective:     Vital Signs (Most Recent):  Temp: 98.2 °F (36.8 °C) (03/18/23 1100)  Pulse: 93 (03/18/23 1100)  Resp: (!) 23 (03/18/23 1100)  BP: (!) 143/65 (03/18/23 1100)  SpO2: 95 % (03/18/23 1100)   Vital Signs (24h Range):  Temp:  [97.5 °F (36.4 °C)-98.5 °F (36.9 °C)] 98.2 °F (36.8 °C)  Pulse:  [65-94] 93  Resp:  [8-28] 23  SpO2:  [93 %-100 %] 95 %  BP: (128-170)/(57-79) 143/65  Arterial Line BP: (132-169)/(48-62) 136/48     Weight: (!) 144.7 kg (319 lb)  Body mass index is 43.26 kg/m².     SpO2: 95 %       Physical Exam  Constitutional:       General: He is not in acute distress.     Appearance: Normal appearance. He is not ill-appearing.   HENT:      Head: Normocephalic and atraumatic.      Nose: No congestion.      Mouth/Throat:      Mouth: Mucous membranes are moist.   Eyes:      Conjunctiva/sclera: Conjunctivae normal.   Cardiovascular:      Rate and Rhythm: Normal rate and regular rhythm.      Pulses: Normal pulses.      Comments: Pocket with no hematoma, pressure dressing removed, aquacel in place  Pulmonary:      Effort: Pulmonary effort is normal. No respiratory distress.   Abdominal:      General: Abdomen is flat. There is no distension.   Musculoskeletal:      Cervical back: Normal range of motion.   Skin:     Capillary Refill: Capillary refill takes less than 2 seconds.      Findings: No rash.   Neurological:      Mental Status: He is alert and oriented to person, place, and time.       Significant Labs: All pertinent lab results from the last 24 hours have been reviewed.

## 2023-03-18 NOTE — DISCHARGE INSTRUCTIONS
Medication instructions:  Complete your 5 days of antibiotics  If you are on a blood thinner (examples: apixiban [Eliquis], rivaroxaban [Xarelto], dabigatran [Pradaxa], or enoxaparin [Lovenox]), do not take your blood thinner for the next 5 days after your procedure. You can resume after 5 days post-procedure (i.e., when you complete your antibiotics). If you are on Coumadin you can continue to take it the day of your procedure  Pain control: you can take up to Tylenol 1000 mg every 6 hours as needed or (if you do not have kidney disease) ibuprofen 600 mg every 6 hours as needed as needed for pain control (if you do not have kidney disease). If unsure, please contact your primary care physician for recommendations.    Activity restrictions & precautions:  Wear you sling for the next 48 hours, then nightly for the next 6 weeks  Surgical site dressing (see images below)  **Note: these are general rules to follow unless instructed otherwise**  If you have a brown rectangular gel bandage (A.K.A. Aquacel Ag dressing) over your surgical incision do not remove it. This will be removed at your device clinic follow up appointment in 1 week.  If you have a square light brown bandage (A.K.A Mepilex dressing) you should remove in 48 hours after your procedure.  If you have a pressure dressing (white elastic tape with gauze underneath or a very large bandage that covers your left chest and is shaped like a triangle) over your surgical site, this should be removed the morning after your procedure unless instructed otherwise.  You can shower after 24 hours post-procedure, but do not let the jet directly hit your pocket site for at least 2 weeks  Do not submerge your surgical incision site under water (swimming, bathing if you submerge the actual surgical site) for at least 6 week  No lifting over 5-10 pounds using your arm (on the side of your device) for the 2 weeks after your procedure   Do not lift your arm (on the side of your  device) above shoulder height for 6 weeks  No driving for 1 week if you use the arm for driving that is on the opposite side as your device, and for 4 weeks if you use your arm for driving on the same side as your device  Follow up in device clinic in 1 week to check your incision and device function  Please contact the electrophysiology clinic if you have any questions or if you experience: potential surgical/pocket site complications (pain, swelling, bleeding, drainage), fevers, chest pain/shortness of breath, or for any other concerns.

## 2023-03-20 NOTE — ANESTHESIA POSTPROCEDURE EVALUATION
Anesthesia Post Evaluation    Patient: Dragan Man II    Procedure(s) Performed: Procedure(s) (LRB):  REPLACEMENT, AORTIC VALVE, TRANSCATHETER (TAVR) (N/A)  Cardiac Cath Cosurgeon (N/A)    Final Anesthesia Type: general      Patient location during evaluation: ICU  Patient participation: Yes- Able to Participate  Level of consciousness: awake and alert and oriented  Post-procedure vital signs: reviewed and stable  Pain management: adequate  Airway patency: patent    PONV status at discharge: No PONV  Anesthetic complications: no      Cardiovascular status: blood pressure returned to baseline and hemodynamically stable (Pt in heart blocked - paced by TVP. Will need PPM)  Respiratory status: unassisted and spontaneous ventilation  Hydration status: euvolemic  Follow-up not needed.          Vitals Value Taken Time   /72 03/18/23 1302   Temp 36.8 °C (98.2 °F) 03/18/23 1100   Pulse 92 03/18/23 1312   Resp 28 03/18/23 1310   SpO2 95 % 03/18/23 1312   Vitals shown include unvalidated device data.      No case tracking events are documented in the log.      Pain/Noah Score: No data recorded

## 2023-03-20 NOTE — ANESTHESIA POSTPROCEDURE EVALUATION
Anesthesia Post Evaluation    Patient: Dragan Man II    Procedure(s) Performed: Procedure(s) (LRB):  INSERTION, CARDIAC PACEMAKER, DUAL CHAMBER (N/A)    Final Anesthesia Type: general      Patient location during evaluation: PACU  Patient participation: Yes- Able to Participate  Level of consciousness: awake and alert  Post-procedure vital signs: reviewed and stable  Pain management: adequate  Airway patency: patent    PONV status at discharge: No PONV  Anesthetic complications: no      Cardiovascular status: blood pressure returned to baseline  Respiratory status: unassisted, spontaneous ventilation and room air  Hydration status: euvolemic            Vitals Value   /72   Temp 36.8 °C (98.2 °F)   Pulse 92   Resp 28   SpO2 95 %   Vitals shown include unvalidated device data.      No case tracking events are documented in the log.      Pain/Noah Score: No data recorded

## 2023-03-21 NOTE — PHYSICIAN QUERY
NEPHROLOGY PROGRESS NOTE    PATIENT IDENTIFICATION:   Name:  Ze Wu      MRN:  [id number]     73 y.o.  male             Reason for visit: ESRD    SUBJECTIVE:   Seen and examined. Confusion improved. Answered most questions properly. No SOA or CP. No new events.  OBJECTIVE:  Vitals:    02/10/17 1020 02/10/17 1120 02/10/17 1205 02/10/17 1425   BP:  116/46     BP Location:  Left arm     Patient Position:  Lying     Pulse: 71 86 77 76   Resp:  20  18   Temp:  97.4 °F (36.3 °C)     TempSrc:  Oral     SpO2: 94% 92%  92%   Weight:       Height:         FiO2 (%): 100 %     Body mass index is 20.47 kg/(m^2).    Intake/Output Summary (Last 24 hours) at 02/10/17 1551  Last data filed at 02/10/17 1303   Gross per 24 hour   Intake    210 ml   Output      0 ml   Net    210 ml         Exam:  GEN:  Frail.  appears stated age. Confused. On dialysis. Qb 350  EYES:   Anicteric sclera  ENT:    External ears/nose normal, MM are dry  NECK:  No adenopathy, JVP .  LUNGS: Clear to auscultation   CV:  Irreg,  Bradycardic  ABD:  Non-tender, non-distended, +BS  EXT: No lower ext edema. . Rt TDC    Scheduled meds:      aspirin 81 mg Oral Daily   epoetin bacilio 10,000 Units Intravenous Once per day on Mon Wed Fri   isosorbide mononitrate 30 mg Oral Q24H   lansoprazole 30 mg Oral QAM   levothyroxine 100 mcg Oral Daily   vancomycin 500 mg Intravenous Once   Vancomycin Pharmacy Intermittent Dosing  Does not apply Daily   warfarin 1 mg Oral Daily     IV meds:                          Pharmacy to dose vancomycin        Data Review:      Results from last 7 days  Lab Units 02/08/17  0215 02/07/17  0412 02/06/17  0531   SODIUM mmol/L 139 133* 135*   POTASSIUM mmol/L 4.3 4.8 4.4   CHLORIDE mmol/L 99 94* 98   TOTAL CO2 mmol/L 20.8* 18.9* 20.4*   BUN mg/dL 27* 40* 31*   CREATININE mg/dL 4.95* 7.21* 5.97*   CALCIUM mg/dL 9.0 9.1 9.3   BILIRUBIN mg/dL 1.1 1.1  --    ALK PHOS U/L 117 123*  --    ALT (SGPT) U/L 11 14  --    AST (SGOT) U/L 14 18  --   PT Name: Dragan Man II  MR #: [id number]     DOCUMENTATION CLARIFICATION     CDS/: Adry Turcios RN              Contact information: [email or web address]  This form is a permanent document in the medical record.     Query Date: March 21, 2023    By submitting this query, we are merely seeking further clarification of documentation.  Please utilize your independent clinical judgment when addressing the question(s) below.    The Medical Record contains the following   Indicators   Supporting Clinical Findings Location in Medical Record   x Heart Failure documented Treatments/Procedures: Procedure(s)   REPLACEMENT, AORTIC VALVE, TRANSCATHETER (TAVR)    Postop Diagnosis:   Severe aortic stenosis [I35.0]  Chronic diastolic congestive heart failure [I50.32]    LVEDP 30mmHg, consistent with acute on chronic heart failure and patients history on presentation (orthopnea and PND).    Chronic diastolic congestive heart failure  Chronic. Controlled. Follow up with Cardiology/PCP. Brief Op Note 3/16                Brief Op Note 3/16        PN 3/17 Interventional Cardiology     x BNP BNP  11 Lab 3/16     x EF/Echo Limited study to evaluate LVEF.  The left ventricle is normal in size with normal systolic function. The estimated ejection fraction is 65%.  There is a 29 mm EvolutFX transcutaneously-placed aortic bioprosthesis present. Trace aortic regurgitation.  Normal right ventricular size with normal right ventricular systolic function.  Normal central venous pressure (3 mmHg).   Echo 3/17   x Radiology findings The cardiomediastinal silhouette is prominent, magnified by technique however similar to the previous exam noting interval surgical change and pacer placement..  There is no pleural effusion.  The trachea is midline.  The lungs are symmetrically expanded bilaterally with coarse interstitial attenuation accentuated by expiratory view, underlying edema not excluded..  No large focal consolidation seen.  There    GLUCOSE mg/dL 95 96 96       Estimated Creatinine Clearance: 13.2 mL/min (by C-G formula based on Cr of 4.95).      Results from last 7 days  Lab Units 02/06/17  0531   MAGNESIUM mg/dL 2.2         Results from last 7 days  Lab Units 02/09/17  0339 02/08/17  0215 02/07/17  0412 02/06/17  0531 02/05/17  2107   WBC 10*3/mm3 6.70 6.52 7.75 8.62 8.79   HEMOGLOBIN g/dL 8.0* 8.6* 8.8* 9.2* 8.9*   PLATELETS 10*3/mm3 208 206 286 307 326         Results from last 7 days  Lab Units 02/10/17  0534 02/09/17  0339 02/08/17 0215 02/07/17 0412 02/06/17  0531   INR  2.62* 2.11* 1.56* 1.30* 1.31*             ASSESSMENT:   Active Problems:    Sepsis    1. ESRD on HD TTS.  2. Hypercaogulopathy .  3. MRSA bacteremia: SP graft excision and port removal.   4. Encephalopathy. Still confused.   5. Anemia of ESRD. Procrit with HD.   6. Secondary hyperparathyroidism  7.  Pulmonary septic emboli  8. HTN. Too controlled. Dc hydralazine.    9. Hypothyroid on replacement.     Plan:     1. HD at AM. Volume and lytes are stable.   2. Procrit with HD.  3. BP is under control        Ariadne Gonsales MD  2/10/2017    3:51 PM      is no pneumothorax    CXR  3/17    Subjective/Objective Respiratory Conditions      Recent/Current MI      Heart Transplant, LVAD      Edema, JVD      Ascites     x Diuretics/Meds   Furosemide 20 mg oral     MAR 3/16, 3/17    Other Treatment      Other       Heart failure is a clinical diagnosis which includes symptomatic fluid retention, elevated intracardiac pressures, and/or the inability of the heart to deliver adequate blood flow.    Heart Failure with reduced Ejection Fraction (HFrEF) or Systolic Heart Failure (loses ability to contract normally, EF is <40%)    Heart Failure with preserved Ejection Fraction (HFpEF) or Diastolic Heart Failure (stiff ventricles, does not relax properly, EF is >50%)     Heart Failure with Combined Systolic and Diastolic Failure (stiff ventricles, does not relax properly and EF is <50%)    Mid-range or mildly reduced ejection fraction (HFmrEF) is classified as systolic heart failure.  Congestive heart failure with a recovered EF is classified as Diastolic Heart Failure.  Common clues to acute exacerbation:  Rapidly progressive symptoms (w/in 2 weeks of presentation), using IV diuretics, using supplemental O2, pulmonary edema on Xray, new or worsening pleural effusion, +JVD or other signs of volume overload, MI w/in 4 weeks, and/or BNP >500  The clinical guidelines noted are only system guidelines, and do not replace the providers clinical judgment.    Provider, please clarify conflicting documentation regarding the acuity of the Diastolic Heart failure diagnosis associated with the above clinical findings.    [ X  ]  Chronic Diastolic Heart Failure (HFpEF) - preexisting and stable     [   ]  Acute on Chronic Diastolic Heart Failure (HFpEF) - worsening of CHF signs/symptoms in preexisting CHF  . Please specify clinical support (signs & symptoms) for the confirmed diagnosis: ____________________     [   ]  Other (please specify): ___________________________________              Please document in your progress notes daily for the duration of treatment until resolved and include in your discharge summary.    References:  American Heart Association editorial staff. (2017, May). Ejection Fraction Heart Failure Measurement. American Heart Association. https://www.heart.org/en/health-topics/heart-failure/diagnosing-heart-failure/ejection-fraction-heart-failure-measurement#:~:text=Ejection%20fraction%20(EF)%20is%20a,pushed%20out%20with%20each%20heartbeat  KIRBY Trotter (2020, December 15). Heart failure with preserved ejection fraction: Clinical manifestations and diagnosis. [employer]. https://www.[employer].com/contents/heart-failure-with-preserved-ejection-fraction-clinical-manifestations-and-diagnosis.  ICD-10-CM/PCS Coding Clinic Third Quarter ICD-10, Effective with discharges: September 8, 2020 [facility] § Heart failure with mid-range or mildly reduced ejection fraction (2020).  ICD-10-CM/PCS Coding Clinic Third Quarter ICD-10, Effective with discharges: September 8, 2020 [facility] § Heart failure with recovered ejection fraction (2020).  Form No. 16420

## 2023-03-24 ENCOUNTER — CLINICAL SUPPORT (OUTPATIENT)
Dept: CARDIOLOGY | Facility: HOSPITAL | Age: 58
End: 2023-03-24
Attending: STUDENT IN AN ORGANIZED HEALTH CARE EDUCATION/TRAINING PROGRAM
Payer: COMMERCIAL

## 2023-03-24 ENCOUNTER — TELEPHONE (OUTPATIENT)
Dept: CARDIOLOGY | Facility: CLINIC | Age: 58
End: 2023-03-24
Payer: COMMERCIAL

## 2023-03-24 DIAGNOSIS — Z95.0 CARDIAC PACEMAKER IN SITU: ICD-10-CM

## 2023-03-24 DIAGNOSIS — I44.2 CHB (COMPLETE HEART BLOCK): ICD-10-CM

## 2023-03-24 DIAGNOSIS — Z95.2 S/P TAVR (TRANSCATHETER AORTIC VALVE REPLACEMENT): ICD-10-CM

## 2023-03-24 PROCEDURE — 93280 PM DEVICE PROGR EVAL DUAL: CPT | Mod: 26,,, | Performed by: STUDENT IN AN ORGANIZED HEALTH CARE EDUCATION/TRAINING PROGRAM

## 2023-03-24 PROCEDURE — 93280 CARDIAC DEVICE CHECK - IN CLINIC & HOSPITAL: ICD-10-PCS | Mod: 26,,, | Performed by: STUDENT IN AN ORGANIZED HEALTH CARE EDUCATION/TRAINING PROGRAM

## 2023-03-24 PROCEDURE — 93280 PM DEVICE PROGR EVAL DUAL: CPT

## 2023-03-24 NOTE — TELEPHONE ENCOUNTER
Patient called to report that he has been experiencing vision disturbances since having TAVR on 3/16/23.  He states that he sees white spots all over.  Has happened on more than one occasion.  Denies weakness to extremities, facial drooping or speech issues.  Advised patient to go to ED.

## 2023-04-15 ENCOUNTER — PATIENT MESSAGE (OUTPATIENT)
Dept: INTERNAL MEDICINE | Facility: CLINIC | Age: 58
End: 2023-04-15
Payer: COMMERCIAL

## 2023-04-15 PROBLEM — N32.89 BLADDER WALL THICKENING: Status: ACTIVE | Noted: 2023-04-15

## 2023-04-15 PROBLEM — R91.1 PULMONARY NODULE: Status: ACTIVE | Noted: 2023-04-15

## 2023-04-18 ENCOUNTER — PATIENT MESSAGE (OUTPATIENT)
Dept: ELECTROPHYSIOLOGY | Facility: CLINIC | Age: 58
End: 2023-04-18
Payer: COMMERCIAL

## 2023-04-20 ENCOUNTER — HOSPITAL ENCOUNTER (OUTPATIENT)
Dept: CARDIOLOGY | Facility: HOSPITAL | Age: 58
Discharge: HOME | End: 2023-04-20
Attending: PHYSICIAN ASSISTANT
Payer: COMMERCIAL

## 2023-04-20 ENCOUNTER — PATIENT MESSAGE (OUTPATIENT)
Dept: INTERNAL MEDICINE | Facility: CLINIC | Age: 58
End: 2023-04-20

## 2023-04-20 ENCOUNTER — TELEPHONE (OUTPATIENT)
Dept: CARDIOLOGY | Facility: CLINIC | Age: 58
End: 2023-04-20
Payer: COMMERCIAL

## 2023-04-20 ENCOUNTER — HOSPITAL ENCOUNTER (OUTPATIENT)
Dept: RADIOLOGY | Facility: HOSPITAL | Age: 58
Discharge: HOME | End: 2023-04-20
Attending: FAMILY MEDICINE
Payer: COMMERCIAL

## 2023-04-20 ENCOUNTER — OFFICE VISIT (OUTPATIENT)
Dept: INTERNAL MEDICINE | Facility: CLINIC | Age: 58
End: 2023-04-20
Payer: COMMERCIAL

## 2023-04-20 VITALS
WEIGHT: 315 LBS | HEART RATE: 75 BPM | OXYGEN SATURATION: 97 % | BODY MASS INDEX: 42.66 KG/M2 | SYSTOLIC BLOOD PRESSURE: 118 MMHG | DIASTOLIC BLOOD PRESSURE: 60 MMHG | HEIGHT: 72 IN | TEMPERATURE: 98 F

## 2023-04-20 VITALS
HEIGHT: 72 IN | DIASTOLIC BLOOD PRESSURE: 72 MMHG | BODY MASS INDEX: 42.66 KG/M2 | WEIGHT: 315 LBS | HEART RATE: 64 BPM | SYSTOLIC BLOOD PRESSURE: 147 MMHG

## 2023-04-20 DIAGNOSIS — Z95.2 S/P TAVR (TRANSCATHETER AORTIC VALVE REPLACEMENT): ICD-10-CM

## 2023-04-20 DIAGNOSIS — R06.09 DYSPNEA ON EXERTION: ICD-10-CM

## 2023-04-20 DIAGNOSIS — R06.09 DOE (DYSPNEA ON EXERTION): Primary | ICD-10-CM

## 2023-04-20 DIAGNOSIS — R91.1 PULMONARY NODULE: Chronic | ICD-10-CM

## 2023-04-20 DIAGNOSIS — R06.09 DYSPNEA ON EXERTION: Primary | ICD-10-CM

## 2023-04-20 DIAGNOSIS — N32.89 BLADDER WALL THICKENING: Chronic | ICD-10-CM

## 2023-04-20 DIAGNOSIS — I50.32 CHRONIC DIASTOLIC CONGESTIVE HEART FAILURE: ICD-10-CM

## 2023-04-20 LAB
AORTIC ROOT ANNULUS: 1.98 CM
AV INDEX (PROSTH): 0.57
AV MEAN GRADIENT: 16 MMHG
AV PEAK GRADIENT: 32 MMHG
AV VALVE AREA: 3.73 CM2
AV VELOCITY RATIO: 0.56
BSA FOR ECHO PROCEDURE: 2.71 M2
CV ECHO LV RWT: 0.61 CM
DOP CALC AO PEAK VEL: 2.82 M/S
DOP CALC AO VTI: 61.4 CM
DOP CALC LVOT AREA: 6.6 CM2
DOP CALC LVOT DIAMETER: 2.9 CM
DOP CALC LVOT PEAK VEL: 1.58 M/S
DOP CALC LVOT STROKE VOLUME: 229.08 CM3
DOP CALC RVOT PEAK VEL: 0.93 M/S
DOP CALC RVOT VTI: 21.5 CM
DOP CALCLVOT PEAK VEL VTI: 34.7 CM
E WAVE DECELERATION TIME: 192.89 MSEC
E/A RATIO: 0.96
E/E' RATIO: 18 M/S
ECHO LV POSTERIOR WALL: 1.42 CM (ref 0.6–1.1)
EJECTION FRACTION: 60 %
FRACTIONAL SHORTENING: 29 % (ref 28–44)
INTERVENTRICULAR SEPTUM: 1.41 CM (ref 0.6–1.1)
IVC DIAMETER: 1.74 CM
IVRT: 88.49 MSEC
LA MAJOR: 7.59 CM
LA MINOR: 5.58 CM
LA WIDTH: 4.6 CM
LEFT ATRIUM SIZE: 4.03 CM
LEFT ATRIUM VOLUME INDEX MOD: 25.5 ML/M2
LEFT ATRIUM VOLUME INDEX: 39 ML/M2
LEFT ATRIUM VOLUME MOD: 66.21 CM3
LEFT ATRIUM VOLUME: 101.34 CM3
LEFT INTERNAL DIMENSION IN SYSTOLE: 3.3 CM (ref 2.1–4)
LEFT VENTRICLE DIASTOLIC VOLUME INDEX: 38.41 ML/M2
LEFT VENTRICLE DIASTOLIC VOLUME: 99.87 ML
LEFT VENTRICLE MASS INDEX: 102 G/M2
LEFT VENTRICLE SYSTOLIC VOLUME INDEX: 17 ML/M2
LEFT VENTRICLE SYSTOLIC VOLUME: 44.22 ML
LEFT VENTRICULAR INTERNAL DIMENSION IN DIASTOLE: 4.65 CM (ref 3.5–6)
LEFT VENTRICULAR MASS: 265.15 G
LV LATERAL E/E' RATIO: 18 M/S
LV SEPTAL E/E' RATIO: 18 M/S
LVOT MG: 5.48 MMHG
LVOT MV: 1.09 CM/S
MV PEAK A VEL: 1.31 M/S
MV PEAK E VEL: 1.26 M/S
MV STENOSIS PRESSURE HALF TIME: 55.94 MS
MV VALVE AREA P 1/2 METHOD: 3.93 CM2
PISA TR MAX VEL: 2.8 M/S
PV MEAN GRADIENT: 2 MMHG
PV PEAK VELOCITY: 1.61 CM/S
RA MAJOR: 4.44 CM
RA PRESSURE: 3 MMHG
RA WIDTH: 3.68 CM
RIGHT VENTRICULAR END-DIASTOLIC DIMENSION: 3.58 CM
TDI LATERAL: 0.07 M/S
TDI SEPTAL: 0.07 M/S
TDI: 0.07 M/S
TR MAX PG: 31 MMHG
TV REST PULMONARY ARTERY PRESSURE: 34 MMHG

## 2023-04-20 PROCEDURE — 3074F SYST BP LT 130 MM HG: CPT | Mod: CPTII,S$GLB,, | Performed by: FAMILY MEDICINE

## 2023-04-20 PROCEDURE — 99215 OFFICE O/P EST HI 40 MIN: CPT | Mod: S$GLB,,, | Performed by: FAMILY MEDICINE

## 2023-04-20 PROCEDURE — 1159F MED LIST DOCD IN RCRD: CPT | Mod: CPTII,S$GLB,, | Performed by: FAMILY MEDICINE

## 2023-04-20 PROCEDURE — 1160F PR REVIEW ALL MEDS BY PRESCRIBER/CLIN PHARMACIST DOCUMENTED: ICD-10-PCS | Mod: CPTII,S$GLB,, | Performed by: FAMILY MEDICINE

## 2023-04-20 PROCEDURE — 4010F PR ACE/ARB THEARPY RXD/TAKEN: ICD-10-PCS | Mod: CPTII,S$GLB,, | Performed by: FAMILY MEDICINE

## 2023-04-20 PROCEDURE — 93306 TTE W/DOPPLER COMPLETE: CPT | Mod: 26,,, | Performed by: INTERNAL MEDICINE

## 2023-04-20 PROCEDURE — 93306 ECHO (CUPID ONLY): ICD-10-PCS | Mod: 26,,, | Performed by: INTERNAL MEDICINE

## 2023-04-20 PROCEDURE — 99999 PR PBB SHADOW E&M-EST. PATIENT-LVL V: ICD-10-PCS | Mod: PBBFAC,,, | Performed by: FAMILY MEDICINE

## 2023-04-20 PROCEDURE — 1160F RVW MEDS BY RX/DR IN RCRD: CPT | Mod: CPTII,S$GLB,, | Performed by: FAMILY MEDICINE

## 2023-04-20 PROCEDURE — 99999 PR PBB SHADOW E&M-EST. PATIENT-LVL V: CPT | Mod: PBBFAC,,, | Performed by: FAMILY MEDICINE

## 2023-04-20 PROCEDURE — 1159F PR MEDICATION LIST DOCUMENTED IN MEDICAL RECORD: ICD-10-PCS | Mod: CPTII,S$GLB,, | Performed by: FAMILY MEDICINE

## 2023-04-20 PROCEDURE — 71046 X-RAY EXAM CHEST 2 VIEWS: CPT | Mod: 26,,, | Performed by: RADIOLOGY

## 2023-04-20 PROCEDURE — 3008F PR BODY MASS INDEX (BMI) DOCUMENTED: ICD-10-PCS | Mod: CPTII,S$GLB,, | Performed by: FAMILY MEDICINE

## 2023-04-20 PROCEDURE — 4010F ACE/ARB THERAPY RXD/TAKEN: CPT | Mod: CPTII,S$GLB,, | Performed by: FAMILY MEDICINE

## 2023-04-20 PROCEDURE — 71046 XR CHEST PA AND LATERAL: ICD-10-PCS | Mod: 26,,, | Performed by: RADIOLOGY

## 2023-04-20 PROCEDURE — 93306 TTE W/DOPPLER COMPLETE: CPT

## 2023-04-20 PROCEDURE — 3078F DIAST BP <80 MM HG: CPT | Mod: CPTII,S$GLB,, | Performed by: FAMILY MEDICINE

## 2023-04-20 PROCEDURE — 71046 X-RAY EXAM CHEST 2 VIEWS: CPT | Mod: TC

## 2023-04-20 PROCEDURE — 99215 PR OFFICE/OUTPT VISIT, EST, LEVL V, 40-54 MIN: ICD-10-PCS | Mod: S$GLB,,, | Performed by: FAMILY MEDICINE

## 2023-04-20 PROCEDURE — 3074F PR MOST RECENT SYSTOLIC BLOOD PRESSURE < 130 MM HG: ICD-10-PCS | Mod: CPTII,S$GLB,, | Performed by: FAMILY MEDICINE

## 2023-04-20 PROCEDURE — 3078F PR MOST RECENT DIASTOLIC BLOOD PRESSURE < 80 MM HG: ICD-10-PCS | Mod: CPTII,S$GLB,, | Performed by: FAMILY MEDICINE

## 2023-04-20 PROCEDURE — 3008F BODY MASS INDEX DOCD: CPT | Mod: CPTII,S$GLB,, | Performed by: FAMILY MEDICINE

## 2023-04-20 RX ORDER — FUROSEMIDE 20 MG/1
40 TABLET ORAL 2 TIMES DAILY
Qty: 8 TABLET | Refills: 0 | Status: SHIPPED | OUTPATIENT
Start: 2023-04-20 | End: 2023-04-22

## 2023-04-20 NOTE — PROGRESS NOTES
OFFICE VISIT 4/20/23  4:30 PM CDT      Estimated body mass index is 45.87 kg/m² as calculated from the following:    Height as of this encounter: 6' (1.829 m).    Weight as of this encounter: 153.4 kg (338 lb 3 oz).      Subjective   CHIEF COMPLAINT: Shortness of Breath and Congestive Heart Failure (Weight Gain)    He reports that over the last 2-3 weeks he has had increasing dyspnea on exertion. Not so much shortness of breath with rest, but he says that he gets short of breath with even modest physical exertion, such as walking across the parking lot. He also has had increased lower extremity edema and weight gain of 19 pounds. He had echocardiogram, BMP, and CBC done today. Results are unrevealing. He says he has been taking Lasix 20 mg alternating with 40 mg every other day for the last few days. In spite of this, he has continued to gain weight. We discussed differential diagnosis and risks and benefits of treatment options. PLAN: CXR. Increase Lasix. Send message updating his cardiologist.    Review of Systems   Respiratory:  Positive for shortness of breath. Negative for chest tightness.    Cardiovascular:  Negative for chest pain.   Neurological:  Negative for syncope and light-headedness.       Assessment and Plan   1. Dyspnea on exertion  -     X-Ray Chest PA And Lateral; Future; Expected date: 04/20/2023    2. Chronic diastolic congestive heart failure  Overview:  ECHOCARDIOGRAM 03/22/2021  ·The left ventricle is normal in size with normal systolic function. The estimated ejection fraction is 60%  ·Indeterminate left ventricular diastolic function.  ·Normal right ventricular size with normal right ventricular systolic function.  ·Mild left atrial enlargement.  ·Normal central venous pressure (3 mmHg).  ·There is severe aortic valve stenosis.  ·Aortic valve area is 1.25 cm2; peak velocity is 4.55 m/s; mean gradient is 50 mmHg.    ECHOCARDIOGRAM 10/18/2019  1 - Normal left ventricular systolic function (EF  60-65%).  2 - Impaired LV relaxation, elevated LAP (grade 2 diastolic dysfunction).  3 - Normal right ventricular systolic function.  4 - No wall motion abnormalities.  5 - Severe left atrial enlargement.  6 - Concentric hypertrophy.  7 - Mild aortic regurgitation.  8 - Moderate aortic stenosis, CRISTINO = 1.87 cm2, AVAi = 0.7 cm2/m2, peak velocity = 3.87 m/s, mean gradient = 34 mmHg.    Orders:  -     furosemide (LASIX) 20 MG tablet; Take 2 tablets (40 mg total) by mouth 2 (two) times daily. for 2 days  Dispense: 8 tablet; Refill: 0    3. Pulmonary nodule  Assessment & Plan:  PLAN: F/U with pulmonology.  Future Appointments   Date Time Provider Department Center   5/30/2023  1:00 PM Elizabeth Lejeune, NP [facility]           4. Bladder wall thickening  Assessment & Plan:  We discussed differential diagnosis. PLAN: Urology E-consult.    Orders:  -     E-Consult to Urology    5. S/P TAVR (transcatheter aortic valve replacement)    Unless noted herein, any chronic conditions are represented as and appear stable, and no other significant complaints or concerns were reported.    Follow up in about 1 month (around 5/20/2023) for virtual visit, re-evaluation.   Future Appointments   Date Time Provider Department Center   5/3/2023  1:00 PM Lalita Ballesteros PA-C [facility] STRHRT [facility]   5/30/2023  1:00 PM Elizabeth Lejeune, NP [facility] SLEEP [facility]   6/15/2023 10:00 AM HOME MONITOR DEVICE CHECK, [facility] ARRHPRO [facility]   6/21/2023  8:20 AM Royce Liao MD [facility] ARRHYTH [facility]   7/28/2023  9:00 AM Ron Shaikh MD ON CARDIO [facility]     Objective   Vitals:    04/20/23 1540   BP: 118/60   BP Location: Right arm   Patient Position: Sitting   BP Method: Large (Manual)   Pulse: 75   Temp: 98.2 °F (36.8 °C)   SpO2: 97%   Weight: (!) 153.4 kg (338 lb 3 oz)   Height: 6' (1.829 m)   Physical Exam  Vitals reviewed.   Constitutional:       General: He is not in acute distress.     Appearance: Normal appearance. He  is not ill-appearing or diaphoretic.   Cardiovascular:      Rate and Rhythm: Normal rate and regular rhythm.   Pulmonary:      Effort: Pulmonary effort is normal. No respiratory distress.      Breath sounds: Normal breath sounds. No wheezing or rales.   Musculoskeletal:      Right lower leg: Edema (1-2+) present.      Left lower leg: Edema (1-2+) present.   Skin:     General: Skin is warm and dry.   Neurological:      General: No focal deficit present.      Mental Status: He is alert and oriented to person, place, and time. Mental status is at baseline.   Psychiatric:         Mood and Affect: Mood normal.         Behavior: Behavior normal.         Judgment: Judgment normal.      Orders Placed This Encounter    X-Ray Chest PA And Lateral    furosemide (LASIX) 20 MG tablet    E-Consult to Urology     TOTAL TIME evaluating and managing this patient for this encounter was greater than or equal to 43 minutes. This time was spent personally by me on the following activities: review of nurse's notes, pre-charting, review of patient's past medical history, assessing age-appropriate health maintenance needs, review of any interval history, medication reconciliation, reconciling/updating problem list, obtaining history from the patient, examination of the patient, review and interpretation of lab results, answering patient's questions about lab results, review and interpretation of cardiology test results, answering patient's questions about cardiology test results, reviewing consulting specialist notes, evaluation of the patient's response to treatment, managing and/or ordering prescription medications, ordering imaging tests, executing E-consult to urologist, educating patient and answering their questions about treatment plan, goals of treatment, and follow-up, phone call/messaging with cardiologist about patient's condition and treatment plan, and final documentation of the visit. This time was exclusive of any  "separately billable procedures for this patient and exclusive of time spent treating any other patients.   Documentation entered by me for this encounter may have been done in part using speech-recognition technology. Although I have made an effort to ensure accuracy, "sound like" errors may exist and should be interpreted in context.   "

## 2023-04-20 NOTE — ASSESSMENT & PLAN NOTE
PLAN: F/U with pulmonology.  Future Appointments   Date Time Provider Department Center   5/30/2023  1:00 PM Elizabeth Lejeune, NP [facility]

## 2023-04-20 NOTE — PROGRESS NOTES
Request for Urology E-Consult     Patient Name: Dragan Man II  (57 y.o.)  MRN: [id number]  Requesting Provider: GIL Barba MD   Primary Care Provider: GIL Barba MD     Consent for e-consult has been obtained from patient, and patient is aware of applicable cost: Yes    Reason for Consult: Incidental finding of bladder wall thickening on CTA 2/9/23. He reports no bladder/urinary pain or sense of retention. He's willing to see urology, but we're dealing with his more pressing cardiac issues now.    SPECIFIC QUESTIONS: Do you have any recommendations for further evaluation at this point?    Time spent preparing this E-consult and/or communicating with the consulting physician was incorporated into total time spent evaluating and managing this patient for this encounter and was not billed separately.  #LMECR

## 2023-04-21 ENCOUNTER — TELEPHONE (OUTPATIENT)
Dept: CARDIOLOGY | Facility: CLINIC | Age: 58
End: 2023-04-21
Payer: COMMERCIAL

## 2023-04-21 ENCOUNTER — E-CONSULT (OUTPATIENT)
Dept: UROLOGY | Facility: CLINIC | Age: 58
End: 2023-04-21
Payer: COMMERCIAL

## 2023-04-21 DIAGNOSIS — N32.89 BLADDER WALL THICKENING: Primary | ICD-10-CM

## 2023-04-21 PROCEDURE — 99446 NTRPROF PH1/NTRNET/EHR 5-10: CPT | Mod: S$GLB,,, | Performed by: UROLOGY

## 2023-04-21 PROCEDURE — 99446 PR INTERPROF, PHONE/INTERNET/EHR, CONSULT, 5-10 MINS: ICD-10-PCS | Mod: S$GLB,,, | Performed by: UROLOGY

## 2023-04-21 NOTE — CONSULTS
[facility] - Urology  Response for E-Consult     Patient Name: Dragan Man II  MRN: [id number]  Primary Care Provider: GIL Barba MD   Requesting Provider: GIL Barba MD  E-Consult to Urology  Consult performed by: Galileo Monreal MD  Consult ordered by: GIL Barba MD  Reason for consult: bladder wall thickening  Assessment/Recommendations: 57-year-old gentleman who upon CTA was found to have bladder wall thickening.  Documentation does admit that this could be due to incomplete bladder filling.  I cannot view the images, this is based upon the report.  Reported no lower urinary tract symptoms per Dr. Barba.  Normal renal function, no recent urinalysis.  Per chart review patient admits to smokeless tobacco, but has never been a smoker.  No urological family history.  At this point due to his other comorbidities I would recommend obtaining a urinalysis with microscopic analysis.  If hematuria is noted then would recommend referral to Urology for possible cystoscopy.  If patient's urinalysis demonstrates less than 3 RBCs per high-powered field then can monitor and re-evaluate at a future date with possible bladder ultrasound.  Please let us know if there is anything further we can do to assist.        Findings:  57-year-old gentleman who upon CTA was found to have bladder wall thickening.  Documentation does admit that this could be due to incomplete bladder filling.  I cannot view the images, this is based upon the report.  Reported no lower urinary tract symptoms per Dr. Barba.  Normal renal function, no recent urinalysis.  Per chart review patient admits to smokeless tobacco, but has never been a smoker.  No urological family history.  At this point due to his other comorbidities I would recommend obtaining a urinalysis with microscopic analysis.  If hematuria is noted then would recommend referral to Urology for possible cystoscopy.  If patient's urinalysis demonstrates less than  3 RBCs per high-powered field then can monitor and re-evaluate at a future date with possible bladder ultrasound.  Please let us know if there is anything further we can do to assist.    I did not speak to the requesting provider verbally about this.     Total time of Consultation: 15 minute    Percentage of time spent on verbal/written discussion: 50%     *This eConsult is based on the clinical data available to me and is furnished without benefit of a physical examination. The eConsult will need to be interpreted in light of any clinical issues or changes in patient status not available to me at the time of filing this eConsults. Significant changes in patient condition or level of acuity should result in immediate formal consultation and reevaluation. Please alert me if you have further questions.    Thank you for your consult.     Galileo Monreal MD  [facility] - Urology

## 2023-04-21 NOTE — TELEPHONE ENCOUNTER
Spoke with pt in regards to getting f/u and labs scheduled next week.          ----- Message from Ron Shaikh MD sent at 4/20/2023  9:47 PM CDT -----  Regarding: FW: Decompensation  Please schedule a f/u with me early next week with BNP     Thx      ----- Message -----  From: GIL Barba MD  Sent: 4/20/2023   5:54 PM CDT  To: Ron Shaikh MD, Ne[facility]as[facility] Staff  Subject: Decompensation                                   Hi, Dr. Shaikh.    Would you please have someone from your office contact him to give Mr. Man guidance about the following?    He reports that over the last 2-3 weeks he has had increased dyspnea on exertion to the point that he now gets short of breath with even modest physical exertion, such as walking across the parking lot.    During the same time, he also has had increased lower extremity edema and weight gain of 19 pounds. He had echocardiogram, CXR, BMP, and CBC done today. Results are unrevealing.    He says he has been taking Lasix 20 mg alternating with 40 mg every other day for the last few days. In spite of this, he has continued to gain weight.    I increased his Lasix to 40 mg BID x 2 days and told him I would update you on his status and ask for your recommendation.    Thank you for your help caring for our patients!    -TIFFANY

## 2023-04-21 NOTE — TELEPHONE ENCOUNTER
----- Message from GIL Barba MD sent at 4/20/2023  5:40 PM CDT -----  Regarding: Decompensation  Hi, Dr. Shaikh.    Would you please have someone from your office contact him to give Mr. Man guidance about the following?    He reports that over the last 2-3 weeks he has had increased dyspnea on exertion to the point that he now gets short of breath with even modest physical exertion, such as walking across the parking lot.    During the same time, he also has had increased lower extremity edema and weight gain of 19 pounds. He had echocardiogram, CXR, BMP, and CBC done today. Results are unrevealing.    He says he has been taking Lasix 20 mg alternating with 40 mg every other day for the last few days. In spite of this, he has continued to gain weight.    I increased his Lasix to 40 mg BID x 2 days and told him I would update you on his status and ask for your recommendation.    Thank you for your help caring for our patients!    -TIFFANY

## 2023-04-23 ENCOUNTER — PATIENT MESSAGE (OUTPATIENT)
Dept: INTERNAL MEDICINE | Facility: CLINIC | Age: 58
End: 2023-04-23
Payer: COMMERCIAL

## 2023-04-23 DIAGNOSIS — N32.89 BLADDER WALL THICKENING: Primary | ICD-10-CM

## 2023-04-23 NOTE — TELEPHONE ENCOUNTER
TO MY TEAM:   Please contact Dragan to relay message (below) or at least verify that he read the same Patient Portal message I sent on [facility].  Schedule lab appointment for urinalysis.  Thanks.  --------------------------------------------------------------------------------    Urology E-Consult Follow-up  Dragan Dela Cruz.    I consulted one of our urologists about your bladder wall thickening.    Here is a summary of their findings and recommendations.    FINDINGS: 57-year-old gentleman who upon CTA was found to have bladder wall thickening. Documentation does admit that this could be due to incomplete bladder filling. I cannot view the images, this is based upon the report. Reported no lower urinary tract symptoms per Dr. Barba. Normal renal function, no recent urinalysis. Per chart review patient admits to smokeless tobacco, but has never been a smoker. No urological family history.  RECOMMENDATIONS: At this point due to his other comorbidities I would recommend obtaining a urinalysis with microscopic analysis. If hematuria is noted then would recommend referral to Urology for possible cystoscopy. If patient's urinalysis demonstrates less than 3 RBCs per high-powered field then can monitor and re-evaluate at a future date with possible bladder ultrasound.    In response to their recommendations, the following orders have been entered for you.    Orders Placed This Encounter   Procedures    Urinalysis      I've asked my staff to add this to the labs you will be getting done on Monday, April 24th at 3:50 PM.    Thanks for letting me care for you, and thanks for trusting [facility] with your [facility] needs.    Sincerely,    DENYS Barba MD   #LMECFU     Future Appointments   Date Time Provider Department Center   4/24/2023  3:50 PM LABORATORY, [facility]

## 2023-04-24 ENCOUNTER — TELEPHONE (OUTPATIENT)
Dept: INTERNAL MEDICINE | Facility: CLINIC | Age: 58
End: 2023-04-24
Payer: COMMERCIAL

## 2023-04-24 ENCOUNTER — LAB VISIT (OUTPATIENT)
Dept: LAB | Facility: HOSPITAL | Age: 58
End: 2023-04-24
Attending: INTERNAL MEDICINE
Payer: COMMERCIAL

## 2023-04-24 ENCOUNTER — OFFICE VISIT (OUTPATIENT)
Dept: CARDIOLOGY | Facility: CLINIC | Age: 58
End: 2023-04-24
Payer: COMMERCIAL

## 2023-04-24 VITALS
DIASTOLIC BLOOD PRESSURE: 70 MMHG | OXYGEN SATURATION: 96 % | HEIGHT: 72 IN | WEIGHT: 315 LBS | SYSTOLIC BLOOD PRESSURE: 150 MMHG | BODY MASS INDEX: 42.66 KG/M2

## 2023-04-24 DIAGNOSIS — Z95.0 PACEMAKER: ICD-10-CM

## 2023-04-24 DIAGNOSIS — I70.0 AORTIC ATHEROSCLEROSIS: ICD-10-CM

## 2023-04-24 DIAGNOSIS — I50.32 CHRONIC DIASTOLIC CONGESTIVE HEART FAILURE: ICD-10-CM

## 2023-04-24 DIAGNOSIS — E66.01 MORBID OBESITY WITH BMI OF 45.0-49.9, ADULT: Chronic | ICD-10-CM

## 2023-04-24 DIAGNOSIS — E66.01 CLASS 3 SEVERE OBESITY DUE TO EXCESS CALORIES WITH SERIOUS COMORBIDITY AND BODY MASS INDEX (BMI) OF 45.0 TO 49.9 IN ADULT: ICD-10-CM

## 2023-04-24 DIAGNOSIS — I44.2 COMPLETE HEART BLOCK: ICD-10-CM

## 2023-04-24 DIAGNOSIS — Z95.2 S/P TAVR (TRANSCATHETER AORTIC VALVE REPLACEMENT): Primary | ICD-10-CM

## 2023-04-24 DIAGNOSIS — I10 PRIMARY HYPERTENSION: Chronic | ICD-10-CM

## 2023-04-24 DIAGNOSIS — R60.1 GENERALIZED EDEMA: ICD-10-CM

## 2023-04-24 DIAGNOSIS — N32.89 BLADDER WALL THICKENING: ICD-10-CM

## 2023-04-24 DIAGNOSIS — R06.09 DOE (DYSPNEA ON EXERTION): ICD-10-CM

## 2023-04-24 LAB
BILIRUB UR QL STRIP: NEGATIVE
BNP SERPL-MCNC: 21 PG/ML (ref 0–99)
CLARITY UR: CLEAR
COLOR UR: YELLOW
GLUCOSE UR QL STRIP: NEGATIVE
HGB UR QL STRIP: NEGATIVE
KETONES UR QL STRIP: NEGATIVE
LEUKOCYTE ESTERASE UR QL STRIP: NEGATIVE
NITRITE UR QL STRIP: NEGATIVE
PH UR STRIP: 6 [PH] (ref 5–8)
PROT UR QL STRIP: NEGATIVE
SP GR UR STRIP: 1.02 (ref 1–1.03)
URN SPEC COLLECT METH UR: NORMAL
UROBILINOGEN UR STRIP-ACNC: NEGATIVE EU/DL

## 2023-04-24 PROCEDURE — 3078F PR MOST RECENT DIASTOLIC BLOOD PRESSURE < 80 MM HG: ICD-10-PCS | Mod: CPTII,S$GLB,, | Performed by: INTERNAL MEDICINE

## 2023-04-24 PROCEDURE — 36415 COLL VENOUS BLD VENIPUNCTURE: CPT | Performed by: INTERNAL MEDICINE

## 2023-04-24 PROCEDURE — 1159F PR MEDICATION LIST DOCUMENTED IN MEDICAL RECORD: ICD-10-PCS | Mod: CPTII,S$GLB,, | Performed by: INTERNAL MEDICINE

## 2023-04-24 PROCEDURE — 3077F PR MOST RECENT SYSTOLIC BLOOD PRESSURE >= 140 MM HG: ICD-10-PCS | Mod: CPTII,S$GLB,, | Performed by: INTERNAL MEDICINE

## 2023-04-24 PROCEDURE — 3077F SYST BP >= 140 MM HG: CPT | Mod: CPTII,S$GLB,, | Performed by: INTERNAL MEDICINE

## 2023-04-24 PROCEDURE — 1160F RVW MEDS BY RX/DR IN RCRD: CPT | Mod: CPTII,S$GLB,, | Performed by: INTERNAL MEDICINE

## 2023-04-24 PROCEDURE — 99214 OFFICE O/P EST MOD 30 MIN: CPT | Mod: S$GLB,,, | Performed by: INTERNAL MEDICINE

## 2023-04-24 PROCEDURE — 83880 ASSAY OF NATRIURETIC PEPTIDE: CPT | Performed by: INTERNAL MEDICINE

## 2023-04-24 PROCEDURE — 3008F BODY MASS INDEX DOCD: CPT | Mod: CPTII,S$GLB,, | Performed by: INTERNAL MEDICINE

## 2023-04-24 PROCEDURE — 4010F ACE/ARB THERAPY RXD/TAKEN: CPT | Mod: CPTII,S$GLB,, | Performed by: INTERNAL MEDICINE

## 2023-04-24 PROCEDURE — 99999 PR PBB SHADOW E&M-EST. PATIENT-LVL IV: ICD-10-PCS | Mod: PBBFAC,,, | Performed by: INTERNAL MEDICINE

## 2023-04-24 PROCEDURE — 1160F PR REVIEW ALL MEDS BY PRESCRIBER/CLIN PHARMACIST DOCUMENTED: ICD-10-PCS | Mod: CPTII,S$GLB,, | Performed by: INTERNAL MEDICINE

## 2023-04-24 PROCEDURE — 81002 URINALYSIS NONAUTO W/O SCOPE: CPT | Performed by: FAMILY MEDICINE

## 2023-04-24 PROCEDURE — 99214 PR OFFICE/OUTPT VISIT, EST, LEVL IV, 30-39 MIN: ICD-10-PCS | Mod: S$GLB,,, | Performed by: INTERNAL MEDICINE

## 2023-04-24 PROCEDURE — 3078F DIAST BP <80 MM HG: CPT | Mod: CPTII,S$GLB,, | Performed by: INTERNAL MEDICINE

## 2023-04-24 PROCEDURE — 1159F MED LIST DOCD IN RCRD: CPT | Mod: CPTII,S$GLB,, | Performed by: INTERNAL MEDICINE

## 2023-04-24 PROCEDURE — 3008F PR BODY MASS INDEX (BMI) DOCUMENTED: ICD-10-PCS | Mod: CPTII,S$GLB,, | Performed by: INTERNAL MEDICINE

## 2023-04-24 PROCEDURE — 99999 PR PBB SHADOW E&M-EST. PATIENT-LVL IV: CPT | Mod: PBBFAC,,, | Performed by: INTERNAL MEDICINE

## 2023-04-24 PROCEDURE — 4010F PR ACE/ARB THEARPY RXD/TAKEN: ICD-10-PCS | Mod: CPTII,S$GLB,, | Performed by: INTERNAL MEDICINE

## 2023-04-24 RX ORDER — FUROSEMIDE 20 MG/1
20 TABLET ORAL DAILY
Qty: 30 TABLET | Refills: 11 | Status: SHIPPED | OUTPATIENT
Start: 2023-04-24 | End: 2024-04-23

## 2023-04-24 RX ORDER — HYDROCHLOROTHIAZIDE 25 MG/1
25 TABLET ORAL DAILY
Qty: 30 TABLET | Refills: 11 | Status: SHIPPED | OUTPATIENT
Start: 2023-04-24

## 2023-04-24 NOTE — TELEPHONE ENCOUNTER
Spoke with patient;Relayed message that was sent from  in regards to E-consult results and Urinalysis. Verbalizes understanding./CS

## 2023-04-24 NOTE — PROGRESS NOTES
Subjective:   Patient ID:  Dragan Man II is a 57 y.o. male who presents for follow up of Shortness of Breath (Sob very frequently since surgery, pt also mentioned seeing stars)      57 yo. Male, f/u for severe AS.. Selling the beer at the store,   PMH severe AS, HTN, LAVERNE on CPAP, obesit and Von Willebrand diseasey. No Dx of heart attack,DM,stroke and cancer. No smoking/drinking  Chronic ROLLINS. Recently worse with dizziness, left chest tightness. Occurred at workplace and physical activity. Improved the symptoms at home. Thought related to the temperature change from parking lot to cooler.   Treadmill stress ekg showed no stress induced EKG change. Peak  mmHG   ECHO showed normal EF, severe LAE, and mild to mod AS  No f/h premature CAD  In 2012, had episode of syncope when lifted up heavy stuff.  2012. MPI showed no ischemia and echo showed normal EF, dilated RV. Mild LAE and   Enrolled in HTN digit program    03/2021visit. States that ROLLINS slightly worse  No chest pain, dizziness palpitation, faint  Leg swelling worse in PM and better in AM    Gained the weight 10 pounds in 6 months.  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 Occasional ROLLINS. No faint syncope dizziness and chest pain  Sleeps on elevated bed due to LAVERNE. And leg swelling   Decreased exercise capacity at workplace  stationary BIKING. NO SMOKING.DRINKING  03/2021 echo normal EF and mod to severe AS. Aortic valve area is 1.25 cm2; peak velocity is 4.55 m/s; mean gradient is 50 mmHg.  No f/h valve disease and SCD   BNP and Cr wnl     visit  S/p CYNTHIA done in  revealing calcified tricuspid AV, mode AS and mild AI. Mild sore throat. No chest pain and dizziness  C/o hands and leg swelling   Repeat echo in  Aortic valve area is 1.22 cm2; peak velocity is 4.61 m/s; mean gradient is 52 mmHg.  Chronic SOB and partially improved after breathing Rx. Severely obese  No faint chest  pain and palpitation. ekg NSR and no acute stt change     visit  06/2022 LHC/RHC  · The coronary arteries were normal..  · The filling pressures on the right and left were moderately elevated. Pulmonary hypertension was moderate.  · 53 MMHG GRADIENT ACROSS AORTIC VAKLVE SUGGESTIVE OF SEVERE AORTIC STENOSIS.  On weight control program, working well. Leg swelling improved.  Pt has moderate aortic stenosis per planimetry and elevated gradient pressure in LVOT  He states that he lost 10 poudns now and feels better      visit  Loat 20 lbs in 6 months, on Ozempic.  Remains ROLLINS and on inhaler, worse at allergy season.  No chest pain, faint dizziness. Switches to light duty work with a lot of walking. Energy ok   Ekg NSr LVH  ms  His echo showed elevated velocity at LVOT > 1.5 m/s. And calculated CRISTINO per continuity equation and planimetry per CYNTHIA > 1 cm2. In mod AS level    Interval history  S/p TAVR on 03/15/2023 at [facility] by Dr. Prather.   S/p PPM 2 days after TAVR  04/20/2023 echo  · The left ventricle is normal in size with moderate concentric hypertrophy and normal systolic function.  · Mild left atrial enlargement.  · The estimated ejection fraction is 60%.  · Grade II left ventricular diastolic dysfunction.  · There is abnormal septal wall motion consistent with right ventricular pacemaker.  · Normal right ventricular size with normal right ventricular systolic function.  · There is a 29 mm evolute transcutaneously-placed aortic bioprosthesis present. Well seated  · The aortic valve mean gradient is 16 mmHg with a dimensionless index of 0.57.  · Normal central venous pressure (3 mmHg).  · The estimated PA systolic pressure is 34 mmHg.  Gained 20 lbs again after the surgery. No interrupt of ozempic brigette op  C/o worsening SOB dizziness. No syncope  Seeing the white spot post op.                Past Medical History:   Diagnosis Date    Actinic keratoses 10/14/2019    Bilateral carpal tunnel syndrome  4/12/2021    Chronic bilateral low back pain without sciatica 4/12/2021    Depression     Morbid obesity with BMI of 40.0-44.9, adult 11/17/2017    LAVERNE (obstructive sleep apnea)     Prediabetes 4/12/2021    Seasonal allergic rhinitis due to pollen 10/14/2019    Ulnar neuropathy of both upper extremities 2/17/2020       Past Surgical History:   Procedure Laterality Date    A-V CARDIAC PACEMAKER INSERTION N/A 3/17/2023    Procedure: INSERTION, CARDIAC PACEMAKER, DUAL CHAMBER;  Surgeon: Royce Liao MD;  Location: [facility] EP LAB;  Service: Cardiology;  Laterality: N/A;  CHB, DUAL PPM, ANES, MDT, DM, CVICU [id number]    CARDIAC CATH COSURGEON N/A 3/16/2023    Procedure: Cardiac Cath Cosurgeon;  Surgeon: Mitchell Kingston MD;  Location: [facility] CATH LAB;  Service: Cardiovascular;  Laterality: N/A;    CATHETERIZATION OF BOTH LEFT AND RIGHT HEART N/A 6/28/2022    Procedure: CATHETERIZATION, HEART, BOTH LEFT AND RIGHT;  Surgeon: Carlotta Gordon MD;  Location: [facility] CATH LAB;  Service: Cardiology;  Laterality: N/A;    None      TRANSCATHETER AORTIC VALVE REPLACEMENT (TAVR) N/A 3/16/2023    Procedure: REPLACEMENT, AORTIC VALVE, TRANSCATHETER (TAVR);  Surgeon: Adan Greene MD;  Location: [facility] CATH LAB;  Service: Cardiology;  Laterality: N/A;       Social History     Tobacco Use    Smoking status: Never    Smokeless tobacco: Former     Types: Snuff     Quit date: 2003    Tobacco comments:     quit 15 years ago    Substance Use Topics    Alcohol use: Yes     Comment: socially // beer    Drug use: No       Family History   Problem Relation Age of Onset    Diabetes Father     Diabetes Paternal Grandfather     No Known Problems Mother          ROS    Objective:   Physical Exam  HENT:      Head: Normocephalic.   Eyes:      Pupils: Pupils are equal, round, and reactive to light.   Neck:      Thyroid: No thyromegaly.      Vascular: Normal carotid pulses. No carotid bruit or JVD.   Cardiovascular:      Rate and Rhythm: Normal rate and  regular rhythm. No extrasystoles are present.     Chest Wall: PMI is not displaced.      Pulses: Normal pulses.           Carotid pulses are 2+ on the right side and 2+ on the left side.     Heart sounds: Murmur (ESM 3/6 on bases and along LSB. up to the neck) heard.     No gallop. No S3 sounds.   Pulmonary:      Effort: No respiratory distress.      Breath sounds: Normal breath sounds. No stridor.   Chest:      Comments: S/p PPM pocket on left chest healing well  Abdominal:      General: Bowel sounds are normal.      Palpations: Abdomen is soft.      Tenderness: There is no abdominal tenderness. There is no rebound.   Musculoskeletal:         General: Normal range of motion.   Skin:     Findings: No rash.   Neurological:      Mental Status: He is alert and oriented to person, place, and time.   Psychiatric:         Behavior: Behavior normal.       Lab Results   Component Value Date    CHOL 110 (L) 08/15/2022    CHOL 135 04/06/2021    CHOL 185 10/14/2019     Lab Results   Component Value Date    HDL 40 08/15/2022    HDL 41 04/06/2021    HDL 38 (L) 10/14/2019     Lab Results   Component Value Date    LDLCALC 54.4 (L) 08/15/2022    LDLCALC 70.8 04/06/2021    LDLCALC 80.8 10/14/2019     Lab Results   Component Value Date    TRIG 78 08/15/2022    TRIG 116 04/06/2021    TRIG 331 (H) 10/14/2019     Lab Results   Component Value Date    CHOLHDL 36.4 08/15/2022    CHOLHDL 30.4 04/06/2021    CHOLHDL 20.5 10/14/2019       Chemistry        Component Value Date/Time     04/20/2023 1023    K 4.5 04/20/2023 1023     04/20/2023 1023    CO2 23 04/20/2023 1023    BUN 16 04/20/2023 1023    CREATININE 0.9 04/20/2023 1023    GLU 94 04/20/2023 1023        Component Value Date/Time    CALCIUM 9.4 04/20/2023 1023    ALKPHOS 74 03/17/2023 0313    AST 26 03/17/2023 0313    ALT 27 03/17/2023 0313    BILITOT 1.3 (H) 03/17/2023 0313    ESTGFRAFRICA >60.0 06/20/2022 1635    EGFRNONAA >60.0 06/20/2022 1635          Lab Results    Component Value Date    HGBA1C 5.4 12/14/2022     Lab Results   Component Value Date    TSH 2.059 03/01/2023     Lab Results   Component Value Date    INR 1.0 03/16/2023    INR 1.0 02/09/2023    INR 1.1 06/20/2022     Lab Results   Component Value Date    WBC 7.41 04/20/2023    HGB 12.5 (L) 04/20/2023    HCT 38.6 (L) 04/20/2023    MCV 99 (H) 04/20/2023     (L) 04/20/2023     BMP  Sodium   Date Value Ref Range Status   04/20/2023 138 136 - 145 mmol/L Final     Potassium   Date Value Ref Range Status   04/20/2023 4.5 3.5 - 5.1 mmol/L Final     Chloride   Date Value Ref Range Status   04/20/2023 103 95 - 110 mmol/L Final     CO2   Date Value Ref Range Status   04/20/2023 23 23 - 29 mmol/L Final     BUN   Date Value Ref Range Status   04/20/2023 16 6 - 20 mg/dL Final     Creatinine   Date Value Ref Range Status   04/20/2023 0.9 0.5 - 1.4 mg/dL Final     Calcium   Date Value Ref Range Status   04/20/2023 9.4 8.7 - 10.5 mg/dL Final     Anion Gap   Date Value Ref Range Status   04/20/2023 12 8 - 16 mmol/L Final     eGFR if    Date Value Ref Range Status   06/20/2022 >60.0 >60 mL/min/1.73 m^2 Final     eGFR if non    Date Value Ref Range Status   06/20/2022 >60.0 >60 mL/min/1.73 m^2 Final     Comment:     Calculation used to obtain the estimated glomerular filtration  rate (eGFR) is the CKD-EPI equation.        BNP  @LABRCNTIP(BNP,BNPTRIAGEBLO)@  @LABRCNTIP(troponini)@  Estimated Creatinine Clearance: 135.1 mL/min (based on SCr of 0.9 mg/dL).  No results found in the last 24 hours.  No results found in the last 24 hours.  No results found in the last 24 hours.    Assessment:      1. S/P TAVR (transcatheter aortic valve replacement)    2. Primary hypertension    3. Generalized edema    4. Aortic atherosclerosis    5. Chronic diastolic congestive heart failure    6. Complete heart block    7. Pacemaker    8. Class 3 severe obesity due to excess calories with serious comorbidity and  body mass index (BMI) of 45.0 to 49.9 in adult    9. Morbid obesity with BMI of 45.0-49.9, adult      Weight gain  SOB  S/p TAVR and PPM    Plan:   Add HCTZ 25 mg daily for HTn and CHF  Increase lasix to 20 mg daily   Repeat BMP in 1 week  Continue ASA lipitor metoprolol losartan   Fluid restriction 1.5 liters a day  Na< 2 gm   RTC in 3 months

## 2023-04-29 DIAGNOSIS — J45.20 MILD INTERMITTENT ASTHMA WITHOUT COMPLICATION: ICD-10-CM

## 2023-05-01 ENCOUNTER — LAB VISIT (OUTPATIENT)
Dept: LAB | Facility: HOSPITAL | Age: 58
End: 2023-05-01
Attending: INTERNAL MEDICINE
Payer: COMMERCIAL

## 2023-05-01 DIAGNOSIS — I10 PRIMARY HYPERTENSION: Chronic | ICD-10-CM

## 2023-05-01 LAB
ANION GAP SERPL CALC-SCNC: 10 MMOL/L (ref 8–16)
BUN SERPL-MCNC: 15 MG/DL (ref 6–20)
CALCIUM SERPL-MCNC: 10 MG/DL (ref 8.7–10.5)
CHLORIDE SERPL-SCNC: 99 MMOL/L (ref 95–110)
CO2 SERPL-SCNC: 28 MMOL/L (ref 23–29)
CREAT SERPL-MCNC: 1.1 MG/DL (ref 0.5–1.4)
EST. GFR  (NO RACE VARIABLE): >60 ML/MIN/1.73 M^2
GLUCOSE SERPL-MCNC: 91 MG/DL (ref 70–110)
POTASSIUM SERPL-SCNC: 4.7 MMOL/L (ref 3.5–5.1)
SODIUM SERPL-SCNC: 137 MMOL/L (ref 136–145)

## 2023-05-01 PROCEDURE — 80048 BASIC METABOLIC PNL TOTAL CA: CPT | Performed by: INTERNAL MEDICINE

## 2023-05-01 PROCEDURE — 36415 COLL VENOUS BLD VENIPUNCTURE: CPT | Performed by: INTERNAL MEDICINE

## 2023-05-01 RX ORDER — ALBUTEROL SULFATE 90 UG/1
AEROSOL, METERED RESPIRATORY (INHALATION)
Qty: 6.7 G | Refills: 1 | Status: SHIPPED | OUTPATIENT
Start: 2023-05-01 | End: 2023-07-31

## 2023-05-02 ENCOUNTER — TELEPHONE (OUTPATIENT)
Dept: PRIMARY CARE CLINIC | Facility: CLINIC | Age: 58
End: 2023-05-02
Payer: COMMERCIAL

## 2023-05-02 ENCOUNTER — TELEPHONE (OUTPATIENT)
Dept: CARDIOLOGY | Facility: CLINIC | Age: 58
End: 2023-05-02
Payer: COMMERCIAL

## 2023-05-02 NOTE — PROGRESS NOTES
Subjective:     Referring Physician: Dr Neel STONE  Dragan Man II is a 57 y.o. male who presents for TAVR follow up.     Hospital Course  Dragan Man II was admitted and underwent successful placement of a 29 mm EvolutFX TAVR via TF access under MAC sedation on 3/16/23. Please see full cath report for details. A transthoracic echo was performed immediately post procedure which showed trace paravalvular leak. An aortic valve mean gradient of 5 mmHg and a maximum velocity through the aortic valve of 1.1 m/s. Complete heart block post TAVR. A TVP was left in place and will watch overnight. EP was consulted. He underwent PPM implantation.     Interval History  Today he reports SOB when walking outside when it is hot. Walking inside it says that his SOB has imporved. He is slowly increasing his activity.     NYHA:I CCS:0    Review of Systems   Constitutional: Negative for chills and fever.   HENT:  Negative for sore throat.    Eyes:  Negative for blurred vision.   Cardiovascular:  Negative for chest pain, claudication, cyanosis, irregular heartbeat, leg swelling, near-syncope, orthopnea, palpitations, paroxysmal nocturnal dyspnea and syncope.   Respiratory:  Negative for cough and sputum production.    Hematologic/Lymphatic: Does not bruise/bleed easily.   Skin:  Negative for itching, rash and suspicious lesions.   Musculoskeletal:  Negative for falls.   Gastrointestinal:  Negative for abdominal pain and change in bowel habit.   Genitourinary:  Negative for dysuria.   Neurological:  Negative for disturbances in coordination, dizziness and loss of balance.   Psychiatric/Behavioral:  Negative for altered mental status.         Past Medical History:   Diagnosis Date    Actinic keratoses 10/14/2019    Bilateral carpal tunnel syndrome 4/12/2021    Chronic bilateral low back pain without sciatica 4/12/2021    Depression     Morbid obesity with BMI of 40.0-44.9, adult 11/17/2017    LAVERNE (obstructive sleep apnea)      Prediabetes 4/12/2021    Seasonal allergic rhinitis due to pollen 10/14/2019    Ulnar neuropathy of both upper extremities 2/17/2020       Current Outpatient Medications:     albuterol (PROVENTIL/VENTOLIN HFA) 90 mcg/actuation inhaler, INHALE 2 PUFFS BY MOUTH INTO THE LUNGS EVERY 4 HOURS AS NEEDED FOR WHEEZING. RESCUE, Disp: 6.7 g, Rfl: 1    aspirin (ECOTRIN) 81 MG EC tablet, Take 81 mg by mouth once daily., Disp: , Rfl:     atorvastatin (LIPITOR) 20 MG tablet, Take 1 tablet (20 mg total) by mouth every evening., Disp: 90 tablet, Rfl: 3    doxycycline (MONODOX) 100 MG capsule, TAKE ONCE DAILY WITH FOOD. MAY CAUSE UPSET STOMACH., Disp: 90 capsule, Rfl: 1    fluticasone furoate (ARNUITY ELLIPTA) 100 mcg/actuation inhaler, Inhale 1 puff into the lungs once daily., Disp: 30 each, Rfl: 11    furosemide (LASIX) 20 MG tablet, Take 1 tablet (20 mg total) by mouth once daily., Disp: 30 tablet, Rfl: 11    hydroCHLOROthiazide (HYDRODIURIL) 25 MG tablet, Take 1 tablet (25 mg total) by mouth once daily., Disp: 30 tablet, Rfl: 11    losartan (COZAAR) 50 MG tablet, Take 1 tablet (50 mg total) by mouth once daily., Disp: 90 tablet, Rfl: 3    metoprolol succinate (TOPROL-XL) 50 MG 24 hr tablet, TAKE 1 TABLET BY MOUTH EVERY DAY, Disp: 90 tablet, Rfl: 3    mometasone (NASONEX) 50 mcg/actuation nasal spray, INSTILL 2 SPRAYS INTO EACH NOSTRIL ONCE DAILY., Disp: 1 each, Rfl: 1    semaglutide (OZEMPIC) 2 mg/dose (8 mg/3 mL) PnIj, Inject 2 mg into the skin every 7 days., Disp: 1 pen, Rfl: 2    sodium chloride (OCEAN) 0.65 % nasal spray, 1 spray by Nasal route as needed for Congestion., Disp: , Rfl:     tretinoin (RETIN-A) 0.05 % cream, Apply pea-sized amount to entire face at bedtime.  If dryness, use every third night and increase as tolerated to every night., Disp: 20 g, Rfl: 6    Objective:    Physical Exam  Vitals reviewed.   Constitutional:       General: He is not in acute distress.     Appearance: He is well-developed. He is not  diaphoretic.   HENT:      Head: Normocephalic and atraumatic.   Neck:      Vascular: No JVD.   Cardiovascular:      Rate and Rhythm: Normal rate and regular rhythm.      Pulses: Intact distal pulses.      Heart sounds: No murmur heard.  Pulmonary:      Effort: Pulmonary effort is normal. No respiratory distress.   Musculoskeletal:      Cervical back: Normal range of motion.      Right lower leg: No edema.      Left lower leg: No edema.   Skin:     General: Skin is warm and dry.   Neurological:      Mental Status: He is alert and oriented to person, place, and time.           Vitals:    05/03/23 1143   BP: (!) 138/58   Pulse: 89   SpO2: 98%   Weight: (!) 150 kg (330 lb 11 oz)   Height: 6' (1.829 m)     Body mass index is 44.85 kg/m².    Test(s) Reviewed  I have reviewed the following in detail:  [] Stress test   [] Angiography   [x] Echocardiogram   [x] Labs   [] Other       Chemistry        Component Value Date/Time     05/01/2023 0917    K 4.7 05/01/2023 0917    CL 99 05/01/2023 0917    CO2 28 05/01/2023 0917    BUN 15 05/01/2023 0917    CREATININE 1.1 05/01/2023 0917    GLU 91 05/01/2023 0917        Component Value Date/Time    CALCIUM 10.0 05/01/2023 0917    ALKPHOS 74 03/17/2023 0313    AST 26 03/17/2023 0313    ALT 27 03/17/2023 0313    BILITOT 1.3 (H) 03/17/2023 0313    ESTGFRAFRICA >60.0 06/20/2022 1635    EGFRNONAA >60.0 06/20/2022 1635            TTE 4/20/23   The left ventricle is normal in size with moderate concentric hypertrophy and normal systolic function.  Mild left atrial enlargement.  The estimated ejection fraction is 60%.  Grade II left ventricular diastolic dysfunction.  There is abnormal septal wall motion consistent with right ventricular pacemaker.  Normal right ventricular size with normal right ventricular systolic function.  There is a 29 mm evolute transcutaneously-placed aortic bioprosthesis present. Well seated  The aortic valve mean gradient is 16 mmHg with a dimensionless index  of 0.57.  Normal central venous pressure (3 mmHg).  The estimated PA systolic pressure is 34 mmHg.    Assessment:   S/P TAVR (transcatheter aortic valve replacement)  Successful transfemoral aortic valve replacement with a 29 mm EvolutFX valve. TTE reviewed which shows a well functioning valve. MG 16 mmHg.     Chronic diastolic congestive heart failure  Chronic. Controlled. Follow up with Cardiology/PCP.     Severe aortic stenosis  See above     Complete heart block  TVP in place. Plan for PPM this afternoon with EP.     Plan:     Follow up with [facility] in 1 year with labs and Echo.  ASA indefinitely.   No non sterile procedures which could cause endocarditis for 6 months post TAVR including dental work, endoscopy, colonoscopy, and  procedures.   SBE prophylaxis for life.         Lalita Ballesteros PA-C  Valve and Structural Heart Disease  [facility]

## 2023-05-02 NOTE — TELEPHONE ENCOUNTER
Called pt at listed number to check on him; has done well since TAVR. Feels like is doing well. He will schedule soon. Got ozempic filled.     rdf

## 2023-05-02 NOTE — TELEPHONE ENCOUNTER
----- Message from Ron Shaikh MD sent at 5/2/2023  1:21 PM CDT -----  The lab stable  How is the breathing?

## 2023-05-03 ENCOUNTER — OFFICE VISIT (OUTPATIENT)
Dept: CARDIOLOGY | Facility: CLINIC | Age: 58
End: 2023-05-03
Payer: COMMERCIAL

## 2023-05-03 ENCOUNTER — PATIENT MESSAGE (OUTPATIENT)
Dept: CARDIOLOGY | Facility: CLINIC | Age: 58
End: 2023-05-03
Payer: COMMERCIAL

## 2023-05-03 ENCOUNTER — TELEPHONE (OUTPATIENT)
Dept: CARDIOLOGY | Facility: CLINIC | Age: 58
End: 2023-05-03
Payer: COMMERCIAL

## 2023-05-03 VITALS
WEIGHT: 315 LBS | HEIGHT: 72 IN | HEART RATE: 89 BPM | DIASTOLIC BLOOD PRESSURE: 58 MMHG | OXYGEN SATURATION: 98 % | SYSTOLIC BLOOD PRESSURE: 138 MMHG | BODY MASS INDEX: 42.66 KG/M2

## 2023-05-03 DIAGNOSIS — Z95.2 S/P TAVR (TRANSCATHETER AORTIC VALVE REPLACEMENT): Primary | ICD-10-CM

## 2023-05-03 DIAGNOSIS — I44.2 COMPLETE HEART BLOCK: ICD-10-CM

## 2023-05-03 DIAGNOSIS — I50.32 CHRONIC DIASTOLIC CONGESTIVE HEART FAILURE: ICD-10-CM

## 2023-05-03 PROCEDURE — 1159F PR MEDICATION LIST DOCUMENTED IN MEDICAL RECORD: ICD-10-PCS | Mod: CPTII,S$GLB,, | Performed by: PHYSICIAN ASSISTANT

## 2023-05-03 PROCEDURE — 3078F DIAST BP <80 MM HG: CPT | Mod: CPTII,S$GLB,, | Performed by: PHYSICIAN ASSISTANT

## 2023-05-03 PROCEDURE — 99999 PR PBB SHADOW E&M-EST. PATIENT-LVL III: ICD-10-PCS | Mod: PBBFAC,,, | Performed by: PHYSICIAN ASSISTANT

## 2023-05-03 PROCEDURE — 4010F PR ACE/ARB THEARPY RXD/TAKEN: ICD-10-PCS | Mod: CPTII,S$GLB,, | Performed by: PHYSICIAN ASSISTANT

## 2023-05-03 PROCEDURE — 3008F BODY MASS INDEX DOCD: CPT | Mod: CPTII,S$GLB,, | Performed by: PHYSICIAN ASSISTANT

## 2023-05-03 PROCEDURE — 3078F PR MOST RECENT DIASTOLIC BLOOD PRESSURE < 80 MM HG: ICD-10-PCS | Mod: CPTII,S$GLB,, | Performed by: PHYSICIAN ASSISTANT

## 2023-05-03 PROCEDURE — 99214 OFFICE O/P EST MOD 30 MIN: CPT | Mod: S$GLB,,, | Performed by: PHYSICIAN ASSISTANT

## 2023-05-03 PROCEDURE — 3075F SYST BP GE 130 - 139MM HG: CPT | Mod: CPTII,S$GLB,, | Performed by: PHYSICIAN ASSISTANT

## 2023-05-03 PROCEDURE — 99214 PR OFFICE/OUTPT VISIT, EST, LEVL IV, 30-39 MIN: ICD-10-PCS | Mod: S$GLB,,, | Performed by: PHYSICIAN ASSISTANT

## 2023-05-03 PROCEDURE — 1159F MED LIST DOCD IN RCRD: CPT | Mod: CPTII,S$GLB,, | Performed by: PHYSICIAN ASSISTANT

## 2023-05-03 PROCEDURE — 99999 PR PBB SHADOW E&M-EST. PATIENT-LVL III: CPT | Mod: PBBFAC,,, | Performed by: PHYSICIAN ASSISTANT

## 2023-05-03 PROCEDURE — 3075F PR MOST RECENT SYSTOLIC BLOOD PRESS GE 130-139MM HG: ICD-10-PCS | Mod: CPTII,S$GLB,, | Performed by: PHYSICIAN ASSISTANT

## 2023-05-03 PROCEDURE — 3008F PR BODY MASS INDEX (BMI) DOCUMENTED: ICD-10-PCS | Mod: CPTII,S$GLB,, | Performed by: PHYSICIAN ASSISTANT

## 2023-05-03 PROCEDURE — 4010F ACE/ARB THERAPY RXD/TAKEN: CPT | Mod: CPTII,S$GLB,, | Performed by: PHYSICIAN ASSISTANT

## 2023-05-22 ENCOUNTER — OFFICE VISIT (OUTPATIENT)
Dept: INTERNAL MEDICINE | Facility: CLINIC | Age: 58
End: 2023-05-22
Payer: COMMERCIAL

## 2023-05-22 DIAGNOSIS — I10 PRIMARY HYPERTENSION: Chronic | ICD-10-CM

## 2023-05-22 DIAGNOSIS — Z79.899 ON STATIN THERAPY DUE TO RISK OF FUTURE CARDIOVASCULAR EVENT: Chronic | ICD-10-CM

## 2023-05-22 DIAGNOSIS — N32.89 BLADDER WALL THICKENING: Primary | ICD-10-CM

## 2023-05-22 DIAGNOSIS — I35.0 NONRHEUMATIC AORTIC VALVE STENOSIS: ICD-10-CM

## 2023-05-22 DIAGNOSIS — G47.33 OBSTRUCTIVE SLEEP APNEA TREATED WITH CONTINUOUS POSITIVE AIRWAY PRESSURE (CPAP): Chronic | ICD-10-CM

## 2023-05-22 DIAGNOSIS — I50.32 CHRONIC DIASTOLIC CONGESTIVE HEART FAILURE: ICD-10-CM

## 2023-05-22 PROCEDURE — 1160F PR REVIEW ALL MEDS BY PRESCRIBER/CLIN PHARMACIST DOCUMENTED: ICD-10-PCS | Mod: CPTII,95,, | Performed by: FAMILY MEDICINE

## 2023-05-22 PROCEDURE — 4010F PR ACE/ARB THEARPY RXD/TAKEN: ICD-10-PCS | Mod: CPTII,95,, | Performed by: FAMILY MEDICINE

## 2023-05-22 PROCEDURE — 1160F RVW MEDS BY RX/DR IN RCRD: CPT | Mod: CPTII,95,, | Performed by: FAMILY MEDICINE

## 2023-05-22 PROCEDURE — 1159F PR MEDICATION LIST DOCUMENTED IN MEDICAL RECORD: ICD-10-PCS | Mod: CPTII,95,, | Performed by: FAMILY MEDICINE

## 2023-05-22 PROCEDURE — 1159F MED LIST DOCD IN RCRD: CPT | Mod: CPTII,95,, | Performed by: FAMILY MEDICINE

## 2023-05-22 PROCEDURE — 99214 OFFICE O/P EST MOD 30 MIN: CPT | Mod: 95,,, | Performed by: FAMILY MEDICINE

## 2023-05-22 PROCEDURE — 99214 PR OFFICE/OUTPT VISIT, EST, LEVL IV, 30-39 MIN: ICD-10-PCS | Mod: 95,,, | Performed by: FAMILY MEDICINE

## 2023-05-22 PROCEDURE — 4010F ACE/ARB THERAPY RXD/TAKEN: CPT | Mod: CPTII,95,, | Performed by: FAMILY MEDICINE

## 2023-05-22 RX ORDER — LOSARTAN POTASSIUM 50 MG/1
50 TABLET ORAL DAILY
Qty: 90 TABLET | Refills: 2 | Status: SHIPPED | OUTPATIENT
Start: 2023-05-22 | End: 2023-09-01

## 2023-05-22 RX ORDER — ATORVASTATIN CALCIUM 20 MG/1
20 TABLET, FILM COATED ORAL NIGHTLY
Qty: 90 TABLET | Refills: 2 | Status: SHIPPED | OUTPATIENT
Start: 2023-05-22 | End: 2026-02-14

## 2023-05-22 RX ORDER — METOPROLOL SUCCINATE 50 MG/1
50 TABLET, EXTENDED RELEASE ORAL DAILY
Qty: 90 TABLET | Refills: 2 | Status: SHIPPED | OUTPATIENT
Start: 2023-05-22

## 2023-05-22 NOTE — PROGRESS NOTES
TELEMEDICINE VIRTUAL VISIT  5/22/23  7:00 AM CDT    Visit Details: This visit was a telemedicine virtual visit with synchronous audio and video. Dragan represented that his location at the time of this visit was in the [facility]. Dragan chose and consented to receive these medical services by telemedicine.    Subjective   CHIEF COMPLAINT: Follow-up    I reviewed the urologist's e-consult recommendations with him. As his urinalysis showed no hematuria, it was agreed to reevaluate with a bladder ultrasound in six months regarding his nonspecific bladder wall thickening incidentally seen on previous imaging.    He reports that his congestive heart failure symptoms are much improved. He no longer has such dyspnea on exertion.    His hypertension is adequately controlled. He appears to be tolerating his current medications well.    He is compliant with CPAP and has a follow-up in the pulmonology/sleep clinic scheduled soon.    Other conditions appear compensated/controlled and stable.      Review of Systems   Respiratory:  Negative for chest tightness and shortness of breath.    Cardiovascular:  Negative for chest pain.   Endocrine: Negative for polydipsia and polyuria.       Objective   Physical Exam  Constitutional:       General: He is not in acute distress.     Appearance: Normal appearance. He is not ill-appearing.   Pulmonary:      Effort: Pulmonary effort is normal. No respiratory distress.   Skin:     Coloration: Skin is not jaundiced.   Neurological:      Mental Status: He is alert. Mental status is at baseline.   Psychiatric:         Mood and Affect: Mood normal.         Behavior: Behavior normal.         Thought Content: Thought content normal.           Assessment and Plan   1. Bladder wall thickening  -     US Bladder; Future; Expected date: 11/22/2023    2. Chronic diastolic congestive heart failure  Overview:  ECHOCARDIOGRAM 03/22/2021  ·The left ventricle is normal in size with normal systolic  function. The estimated ejection fraction is 60%  ·Indeterminate left ventricular diastolic function.  ·Normal right ventricular size with normal right ventricular systolic function.  ·Mild left atrial enlargement.  ·Normal central venous pressure (3 mmHg).  ·There is severe aortic valve stenosis.  ·Aortic valve area is 1.25 cm2; peak velocity is 4.55 m/s; mean gradient is 50 mmHg.    ECHOCARDIOGRAM 10/18/2019  1 - Normal left ventricular systolic function (EF 60-65%).  2 - Impaired LV relaxation, elevated LAP (grade 2 diastolic dysfunction).  3 - Normal right ventricular systolic function.  4 - No wall motion abnormalities.  5 - Severe left atrial enlargement.  6 - Concentric hypertrophy.  7 - Mild aortic regurgitation.  8 - Moderate aortic stenosis, CRISTINO = 1.87 cm2, AVAi = 0.7 cm2/m2, peak velocity = 3.87 m/s, mean gradient = 34 mmHg.      3. On statin therapy due to risk of future cardiovascular event  -     atorvastatin (LIPITOR) 20 MG tablet; Take 1 tablet (20 mg total) by mouth every evening.  Dispense: 90 tablet; Refill: 2    4. Primary hypertension  -     losartan (COZAAR) 50 MG tablet; Take 1 tablet (50 mg total) by mouth once daily.  Dispense: 90 tablet; Refill: 2  -     metoprolol succinate (TOPROL-XL) 50 MG 24 hr tablet; Take 1 tablet (50 mg total) by mouth once daily.  Dispense: 90 tablet; Refill: 2    5. Nonrheumatic aortic valve stenosis, severe  Overview:  CARDIAC CATH 06/28/22  - The pre-procedure left ventricular end diastolic pressure was 32.  - There was trivial (1+) aortic regurgitation.  - The estimated blood loss was none.  - There was diastolic dysfunction.  - The coronary arteries were normal.  - The filling pressures on the right and left were moderately elevated. Pulmonary hypertension was moderate.  - 53 MMHG GRADIENT ACROSS AORTIC VAKLVE SUGGESTIVE OF SEVERE AORTIC STENOSIS.    ECHOCARDIOGRAM 10/18/2019  1 - Normal left ventricular systolic function (EF 60-65%).  2 - Impaired LV  relaxation, elevated LAP (grade 2 diastolic dysfunction).  3 - Normal right ventricular systolic function.  4 - No wall motion abnormalities.  5 - Severe left atrial enlargement.  6 - Concentric hypertrophy.  7 - Mild aortic regurgitation.  8 - Moderate aortic stenosis, CRISTINO = 1.87 cm2, AVAi = 0.7 cm2/m2, peak velocity = 3.87 m/s, mean gradient = 34 mmHg.    Orders:  -     losartan (COZAAR) 50 MG tablet; Take 1 tablet (50 mg total) by mouth once daily.  Dispense: 90 tablet; Refill: 2  -     metoprolol succinate (TOPROL-XL) 50 MG 24 hr tablet; Take 1 tablet (50 mg total) by mouth once daily.  Dispense: 90 tablet; Refill: 2    6. Obstructive sleep apnea treated with continuous positive airway pressure (CPAP)    Unless noted herein, any chronic conditions are represented as and appear stable, and no other significant complaints or concerns were reported.    Follow up in about 6 months (around 11/22/2023) for review test results, discuss treatment plan, re-evaluation.   Future Appointments   Date Time Provider Department Center   5/30/2023  1:00 PM Elizabeth Lejeune, NP [facility] SLEEP [facility]   6/15/2023 10:00 AM HOME MONITOR DEVICE CHECK, [facility] ARRHPRO [facility]   6/21/2023  8:20 AM Royce Liao MD [facility] ARRHYTH [facility]   7/20/2023  8:30 AM Skye Aguirre MD [facility] DERM [facility]   7/28/2023  9:00 AM Ron Shaikh MD ON CARDIO [facility]        Assessment and Plan    1. Bladder wall thickening  - US Bladder; Future    2. Chronic diastolic congestive heart failure    3. On statin therapy due to risk of future cardiovascular event  - atorvastatin (LIPITOR) 20 MG tablet; Take 1 tablet (20 mg total) by mouth every evening.  Dispense: 90 tablet; Refill: 2    4. Primary hypertension  - losartan (COZAAR) 50 MG tablet; Take 1 tablet (50 mg total) by mouth once daily.  Dispense: 90 tablet; Refill: 2  - metoprolol succinate (TOPROL-XL) 50 MG 24 hr tablet; Take 1 tablet (50 mg total) by mouth once daily.  Dispense: 90  "tablet; Refill: 2    5. Nonrheumatic aortic valve stenosis, severe  - losartan (COZAAR) 50 MG tablet; Take 1 tablet (50 mg total) by mouth once daily.  Dispense: 90 tablet; Refill: 2  - metoprolol succinate (TOPROL-XL) 50 MG 24 hr tablet; Take 1 tablet (50 mg total) by mouth once daily.  Dispense: 90 tablet; Refill: 2    6. Obstructive sleep apnea treated with continuous positive airway pressure (CPAP)    Orders Placed This Encounter    US Bladder    atorvastatin (LIPITOR) 20 MG tablet    losartan (COZAAR) 50 MG tablet    metoprolol succinate (TOPROL-XL) 50 MG 24 hr tablet        TOTAL TIME evaluating and managing this patient for this encounter was greater than or equal to 27 minutes. This time was exclusive of any separately billable procedures for this patient and exclusive of time spent treating any other patients.    Documentation entered by me for this encounter may have been done in part using speech-recognition technology. Although I have made an effort to ensure accuracy, "sound like" errors may exist and should be interpreted in context.    Each patient to whom medical services are provided by telemedicine is: (1) informed of the relationship between the physician and patient and the respective role of any other health care provider with respect to management of the patient; and (2) notified that he or she may decline to receive medical services by telemedicine and may withdraw from such care at any time.  "

## 2023-05-30 ENCOUNTER — OFFICE VISIT (OUTPATIENT)
Dept: SLEEP MEDICINE | Facility: CLINIC | Age: 58
End: 2023-05-30
Payer: COMMERCIAL

## 2023-05-30 VITALS
DIASTOLIC BLOOD PRESSURE: 68 MMHG | BODY MASS INDEX: 42.66 KG/M2 | HEART RATE: 72 BPM | WEIGHT: 315 LBS | OXYGEN SATURATION: 97 % | RESPIRATION RATE: 16 BRPM | HEIGHT: 72 IN | SYSTOLIC BLOOD PRESSURE: 116 MMHG

## 2023-05-30 DIAGNOSIS — R06.09 DOE (DYSPNEA ON EXERTION): Primary | ICD-10-CM

## 2023-05-30 DIAGNOSIS — R91.1 PULMONARY NODULE: Chronic | ICD-10-CM

## 2023-05-30 DIAGNOSIS — G47.33 OBSTRUCTIVE SLEEP APNEA TREATED WITH CONTINUOUS POSITIVE AIRWAY PRESSURE (CPAP): Chronic | ICD-10-CM

## 2023-05-30 DIAGNOSIS — R94.2 RESTRICTIVE PATTERN PRESENT ON PULMONARY FUNCTION TESTING: ICD-10-CM

## 2023-05-30 PROCEDURE — 3008F BODY MASS INDEX DOCD: CPT | Mod: CPTII,S$GLB,, | Performed by: NURSE PRACTITIONER

## 2023-05-30 PROCEDURE — 1159F PR MEDICATION LIST DOCUMENTED IN MEDICAL RECORD: ICD-10-PCS | Mod: CPTII,S$GLB,, | Performed by: NURSE PRACTITIONER

## 2023-05-30 PROCEDURE — 1160F RVW MEDS BY RX/DR IN RCRD: CPT | Mod: CPTII,S$GLB,, | Performed by: NURSE PRACTITIONER

## 2023-05-30 PROCEDURE — 4010F PR ACE/ARB THEARPY RXD/TAKEN: ICD-10-PCS | Mod: CPTII,S$GLB,, | Performed by: NURSE PRACTITIONER

## 2023-05-30 PROCEDURE — 3008F PR BODY MASS INDEX (BMI) DOCUMENTED: ICD-10-PCS | Mod: CPTII,S$GLB,, | Performed by: NURSE PRACTITIONER

## 2023-05-30 PROCEDURE — 99214 PR OFFICE/OUTPT VISIT, EST, LEVL IV, 30-39 MIN: ICD-10-PCS | Mod: S$GLB,,, | Performed by: NURSE PRACTITIONER

## 2023-05-30 PROCEDURE — 1160F PR REVIEW ALL MEDS BY PRESCRIBER/CLIN PHARMACIST DOCUMENTED: ICD-10-PCS | Mod: CPTII,S$GLB,, | Performed by: NURSE PRACTITIONER

## 2023-05-30 PROCEDURE — 99214 OFFICE O/P EST MOD 30 MIN: CPT | Mod: S$GLB,,, | Performed by: NURSE PRACTITIONER

## 2023-05-30 PROCEDURE — 4010F ACE/ARB THERAPY RXD/TAKEN: CPT | Mod: CPTII,S$GLB,, | Performed by: NURSE PRACTITIONER

## 2023-05-30 PROCEDURE — 3078F DIAST BP <80 MM HG: CPT | Mod: CPTII,S$GLB,, | Performed by: NURSE PRACTITIONER

## 2023-05-30 PROCEDURE — 3074F PR MOST RECENT SYSTOLIC BLOOD PRESSURE < 130 MM HG: ICD-10-PCS | Mod: CPTII,S$GLB,, | Performed by: NURSE PRACTITIONER

## 2023-05-30 PROCEDURE — 99999 PR PBB SHADOW E&M-EST. PATIENT-LVL V: ICD-10-PCS | Mod: PBBFAC,,, | Performed by: NURSE PRACTITIONER

## 2023-05-30 PROCEDURE — 3074F SYST BP LT 130 MM HG: CPT | Mod: CPTII,S$GLB,, | Performed by: NURSE PRACTITIONER

## 2023-05-30 PROCEDURE — 3078F PR MOST RECENT DIASTOLIC BLOOD PRESSURE < 80 MM HG: ICD-10-PCS | Mod: CPTII,S$GLB,, | Performed by: NURSE PRACTITIONER

## 2023-05-30 PROCEDURE — 1159F MED LIST DOCD IN RCRD: CPT | Mod: CPTII,S$GLB,, | Performed by: NURSE PRACTITIONER

## 2023-05-30 PROCEDURE — 99999 PR PBB SHADOW E&M-EST. PATIENT-LVL V: CPT | Mod: PBBFAC,,, | Performed by: NURSE PRACTITIONER

## 2023-05-30 NOTE — PROGRESS NOTES
Subjective:      Patient ID: Dragan Man II is a 57 y.o. male.    Chief Complaint: Sleep Apnea    HPI    Presents to office for review of AutoPAP therapy. Recent replacement. Patient states improved symptoms with use of AutoPAP. SPatient states he is benefiting from use of the AutoPAP. His original AHI is 119 events per hour. He is not falling asleep unintentionally during the day. [place] Sleepiness scale score: 4     History of asthma-PFT with restriction due to obesity.  BMI 45 with significant abdominal obesity. FeNo: 33. FENO between 25 and 50 ppb in adults should be interpreted cautiously with reference to the clinical situation (eg, symptomatic, on or off therapy, current smoking). May possibly benefit from ICS but no significant inflammation.  Arnuity was started daily. Feels like possible improvement in his breathing.    4mm nodule seen on recent CT. nonsmoker      Patient Active Problem List   Diagnosis    Obstructive sleep apnea treated with continuous positive airway pressure (CPAP)    Advanced sleep phase syndrome    Behaviorally induced insufficient sleep syndrome    Sleep-related bruxism    Inadequate sleep hygiene    Morbid obesity with BMI of 45.0-49.9, adult    Primary hypertension    Actinic keratoses    Heart murmur    ROLLINS (dyspnea on exertion)    Seasonal allergic rhinitis due to pollen    On statin therapy due to risk of future cardiovascular event    Left atrial enlargement    Aortic valve stenosis    Ulnar neuropathy of both upper extremities    Generalized edema    Chronic diastolic congestive heart failure    PVD (peripheral vascular disease)    Prediabetes    Mild anemia    Chronic bilateral low back pain without sciatica    Bilateral carpal tunnel syndrome    Right-sided low back pain without sciatica    Right foot pain    Lower extremity edema    At risk for cardiovascular event    Von Willebrand disease    Pulmonary hypertension    Class 3 severe obesity due to excess calories with  serious comorbidity and body mass index (BMI) of 45.0 to 49.9 in adult    Aortic atherosclerosis    S/P TAVR (transcatheter aortic valve replacement)    Complete heart block    Pulmonary nodule    Bladder wall thickening    Pacemaker     /68   Pulse 72   Resp 16   Ht 6' (1.829 m)   Wt (!) 152.6 kg (336 lb 6.8 oz)   SpO2 97%   BMI 45.63 kg/m²   Body mass index is 45.63 kg/m².    Review of Systems   Respiratory:  Positive for dyspnea on extertion.    Psychiatric/Behavioral:  Positive for sleep disturbance.    All other systems reviewed and are negative.  Objective:      Physical Exam  Constitutional:       Appearance: He is well-developed. He is obese.   HENT:      Head: Normocephalic and atraumatic.      Nose: Nose normal.   Neck:      Thyroid: No thyroid mass or thyromegaly.      Trachea: Trachea normal.   Cardiovascular:      Rate and Rhythm: Normal rate and regular rhythm.      Heart sounds: Normal heart sounds.   Pulmonary:      Effort: Pulmonary effort is normal.      Breath sounds: Normal breath sounds. No wheezing, rhonchi or rales.   Abdominal:      General: There is distension.      Palpations: Abdomen is soft. There is no splenomegaly.   Musculoskeletal:         General: Normal range of motion.      Cervical back: Normal range of motion and neck supple.   Skin:     General: Skin is warm and dry.   Neurological:      Mental Status: He is alert and oriented to person, place, and time.   Psychiatric:         Mood and Affect: Mood normal.         Behavior: Behavior normal.     Personal Diagnostic Review        Results for orders placed during the hospital encounter of 04/20/23    X-Ray Chest PA And Lateral    Narrative  EXAMINATION:  XR CHEST PA AND LATERAL    CLINICAL HISTORY:  Other forms of dyspnea    TECHNIQUE:  PA and lateral views of the chest were performed.    COMPARISON:  03/17/2023.    FINDINGS:  There is a cardiac pacer, unchanged in position from prior    The lungs are well expanded and  clear. No focal opacities are seen. The pleural spaces are clear.    The cardiac silhouette is enlarged.  There is a prosthetic aortic valve.    The visualized osseous structures demonstrate degenerative changes.    Impression  No acute cardiopulmonary abnormality.      Electronically signed by: Oscar Tobias  Date:    04/20/2023  Time:    17:26    Compliance Report  Compliance  Payor Standard  Usage 04/30/2023 - 05/29/2023  Usage days 30/30 days (100%)  >= 4 hours 30 days (100%)  < 4 hours 0 days (0%)  Usage hours 269 hours 38 minutes  Average usage (total days) 8 hours 59 minutes  Average usage (days used) 8 hours 59 minutes  Median usage (days used) 8 hours 27 minutes  Total used hours (value since last reset - 05/29/2023) 674 hours  AirSense 11 AutoSet  Serial number [id number]  Mode AutoSet  Min Pressure 13 cmH2O  Max Pressure 20 cmH2O  EPR Fulltime  EPR level 3  Response Standard  Therapy  Pressure - cmH2O Median: 14.0 95th percentile: 16.7 Maximum: 18.5  Leaks - L/min Median: 5.3 95th percentile: 18.7 Maximum: 32.4  Events per hour AI: 0.2 HI: 0.4 AHI: 0.6  Apnea Index Central: 0.0 Obstructive: 0.1 Unknown: 0.0  RERA Index 0.5      CTA Cardiac TAVR_Partners (xpd)  Order: [id number]  Status: Final result     Visible to patient: Yes (seen)     Next appt: 06/01/2023 at 09:45 AM in Dermatology (Skye Aguirre MD)     Dx: Chronic diastolic congestive heart fa...     0 Result Notes  Details    Reading Physician Reading Date Result Priority   Klaudia Velasquez MD  [phone] 2/9/2023      Narrative & Impression  EXAM:  CTA CARDIAC TAVR_PARTNERS (XPD)     CLINICAL HISTORY: Aortic stenosis     TECHNIQUE: Contrast-enhanced CT of the chest, abdomen, and pelvis was performed after the administration of     intravenous contrast.      All CT scans at this location are performed using dose modulation techniques as appropriate to a performed exam including the following: automated exposure control; adjustment of the mA  and/or kV according to patient size (this includes techniques or standardized protocols for targeted exams where dose is matched to indication / reason for exam; i.e. extremities or head); use of iterative reconstruction technique.     COMPARISON:  No prior CT is available for comparison.     FINDINGS:        TAVR Measurements     Left ventricle outflow tract:  1.7 x 2.4 cm  CTA smallest diameter:  Infrarenal abdominal aorta:  1.8 cm  Left common iliac artery:  0.9 cm  Left external iliac artery:  0.9 cm  Left common femoral artery:  0.9 cm  Right common Iliac artery:  1.2 cm  Right external Iliac artery:  0.8 cm  Right common femoral artery:  0.8 cm     CHEST:   Soft tissue structures at the base of the neck are within normal limits.     No aortic aneurysm or dissection.  There is minimal aortoiliac atherosclerosis.  There is moderate coronary artery atherosclerosis.  There is no cardiomegaly or pericardial effusion.  No pathologically enlarged mediastinal, hilar, or axillary lymph nodes.     The trachea and proximal airways are patent.  No endobronchial or endotracheal lesions are evident.  The lungs are clear without large consolidation.  There is mild bilateral dependent density suggesting atelectasis or fibrosis.  There is a 4 mm right lower lobe pulmonary nodule (axial series 2, image 79).  No significant pleural fluid, pleural thickening, or pneumothorax.     ABDOMEN AND PELVIS:  The liver, spleen, pancreas, and adrenal glands are not unusual in contrast-enhanced CT appearance.  The gallbladder and bile ducts are within normal limits.     The kidneys enhance homogeneously.  No hydronephrosis or nephrolithiasis.  The ureters are unremarkable.  There is mild diffuse urinary bladder wall thickening which may relate to underdistention versus cystitis or chronic outlet obstruction.  The reproductive organs are not unusual in CT appearance.     The stomach and small bowel are unremarkable.  There are few colonic  diverticula without inflammatory change of diverticulitis.  No evidence of bowel obstruction or acute bowel inflammation.  The appendix is normal.  No free fluid or free air.     No aortic aneurysm identified.  No pathologically enlarged lymph nodes.  There is a small fat-containing left inguinal hernia.     OSSEOUS STRUCTURES: No acute or concerning osseous abnormality.  There is mild degenerative change of spine.        Impression:        Extensive calcification of the aortic valve with TAVR measurements as above.     4 mm right lower lobe pulmonary nodule.  In a low-risk individual, no further follow-up required.  An high-risk individual, consider optional CT at 12 months to ensure stability.     Mild diffuse urinary bladder wall thickening which may relate to nondistention versus cystitis or chronic outlet obstruction.     Report Date: 2/9/2023 1:30 PM     Finalized on: 2/9/2023 1:30 PM By:  Klaudia Velasquez MD  [facility]# [id number]      2023-02-09 13:32:[id number]    [facility]           Exam Ended: 02/09/23 11:57 Last Resulted: 02/09/23 13:30              Assessment:       1. ROLLINS (dyspnea on exertion)    2. Obstructive sleep apnea treated with continuous positive airway pressure (CPAP)    3. Pulmonary nodule    4. Restrictive pattern present on pulmonary function testing        Outpatient Encounter Medications as of 5/30/2023   Medication Sig Dispense Refill    albuterol (PROVENTIL/VENTOLIN HFA) 90 mcg/actuation inhaler INHALE 2 PUFFS BY MOUTH INTO THE LUNGS EVERY 4 HOURS AS NEEDED FOR WHEEZING. RESCUE 6.7 g 1    aspirin (ECOTRIN) 81 MG EC tablet Take 81 mg by mouth once daily.      atorvastatin (LIPITOR) 20 MG tablet Take 1 tablet (20 mg total) by mouth every evening. 90 tablet 2    doxycycline (MONODOX) 100 MG capsule TAKE ONCE DAILY WITH FOOD. MAY CAUSE UPSET STOMACH. 90 capsule 1    fluticasone furoate (ARNUITY ELLIPTA) 100 mcg/actuation inhaler Inhale 1 puff into the lungs once daily. 30 each 11    furosemide (LASIX) 20  MG tablet Take 1 tablet (20 mg total) by mouth once daily. 30 tablet 11    hydroCHLOROthiazide (HYDRODIURIL) 25 MG tablet Take 1 tablet (25 mg total) by mouth once daily. 30 tablet 11    losartan (COZAAR) 50 MG tablet Take 1 tablet (50 mg total) by mouth once daily. 90 tablet 2    metoprolol succinate (TOPROL-XL) 50 MG 24 hr tablet Take 1 tablet (50 mg total) by mouth once daily. 90 tablet 2    mometasone (NASONEX) 50 mcg/actuation nasal spray INSTILL 2 SPRAYS INTO EACH NOSTRIL ONCE DAILY. 1 each 1    semaglutide (OZEMPIC) 2 mg/dose (8 mg/3 mL) PnIj Inject 2 mg into the skin every 7 days. 1 pen 2    sodium chloride (OCEAN) 0.65 % nasal spray 1 spray by Nasal route as needed for Congestion.      tretinoin (RETIN-A) 0.05 % cream Apply pea-sized amount to entire face at bedtime.  If dryness, use every third night and increase as tolerated to every night. 20 g 6     No facility-administered encounter medications on file as of 5/30/2023.     Orders Placed This Encounter   Procedures    Spirometry with/without bronchodilator     Standing Status:   Future     Standing Expiration Date:   5/29/2024     Order Specific Question:   Release to patient     Answer:   Immediate     Plan:      Continue current pulmonary drug regimen.  Repeat spirometry 6-8 weeks. Weight loss -restriction on PFT  Compliant with PAP and benefits from use.    Problem List Items Addressed This Visit          Pulmonary    Pulmonary nodule (Chronic)    Overview     02/2023 There is a 4 mm right lower lobe pulmonary nodule (axial series 2, image 79). Low risk for malignancy. No follow up     Social History     Tobacco Use   Smoking Status Never   Smokeless Tobacco Former    Types: Snuff    Quit date: 2003   Tobacco Comments    quit 15 years ago                  Cardiac/Vascular    ROLLINS (dyspnea on exertion) - Primary    Relevant Orders    Spirometry with/without bronchodilator       Other    Obstructive sleep apnea treated with continuous positive airway  pressure (CPAP) (Chronic)     Other Visit Diagnoses       Restrictive pattern present on pulmonary function testing                             Elizabeth LeJeune, ACNP, ANP

## 2023-06-01 ENCOUNTER — OFFICE VISIT (OUTPATIENT)
Dept: DERMATOLOGY | Facility: CLINIC | Age: 58
End: 2023-06-01
Payer: COMMERCIAL

## 2023-06-01 DIAGNOSIS — Z12.83 SCREENING, MALIGNANT NEOPLASM, SKIN: ICD-10-CM

## 2023-06-01 DIAGNOSIS — L57.0 ACTINIC KERATOSES: ICD-10-CM

## 2023-06-01 DIAGNOSIS — L70.0 ACNE VULGARIS: ICD-10-CM

## 2023-06-01 DIAGNOSIS — L71.9 ROSACEA: ICD-10-CM

## 2023-06-01 DIAGNOSIS — Z85.828 HISTORY OF SKIN CANCER: ICD-10-CM

## 2023-06-01 DIAGNOSIS — L90.5 SCAR CONDITIONS/SKIN FIBROSIS: Primary | ICD-10-CM

## 2023-06-01 PROCEDURE — 1160F RVW MEDS BY RX/DR IN RCRD: CPT | Mod: CPTII,S$GLB,, | Performed by: DERMATOLOGY

## 2023-06-01 PROCEDURE — 99999 PR PBB SHADOW E&M-EST. PATIENT-LVL III: CPT | Mod: PBBFAC,,, | Performed by: DERMATOLOGY

## 2023-06-01 PROCEDURE — 99999 PR PBB SHADOW E&M-EST. PATIENT-LVL III: ICD-10-PCS | Mod: PBBFAC,,, | Performed by: DERMATOLOGY

## 2023-06-01 PROCEDURE — 17003 DESTRUCTION, PREMALIGNANT LESIONS; SECOND THROUGH 14 LESIONS: ICD-10-PCS | Mod: S$GLB,,, | Performed by: DERMATOLOGY

## 2023-06-01 PROCEDURE — 4010F PR ACE/ARB THEARPY RXD/TAKEN: ICD-10-PCS | Mod: CPTII,S$GLB,, | Performed by: DERMATOLOGY

## 2023-06-01 PROCEDURE — 1159F PR MEDICATION LIST DOCUMENTED IN MEDICAL RECORD: ICD-10-PCS | Mod: CPTII,S$GLB,, | Performed by: DERMATOLOGY

## 2023-06-01 PROCEDURE — 1160F PR REVIEW ALL MEDS BY PRESCRIBER/CLIN PHARMACIST DOCUMENTED: ICD-10-PCS | Mod: CPTII,S$GLB,, | Performed by: DERMATOLOGY

## 2023-06-01 PROCEDURE — 17000 DESTRUCT PREMALG LESION: CPT | Mod: S$GLB,,, | Performed by: DERMATOLOGY

## 2023-06-01 PROCEDURE — 99214 PR OFFICE/OUTPT VISIT, EST, LEVL IV, 30-39 MIN: ICD-10-PCS | Mod: 25,S$GLB,, | Performed by: DERMATOLOGY

## 2023-06-01 PROCEDURE — 4010F ACE/ARB THERAPY RXD/TAKEN: CPT | Mod: CPTII,S$GLB,, | Performed by: DERMATOLOGY

## 2023-06-01 PROCEDURE — 17003 DESTRUCT PREMALG LES 2-14: CPT | Mod: S$GLB,,, | Performed by: DERMATOLOGY

## 2023-06-01 PROCEDURE — 1159F MED LIST DOCD IN RCRD: CPT | Mod: CPTII,S$GLB,, | Performed by: DERMATOLOGY

## 2023-06-01 PROCEDURE — 17000 PR DESTRUCTION(LASER SURGERY,CRYOSURGERY,CHEMOSURGERY),PREMALIGNANT LESIONS,FIRST LESION: ICD-10-PCS | Mod: S$GLB,,, | Performed by: DERMATOLOGY

## 2023-06-01 PROCEDURE — 99214 OFFICE O/P EST MOD 30 MIN: CPT | Mod: 25,S$GLB,, | Performed by: DERMATOLOGY

## 2023-06-01 RX ORDER — DOXYCYCLINE 100 MG/1
CAPSULE ORAL
Qty: 90 CAPSULE | Refills: 3 | Status: SHIPPED | OUTPATIENT
Start: 2023-06-01

## 2023-06-01 RX ORDER — TRETINOIN 0.25 MG/G
CREAM TOPICAL
Qty: 20 G | Refills: 6 | Status: SHIPPED | OUTPATIENT
Start: 2023-06-01 | End: 2023-08-01

## 2023-06-01 NOTE — PROGRESS NOTES
Subjective:       Patient ID:  Dragan Man II is a 57 y.o. male who presents for   Chief Complaint   Patient presents with    Skin Check     UBSE, spot of left nasal x 2 weeks, retin a,   currently on doxy     Dry Skin     Dry flaky skin of face      Hx of SCC of the right superior brow (s/p Mohs on 8-9/2020?), SCCIS of the right nasal sidewall (s/p Mohs 1/13/20), SCC of the left temple (s/p Mohs on 5/29/20), SCCIS of the left medial cheek (s/p Mohs on 5/29/20), SCCIS of the right lower eyelid (s/p Mohs on 6/1/20 ?), last seen on 6/3/22.  He c/o dryness of the forehead and lateral cheeks x several years.       Denies bleeding, tender, growing, or concerning lesions.      This is a high risk patient here to check for the development of new lesions.     For rosacea, he has tried doxy 100 mg qD.       Review of Systems   Constitutional:  Negative for fever and chills.   Gastrointestinal:  Negative for nausea and vomiting.   Skin:  Positive for activity-related sunscreen use. Negative for daily sunscreen use and recent sunburn.   Hematologic/Lymphatic: Does not bruise/bleed easily.      Objective:    Physical Exam   Constitutional: He appears well-developed and well-nourished. No distress.   Neurological: He is alert and oriented to person, place, and time. He is not disoriented.   Psychiatric: He has a normal mood and affect.   Skin:   Areas Examined (abnormalities noted in diagram):   Scalp / Hair Palpated and Inspected  Head / Face Inspection Performed  Neck Inspection Performed  Chest / Axilla Inspection Performed  Abdomen Inspection Performed  Back Inspection Performed  RUE Inspected  LUE Inspection Performed  Nails and Digits Inspection Performed                 Diagram Legend     Erythematous scaling macule/papule c/w actinic keratosis       Vascular papule c/w angioma      Pigmented verrucoid papule/plaque c/w seborrheic keratosis      Yellow umbilicated papule c/w sebaceous hyperplasia      Irregularly  shaped tan macule c/w lentigo     1-2 mm smooth white papules consistent with Milia      Movable subcutaneous cyst with punctum c/w epidermal inclusion cyst      Subcutaneous movable cyst c/w pilar cyst      Firm pink to brown papule c/w dermatofibroma      Pedunculated fleshy papule(s) c/w skin tag(s)      Evenly pigmented macule c/w junctional nevus     Mildly variegated pigmented, slightly irregular-bordered macule c/w mildly atypical nevus      Flesh colored to evenly pigmented papule c/w intradermal nevus       Pink pearly papule/plaque c/w basal cell carcinoma      Erythematous hyperkeratotic cursted plaque c/w SCC      Surgical scar with no sign of skin cancer recurrence      Open and closed comedones      Inflammatory papules and pustules      Verrucoid papule consistent consistent with wart     Erythematous eczematous patches and plaques     Dystrophic onycholytic nail with subungual debris c/w onychomycosis     Umbilicated papule    Erythematous-base heme-crusted tan verrucoid plaque consistent with inflamed seborrheic keratosis     Erythematous Silvery Scaling Plaque c/w Psoriasis     See annotation      Assessment / Plan:        Scar conditions/skin fibrosis  Screening, malignant neoplasm, skin  History of skin cancer  Scar of the several sites, hx of NMSC.  No evidence of recurrence on physical exam today.  Continue routine skin surveillance. Daily sunscreen advised.    Rosacea  -     doxycycline (MONODOX) 100 MG capsule; Take once daily with food. May cause upset stomach.  Dispense: 90 capsule; Refill: 3  -     will continue above med.     Acne vulgaris  -     tretinoin (RETIN-A) 0.025 % cream; Apply pea-sized amount to entire face at bedtime.  If dryness, use every third night and increase as tolerated to every night.  Dispense: 20 g; Refill: 6  -     diffuse actinic damage, will start above med in preparation to start efudex in the winter. The patient acknowledged understanding.       Actinic  keratoses  The patient is informed of the precancerous quality and need for treatment of these lesions. After risks, benefits and alternatives explained, including blistering, pain, hyper- and hypopigmentation, patient verbally consents to cryotherapy to precancerous lesions. Liquid nitrogen cryosurgery is applied to the 14 actinic keratoses, as detailed in the physical exam, to produce a freeze injury. The patient is aware that blisters may form and is instructed on wound care with gentle cleansing and use of vaseline ointment to keep moist until healed. The patient is supplied a handout on cryosurgery and is instructed to call if lesions do not completely resolve.           No follow-ups on file.

## 2023-06-01 NOTE — PATIENT INSTRUCTIONS

## 2023-06-06 ENCOUNTER — PATIENT MESSAGE (OUTPATIENT)
Dept: CARDIOLOGY | Facility: HOSPITAL | Age: 58
End: 2023-06-06
Payer: COMMERCIAL

## 2023-06-07 ENCOUNTER — TELEPHONE (OUTPATIENT)
Dept: ELECTROPHYSIOLOGY | Facility: CLINIC | Age: 58
End: 2023-06-07
Payer: COMMERCIAL

## 2023-06-07 NOTE — TELEPHONE ENCOUNTER
Pt's [facility] [facility] monitor shows disconnection.  Pt states he has been in contact with [facility] and was told he had a good connection.  However, there has been no transmission received.      Pt is currently not at home to check his monitor.  He will try to send and will contact [facility] stay connected today, he has the phone number.

## 2023-06-20 NOTE — Clinical Note
ID band present and verified. Post-Care Instructions: I reviewed with the patient in detail post-care instructions. Patient is to keep the biopsy site dry overnight, and then apply bacitracin twice daily until healed. Patient may apply hydrogen peroxide soaks to remove any crusting.

## 2023-06-21 ENCOUNTER — OFFICE VISIT (OUTPATIENT)
Dept: ELECTROPHYSIOLOGY | Facility: CLINIC | Age: 58
End: 2023-06-21
Payer: COMMERCIAL

## 2023-06-21 ENCOUNTER — PATIENT MESSAGE (OUTPATIENT)
Dept: ADMINISTRATIVE | Facility: OTHER | Age: 58
End: 2023-06-21
Payer: COMMERCIAL

## 2023-06-21 ENCOUNTER — CLINICAL SUPPORT (OUTPATIENT)
Dept: CARDIOLOGY | Facility: HOSPITAL | Age: 58
End: 2023-06-21
Attending: STUDENT IN AN ORGANIZED HEALTH CARE EDUCATION/TRAINING PROGRAM
Payer: COMMERCIAL

## 2023-06-21 VITALS
HEIGHT: 72 IN | WEIGHT: 315 LBS | BODY MASS INDEX: 42.66 KG/M2 | HEART RATE: 65 BPM | DIASTOLIC BLOOD PRESSURE: 63 MMHG | SYSTOLIC BLOOD PRESSURE: 133 MMHG

## 2023-06-21 DIAGNOSIS — I44.2 CHB (COMPLETE HEART BLOCK): ICD-10-CM

## 2023-06-21 DIAGNOSIS — Z95.0 CARDIAC PACEMAKER IN SITU: ICD-10-CM

## 2023-06-21 DIAGNOSIS — Z79.899 ON STATIN THERAPY DUE TO RISK OF FUTURE CARDIOVASCULAR EVENT: Chronic | ICD-10-CM

## 2023-06-21 DIAGNOSIS — Z95.0 PACEMAKER: ICD-10-CM

## 2023-06-21 DIAGNOSIS — I27.20 PULMONARY HYPERTENSION: Chronic | ICD-10-CM

## 2023-06-21 DIAGNOSIS — I50.32 CHRONIC DIASTOLIC CONGESTIVE HEART FAILURE: Chronic | ICD-10-CM

## 2023-06-21 DIAGNOSIS — Z95.2 S/P TAVR (TRANSCATHETER AORTIC VALVE REPLACEMENT): ICD-10-CM

## 2023-06-21 DIAGNOSIS — I44.2 COMPLETE HEART BLOCK: Primary | ICD-10-CM

## 2023-06-21 DIAGNOSIS — I10 PRIMARY HYPERTENSION: Chronic | ICD-10-CM

## 2023-06-21 PROCEDURE — 99215 OFFICE O/P EST HI 40 MIN: CPT | Mod: S$GLB,,, | Performed by: INTERNAL MEDICINE

## 2023-06-21 PROCEDURE — 3008F BODY MASS INDEX DOCD: CPT | Mod: CPTII,S$GLB,, | Performed by: INTERNAL MEDICINE

## 2023-06-21 PROCEDURE — 3075F PR MOST RECENT SYSTOLIC BLOOD PRESS GE 130-139MM HG: ICD-10-PCS | Mod: CPTII,S$GLB,, | Performed by: INTERNAL MEDICINE

## 2023-06-21 PROCEDURE — 3078F DIAST BP <80 MM HG: CPT | Mod: CPTII,S$GLB,, | Performed by: INTERNAL MEDICINE

## 2023-06-21 PROCEDURE — 99999 PR PBB SHADOW E&M-EST. PATIENT-LVL III: ICD-10-PCS | Mod: PBBFAC,,, | Performed by: INTERNAL MEDICINE

## 2023-06-21 PROCEDURE — 93005 RHYTHM STRIP: ICD-10-PCS | Mod: S$GLB,,, | Performed by: STUDENT IN AN ORGANIZED HEALTH CARE EDUCATION/TRAINING PROGRAM

## 2023-06-21 PROCEDURE — 93005 ELECTROCARDIOGRAM TRACING: CPT | Mod: S$GLB,,, | Performed by: STUDENT IN AN ORGANIZED HEALTH CARE EDUCATION/TRAINING PROGRAM

## 2023-06-21 PROCEDURE — 93280 PM DEVICE PROGR EVAL DUAL: CPT

## 2023-06-21 PROCEDURE — 93280 CARDIAC DEVICE CHECK - IN CLINIC & HOSPITAL: ICD-10-PCS | Mod: 26,,, | Performed by: STUDENT IN AN ORGANIZED HEALTH CARE EDUCATION/TRAINING PROGRAM

## 2023-06-21 PROCEDURE — 4010F PR ACE/ARB THEARPY RXD/TAKEN: ICD-10-PCS | Mod: CPTII,S$GLB,, | Performed by: INTERNAL MEDICINE

## 2023-06-21 PROCEDURE — 1159F PR MEDICATION LIST DOCUMENTED IN MEDICAL RECORD: ICD-10-PCS | Mod: CPTII,S$GLB,, | Performed by: INTERNAL MEDICINE

## 2023-06-21 PROCEDURE — 1159F MED LIST DOCD IN RCRD: CPT | Mod: CPTII,S$GLB,, | Performed by: INTERNAL MEDICINE

## 2023-06-21 PROCEDURE — 93010 ELECTROCARDIOGRAM REPORT: CPT | Mod: S$GLB,,, | Performed by: INTERNAL MEDICINE

## 2023-06-21 PROCEDURE — 4010F ACE/ARB THERAPY RXD/TAKEN: CPT | Mod: CPTII,S$GLB,, | Performed by: INTERNAL MEDICINE

## 2023-06-21 PROCEDURE — 3075F SYST BP GE 130 - 139MM HG: CPT | Mod: CPTII,S$GLB,, | Performed by: INTERNAL MEDICINE

## 2023-06-21 PROCEDURE — 99999 PR PBB SHADOW E&M-EST. PATIENT-LVL III: CPT | Mod: PBBFAC,,, | Performed by: INTERNAL MEDICINE

## 2023-06-21 PROCEDURE — 93010 RHYTHM STRIP: ICD-10-PCS | Mod: S$GLB,,, | Performed by: INTERNAL MEDICINE

## 2023-06-21 PROCEDURE — 3078F PR MOST RECENT DIASTOLIC BLOOD PRESSURE < 80 MM HG: ICD-10-PCS | Mod: CPTII,S$GLB,, | Performed by: INTERNAL MEDICINE

## 2023-06-21 PROCEDURE — 3008F PR BODY MASS INDEX (BMI) DOCUMENTED: ICD-10-PCS | Mod: CPTII,S$GLB,, | Performed by: INTERNAL MEDICINE

## 2023-06-21 PROCEDURE — 99215 PR OFFICE/OUTPT VISIT, EST, LEVL V, 40-54 MIN: ICD-10-PCS | Mod: S$GLB,,, | Performed by: INTERNAL MEDICINE

## 2023-06-21 PROCEDURE — 93280 PM DEVICE PROGR EVAL DUAL: CPT | Mod: 26,,, | Performed by: STUDENT IN AN ORGANIZED HEALTH CARE EDUCATION/TRAINING PROGRAM

## 2023-06-21 NOTE — PROGRESS NOTES
PCP - L Honorio Barba MD  Subjective:     I had the pleasure today of seeing Dragan Man II (57 y.o. male) who presents for device follow up.     History:  Post-TAVR CHB s/p dual chamber PPM 3/17/23  Severe AS s/p TAVR  Von Willebrand Disease  LAVERNE on CPAP  Obesity    Background:  Patient presented in March '23 for elective TAVR. He has a baseline IVCD > CHB with LBB escape after TAVR. Underwent uneventful dcPPM implantation.     In clinic today:  No complaints. Doing well post-device and TAVR implant. Works for [employer] and has no trouble doing his job. Device interrogation whos 13% RA and 100% RV pacing with CHB underlying rhythm. Good lead parameters     Today's ECG/rhythm strip shows AS- rhythm    History:     Social History     Tobacco Use    Smoking status: Never    Smokeless tobacco: Former     Types: Snuff     Quit date: 2003    Tobacco comments:     quit 15 years ago    Substance Use Topics    Alcohol use: Yes     Comment: socially // beer     Family History   Problem Relation Age of Onset    Diabetes Father     Diabetes Paternal Grandfather     No Known Problems Mother        Meds:     Review of patient's allergies indicates:   Allergen Reactions    Olmesartan Other (See Comments)     Numbness and tingling in fingers and knee joint pain    Chlorthalidone Other (See Comments)     Patient states it made him depressed.        Current Outpatient Medications:     albuterol (PROVENTIL/VENTOLIN HFA) 90 mcg/actuation inhaler, INHALE 2 PUFFS BY MOUTH INTO THE LUNGS EVERY 4 HOURS AS NEEDED FOR WHEEZING. RESCUE (Patient not taking: Reported on 6/1/2023), Disp: 6.7 g, Rfl: 1    aspirin (ECOTRIN) 81 MG EC tablet, Take 81 mg by mouth once daily., Disp: , Rfl:     atorvastatin (LIPITOR) 20 MG tablet, Take 1 tablet (20 mg total) by mouth every evening., Disp: 90 tablet, Rfl: 2    doxycycline (MONODOX) 100 MG capsule, Take once daily with food. May cause upset stomach., Disp: 90 capsule, Rfl: 3     fluticasone furoate (ARNUITY ELLIPTA) 100 mcg/actuation inhaler, Inhale 1 puff into the lungs once daily., Disp: 30 each, Rfl: 11    furosemide (LASIX) 20 MG tablet, Take 1 tablet (20 mg total) by mouth once daily., Disp: 30 tablet, Rfl: 11    hydroCHLOROthiazide (HYDRODIURIL) 25 MG tablet, Take 1 tablet (25 mg total) by mouth once daily., Disp: 30 tablet, Rfl: 11    losartan (COZAAR) 50 MG tablet, Take 1 tablet (50 mg total) by mouth once daily., Disp: 90 tablet, Rfl: 2    metoprolol succinate (TOPROL-XL) 50 MG 24 hr tablet, Take 1 tablet (50 mg total) by mouth once daily., Disp: 90 tablet, Rfl: 2    mometasone (NASONEX) 50 mcg/actuation nasal spray, INSTILL 2 SPRAYS INTO EACH NOSTRIL ONCE DAILY., Disp: 1 each, Rfl: 1    semaglutide (OZEMPIC) 2 mg/dose (8 mg/3 mL) PnIj, Inject 2 mg into the skin every 7 days., Disp: 1 pen, Rfl: 2    sodium chloride (OCEAN) 0.65 % nasal spray, 1 spray by Nasal route as needed for Congestion., Disp: , Rfl:     tretinoin (RETIN-A) 0.025 % cream, Apply pea-sized amount to entire face at bedtime.  If dryness, use every third night and increase as tolerated to every night., Disp: 20 g, Rfl: 6    Constitution: Negative for fever or chills. Negative for weight loss or gain.   HENT: Negative for sore throat or headaches. Negative for rhinorrhea.  Eyes: Negative for blurred or double vision.   Cardiovascular: See above  Pulmonary: Negative for SOB. Negative for cough.   Gastrointestinal: Negative for abdominal pain. Negative for nausea/ vomiting. Negative for diarrhea.   : Negative for dysuria.   Neurological: Negative for focal weakness or sensory changes.    Objective:     Vitals:    06/21/23 0928   BP: 133/63   Pulse: 65         General: NAD. AAO.  HENT: No scleral icterus. Extraocular movements intact.  Neck: No JVD  Cardiovascular: Regular heart rate and rhythm. S1/S2 appreciated. Device incision well healed.  Respiratory: CTAB. No increased work of breathing.  Extremities: Warm. No  edema.  Skin: no ulceration or wounds present    Labs & Imaging   Reviewed in clinic today    Assessment:   Dragan Man II is a 57 y.o. y.o. male who presented today for device clinic follow up with CHB post-TAVR s/p dual chamber PPM. Doing well. 100% RV paced.    1. Complete heart block  Echo      2. Pacemaker        3. S/P TAVR (transcatheter aortic valve replacement)        4. Chronic diastolic congestive heart failure        5. Primary hypertension  Echo      6. Pulmonary hypertension          Plan:     - Routine device checks  - Echo prior to next years visit    Phillip Walker MD, PGY7  Electrophysiology

## 2023-07-29 DIAGNOSIS — J45.20 MILD INTERMITTENT ASTHMA WITHOUT COMPLICATION: ICD-10-CM

## 2023-07-29 DIAGNOSIS — L70.0 ACNE VULGARIS: ICD-10-CM

## 2023-07-31 ENCOUNTER — OFFICE VISIT (OUTPATIENT)
Dept: CARDIOLOGY | Facility: CLINIC | Age: 58
End: 2023-07-31
Payer: COMMERCIAL

## 2023-07-31 VITALS
WEIGHT: 315 LBS | HEART RATE: 100 BPM | SYSTOLIC BLOOD PRESSURE: 139 MMHG | BODY MASS INDEX: 46.52 KG/M2 | DIASTOLIC BLOOD PRESSURE: 76 MMHG | OXYGEN SATURATION: 96 %

## 2023-07-31 DIAGNOSIS — I10 PRIMARY HYPERTENSION: Chronic | ICD-10-CM

## 2023-07-31 DIAGNOSIS — I44.2 COMPLETE HEART BLOCK: ICD-10-CM

## 2023-07-31 DIAGNOSIS — Z95.2 S/P TAVR (TRANSCATHETER AORTIC VALVE REPLACEMENT): Primary | ICD-10-CM

## 2023-07-31 DIAGNOSIS — Z95.0 PACEMAKER: ICD-10-CM

## 2023-07-31 DIAGNOSIS — I50.32 CHRONIC DIASTOLIC CONGESTIVE HEART FAILURE: Chronic | ICD-10-CM

## 2023-07-31 DIAGNOSIS — I35.0 AORTIC VALVE STENOSIS, ETIOLOGY OF CARDIAC VALVE DISEASE UNSPECIFIED: ICD-10-CM

## 2023-07-31 DIAGNOSIS — Z79.899 ON STATIN THERAPY DUE TO RISK OF FUTURE CARDIOVASCULAR EVENT: Chronic | ICD-10-CM

## 2023-07-31 PROCEDURE — 99999 PR PBB SHADOW E&M-EST. PATIENT-LVL IV: ICD-10-PCS | Mod: PBBFAC,,, | Performed by: INTERNAL MEDICINE

## 2023-07-31 PROCEDURE — 1160F PR REVIEW ALL MEDS BY PRESCRIBER/CLIN PHARMACIST DOCUMENTED: ICD-10-PCS | Mod: CPTII,S$GLB,, | Performed by: INTERNAL MEDICINE

## 2023-07-31 PROCEDURE — 1160F RVW MEDS BY RX/DR IN RCRD: CPT | Mod: CPTII,S$GLB,, | Performed by: INTERNAL MEDICINE

## 2023-07-31 PROCEDURE — 4010F PR ACE/ARB THEARPY RXD/TAKEN: ICD-10-PCS | Mod: CPTII,S$GLB,, | Performed by: INTERNAL MEDICINE

## 2023-07-31 PROCEDURE — 3008F BODY MASS INDEX DOCD: CPT | Mod: CPTII,S$GLB,, | Performed by: INTERNAL MEDICINE

## 2023-07-31 PROCEDURE — 1159F MED LIST DOCD IN RCRD: CPT | Mod: CPTII,S$GLB,, | Performed by: INTERNAL MEDICINE

## 2023-07-31 PROCEDURE — 3075F PR MOST RECENT SYSTOLIC BLOOD PRESS GE 130-139MM HG: ICD-10-PCS | Mod: CPTII,S$GLB,, | Performed by: INTERNAL MEDICINE

## 2023-07-31 PROCEDURE — 99214 PR OFFICE/OUTPT VISIT, EST, LEVL IV, 30-39 MIN: ICD-10-PCS | Mod: S$GLB,,, | Performed by: INTERNAL MEDICINE

## 2023-07-31 PROCEDURE — 1159F PR MEDICATION LIST DOCUMENTED IN MEDICAL RECORD: ICD-10-PCS | Mod: CPTII,S$GLB,, | Performed by: INTERNAL MEDICINE

## 2023-07-31 PROCEDURE — 99999 PR PBB SHADOW E&M-EST. PATIENT-LVL IV: CPT | Mod: PBBFAC,,, | Performed by: INTERNAL MEDICINE

## 2023-07-31 PROCEDURE — 99214 OFFICE O/P EST MOD 30 MIN: CPT | Mod: S$GLB,,, | Performed by: INTERNAL MEDICINE

## 2023-07-31 PROCEDURE — 3008F PR BODY MASS INDEX (BMI) DOCUMENTED: ICD-10-PCS | Mod: CPTII,S$GLB,, | Performed by: INTERNAL MEDICINE

## 2023-07-31 PROCEDURE — 3078F PR MOST RECENT DIASTOLIC BLOOD PRESSURE < 80 MM HG: ICD-10-PCS | Mod: CPTII,S$GLB,, | Performed by: INTERNAL MEDICINE

## 2023-07-31 PROCEDURE — 3075F SYST BP GE 130 - 139MM HG: CPT | Mod: CPTII,S$GLB,, | Performed by: INTERNAL MEDICINE

## 2023-07-31 PROCEDURE — 4010F ACE/ARB THERAPY RXD/TAKEN: CPT | Mod: CPTII,S$GLB,, | Performed by: INTERNAL MEDICINE

## 2023-07-31 PROCEDURE — 3078F DIAST BP <80 MM HG: CPT | Mod: CPTII,S$GLB,, | Performed by: INTERNAL MEDICINE

## 2023-07-31 RX ORDER — ALBUTEROL SULFATE 90 UG/1
AEROSOL, METERED RESPIRATORY (INHALATION)
Qty: 6.7 G | Refills: 1 | Status: SHIPPED | OUTPATIENT
Start: 2023-07-31 | End: 2023-11-13

## 2023-07-31 NOTE — PROGRESS NOTES
Subjective:   Patient ID:  Dragan Man II is a 57 y.o. male who presents for follow up of No chief complaint on file.      55 yo. Male, f/u for severe AS.. Selling the beer at the store,   PMH severe AS, HTN, LAVERNE on CPAP, obesit and Von Willebrand diseasey. No Dx of heart attack,DM,stroke and cancer. No smoking/drinking  Chronic ROLLINS. Recently worse with dizziness, left chest tightness. Occurred at workplace and physical activity. Improved the symptoms at home. Thought related to the temperature change from parking lot to cooler.   Treadmill stress ekg showed no stress induced EKG change. Peak  mmHG   ECHO showed normal EF, severe LAE, and mild to mod AS  No f/h premature CAD  In 2012, had episode of syncope when lifted up heavy stuff.  2012. MPI showed no ischemia and echo showed normal EF, dilated RV. Mild LAE and   Enrolled in HTN digit program    03/2021visit. States that ROLLINS slightly worse  No chest pain, dizziness palpitation, faint  Leg swelling worse in PM and better in AM    Gained the weight 10 pounds in 6 months.  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 Occasional ROLLINS. No faint syncope dizziness and chest pain  Sleeps on elevated bed due to LAVERNE. And leg swelling   Decreased exercise capacity at workplace  stationary BIKING. NO SMOKING.DRINKING  03/2021 echo normal EF and mod to severe AS. Aortic valve area is 1.25 cm2; peak velocity is 4.55 m/s; mean gradient is 50 mmHg.  No f/h valve disease and SCD   BNP and Cr wnl     visit  S/p CYNTHIA done in  revealing calcified tricuspid AV, mode AS and mild AI. Mild sore throat. No chest pain and dizziness  C/o hands and leg swelling   Repeat echo in  Aortic valve area is 1.22 cm2; peak velocity is 4.61 m/s; mean gradient is 52 mmHg.  Chronic SOB and partially improved after breathing Rx. Severely obese  No faint chest pain and palpitation. ekg NSR and no acute stt  change     visit  06/2022 LHC/RHC  · The coronary arteries were normal..  · The filling pressures on the right and left were moderately elevated. Pulmonary hypertension was moderate.  · 53 MMHG GRADIENT ACROSS AORTIC VAKLVE SUGGESTIVE OF SEVERE AORTIC STENOSIS.  On weight control program, working well. Leg swelling improved.  Pt has moderate aortic stenosis per planimetry and elevated gradient pressure in LVOT  He states that he lost 10 poudns now and feels better      visit  Loat 20 lbs in 6 months, on Ozempic.  Remains ROLLINS and on inhaler, worse at allergy season.  No chest pain, faint dizziness. Switches to light duty work with a lot of walking. Energy ok   Ekg NSr LVH  ms  His echo showed elevated velocity at LVOT > 1.5 m/s. And calculated CRISTINO per continuity equation and planimetry per CYNTHIA > 1 cm2. In mod AS level    04/23 visit  S/p TAVR on 03/15/2023 at [facility] by Dr. Prather.   S/p PPM 2 days after TAVR  04/20/2023 echo  · The left ventricle is normal in size with moderate concentric hypertrophy and normal systolic function.  · Mild left atrial enlargement.  · The estimated ejection fraction is 60%.  · Grade II left ventricular diastolic dysfunction.  · There is abnormal septal wall motion consistent with right ventricular pacemaker.  · Normal right ventricular size with normal right ventricular systolic function.  · There is a 29 mm evolute transcutaneously-placed aortic bioprosthesis present. Well seated  · The aortic valve mean gradient is 16 mmHg with a dimensionless index of 0.57.  · Normal central venous pressure (3 mmHg).  · The estimated PA systolic pressure is 34 mmHg.  Gained 20 lbs again after the surgery. No interrupt of ozempic brigette op  C/o worsening SOB dizziness. No syncope  Seeing the white spot post op.    Interval history   Energy and activity much better after TAVR procedure. More activity and less SOB  Off Ozempic and gained the weight. Will go back obesity clinic  Want to  switch to [facility] ppm clinic                  Past Medical History:   Diagnosis Date    Actinic keratoses 10/14/2019    Bilateral carpal tunnel syndrome 4/12/2021    Chronic bilateral low back pain without sciatica 4/12/2021    Depression     Morbid obesity with BMI of 40.0-44.9, adult 11/17/2017    LAVERNE (obstructive sleep apnea)     Prediabetes 4/12/2021    Seasonal allergic rhinitis due to pollen 10/14/2019    Ulnar neuropathy of both upper extremities 2/17/2020       Past Surgical History:   Procedure Laterality Date    A-V CARDIAC PACEMAKER INSERTION N/A 3/17/2023    Procedure: INSERTION, CARDIAC PACEMAKER, DUAL CHAMBER;  Surgeon: Royce Liao MD;  Location: [facility] EP LAB;  Service: Cardiology;  Laterality: N/A;  CHB, DUAL PPM, ANES, MDT, DM, CVICU [id number]    CARDIAC CATH COSURGEON N/A 3/16/2023    Procedure: Cardiac Cath Cosurgeon;  Surgeon: Mitchell Kingston MD;  Location: [facility] CATH LAB;  Service: Cardiovascular;  Laterality: N/A;    CATHETERIZATION OF BOTH LEFT AND RIGHT HEART N/A 6/28/2022    Procedure: CATHETERIZATION, HEART, BOTH LEFT AND RIGHT;  Surgeon: Carlotta Gordon MD;  Location: [facility] CATH LAB;  Service: Cardiology;  Laterality: N/A;    None      TRANSCATHETER AORTIC VALVE REPLACEMENT (TAVR) N/A 3/16/2023    Procedure: REPLACEMENT, AORTIC VALVE, TRANSCATHETER (TAVR);  Surgeon: Adan Greene MD;  Location: [facility] CATH LAB;  Service: Cardiology;  Laterality: N/A;       Social History     Tobacco Use    Smoking status: Never    Smokeless tobacco: Former     Types: Snuff     Quit date: 2003    Tobacco comments:     quit 15 years ago    Substance Use Topics    Alcohol use: Yes     Comment: socially // beer    Drug use: No       Family History   Problem Relation Age of Onset    Diabetes Father     Diabetes Paternal Grandfather     No Known Problems Mother          ROS    Objective:   Physical Exam  HENT:      Head: Normocephalic.   Eyes:      Pupils: Pupils are equal, round, and reactive to light.    Neck:      Thyroid: No thyromegaly.      Vascular: Normal carotid pulses. No carotid bruit or JVD.   Cardiovascular:      Rate and Rhythm: Normal rate and regular rhythm. No extrasystoles are present.     Chest Wall: PMI is not displaced.      Pulses: Normal pulses.           Carotid pulses are 2+ on the right side and 2+ on the left side.     Heart sounds: Murmur (ESM 3/6 on bases and along LSB. up to the neck) heard.      No gallop. No S3 sounds.   Pulmonary:      Effort: No respiratory distress.      Breath sounds: Normal breath sounds. No stridor.   Chest:      Comments: S/p PPM pocket on left chest healing well  Abdominal:      General: Bowel sounds are normal.      Palpations: Abdomen is soft.      Tenderness: There is no abdominal tenderness. There is no rebound.   Musculoskeletal:         General: Normal range of motion.   Skin:     Findings: No rash.   Neurological:      Mental Status: He is alert and oriented to person, place, and time.   Psychiatric:         Behavior: Behavior normal.         Lab Results   Component Value Date    CHOL 110 (L) 08/15/2022    CHOL 135 04/06/2021    CHOL 185 10/14/2019     Lab Results   Component Value Date    HDL 40 08/15/2022    HDL 41 04/06/2021    HDL 38 (L) 10/14/2019     Lab Results   Component Value Date    LDLCALC 54.4 (L) 08/15/2022    LDLCALC 70.8 04/06/2021    LDLCALC 80.8 10/14/2019     Lab Results   Component Value Date    TRIG 78 08/15/2022    TRIG 116 04/06/2021    TRIG 331 (H) 10/14/2019     Lab Results   Component Value Date    CHOLHDL 36.4 08/15/2022    CHOLHDL 30.4 04/06/2021    CHOLHDL 20.5 10/14/2019       Chemistry        Component Value Date/Time     05/01/2023 0917    K 4.7 05/01/2023 0917    CL 99 05/01/2023 0917    CO2 28 05/01/2023 0917    BUN 15 05/01/2023 0917    CREATININE 1.1 05/01/2023 0917    GLU 91 05/01/2023 0917        Component Value Date/Time    CALCIUM 10.0 05/01/2023 0917    ALKPHOS 74 03/17/2023 0313    AST 26 03/17/2023 0313     ALT 27 03/17/2023 0313    BILITOT 1.3 (H) 03/17/2023 0313    ESTGFRAFRICA >60.0 06/20/2022 1635    EGFRNONAA >60.0 06/20/2022 1635          Lab Results   Component Value Date    HGBA1C 5.4 12/14/2022     Lab Results   Component Value Date    TSH 2.059 03/01/2023     Lab Results   Component Value Date    INR 1.0 03/16/2023    INR 1.0 02/09/2023    INR 1.1 06/20/2022     Lab Results   Component Value Date    WBC 7.41 04/20/2023    HGB 12.5 (L) 04/20/2023    HCT 38.6 (L) 04/20/2023    MCV 99 (H) 04/20/2023     (L) 04/20/2023     BMP  Sodium   Date Value Ref Range Status   05/01/2023 137 136 - 145 mmol/L Final     Potassium   Date Value Ref Range Status   05/01/2023 4.7 3.5 - 5.1 mmol/L Final     Chloride   Date Value Ref Range Status   05/01/2023 99 95 - 110 mmol/L Final     CO2   Date Value Ref Range Status   05/01/2023 28 23 - 29 mmol/L Final     BUN   Date Value Ref Range Status   05/01/2023 15 6 - 20 mg/dL Final     Creatinine   Date Value Ref Range Status   05/01/2023 1.1 0.5 - 1.4 mg/dL Final     Calcium   Date Value Ref Range Status   05/01/2023 10.0 8.7 - 10.5 mg/dL Final     Anion Gap   Date Value Ref Range Status   05/01/2023 10 8 - 16 mmol/L Final     eGFR if    Date Value Ref Range Status   06/20/2022 >60.0 >60 mL/min/1.73 m^2 Final     eGFR if non    Date Value Ref Range Status   06/20/2022 >60.0 >60 mL/min/1.73 m^2 Final     Comment:     Calculation used to obtain the estimated glomerular filtration  rate (eGFR) is the CKD-EPI equation.        BNP  @LABRCNTIP(BNP,BNPTRIAGEBLO)@  @LABRCNTIP(troponini)@  CrCl cannot be calculated (Patient's most recent lab result is older than the maximum 7 days allowed.).  No results found in the last 24 hours.  No results found in the last 24 hours.  No results found in the last 24 hours.    Assessment:      1. S/P TAVR (transcatheter aortic valve replacement)    2. Aortic valve stenosis, etiology of cardiac valve disease  unspecified    3. Primary hypertension    4. On statin therapy due to risk of future cardiovascular event    5. Chronic diastolic congestive heart failure    6. Pacemaker    7. Complete heart block        Plan:   Weight control program f/u  Switch to [facility] PPM clinic    Continue ASA lipitor hctz metoprolol losartan and lasix    DM Rx per PCP  Counseled DASH  Check Lipid profile with PCP in 6 months  Recommend heart-healthy diet, weight control and regular exercise.  Fadumo. Risk modification.   I have reviewed all pertinent labs and cardiac studies independently. Plans and recommendations have been formulated under my direct supervision. All questions answered and patient voiced understanding.   If symptoms persist go to the ED  RTC in 6 months

## 2023-08-01 RX ORDER — TRETINOIN 0.5 MG/G
CREAM TOPICAL
Qty: 20 G | Refills: 6 | Status: SHIPPED | OUTPATIENT
Start: 2023-08-01

## 2023-08-03 ENCOUNTER — TELEPHONE (OUTPATIENT)
Dept: PRIMARY CARE CLINIC | Facility: CLINIC | Age: 58
End: 2023-08-03
Payer: COMMERCIAL

## 2023-08-07 ENCOUNTER — HOSPITAL ENCOUNTER (OUTPATIENT)
Dept: CARDIOLOGY | Facility: HOSPITAL | Age: 58
Discharge: HOME | End: 2023-08-07
Attending: INTERNAL MEDICINE
Payer: COMMERCIAL

## 2023-08-07 ENCOUNTER — OFFICE VISIT (OUTPATIENT)
Dept: PRIMARY CARE CLINIC | Facility: CLINIC | Age: 58
End: 2023-08-07
Payer: COMMERCIAL

## 2023-08-07 DIAGNOSIS — Z95.2 S/P TAVR (TRANSCATHETER AORTIC VALVE REPLACEMENT): ICD-10-CM

## 2023-08-07 DIAGNOSIS — R73.03 PREDIABETES: ICD-10-CM

## 2023-08-07 DIAGNOSIS — I10 PRIMARY HYPERTENSION: Chronic | ICD-10-CM

## 2023-08-07 DIAGNOSIS — I70.0 AORTIC ATHEROSCLEROSIS: ICD-10-CM

## 2023-08-07 DIAGNOSIS — E66.01 CLASS 3 SEVERE OBESITY DUE TO EXCESS CALORIES WITH SERIOUS COMORBIDITY AND BODY MASS INDEX (BMI) OF 45.0 TO 49.9 IN ADULT: ICD-10-CM

## 2023-08-07 DIAGNOSIS — G47.33 OBSTRUCTIVE SLEEP APNEA TREATED WITH CONTINUOUS POSITIVE AIRWAY PRESSURE (CPAP): Chronic | ICD-10-CM

## 2023-08-07 DIAGNOSIS — Z95.0 PACEMAKER: ICD-10-CM

## 2023-08-07 PROCEDURE — 4010F PR ACE/ARB THEARPY RXD/TAKEN: ICD-10-PCS | Mod: CPTII,95,, | Performed by: FAMILY MEDICINE

## 2023-08-07 PROCEDURE — 93280 PM DEVICE PROGR EVAL DUAL: CPT

## 2023-08-07 PROCEDURE — 93280 CARDIAC DEVICE CHECK - IN CLINIC & HOSPITAL: ICD-10-PCS | Mod: 26,,, | Performed by: INTERNAL MEDICINE

## 2023-08-07 PROCEDURE — 1159F MED LIST DOCD IN RCRD: CPT | Mod: CPTII,95,, | Performed by: FAMILY MEDICINE

## 2023-08-07 PROCEDURE — 4010F ACE/ARB THERAPY RXD/TAKEN: CPT | Mod: CPTII,95,, | Performed by: FAMILY MEDICINE

## 2023-08-07 PROCEDURE — 1160F RVW MEDS BY RX/DR IN RCRD: CPT | Mod: CPTII,95,, | Performed by: FAMILY MEDICINE

## 2023-08-07 PROCEDURE — 1160F PR REVIEW ALL MEDS BY PRESCRIBER/CLIN PHARMACIST DOCUMENTED: ICD-10-PCS | Mod: CPTII,95,, | Performed by: FAMILY MEDICINE

## 2023-08-07 PROCEDURE — 99214 OFFICE O/P EST MOD 30 MIN: CPT | Mod: 95,,, | Performed by: FAMILY MEDICINE

## 2023-08-07 PROCEDURE — 99214 PR OFFICE/OUTPT VISIT, EST, LEVL IV, 30-39 MIN: ICD-10-PCS | Mod: 95,,, | Performed by: FAMILY MEDICINE

## 2023-08-07 PROCEDURE — 93280 PM DEVICE PROGR EVAL DUAL: CPT | Mod: 26,,, | Performed by: INTERNAL MEDICINE

## 2023-08-07 PROCEDURE — 1159F PR MEDICATION LIST DOCUMENTED IN MEDICAL RECORD: ICD-10-PCS | Mod: CPTII,95,, | Performed by: FAMILY MEDICINE

## 2023-08-07 RX ORDER — SEMAGLUTIDE 1.34 MG/ML
INJECTION, SOLUTION SUBCUTANEOUS
Qty: 3 ML | Refills: 1 | Status: SHIPPED | OUTPATIENT
Start: 2023-08-07 | End: 2024-02-26

## 2023-08-07 NOTE — PROGRESS NOTES
Subjective   Pmhx, fam hx, soc hx, surg hx, allergies, med list reviewed  The patient location is: parked car/[place]  The chief complaint leading to consultation is: follow up      Visit type: audiovisual    Face to Face time with patient: 12  18 minutes of total time spent on the encounter, which includes face to face time and non-face to face time preparing to see the patient (eg, review of tests), Obtaining and/or reviewing separately obtained history, Documenting clinical information in the electronic or other health record, Independently interpreting results (not separately reported) and communicating results to the patient/family/caregiver, or Care coordination (not separately reported).         Each patient to whom he or she provides medical services by telemedicine is:  (1) informed of the relationship between the physician and patient and the respective role of any other health care provider with respect to management of the patient; and (2) notified that he or she may decline to receive medical services by telemedicine and may withdraw from such care at any time.    Notes:     Patient ID: Dragan Man II is a 57 y.o. male.    Chief Complaint: f/u  Pt reports had pace maker scanned today. Doing well. Weight at 342. Hx of TAVR.    Wt Readings from Last 3 Encounters:   07/31/23 1547 (!) 155.6 kg (343 lb 0.6 oz)   06/21/23 0928 (!) 150 kg (330 lb 11 oz)   05/30/23 1301 (!) 152.6 kg (336 lb 6.8 oz)     Pt has been off his medication due to cost and lack of coverage.   He has seen Cardiology; still on same meds including diuretic. Weight up 10 lb+ in last 6 weeks.   Has been doing smoothies with berries, peanut butter, nut powder, sometimes muscle milk. Some bananas.     Doing better with lunches; less poboys. Trying wraps/lower carb. Tends to be snacking more.   Is watching sodium in chips.     Pt has been off medication x 6 months. Using target.     Compliant with cpap.     Has been watching blood pressure.  Compliant with meds. No new changes.       HPI  Review of Systems   Constitutional:  Negative for activity change, appetite change, fatigue and unexpected weight change.   HENT:  Negative for mouth dryness.    Eyes:  Negative for visual disturbance.   Respiratory:  Negative for apnea, cough (had uri 1-2 weeks ago; wife had as well; pt's has resolved), chest tightness and shortness of breath.    Cardiovascular:  Positive for leg swelling (at baseline). Negative for chest pain.   Gastrointestinal:  Negative for abdominal pain.   Musculoskeletal:  Negative for arthralgias and myalgias.   Integumentary:  Negative for rash.   Allergic/Immunologic: Negative for food allergies.   Neurological:  Negative for dizziness and headaches.   Psychiatric/Behavioral:  Negative for behavioral problems and sleep disturbance. The patient is not nervous/anxious.           Objective     Physical Exam  Constitutional:       Appearance: Normal appearance.   HENT:      Head: Normocephalic and atraumatic.   Pulmonary:      Effort: Pulmonary effort is normal. No respiratory distress.   Skin:     Capillary Refill: Capillary refill takes 2 to 3 seconds.   Neurological:      Mental Status: He is alert and oriented to person, place, and time.   Psychiatric:         Mood and Affect: Mood normal.         Behavior: Behavior normal.            Assessment and Plan         1. Prediabetes    2. Primary hypertension    3. Class 3 severe obesity due to excess calories with serious comorbidity and body mass index (BMI) of 45.0 to 49.9 in adult    4. Obstructive sleep apnea treated with continuous positive airway pressure (CPAP)    5. S/P TAVR (transcatheter aortic valve replacement)    6. Aortic atherosclerosis          Prediabetes  -     semaglutide (OZEMPIC) 0.25 mg or 0.5 mg(2 mg/1.5 mL) pen injector; Inject 0.25 mg subq weekly x 4 weeks then 0.5 mg subq weekly  Dispense: 3 mL; Refill: 1    Primary hypertension  Comments:  chronic/stable    Class 3  severe obesity due to excess calories with serious comorbidity and body mass index (BMI) of 45.0 to 49.9 in adult  -     semaglutide (OZEMPIC) 0.25 mg or 0.5 mg(2 mg/1.5 mL) pen injector; Inject 0.25 mg subq weekly x 4 weeks then 0.5 mg subq weekly  Dispense: 3 mL; Refill: 1    Obstructive sleep apnea treated with continuous positive airway pressure (CPAP)  Comments:  pt is compliant   Orders:  -     semaglutide (OZEMPIC) 0.25 mg or 0.5 mg(2 mg/1.5 mL) pen injector; Inject 0.25 mg subq weekly x 4 weeks then 0.5 mg subq weekly  Dispense: 3 mL; Refill: 1    S/P TAVR (transcatheter aortic valve replacement)  -     semaglutide (OZEMPIC) 0.25 mg or 0.5 mg(2 mg/1.5 mL) pen injector; Inject 0.25 mg subq weekly x 4 weeks then 0.5 mg subq weekly  Dispense: 3 mL; Refill: 1    Aortic atherosclerosis  -     semaglutide (OZEMPIC) 0.25 mg or 0.5 mg(2 mg/1.5 mL) pen injector; Inject 0.25 mg subq weekly x 4 weeks then 0.5 mg subq weekly  Dispense: 3 mL; Refill: 1    Will try to resume ozempic again due to weight gain; unsure if has insurance coverage at this time but pt would like to check  Reviewed risks/benefits/se's of glp1 including any gi issues, gastroparesis, n/v, fatigue, mh changes  Reviewed protein requirements and adequate water intake       Pt to schedule f/u online and mychart update

## 2023-08-21 ENCOUNTER — CLINICAL SUPPORT (OUTPATIENT)
Dept: PULMONOLOGY | Facility: CLINIC | Age: 58
End: 2023-08-21
Payer: COMMERCIAL

## 2023-08-21 ENCOUNTER — OFFICE VISIT (OUTPATIENT)
Dept: PULMONOLOGY | Facility: CLINIC | Age: 58
End: 2023-08-21
Payer: COMMERCIAL

## 2023-08-21 VITALS
OXYGEN SATURATION: 96 % | WEIGHT: 315 LBS | RESPIRATION RATE: 19 BRPM | HEART RATE: 80 BPM | SYSTOLIC BLOOD PRESSURE: 130 MMHG | HEIGHT: 72 IN | BODY MASS INDEX: 42.66 KG/M2 | DIASTOLIC BLOOD PRESSURE: 70 MMHG

## 2023-08-21 DIAGNOSIS — F51.12 BEHAVIORALLY INDUCED INSUFFICIENT SLEEP SYNDROME: ICD-10-CM

## 2023-08-21 DIAGNOSIS — J45.20 MILD INTERMITTENT ASTHMA WITHOUT COMPLICATION: ICD-10-CM

## 2023-08-21 DIAGNOSIS — G47.22 ADVANCED SLEEP PHASE SYNDROME: ICD-10-CM

## 2023-08-21 DIAGNOSIS — J98.4 RESTRICTIVE LUNG DISEASE SECONDARY TO OBESITY: ICD-10-CM

## 2023-08-21 DIAGNOSIS — E66.01 MORBID OBESITY WITH BMI OF 45.0-49.9, ADULT: Chronic | ICD-10-CM

## 2023-08-21 DIAGNOSIS — G47.33 OBSTRUCTIVE SLEEP APNEA TREATED WITH CONTINUOUS POSITIVE AIRWAY PRESSURE (CPAP): Chronic | ICD-10-CM

## 2023-08-21 DIAGNOSIS — R91.1 PULMONARY NODULE: Primary | Chronic | ICD-10-CM

## 2023-08-21 DIAGNOSIS — E66.9 RESTRICTIVE LUNG DISEASE SECONDARY TO OBESITY: ICD-10-CM

## 2023-08-21 DIAGNOSIS — R06.09 DOE (DYSPNEA ON EXERTION): ICD-10-CM

## 2023-08-21 LAB
BRPFT: ABNORMAL
FEF 25 75 LLN: 1.55
FEF 25 75 PRE REF: 76.3 %
FEF 25 75 REF: 3.15
FEV1 FVC LLN: 65
FEV1 FVC PRE REF: 99.6 %
FEV1 FVC REF: 77
FEV1 LLN: 2.89
FEV1 PRE REF: 72 %
FEV1 REF: 3.83
FVC LLN: 3.8
FVC PRE REF: 72.1 %
FVC REF: 4.98
PEF LLN: 7.31
PEF PRE REF: 63.2 %
PEF REF: 9.76
PRE FEF 25 75: 2.4 L/S (ref 1.55–4.75)
PRE FET 100: 9.9 SEC
PRE FEV1 FVC: 76.82 % (ref 65.23–89.08)
PRE FEV1: 2.76 L (ref 2.89–4.77)
PRE FVC: 3.59 L (ref 3.8–6.16)
PRE PEF: 6.17 L/S (ref 7.31–12.22)

## 2023-08-21 PROCEDURE — 4010F ACE/ARB THERAPY RXD/TAKEN: CPT | Mod: CPTII,S$GLB,, | Performed by: NURSE PRACTITIONER

## 2023-08-21 PROCEDURE — 99214 OFFICE O/P EST MOD 30 MIN: CPT | Mod: 25,S$GLB,, | Performed by: NURSE PRACTITIONER

## 2023-08-21 PROCEDURE — 3008F PR BODY MASS INDEX (BMI) DOCUMENTED: ICD-10-PCS | Mod: CPTII,S$GLB,, | Performed by: NURSE PRACTITIONER

## 2023-08-21 PROCEDURE — 94010 BREATHING CAPACITY TEST: CPT | Mod: S$GLB,,, | Performed by: INTERNAL MEDICINE

## 2023-08-21 PROCEDURE — 3075F PR MOST RECENT SYSTOLIC BLOOD PRESS GE 130-139MM HG: ICD-10-PCS | Mod: CPTII,S$GLB,, | Performed by: NURSE PRACTITIONER

## 2023-08-21 PROCEDURE — 3078F PR MOST RECENT DIASTOLIC BLOOD PRESSURE < 80 MM HG: ICD-10-PCS | Mod: CPTII,S$GLB,, | Performed by: NURSE PRACTITIONER

## 2023-08-21 PROCEDURE — 1160F PR REVIEW ALL MEDS BY PRESCRIBER/CLIN PHARMACIST DOCUMENTED: ICD-10-PCS | Mod: CPTII,S$GLB,, | Performed by: NURSE PRACTITIONER

## 2023-08-21 PROCEDURE — 3008F BODY MASS INDEX DOCD: CPT | Mod: CPTII,S$GLB,, | Performed by: NURSE PRACTITIONER

## 2023-08-21 PROCEDURE — 4010F PR ACE/ARB THEARPY RXD/TAKEN: ICD-10-PCS | Mod: CPTII,S$GLB,, | Performed by: NURSE PRACTITIONER

## 2023-08-21 PROCEDURE — 1159F MED LIST DOCD IN RCRD: CPT | Mod: CPTII,S$GLB,, | Performed by: NURSE PRACTITIONER

## 2023-08-21 PROCEDURE — 3078F DIAST BP <80 MM HG: CPT | Mod: CPTII,S$GLB,, | Performed by: NURSE PRACTITIONER

## 2023-08-21 PROCEDURE — 1160F RVW MEDS BY RX/DR IN RCRD: CPT | Mod: CPTII,S$GLB,, | Performed by: NURSE PRACTITIONER

## 2023-08-21 PROCEDURE — 99999 PR PBB SHADOW E&M-EST. PATIENT-LVL IV: ICD-10-PCS | Mod: PBBFAC,,, | Performed by: NURSE PRACTITIONER

## 2023-08-21 PROCEDURE — 99214 PR OFFICE/OUTPT VISIT, EST, LEVL IV, 30-39 MIN: ICD-10-PCS | Mod: 25,S$GLB,, | Performed by: NURSE PRACTITIONER

## 2023-08-21 PROCEDURE — 3075F SYST BP GE 130 - 139MM HG: CPT | Mod: CPTII,S$GLB,, | Performed by: NURSE PRACTITIONER

## 2023-08-21 PROCEDURE — 99999 PR PBB SHADOW E&M-EST. PATIENT-LVL IV: CPT | Mod: PBBFAC,,, | Performed by: NURSE PRACTITIONER

## 2023-08-21 PROCEDURE — 1159F PR MEDICATION LIST DOCUMENTED IN MEDICAL RECORD: ICD-10-PCS | Mod: CPTII,S$GLB,, | Performed by: NURSE PRACTITIONER

## 2023-08-21 PROCEDURE — 94010 BREATHING CAPACITY TEST: ICD-10-PCS | Mod: S$GLB,,, | Performed by: INTERNAL MEDICINE

## 2023-08-21 RX ORDER — FLUTICASONE FUROATE AND VILANTEROL 200; 25 UG/1; UG/1
1 POWDER RESPIRATORY (INHALATION) DAILY
Qty: 60 EACH | Refills: 11 | Status: SHIPPED | OUTPATIENT
Start: 2023-08-21

## 2023-08-21 NOTE — PROGRESS NOTES
Subjective:      Patient ID: Dragan Man II is a 57 y.o. male.    Chief Complaint: Sleep Apnea and Pulmonary Nodules (Rev aylssa)    Pulmonary Nodules      Follow up ROLLINS. History of asthma-PFT with restriction due to obesity.  BMI 46 with significant abdominal obesity. FeNo: 33. Started Arnuity. Helped some with ROLLINS as well as albuterol.   4mm nodule  Presents to office for review of AutoPAP therapy. Patient states improved symptoms with use of AutoPAP. SPatient states he is benefiting from use of the AutoPAP. His original AHI is 119 events per hour  Patient Active Problem List   Diagnosis    Obstructive sleep apnea treated with continuous positive airway pressure (CPAP)    Advanced sleep phase syndrome    Behaviorally induced insufficient sleep syndrome    Sleep-related bruxism    Inadequate sleep hygiene    Morbid obesity with BMI of 45.0-49.9, adult    Primary hypertension    Actinic keratoses    Heart murmur    ROLLINS (dyspnea on exertion)    Seasonal allergic rhinitis due to pollen    On statin therapy due to risk of future cardiovascular event    Left atrial enlargement    Aortic valve stenosis    Ulnar neuropathy of both upper extremities    Generalized edema    Chronic diastolic congestive heart failure    PVD (peripheral vascular disease)    Prediabetes    Mild anemia    Chronic bilateral low back pain without sciatica    Bilateral carpal tunnel syndrome    Right-sided low back pain without sciatica    Right foot pain    Lower extremity edema    At risk for cardiovascular event    Von Willebrand disease    Pulmonary hypertension    Class 3 severe obesity due to excess calories with serious comorbidity and body mass index (BMI) of 45.0 to 49.9 in adult    Aortic atherosclerosis    S/P TAVR (transcatheter aortic valve replacement)    Complete heart block    Pulmonary nodule    Bladder wall thickening    Pacemaker     /70   Pulse 80   Resp 19   Ht 6' (1.829 m)   Wt (!) 154.9 kg (341 lb 7.9 oz)    SpO2 96%   BMI 46.31 kg/m²   Body mass index is 46.31 kg/m².    Review of Systems   Constitutional: Negative.    HENT: Negative.     Respiratory: Negative.     Cardiovascular: Negative.    Musculoskeletal: Negative.    Gastrointestinal: Negative.    Neurological: Negative.    Psychiatric/Behavioral: Negative.       Objective:      Physical Exam  Constitutional:       Appearance: He is well-developed. He is obese.   HENT:      Head: Normocephalic and atraumatic.      Nose: Nose normal.   Neck:      Thyroid: No thyroid mass or thyromegaly.      Trachea: Trachea normal.   Cardiovascular:      Rate and Rhythm: Normal rate and regular rhythm.      Heart sounds: Normal heart sounds.   Pulmonary:      Effort: Pulmonary effort is normal.      Breath sounds: Normal breath sounds. No wheezing, rhonchi or rales.   Abdominal:      Palpations: Abdomen is soft. There is no splenomegaly.      Tenderness: There is no abdominal tenderness.   Musculoskeletal:         General: Normal range of motion.      Cervical back: Normal range of motion and neck supple.   Skin:     General: Skin is warm and dry.   Neurological:      Mental Status: He is alert and oriented to person, place, and time.   Psychiatric:         Mood and Affect: Mood normal.         Behavior: Behavior normal.       Personal Diagnostic Review    Results for orders placed or performed in visit on 08/21/23   Spirometry with/without bronchodilator   Result Value Ref Range    Interpretation       Spirometry shows mild restriction based on the reduction in FVC.   Â   Notes:  Â   Consider lung volume determination to confirm the degree of restriction.       Pre FVC 3.59 (L) 3.80 - 6.16 L    Pre FEV1 2.76 (L) 2.89 - 4.77 L    Pre FEV1 FVC 76.82 65.23 - 89.08 %    Pre FEF 25 75 2.40 1.55 - 4.75 L/s    Pre PEF 6.17 (L) 7.31 - 12.22 L/s    Pre  9.90 sec    FVC Ref 4.98     FVC LLN 3.80     FVC Pre Ref 72.1 %    FEV1 Ref 3.83     FEV1 LLN 2.89     FEV1 Pre Ref 72.0 %     FEV1 FVC Ref 77     FEV1 FVC LLN 65     FEV1 FVC Pre Ref 99.6 %    FEF 25 75 Ref 3.15     FEF 25 75 LLN 1.55     FEF 25 75 Pre Ref 76.3 %    PEF Ref 9.76     PEF LLN 7.31     PEF Pre Ref 63.2 %       Results for orders placed during the hospital encounter of 04/20/23    X-Ray Chest PA And Lateral    Narrative  EXAMINATION:  XR CHEST PA AND LATERAL    CLINICAL HISTORY:  Other forms of dyspnea    TECHNIQUE:  PA and lateral views of the chest were performed.    COMPARISON:  03/17/2023.    FINDINGS:  There is a cardiac pacer, unchanged in position from prior    The lungs are well expanded and clear. No focal opacities are seen. The pleural spaces are clear.    The cardiac silhouette is enlarged.  There is a prosthetic aortic valve.    The visualized osseous structures demonstrate degenerative changes.    Impression  No acute cardiopulmonary abnormality.      Electronically signed by: Oscar Tobias  Date:    04/20/2023  Time:    17:26        Assessment:       1. Pulmonary nodule    2. ROLLINS (dyspnea on exertion)    3. Morbid obesity with BMI of 45.0-49.9, adult    4. Advanced sleep phase syndrome    5. Behaviorally induced insufficient sleep syndrome    6. Obstructive sleep apnea treated with continuous positive airway pressure (CPAP)    7. Mild intermittent asthma without complication    8. Restrictive lung disease secondary to obesity        Outpatient Encounter Medications as of 8/21/2023   Medication Sig Dispense Refill    albuterol (PROVENTIL/VENTOLIN HFA) 90 mcg/actuation inhaler INHALE 2 PUFFS BY MOUTH INTO THE LUNGS EVERY 4 HOURS AS NEEDED FOR WHEEZING. RESCUE 6.7 g 1    aspirin (ECOTRIN) 81 MG EC tablet Take 81 mg by mouth once daily.      atorvastatin (LIPITOR) 20 MG tablet Take 1 tablet (20 mg total) by mouth every evening. 90 tablet 2    doxycycline (MONODOX) 100 MG capsule Take once daily with food. May cause upset stomach. 90 capsule 3    furosemide (LASIX) 20 MG tablet Take 1 tablet (20 mg total) by mouth  once daily. 30 tablet 11    hydroCHLOROthiazide (HYDRODIURIL) 25 MG tablet Take 1 tablet (25 mg total) by mouth once daily. 30 tablet 11    losartan (COZAAR) 50 MG tablet Take 1 tablet (50 mg total) by mouth once daily. 90 tablet 2    metoprolol succinate (TOPROL-XL) 50 MG 24 hr tablet Take 1 tablet (50 mg total) by mouth once daily. 90 tablet 2    mometasone (NASONEX) 50 mcg/actuation nasal spray INSTILL 2 SPRAYS INTO EACH NOSTRIL ONCE DAILY. 1 each 1    semaglutide (OZEMPIC) 0.25 mg or 0.5 mg(2 mg/1.5 mL) pen injector Inject 0.25 mg subq weekly x 4 weeks then 0.5 mg subq weekly 3 mL 1    sodium chloride (OCEAN) 0.65 % nasal spray 1 spray by Nasal route as needed for Congestion.      tretinoin (RETIN-A) 0.05 % cream APPLY PEA SIZED AMOUNT TO ENTIRE FACE AT BEDTIME. IF DRYNESS,USE EVERY 3RD NIGHT AND INCREASE AS TOLERATED TO EVERY NIGHT 20 g 6    [DISCONTINUED] fluticasone furoate (ARNUITY ELLIPTA) 100 mcg/actuation inhaler Inhale 1 puff into the lungs once daily. 30 each 11    fluticasone furoate-vilanteroL (BREO ELLIPTA) 200-25 mcg/dose DsDv diskus inhaler Inhale 1 puff into the lungs once daily. Controller 60 each 11    [DISCONTINUED] albuterol (PROVENTIL/VENTOLIN HFA) 90 mcg/actuation inhaler INHALE 2 PUFFS BY MOUTH INTO THE LUNGS EVERY 4 HOURS AS NEEDED FOR WHEEZING. RESCUE 6.7 g 1    [DISCONTINUED] semaglutide (OZEMPIC) 2 mg/dose (8 mg/3 mL) PnIj Inject 2 mg into the skin every 7 days. 1 pen 2    [DISCONTINUED] tretinoin (RETIN-A) 0.025 % cream Apply pea-sized amount to entire face at bedtime.  If dryness, use every third night and increase as tolerated to every night. 20 g 6     No facility-administered encounter medications on file as of 8/21/2023.     Orders Placed This Encounter   Procedures    CPAP/BIPAP SUPPLIES     Order Specific Question:   Length of need (1-99 months):     Answer:   99     Order Specific Question:   Choose ONE mask type and its corresponding cushions and/or pillows:     Answer:     Full Face Mask, 1 per 90 days:  Full Face Cushion, (3 per 90 days)     Order Specific Question:   Choose EITHER Heated or Non-Heated Tubjing     Answer:    Non-Heated Tubing, 1 per 90 days     Order Specific Question:   All other supplies as needed as listed below:     Answer:    Headgear, 1 per 180 days     Order Specific Question:   All other supplies as needed as listed below:     Answer:    Disposable Filter, 6 per 90 days     Order Specific Question:   All other supplies as needed as listed below:     Answer:    Non-Disposable Filter, 1 per 180 days     Order Specific Question:   All other supplies as needed as listed below:     Answer:    Humidifier Chamber, 1 per 180 days    Spirometry without Bronchodilator     Standing Status:   Future     Standing Expiration Date:   8/21/2024     Order Specific Question:   Release to patient     Answer:   Immediate     Plan:     Problem List Items Addressed This Visit          Pulmonary    Pulmonary nodule - Primary (Chronic)    Overview     02/2023 There is a 4 mm right lower lobe pulmonary nodule (axial series 2, image 79). Low risk for malignancy. No follow up     Social History     Tobacco Use   Smoking Status Never   Smokeless Tobacco Former    Types: Snuff    Quit date: 2003   Tobacco Comments    quit 15 years ago                  Cardiac/Vascular    ROLLINS (dyspnea on exertion)    Relevant Orders    Spirometry without Bronchodilator       Endocrine    Morbid obesity with BMI of 45.0-49.9, adult (Chronic)       Other    Obstructive sleep apnea treated with continuous positive airway pressure (CPAP) (Chronic)    Relevant Orders    CPAP/BIPAP SUPPLIES    Spirometry without Bronchodilator    Advanced sleep phase syndrome    Behaviorally induced insufficient sleep syndrome     Other Visit Diagnoses       Mild intermittent asthma without complication        Relevant Medications    fluticasone furoate-vilanteroL (BREO ELLIPTA) 200-25  mcg/dose DsDv diskus inhaler    Restrictive lung disease secondary to obesity              Weight loss and exercise to improve overall health.  Change arnuity to BREO  Compliant with PAP and benefits from use.   Follow up 6 months                 Elizabeth LeJeune, ACNP, ANP

## 2023-09-05 DIAGNOSIS — Z95.2 S/P TAVR (TRANSCATHETER AORTIC VALVE REPLACEMENT): ICD-10-CM

## 2023-09-05 DIAGNOSIS — Z95.0 CARDIAC PACEMAKER IN SITU: Primary | ICD-10-CM

## 2023-09-05 DIAGNOSIS — I44.2 COMPLETE HEART BLOCK: ICD-10-CM

## 2023-09-21 NOTE — TELEPHONE ENCOUNTER
Pt contacted [facility] for pt to call back.            ----- Message from Ron Shaikh MD sent at 12/15/2022  4:01 PM CST -----  Thanks.    Will arrange f/uy with me in 1 to 2 weeks.     -Z  ----- Message -----  From: Chely Cochran MD  Sent: 12/15/2022   7:36 AM CST  To: MD Dr. Neel Browne,    I saw Mr. Archibald yesterday and he asked me to message you with a concern. He complains of increased dyspnea and LE edema for the past month or so. (He did have covid in Oct but has recovered without issue from a pulmonary standpoint.) I have checked labs and they are okay and he is compliant with his diuretic dosage. HE denies chest pain, orthopnea, dizziness, etc. I have asked him to reach out to your office to schedule sooner than his next appointment if he feels that it is necessary but he did not want to do so until you were aware of the situation.      Best regards,       Dr. Cochran       [facility] Pregnancy And Lactation Text: This medication is considered safe during pregnancy and breastfeeding.

## 2023-11-13 ENCOUNTER — HOSPITAL ENCOUNTER (OUTPATIENT)
Dept: RADIOLOGY | Facility: HOSPITAL | Age: 58
Discharge: HOME | End: 2023-11-13
Attending: FAMILY MEDICINE
Payer: COMMERCIAL

## 2023-11-13 DIAGNOSIS — N32.89 BLADDER WALL THICKENING: ICD-10-CM

## 2023-11-13 DIAGNOSIS — J45.20 MILD INTERMITTENT ASTHMA WITHOUT COMPLICATION: ICD-10-CM

## 2023-11-13 PROCEDURE — 76857 US EXAM PELVIC LIMITED: CPT | Mod: 26,,, | Performed by: RADIOLOGY

## 2023-11-13 PROCEDURE — 76857 US BLADDER: ICD-10-PCS | Mod: 26,,, | Performed by: RADIOLOGY

## 2023-11-13 PROCEDURE — 76857 US EXAM PELVIC LIMITED: CPT | Mod: TC

## 2023-11-13 RX ORDER — ALBUTEROL SULFATE 90 UG/1
AEROSOL, METERED RESPIRATORY (INHALATION)
Qty: 6.7 G | Refills: 1 | Status: SHIPPED | OUTPATIENT
Start: 2023-11-13 | End: 2024-03-12

## 2023-11-20 ENCOUNTER — TELEPHONE (OUTPATIENT)
Dept: INTERNAL MEDICINE | Facility: CLINIC | Age: 58
End: 2023-11-20
Payer: COMMERCIAL

## 2023-11-30 ENCOUNTER — TELEPHONE (OUTPATIENT)
Dept: INTERNAL MEDICINE | Facility: CLINIC | Age: 58
End: 2023-11-30
Payer: COMMERCIAL

## 2023-12-12 ENCOUNTER — TELEPHONE (OUTPATIENT)
Dept: INTERNAL MEDICINE | Facility: CLINIC | Age: 58
End: 2023-12-12
Payer: COMMERCIAL

## 2023-12-12 ENCOUNTER — OFFICE VISIT (OUTPATIENT)
Dept: DERMATOLOGY | Facility: CLINIC | Age: 58
End: 2023-12-12
Payer: COMMERCIAL

## 2023-12-12 VITALS — HEIGHT: 73 IN | WEIGHT: 315 LBS | BODY MASS INDEX: 41.75 KG/M2

## 2023-12-12 DIAGNOSIS — D48.5 NEOPLASM OF UNCERTAIN BEHAVIOR OF SKIN: ICD-10-CM

## 2023-12-12 DIAGNOSIS — Z12.83 SCREENING, MALIGNANT NEOPLASM, SKIN: ICD-10-CM

## 2023-12-12 DIAGNOSIS — Z85.828 HISTORY OF SKIN CANCER: ICD-10-CM

## 2023-12-12 DIAGNOSIS — L57.0 ACTINIC KERATOSES: ICD-10-CM

## 2023-12-12 DIAGNOSIS — L90.5 SCAR CONDITIONS/SKIN FIBROSIS: Primary | ICD-10-CM

## 2023-12-12 PROCEDURE — 99214 PR OFFICE/OUTPT VISIT, EST, LEVL IV, 30-39 MIN: ICD-10-PCS | Mod: 25,S$GLB,, | Performed by: DERMATOLOGY

## 2023-12-12 PROCEDURE — 1160F RVW MEDS BY RX/DR IN RCRD: CPT | Mod: CPTII,S$GLB,, | Performed by: DERMATOLOGY

## 2023-12-12 PROCEDURE — 1159F MED LIST DOCD IN RCRD: CPT | Mod: CPTII,S$GLB,, | Performed by: DERMATOLOGY

## 2023-12-12 PROCEDURE — 11102 TANGNTL BX SKIN SINGLE LES: CPT | Mod: S$GLB,,, | Performed by: DERMATOLOGY

## 2023-12-12 PROCEDURE — 3008F BODY MASS INDEX DOCD: CPT | Mod: CPTII,S$GLB,, | Performed by: DERMATOLOGY

## 2023-12-12 PROCEDURE — 99999 PR PBB SHADOW E&M-EST. PATIENT-LVL IV: CPT | Mod: PBBFAC,,, | Performed by: DERMATOLOGY

## 2023-12-12 PROCEDURE — 88305 TISSUE EXAM BY PATHOLOGIST: CPT | Mod: 26,,, | Performed by: PATHOLOGY

## 2023-12-12 PROCEDURE — 88305 TISSUE EXAM BY PATHOLOGIST: ICD-10-PCS | Mod: 26,,, | Performed by: PATHOLOGY

## 2023-12-12 PROCEDURE — 11102 PR TANGENTIAL BIOPSY, SKIN, SINGLE LESION: ICD-10-PCS | Mod: S$GLB,,, | Performed by: DERMATOLOGY

## 2023-12-12 PROCEDURE — 4010F PR ACE/ARB THEARPY RXD/TAKEN: ICD-10-PCS | Mod: CPTII,S$GLB,, | Performed by: DERMATOLOGY

## 2023-12-12 PROCEDURE — 3008F PR BODY MASS INDEX (BMI) DOCUMENTED: ICD-10-PCS | Mod: CPTII,S$GLB,, | Performed by: DERMATOLOGY

## 2023-12-12 PROCEDURE — 1160F PR REVIEW ALL MEDS BY PRESCRIBER/CLIN PHARMACIST DOCUMENTED: ICD-10-PCS | Mod: CPTII,S$GLB,, | Performed by: DERMATOLOGY

## 2023-12-12 PROCEDURE — 4010F ACE/ARB THERAPY RXD/TAKEN: CPT | Mod: CPTII,S$GLB,, | Performed by: DERMATOLOGY

## 2023-12-12 PROCEDURE — 88305 TISSUE EXAM BY PATHOLOGIST: CPT | Performed by: PATHOLOGY

## 2023-12-12 PROCEDURE — 99999 PR PBB SHADOW E&M-EST. PATIENT-LVL IV: ICD-10-PCS | Mod: PBBFAC,,, | Performed by: DERMATOLOGY

## 2023-12-12 PROCEDURE — 99214 OFFICE O/P EST MOD 30 MIN: CPT | Mod: 25,S$GLB,, | Performed by: DERMATOLOGY

## 2023-12-12 PROCEDURE — 1159F PR MEDICATION LIST DOCUMENTED IN MEDICAL RECORD: ICD-10-PCS | Mod: CPTII,S$GLB,, | Performed by: DERMATOLOGY

## 2023-12-12 RX ORDER — FLUOROURACIL 50 MG/G
CREAM TOPICAL
Qty: 40 G | Refills: 1 | Status: SHIPPED | OUTPATIENT
Start: 2023-12-12

## 2023-12-12 NOTE — PATIENT INSTRUCTIONS
How to use Efudex (5% fluorouracil cream)    Apply a thin layer twice a day to the face and scalp for 2 weeks.  Avoid getting efudex on the eyelids or near the eyes.  Efudex may increase your sensitivity to sunlight, so you should wear a daily sunscreen with SPF of 30 or greater while using efudex.    If severe blistering and pain, then hold efudex for 2-3 days and use OTC cortisone cream twice a day.  Once blisters and pain resolve, restart using efudex for a full 2 week course of treatment.  Full healing may take an additional 1-2 weeks after stopping efudex.    Please call the [facility] office with any questions or concerns.    [facility]  THE [facility] DERMATOLOGY 4TH FLOOR  [address] [address]  Dept: [phone]  Dept Fax: [phone]      Patient Education     Fluorouracil Topical cream     What is this medicine?   FLUOROURACIL, 5-FU (flure oh YOOR a kathrin) is a chemotherapy agent. It is used on the skin to treat skin cancer and skin conditions that could become cancer.   This medicine may be used for other purposes; ask your health care provider or pharmacist if you have questions.     What should I tell my health care provider before I take this medicine?   They need to know if you have any of these conditions:   DPD enzyme deficiency   recent or current radiation therapy   swelling or open sores at the treatment site   an unusual or allergic reaction to fluorouracil, other chemotherapy, other medicines, foods, dyes, or preservatives   pregnant or trying to get pregnant   breast-feeding    How should I use this medicine?   This medicine is only for use on the skin. Follow the directions on the prescription label. Wash hands before and after use. Wash affected area and gently pat dry. To apply this medicine use a cotton-tipped applicator, or use gloves if applying with fingertips. If applied with unprotected fingertips, it is very important to wash your  hands well after you apply this medicine. Avoid applying to the eyes, nose, or mouth. Apply enough medicine to cover the affected area. You can cover the area with a light gauze dressing, but do not use tight or air-tight dressings. Finish the full course prescribed by your doctor or health care professional, even if you think your condition is better. Do not stop taking except on the advice of your doctor or health care professional.   Talk to your pediatrician regarding the use of this medicine in children. Special care may be needed.   Overdosage: If you think you have taken too much of this medicine contact a poison control center or emergency room at once.   NOTE: This medicine is only for you. Do not share this medicine with others.     What if I miss a dose?   If you miss a dose, apply it as soon as you can. If it is almost time for your next dose, only use that dose. Do not apply extra doses.     What may interact with this medicine?   Interactions are not expected. Do not use any other skin products without telling your doctor or health care professional.   This list may not describe all possible interactions. Give your health care provider a list of all the medicines, herbs, non-prescription drugs, or dietary supplements you use. Also tell them if you smoke, drink alcohol, or use illegal drugs. Some items may interact with your medicine.     What should I watch for while using this medicine?   Visit your doctor or health care professional for checks on your progress. You will need to use this medicine for 2 to 6 weeks. This may be longer depending on the condition being treated. You may not see full healing for another 1 to 2 months after you stop using the medicine.   Treated areas of skin can look unsightly during and for several weeks after treatment with this medicine.   This medicine can make you more sensitive to the sun. Keep out of the sun. If you cannot avoid being in the sun, wear protective  clothing and use sunscreen. Do not use sun lamps or tanning beds/booths.     What side effects may I notice from receiving this medicine?   Side effects that you should report to your doctor or health care professional as soon as possible:   severe redness and swelling of normal skin  Side effects that usually do not require medical attention (report to your doctor or health care professional if they continue or are bothersome):   dark colored skin   eye irritation including burning, itching, sensitivity, stinging, or watering   increased sensitivity of the skin to sun and ultraviolet light   pain and burning of the affected area   scaling or swelling of the affected area   skin rash, itching of the affected area   tenderness  This list may not describe all possible side effects. Call your doctor for medical advice about side effects. You may report side effects to FDA at 0-407-FDA-9874.     Where should I keep my medicine?   Keep out of the reach of children.   Store at room temperature between 15 and 30 degrees C (59 and 86 degrees F). Do not freeze. Keep container tightly closed. Throw away any unused medicine after the expiration date.   NOTE:This sheet is a summary. It may not cover all possible information. If you have questions about this medicine, talk to your doctor, pharmacist, or health care provider. Copyright© 2014 [employer]     CRYOSURGERY      Your doctor has used a method called cryosurgery to treat your skin condition. Cryosurgery refers to the use of very cold substances to treat a variety of skin conditions such as warts, pre-skin cancers, molluscum contagiosum, sun spots, and several benign growths. The substance we use in cryosurgery is liquid nitrogen and is so cold (-195 degrees Celsius) that is burns when administered.     Following treatment in the office, the skin may immediately burn and become red. You may find the area around the lesion is affected as well. It is sometimes necessary  to treat not only the lesion, but a small area of the surrounding normal skin to achieve a good response.     A blister, and even a blood filled blister, may form after treatment.   This is a normal response. If the blister is painful, it is acceptable to sterilize a needle and with rubbing alcohol and gently pop the blister. It is important that you gently wash the area with soap and warm water as the blister fluid may contain wart virus if a wart was treated. Do no remove the roof of the blister.     The area treated can take anywhere from 1-3 weeks to heal. Healing time depends on the kind of skin lesion treated, the location, and how aggressively the lesion was treated. It is recommended that the areas treated are covered with Vaseline or bacitracin ointment and a band-aid. If a band-aid is not practical, just ointment applied several times per day will do. Keeping these areas moist will speed the healing time.    Treatment with liquid nitrogen can leave a scar. In dark skin, it may be a light or dark scar, in light skin it may be a white or pink scar. These will generally fade with time.    If you have any concerns after your treatment, please feel free to call the office.         [facility] DERMATOLOGY 4TH FLOOR  [address] [address] [address]  Dept: [phone]  Dept Fax: [phone]

## 2023-12-12 NOTE — PROGRESS NOTES
Subjective:       Patient ID:  Dragan Man II is a 58 y.o. male who presents for   Chief Complaint   Patient presents with    Dry Skin    Itching    Rash    Spot    Growth     Pt visit for spot appearing itching and dryness face, head , left ear      Hx of SCC of the right superior brow (s/p Mohs on 8-9/2020?), SCCIS of the right nasal sidewall (s/p Mohs 1/13/20), SCC of the left temple (s/p Mohs on 5/29/20), SCCIS of the left medial cheek (s/p Mohs on 5/29/20), SCCIS of the right lower eyelid (s/p Mohs on 6/1/20?), last seen on 6/1/23. He is using tretinoin cream 2-3 nights/week.   He c/o crusted painful lesion of the left trangus x 6 months.  S/p cryo with return.     Denies bleeding, tender, growing, or concerning lesions.      This is a high risk patient here to check for the development of new lesions.     For rosacea, he has tried doxy 100 mg qD.         Review of Systems   Constitutional:  Negative for fever and chills.   Gastrointestinal:  Negative for nausea and vomiting.   Skin:  Positive for activity-related sunscreen use. Negative for daily sunscreen use and recent sunburn.   Hematologic/Lymphatic: Does not bruise/bleed easily.        Objective:    Physical Exam   Constitutional: He appears well-developed and well-nourished. No distress.   Neurological: He is alert and oriented to person, place, and time. He is not disoriented.   Psychiatric: He has a normal mood and affect.   Skin:   Areas Examined (abnormalities noted in diagram):   Scalp / Hair Palpated and Inspected  Head / Face Inspection Performed  Neck Inspection Performed  Chest / Axilla Inspection Performed  Abdomen Inspection Performed  Back Inspection Performed  RUE Inspected  LUE Inspection Performed  Nails and Digits Inspection Performed                   Diagram Legend     Erythematous scaling macule/papule c/w actinic keratosis       Vascular papule c/w angioma      Pigmented verrucoid papule/plaque c/w seborrheic keratosis       poct blood sugar post procedure: 120 Yellow umbilicated papule c/w sebaceous hyperplasia      Irregularly shaped tan macule c/w lentigo     1-2 mm smooth white papules consistent with Milia      Movable subcutaneous cyst with punctum c/w epidermal inclusion cyst      Subcutaneous movable cyst c/w pilar cyst      Firm pink to brown papule c/w dermatofibroma      Pedunculated fleshy papule(s) c/w skin tag(s)      Evenly pigmented macule c/w junctional nevus     Mildly variegated pigmented, slightly irregular-bordered macule c/w mildly atypical nevus      Flesh colored to evenly pigmented papule c/w intradermal nevus       Pink pearly papule/plaque c/w basal cell carcinoma      Erythematous hyperkeratotic cursted plaque c/w SCC      Surgical scar with no sign of skin cancer recurrence      Open and closed comedones      Inflammatory papules and pustules      Verrucoid papule consistent consistent with wart     Erythematous eczematous patches and plaques     Dystrophic onycholytic nail with subungual debris c/w onychomycosis     Umbilicated papule    Erythematous-base heme-crusted tan verrucoid plaque consistent with inflamed seborrheic keratosis     Erythematous Silvery Scaling Plaque c/w Psoriasis     See annotation        Assessment / Plan:      Pathology Orders:       Normal Orders This Visit    Specimen to Pathology, Dermatology     Comments:    Number of Specimens:->1  ------------------------->-------------------------  Spec 1 Procedure:->Biopsy  Spec 1 Clinical Impression:->r/o SCC vs. HAK  Spec 1 Source:->left pre-auricular  Release to patient->Immediate    Questions:    Procedure Type: Dermatology and skin neoplasms    Number of Specimens: 1    ------------------------: -------------------------    Spec 1 Procedure: Biopsy    Spec 1 Clinical Impression: r/o SCC vs. HAK    Spec 1 Source: left pre-auricular    Clinical Information: see above    Release to patient: Immediate          Scar conditions/skin fibrosis  Screening, malignant neoplasm,  skin  History of skin cancer  Scar of the several sites, hx of NMSC.  No evidence of recurrence on physical exam today.  Continue routine skin surveillance. Daily sunscreen advised.    Actinic keratoses  -     fluorouraciL (EFUDEX) 5 % cream; AAA of the face, scalp and ears bid x 2 weeks  Dispense: 40 g; Refill: 1  -     of the face, scalp and ears.    Given severity of lesions, recommend Efudex cream twice daily.  If lesions such as the hypertrophic precancer near the left periocular persists, we will consider biopsy at follow up.  The patient acknowledged understanding. ill begin treatment with efudex applied to the face, scalp and ears BID for 2 weeks.  Side effects discussed including blistering, irritation and redness.  If patient develops blisters, patient instructed to hold efudex and use cortisone cream BID x 2-3 days until blister resolves.  Once blister resolves, pt should resume treatment with efudex.  Patient acknowledged understanding.  AVS given with written instructions for efudex use.    Neoplasm of uncertain behavior of skin  -     Specimen to Pathology, Dermatology  -     Shave biopsy(-ies) done of 1 site(s).   Patient informed to call for results within 2 weeks if have not received notification via telephone call or [facility]        Follow up for call for results.    PROCEDURE NOTE - SHAVE BIOPSY   Location: see above    After risk, benefits, and alternatives were discussed with the patient, the patient agrees to the procedure by verbal informed consent.  The area(s) were cleansed with alcohol. 2 cc of plain lidocaine 1% was injected for local anesthesia into each lesion(s).  A sharp dermablade was used to remove part or all of the lesion(s).  The specimen(s) will be sent for tissue pathology.  Hemostasis was obtained with aluminum chloride and/or hyfrecation.  The area(s) were dressed with vaseline ointment and bandaged.  The patient tolerated the procedure well without adverse events.  Wound care  instructions were given to the patient on the AVS.  The patient will be notified of pathology results once available. Results will also be available in Epic.

## 2023-12-21 LAB
FINAL PATHOLOGIC DIAGNOSIS: NORMAL
Lab: NORMAL

## 2023-12-26 ENCOUNTER — TELEPHONE (OUTPATIENT)
Dept: DERMATOLOGY | Facility: CLINIC | Age: 58
End: 2023-12-26
Payer: COMMERCIAL

## 2023-12-26 NOTE — TELEPHONE ENCOUNTER
Called and spoke to patient. Pt notified of his results and verbalized understanding. Pt reports he was waiting to start the cream after the new year. Pt advised to not use of biopsy site. Verbalized understanding. Agreeable to referral being sent to Dr. Collazo's office.   ----- Message from Skye Aguirre MD sent at 12/22/2023  1:27 PM CST -----  Please call and notify patient of the diagnosis of SCCIS of the left pre-auricular (in front of the left ear). Will refer to Mohs, please fax info to [facility]. Please make sure pt is not using efudex in biopsy site.  Schedule follow-up in 3 month(s).

## 2024-01-25 ENCOUNTER — HOSPITAL ENCOUNTER (EMERGENCY)
Facility: HOSPITAL | Age: 59
Discharge: HOME | End: 2024-01-26
Attending: EMERGENCY MEDICINE
Payer: COMMERCIAL

## 2024-01-25 DIAGNOSIS — R06.02 SHORTNESS OF BREATH: ICD-10-CM

## 2024-01-25 DIAGNOSIS — R07.9 CHEST PAIN: ICD-10-CM

## 2024-01-25 LAB
ALBUMIN SERPL BCP-MCNC: 3.9 G/DL (ref 3.5–5.2)
ALP SERPL-CCNC: 86 U/L (ref 55–135)
ALT SERPL W/O P-5'-P-CCNC: 35 U/L (ref 10–44)
ANION GAP SERPL CALC-SCNC: 9 MMOL/L (ref 8–16)
AST SERPL-CCNC: 26 U/L (ref 10–40)
BASOPHILS # BLD AUTO: 0.05 K/UL (ref 0–0.2)
BASOPHILS NFR BLD: 0.5 % (ref 0–1.9)
BILIRUB SERPL-MCNC: 0.7 MG/DL (ref 0.1–1)
BNP SERPL-MCNC: 28 PG/ML (ref 0–99)
BUN SERPL-MCNC: 30 MG/DL (ref 6–20)
CALCIUM SERPL-MCNC: 9.9 MG/DL (ref 8.7–10.5)
CHLORIDE SERPL-SCNC: 103 MMOL/L (ref 95–110)
CO2 SERPL-SCNC: 25 MMOL/L (ref 23–29)
CREAT SERPL-MCNC: 1.1 MG/DL (ref 0.5–1.4)
DIFFERENTIAL METHOD BLD: ABNORMAL
EOSINOPHIL # BLD AUTO: 0.2 K/UL (ref 0–0.5)
EOSINOPHIL NFR BLD: 2.1 % (ref 0–8)
ERYTHROCYTE [DISTWIDTH] IN BLOOD BY AUTOMATED COUNT: 12.4 % (ref 11.5–14.5)
EST. GFR  (NO RACE VARIABLE): >60 ML/MIN/1.73 M^2
GLUCOSE SERPL-MCNC: 121 MG/DL (ref 70–110)
HCT VFR BLD AUTO: 37.6 % (ref 40–54)
HCV AB SERPL QL IA: NEGATIVE
HEP C VIRUS HOLD SPECIMEN: NORMAL
HGB BLD-MCNC: 13.1 G/DL (ref 14–18)
HIV 1+2 AB+HIV1 P24 AG SERPL QL IA: NEGATIVE
IMM GRANULOCYTES # BLD AUTO: 0.08 K/UL (ref 0–0.04)
IMM GRANULOCYTES NFR BLD AUTO: 0.8 % (ref 0–0.5)
LYMPHOCYTES # BLD AUTO: 2.6 K/UL (ref 1–4.8)
LYMPHOCYTES NFR BLD: 26.1 % (ref 18–48)
MCH RBC QN AUTO: 32.9 PG (ref 27–31)
MCHC RBC AUTO-ENTMCNC: 34.8 G/DL (ref 32–36)
MCV RBC AUTO: 95 FL (ref 82–98)
MONOCYTES # BLD AUTO: 1 K/UL (ref 0.3–1)
MONOCYTES NFR BLD: 10.5 % (ref 4–15)
NEUTROPHILS # BLD AUTO: 6 K/UL (ref 1.8–7.7)
NEUTROPHILS NFR BLD: 60 % (ref 38–73)
NRBC BLD-RTO: 0 /100 WBC
PLATELET # BLD AUTO: 145 K/UL (ref 150–450)
PMV BLD AUTO: 10 FL (ref 9.2–12.9)
POTASSIUM SERPL-SCNC: 4.5 MMOL/L (ref 3.5–5.1)
PROT SERPL-MCNC: 7.8 G/DL (ref 6–8.4)
RBC # BLD AUTO: 3.98 M/UL (ref 4.6–6.2)
SARS-COV-2 RDRP RESP QL NAA+PROBE: NEGATIVE
SODIUM SERPL-SCNC: 137 MMOL/L (ref 136–145)
TROPONIN I SERPL DL<=0.01 NG/ML-MCNC: 0.01 NG/ML (ref 0–0.03)
WBC # BLD AUTO: 9.93 K/UL (ref 3.9–12.7)

## 2024-01-25 PROCEDURE — 83880 ASSAY OF NATRIURETIC PEPTIDE: CPT | Performed by: NURSE PRACTITIONER

## 2024-01-25 PROCEDURE — 87389 HIV-1 AG W/HIV-1&-2 AB AG IA: CPT | Performed by: EMERGENCY MEDICINE

## 2024-01-25 PROCEDURE — 85025 COMPLETE CBC W/AUTO DIFF WBC: CPT | Performed by: NURSE PRACTITIONER

## 2024-01-25 PROCEDURE — 93005 ELECTROCARDIOGRAM TRACING: CPT

## 2024-01-25 PROCEDURE — U0002 COVID-19 LAB TEST NON-CDC: HCPCS | Performed by: NURSE PRACTITIONER

## 2024-01-25 PROCEDURE — 80053 COMPREHEN METABOLIC PANEL: CPT | Performed by: NURSE PRACTITIONER

## 2024-01-25 PROCEDURE — 84484 ASSAY OF TROPONIN QUANT: CPT | Performed by: NURSE PRACTITIONER

## 2024-01-25 PROCEDURE — 93010 ELECTROCARDIOGRAM REPORT: CPT | Mod: ,,, | Performed by: INTERNAL MEDICINE

## 2024-01-25 PROCEDURE — 86803 HEPATITIS C AB TEST: CPT | Performed by: EMERGENCY MEDICINE

## 2024-01-25 PROCEDURE — 99285 EMERGENCY DEPT VISIT HI MDM: CPT | Mod: 25

## 2024-01-25 RX ADMIN — IOHEXOL 100 ML: 350 INJECTION, SOLUTION INTRAVENOUS at 11:01

## 2024-01-26 VITALS
WEIGHT: 315 LBS | DIASTOLIC BLOOD PRESSURE: 65 MMHG | TEMPERATURE: 98 F | SYSTOLIC BLOOD PRESSURE: 137 MMHG | RESPIRATION RATE: 18 BRPM | OXYGEN SATURATION: 96 % | BODY MASS INDEX: 41.75 KG/M2 | HEART RATE: 63 BPM | HEIGHT: 73 IN

## 2024-01-26 PROCEDURE — 25500020 PHARM REV CODE 255: Performed by: EMERGENCY MEDICINE

## 2024-01-26 NOTE — FIRST PROVIDER EVALUATION
Medical screening examination initiated.  I have conducted a focused provider triage encounter, findings are as follows:    Brief history of present illness:  Patient presents with shortness of breath and chest congestion.  History of congestive heart failure.    There were no vitals filed for this visit.    Pertinent physical exam:      Brief workup plan:  CHF workup    Preliminary workup initiated; this workup will be continued and followed by the physician or advanced practice provider that is assigned to the patient when roomed.

## 2024-01-26 NOTE — ED PROVIDER NOTES
"SCRIBE #1 NOTE: I, Malcom Blancas, am scribing for, and in the presence of, Edison Saavedra MD. I have scribed the entire note.       History     Chief Complaint   Patient presents with    Shortness of Breath     Pt c/o ofo SOB and BLE edema and left chest pain x's 3 weeks. Gradually worsening.      Review of patient's allergies indicates:   Allergen Reactions    Olmesartan Other (See Comments)     Numbness and tingling in fingers and knee joint pain    Chlorthalidone Other (See Comments)     Patient states it made him depressed.          History of Present Illness     HPI    1/25/2024, 11:00 PM  History obtained from the patient and wife      History of Present Illness: Dragan Man II is a 58 y.o. male patient with a PMHx of pre-DM, pacemaker, valve replacement who presents to the Emergency Department for evaluation of exertional dyspnea and BLE edema which onset gradually 3 weeks ago but worsened 3-4 days ago. Patient states tele-nurse advised patient to come to ED with concerns of CHF exacerbation. He is compliant with his diuretic. He notes he is unable to walk without becoming too SOB x 4 days and has generalized pain due to swelling. Wife notes patient has been waking up in the middle of the night "choking" and coughing while on CPAP. Patient denies any recent long distance travel. Patient sleeps with 1 pillow at night. Symptoms are constant and moderate to severe in severity. No mitigating or exacerbating factors reported. Associated sxs include positive weight change (+15 lbs in 2 weeks), BLE swelling, and RUQ abdominal pain. Patient denies any fever, CP, nausea, dizziness, lightheadedness, weakness, syncope, and all other sxs at this time. No further complaints or concerns at this time.       Arrival mode: Personal vehicle    PCP: GIL Barba MD        Past Medical History:  Past Medical History:   Diagnosis Date    Actinic keratoses 10/14/2019    Bilateral carpal tunnel syndrome 4/12/2021    Chronic " bilateral low back pain without sciatica 4/12/2021    Depression     Morbid obesity with BMI of 40.0-44.9, adult 11/17/2017    LAVERNE (obstructive sleep apnea)     Prediabetes 4/12/2021    Seasonal allergic rhinitis due to pollen 10/14/2019    Ulnar neuropathy of both upper extremities 2/17/2020       Past Surgical History:  Past Surgical History:   Procedure Laterality Date    A-V CARDIAC PACEMAKER INSERTION N/A 3/17/2023    Procedure: INSERTION, CARDIAC PACEMAKER, DUAL CHAMBER;  Surgeon: Royce Liao MD;  Location: [facility] EP LAB;  Service: Cardiology;  Laterality: N/A;  CHB, DUAL PPM, ANES, MDT, DM, CVICU [id number]    CARDIAC CATH COSURGEON N/A 3/16/2023    Procedure: Cardiac Cath Cosurgeon;  Surgeon: Mitchell Kingston MD;  Location: [facility] CATH LAB;  Service: Cardiovascular;  Laterality: N/A;    CATHETERIZATION OF BOTH LEFT AND RIGHT HEART N/A 6/28/2022    Procedure: CATHETERIZATION, HEART, BOTH LEFT AND RIGHT;  Surgeon: Carlotta Gordon MD;  Location: [facility] CATH LAB;  Service: Cardiology;  Laterality: N/A;    None      TRANSCATHETER AORTIC VALVE REPLACEMENT (TAVR) N/A 3/16/2023    Procedure: REPLACEMENT, AORTIC VALVE, TRANSCATHETER (TAVR);  Surgeon: Adan Greene MD;  Location: [facility] CATH LAB;  Service: Cardiology;  Laterality: N/A;         Family History:  Family History   Problem Relation Age of Onset    Diabetes Father     Diabetes Paternal Grandfather     No Known Problems Mother        Social History:  Social History     Tobacco Use    Smoking status: Never    Smokeless tobacco: Former     Types: Snuff     Quit date: 2003    Tobacco comments:     quit 15 years ago    Substance and Sexual Activity    Alcohol use: Yes     Comment: socially // beer    Drug use: No    Sexual activity: Yes     Partners: Female        Review of Systems     Review of Systems   Constitutional:  Positive for unexpected weight change (+15 lbs in 2 weeks). Negative for fever.   HENT:  Negative for sore throat.    Respiratory:  Positive  for shortness of breath.    Cardiovascular:  Positive for leg swelling (BLE). Negative for chest pain.   Gastrointestinal:  Positive for abdominal pain (RUQ). Negative for nausea.   Genitourinary:  Negative for dysuria.   Musculoskeletal:  Negative for back pain.   Skin:  Negative for rash.   Neurological:  Negative for dizziness, syncope, weakness and light-headedness.   Hematological:  Does not bruise/bleed easily.   All other systems reviewed and are negative.       Physical Exam     Initial Vitals   BP Pulse Resp Temp SpO2   01/25/24 1812 01/25/24 1812 01/25/24 1812 01/25/24 2325 01/25/24 1812   (!) 151/75 60 16 97.7 °F (36.5 °C) 98 %      MAP       --                 Physical Exam   Nursing Notes and Vital Signs Reviewed.  Constitutional: Patient is in no acute distress. Well-developed and well-nourished.  Head: Atraumatic. Normocephalic.  Eyes: PERRL. EOM intact. Conjunctivae are not pale. No scleral icterus.  ENT: Mucous membranes are moist. Oropharynx is clear and symmetric.    Neck: Supple. Full ROM. No lymphadenopathy.  Cardiovascular: Regular rate. Regular rhythm. No murmurs, rubs, or gallops. Distal pulses are 2+ and symmetric.  Pulmonary/Chest: No respiratory distress. Clear to auscultation bilaterally. No wheezing or rales.  Abdominal: Soft and non-distended.  There is RUQ tenderness.  No rebound, guarding, or rigidity. Good bowel sounds.  Genitourinary: No CVA tenderness  Musculoskeletal: Moves all extremities. No obvious deformities. 1+ BLE edema. No calf tenderness.  Skin: Warm and dry.  Neurological:  Alert, awake, and appropriate.  Normal speech.  No acute focal neurological deficits are appreciated.  Psychiatric: Normal affect. Good eye contact. Appropriate in content.     ED Course   Procedures  ED Vital Signs:  Vitals:    01/25/24 1812 01/25/24 2237 01/25/24 2238 01/25/24 2303   BP: (!) 151/75 (!) 142/70  (!) 152/72   Pulse: 60 60 60 60   Resp: 16   (!) 22   Temp:       TempSrc: Oral      SpO2:  "98% 98%  97%   Weight: (!) 161 kg (354 lb 15.1 oz)      Height: 6' 1" (1.854 m)       01/25/24 2325 01/25/24 2332 01/26/24 0010   BP:  (!) 140/64 137/65   Pulse:  60 63   Resp:   18   Temp: 97.7 °F (36.5 °C)     TempSrc: Oral     SpO2:  97% 96%   Weight:      Height:          Abnormal Lab Results:  Labs Reviewed   CBC W/ AUTO DIFFERENTIAL - Abnormal; Notable for the following components:       Result Value    RBC 3.98 (*)     Hemoglobin 13.1 (*)     Hematocrit 37.6 (*)     MCH 32.9 (*)     Platelets 145 (*)     Immature Granulocytes 0.8 (*)     Immature Grans (Abs) 0.08 (*)     All other components within normal limits    Narrative:     Release to patient->Immediate   COMPREHENSIVE METABOLIC PANEL - Abnormal; Notable for the following components:    Glucose 121 (*)     BUN 30 (*)     All other components within normal limits    Narrative:     Release to patient->Immediate   TROPONIN I    Narrative:     Release to patient->Immediate   B-TYPE NATRIURETIC PEPTIDE    Narrative:     Release to patient->Immediate   HIV 1 / 2 ANTIBODY    Narrative:     Release to patient->Immediate   HEPATITIS C ANTIBODY    Narrative:     Release to patient->Immediate   HEP C VIRUS HOLD SPECIMEN    Narrative:     Release to patient->Immediate   SARS-COV-2 RNA AMPLIFICATION, QUAL        All Lab Results:  Results for orders placed or performed during the hospital encounter of 01/25/24   CBC auto differential   Result Value Ref Range    WBC 9.93 3.90 - 12.70 K/uL    RBC 3.98 (L) 4.60 - 6.20 M/uL    Hemoglobin 13.1 (L) 14.0 - 18.0 g/dL    Hematocrit 37.6 (L) 40.0 - 54.0 %    MCV 95 82 - 98 fL    MCH 32.9 (H) 27.0 - 31.0 pg    MCHC 34.8 32.0 - 36.0 g/dL    RDW 12.4 11.5 - 14.5 %    Platelets 145 (L) 150 - 450 K/uL    MPV 10.0 9.2 - 12.9 fL    Immature Granulocytes 0.8 (H) 0.0 - 0.5 %    Gran # (ANC) 6.0 1.8 - 7.7 K/uL    Immature Grans (Abs) 0.08 (H) 0.00 - 0.04 K/uL    Lymph # 2.6 1.0 - 4.8 K/uL    Mono # 1.0 0.3 - 1.0 K/uL    Eos # 0.2 0.0 - " 0.5 K/uL    Baso # 0.05 0.00 - 0.20 K/uL    nRBC 0 0 /100 WBC    Gran % 60.0 38.0 - 73.0 %    Lymph % 26.1 18.0 - 48.0 %    Mono % 10.5 4.0 - 15.0 %    Eosinophil % 2.1 0.0 - 8.0 %    Basophil % 0.5 0.0 - 1.9 %    Differential Method Automated    Comprehensive metabolic panel   Result Value Ref Range    Sodium 137 136 - 145 mmol/L    Potassium 4.5 3.5 - 5.1 mmol/L    Chloride 103 95 - 110 mmol/L    CO2 25 23 - 29 mmol/L    Glucose 121 (H) 70 - 110 mg/dL    BUN 30 (H) 6 - 20 mg/dL    Creatinine 1.1 0.5 - 1.4 mg/dL    Calcium 9.9 8.7 - 10.5 mg/dL    Total Protein 7.8 6.0 - 8.4 g/dL    Albumin 3.9 3.5 - 5.2 g/dL    Total Bilirubin 0.7 0.1 - 1.0 mg/dL    Alkaline Phosphatase 86 55 - 135 U/L    AST 26 10 - 40 U/L    ALT 35 10 - 44 U/L    eGFR >60 >60 mL/min/1.73 m^2    Anion Gap 9 8 - 16 mmol/L   Troponin I   Result Value Ref Range    Troponin I 0.007 0.000 - 0.026 ng/mL   Brain natriuretic peptide   Result Value Ref Range    BNP 28 0 - 99 pg/mL   HIV 1/2 Ag/Ab (4th Gen)   Result Value Ref Range    HIV 1/2 Ag/Ab Negative Negative   Hepatitis C Antibody   Result Value Ref Range    Hepatitis C Ab Negative Negative   HCV Virus Hold Specimen   Result Value Ref Range    HEP C Virus Hold Specimen Hold for HCV sendout    COVID-19 Rapid Screening   Result Value Ref Range    SARS-CoV-2 RNA, Amplification, Qual Negative Negative         Imaging Results:  Imaging Results              CTA Chest Non-Coronary (PE Studies) (Final result)  Result time 01/25/24 23:56:59      Final result by Kacey Cruz MD (01/25/24 23:56:59)                   Impression:      No pulmonary embolus seen or other acute finding      Electronically signed by: Kacey Cruz  Date:    01/25/2024  Time:    23:56               Narrative:    EXAMINATION:  CTA CHEST NON CORONARY (PE STUDIES)    CLINICAL HISTORY:  Pulmonary embolism (PE) suspected, high prob;    TECHNIQUE:  Angiographic technique PE protocol with MIPS and post processing  volumetric    Iterative technique with low-dose parameters for diminishing radiation dose as reasonably achievable    COMPARISON:  Radiographic correlation    FINDINGS:  No pulmonary embolus seen.    No pleural effusion    Lungs well aerated without consolidation and large central airways patent    3 mm right upper and lower lobe pulmonary nodules.  No follow-up recommended in low risk patient                                       X-Ray Chest AP Portable (Final result)  Result time 01/25/24 18:35:40      Final result by Kacey Cruz MD (01/25/24 18:35:40)                   Impression:      Stable chest exam, no active finding      Electronically signed by: Kacey Cruz  Date:    01/25/2024  Time:    18:35               Narrative:    EXAMINATION:  XR CHEST AP PORTABLE    CLINICAL HISTORY:  CHF;    TECHNIQUE:  Single frontal portable view of the chest was performed.    COMPARISON:  Four hundred twenty-two    FINDINGS:  No new pulmonary consolidation or pleural effusion.  Mediastinal contour stable with pacemaker                                       The EKG was ordered, reviewed, and independently interpreted by the ED provider.  Interpretation time: 18:16  Rate: 60 BPM  Rhythm:  Atrial-paced rhythm  Interpretation: Left axis deviation. Nonspecific intraventricular block. Inferior infarct. No STEMI.    The Emergency Provider reviewed the vital signs and test results, which are outlined above.     ED Discussion       12:07 AM: Reassessed pt at this time. Discussed with pt all pertinent ED information and results. Discussed pt dx and plan of tx. Gave pt all f/u and return to the ED instructions. All questions and concerns were addressed at this time. Pt expresses understanding of information and instructions, and is comfortable with plan to discharge. Pt is stable for discharge.    I discussed with patient and/or family/caretaker that evaluation in the ED does not suggest any emergent or life threatening  medical conditions requiring immediate intervention beyond what was provided in the ED, and I believe patient is safe for discharge.  Regardless, an unremarkable evaluation in the ED does not preclude the development or presence of a serious of life threatening condition. As such, patient was instructed to return immediately for any worsening or change in current symptoms.         Medical Decision Making  Amount and/or Complexity of Data Reviewed  Labs: ordered. Decision-making details documented in ED Course.  Radiology: ordered. Decision-making details documented in ED Course.  ECG/medicine tests: ordered and independent interpretation performed. Decision-making details documented in ED Course.    Risk  Prescription drug management.                 ED Medication(s):  Medications   iohexoL (OMNIPAQUE 350) injection 100 mL (100 mLs Intravenous Given 1/25/24 8005)       Discharge Medication List as of 1/26/2024 12:05 AM           Follow-up Information       GIL Barba MD In 3 days.    Specialty: Family Medicine  Contact information:  [address]  [place] [address]  [phone]               [facility] - Emergency Dept..    Specialty: Emergency Medicine  Why: As needed, If symptoms worsen  Contact information:  [address] [facility] [id number] [phone]                               Scribe Attestation:   Scribe #1: I performed the above scribed service and the documentation accurately describes the services I performed. I attest to the accuracy of the note.     Attending:   Physician Attestation Statement for Scribe #1: I, Edison Saavedra MD, personally performed the services described in this documentation, as scribed by Malcom Blancas, in my presence, and it is both accurate and complete.           Clinical Impression       ICD-10-CM ICD-9-CM   1. Shortness of breath  R06.02 786.05   2. Chest pain  R07.9 786.50       Disposition:   Disposition: Discharged  Condition:  Edison Haynes MD  01/26/24 0571

## 2024-01-29 ENCOUNTER — LAB VISIT (OUTPATIENT)
Dept: LAB | Facility: HOSPITAL | Age: 59
End: 2024-01-29
Payer: COMMERCIAL

## 2024-01-29 ENCOUNTER — OFFICE VISIT (OUTPATIENT)
Dept: CARDIOLOGY | Facility: CLINIC | Age: 59
End: 2024-01-29
Payer: COMMERCIAL

## 2024-01-29 VITALS
SYSTOLIC BLOOD PRESSURE: 128 MMHG | HEART RATE: 71 BPM | DIASTOLIC BLOOD PRESSURE: 64 MMHG | WEIGHT: 315 LBS | BODY MASS INDEX: 41.75 KG/M2 | HEIGHT: 73 IN | OXYGEN SATURATION: 97 %

## 2024-01-29 DIAGNOSIS — I10 PRIMARY HYPERTENSION: Chronic | ICD-10-CM

## 2024-01-29 DIAGNOSIS — I51.7 LEFT ATRIAL ENLARGEMENT: ICD-10-CM

## 2024-01-29 DIAGNOSIS — Z95.2 S/P TAVR (TRANSCATHETER AORTIC VALVE REPLACEMENT): ICD-10-CM

## 2024-01-29 DIAGNOSIS — Z79.899 ON STATIN THERAPY DUE TO RISK OF FUTURE CARDIOVASCULAR EVENT: Chronic | ICD-10-CM

## 2024-01-29 DIAGNOSIS — R01.1 HEART MURMUR: ICD-10-CM

## 2024-01-29 DIAGNOSIS — R06.09 DOE (DYSPNEA ON EXERTION): ICD-10-CM

## 2024-01-29 DIAGNOSIS — E66.01 MORBID OBESITY WITH BMI OF 45.0-49.9, ADULT: Chronic | ICD-10-CM

## 2024-01-29 DIAGNOSIS — I50.32 CHRONIC DIASTOLIC CONGESTIVE HEART FAILURE: Chronic | ICD-10-CM

## 2024-01-29 DIAGNOSIS — I44.2 COMPLETE HEART BLOCK: ICD-10-CM

## 2024-01-29 DIAGNOSIS — I27.20 PULMONARY HYPERTENSION: Chronic | ICD-10-CM

## 2024-01-29 DIAGNOSIS — I50.32 CHRONIC DIASTOLIC CONGESTIVE HEART FAILURE: Primary | Chronic | ICD-10-CM

## 2024-01-29 DIAGNOSIS — Z91.89 AT RISK FOR CARDIOVASCULAR EVENT: ICD-10-CM

## 2024-01-29 DIAGNOSIS — I73.9 PVD (PERIPHERAL VASCULAR DISEASE): ICD-10-CM

## 2024-01-29 DIAGNOSIS — Z95.0 PACEMAKER: ICD-10-CM

## 2024-01-29 DIAGNOSIS — E66.01 CLASS 3 SEVERE OBESITY DUE TO EXCESS CALORIES WITH SERIOUS COMORBIDITY AND BODY MASS INDEX (BMI) OF 45.0 TO 49.9 IN ADULT: ICD-10-CM

## 2024-01-29 DIAGNOSIS — I35.0 AORTIC VALVE STENOSIS, ETIOLOGY OF CARDIAC VALVE DISEASE UNSPECIFIED: ICD-10-CM

## 2024-01-29 LAB
ESTIMATED AVG GLUCOSE: 137 MG/DL (ref 68–131)
HBA1C MFR BLD: 6.4 % (ref 4–5.6)

## 2024-01-29 PROCEDURE — 99215 OFFICE O/P EST HI 40 MIN: CPT | Mod: S$GLB,,,

## 2024-01-29 PROCEDURE — 36415 COLL VENOUS BLD VENIPUNCTURE: CPT

## 2024-01-29 PROCEDURE — 3074F SYST BP LT 130 MM HG: CPT | Mod: CPTII,S$GLB,,

## 2024-01-29 PROCEDURE — 99999 PR PBB SHADOW E&M-EST. PATIENT-LVL IV: CPT | Mod: PBBFAC,,,

## 2024-01-29 PROCEDURE — 3008F BODY MASS INDEX DOCD: CPT | Mod: CPTII,S$GLB,,

## 2024-01-29 PROCEDURE — 83036 HEMOGLOBIN GLYCOSYLATED A1C: CPT

## 2024-01-29 PROCEDURE — 1159F MED LIST DOCD IN RCRD: CPT | Mod: CPTII,S$GLB,,

## 2024-01-29 PROCEDURE — 1160F RVW MEDS BY RX/DR IN RCRD: CPT | Mod: CPTII,S$GLB,,

## 2024-01-29 PROCEDURE — 3078F DIAST BP <80 MM HG: CPT | Mod: CPTII,S$GLB,,

## 2024-01-29 PROCEDURE — 83880 ASSAY OF NATRIURETIC PEPTIDE: CPT

## 2024-01-29 NOTE — PROGRESS NOTES
Subjective:   Patient ID:  Dragan Man II is a 58 y.o. male who presents for evaluation of No chief complaint on file.      HPI PMH severe AS S/p TAVR on 03/15/2023 at [facility] by Dr. Pratehr.   S/p PPM 2 days after TAVR, HTN, LAVERNE on CPAP, obesit and Von Willebrand diseasey. No Dx of heart attack,DM,stroke and cancer. No smoking/drinking. Follows Dr. Shaikh in cardiology clinic. Here today for HFU, pt was seen at [facility] ED after having worsening SOB with associated chest heaviness that started months ago, worsening in the last 3 weeks. Pt states he has SOB with chest pain when walking across the parking lot. 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 Here today still with SOB walking across parking lot into appt. His weight is usually around 340lb and is 352lb today,  compliant with his lasix at home, recently switched to Diovan, BP stable in clinic today. Cooks his own food, no added salt. BNP in ED nml    EKG 1/25/24 A paced no acute dynamic CV changes  Echo 4/23 EF 60%  06/2022 LHC/RHC  ·           The coronary arteries were normal..  ·           The filling pressures on the right and left were moderately elevated. Pulmonary hypertension was moderate.  ·           51 MMHG GRADIENT ACROSS AORTIC VAKLVE SUGGESTIVE OF SEVERE AORTIC STENOSIS.    Past Medical History:   Diagnosis Date    Actinic keratoses 10/14/2019    Bilateral carpal tunnel syndrome 4/12/2021    Chronic bilateral low back pain without sciatica 4/12/2021    Depression     Morbid obesity with BMI of 40.0-44.9, adult 11/17/2017    LAVERNE (obstructive sleep apnea)     Prediabetes 4/12/2021    Seasonal allergic rhinitis due to pollen 10/14/2019    Ulnar neuropathy of both upper extremities 2/17/2020       Past Surgical History:   Procedure Laterality Date    A-V CARDIAC PACEMAKER INSERTION N/A 3/17/2023    Procedure: INSERTION, CARDIAC PACEMAKER, DUAL CHAMBER;   Surgeon: Royce Liao MD;  Location: [facility] EP LAB;  Service: Cardiology;  Laterality: N/A;  CHB, DUAL PPM, ANES, MDT, DM, CVICU [id number]    CARDIAC CATH COSURGEON N/A 3/16/2023    Procedure: Cardiac Cath Cosurgeon;  Surgeon: Mitchell Kingston MD;  Location: [facility] CATH LAB;  Service: Cardiovascular;  Laterality: N/A;    CATHETERIZATION OF BOTH LEFT AND RIGHT HEART N/A 6/28/2022    Procedure: CATHETERIZATION, HEART, BOTH LEFT AND RIGHT;  Surgeon: Carlotta Gordon MD;  Location: [facility] CATH LAB;  Service: Cardiology;  Laterality: N/A;    None      TRANSCATHETER AORTIC VALVE REPLACEMENT (TAVR) N/A 3/16/2023    Procedure: REPLACEMENT, AORTIC VALVE, TRANSCATHETER (TAVR);  Surgeon: Adan Greene MD;  Location: [facility] CATH LAB;  Service: Cardiology;  Laterality: N/A;       Social History     Tobacco Use    Smoking status: Never    Smokeless tobacco: Former     Types: Snuff     Quit date: 2003    Tobacco comments:     quit 15 years ago    Substance Use Topics    Alcohol use: Yes     Comment: socially // beer    Drug use: No       Family History   Problem Relation Age of Onset    Diabetes Father     Diabetes Paternal Grandfather     No Known Problems Mother        Current Outpatient Medications on File Prior to Visit   Medication Sig Dispense Refill    albuterol (PROVENTIL/VENTOLIN HFA) 90 mcg/actuation inhaler INHALE 2 PUFFS BY MOUTH INTO THE LUNGS EVERY 4 HOURS AS NEEDED FOR WHEEZING. RESCUE 6.7 g 1    aspirin (ECOTRIN) 81 MG EC tablet Take 81 mg by mouth once daily.      atorvastatin (LIPITOR) 20 MG tablet Take 1 tablet (20 mg total) by mouth every evening. 90 tablet 2    doxycycline (MONODOX) 100 MG capsule Take once daily with food. May cause upset stomach. 90 capsule 3    fluorouraciL (EFUDEX) 5 % cream AAA of the face, scalp and ears bid x 2 weeks 40 g 1    fluticasone furoate-vilanteroL (BREO ELLIPTA) 200-25 mcg/dose DsDv diskus inhaler Inhale 1 puff into the lungs once daily. Controller 60 each 11    furosemide  (LASIX) 20 MG tablet Take 1 tablet (20 mg total) by mouth once daily. 30 tablet 11    metoprolol succinate (TOPROL-XL) 50 MG 24 hr tablet Take 1 tablet (50 mg total) by mouth once daily. 90 tablet 2    mometasone (NASONEX) 50 mcg/actuation nasal spray INSTILL 2 SPRAYS INTO EACH NOSTRIL ONCE DAILY. 1 each 1    sodium chloride (OCEAN) 0.65 % nasal spray 1 spray by Nasal route as needed for Congestion.      tretinoin (RETIN-A) 0.05 % cream APPLY PEA SIZED AMOUNT TO ENTIRE FACE AT BEDTIME. IF DRYNESS,USE EVERY 3RD NIGHT AND INCREASE AS TOLERATED TO EVERY NIGHT 20 g 6    valsartan (DIOVAN) 320 MG tablet Take 1 tablet (320 mg total) by mouth once daily. 30 tablet 5    hydroCHLOROthiazide (HYDRODIURIL) 25 MG tablet Take 1 tablet (25 mg total) by mouth once daily. (Patient not taking: Reported on 1/29/2024) 30 tablet 11    semaglutide (OZEMPIC) 0.25 mg or 0.5 mg(2 mg/1.5 mL) pen injector Inject 0.25 mg subq weekly x 4 weeks then 0.5 mg subq weekly 3 mL 1     No current facility-administered medications on file prior to visit.      Wt Readings from Last 3 Encounters:   01/29/24 (!) 160 kg (352 lb 11.8 oz)   01/25/24 (!) 161 kg (354 lb 15.1 oz)   12/12/23 (!) 154.9 kg (341 lb 7.9 oz)     Temp Readings from Last 3 Encounters:   01/25/24 97.7 °F (36.5 °C) (Oral)   04/20/23 98.2 °F (36.8 °C)   03/18/23 98.2 °F (36.8 °C) (Oral)     BP Readings from Last 3 Encounters:   01/29/24 128/64   01/26/24 137/65   08/21/23 130/70     Pulse Readings from Last 3 Encounters:   01/29/24 71   01/26/24 63   08/21/23 80        Review of Systems   Constitutional: Positive for malaise/fatigue and weight gain.   HENT: Negative.     Eyes: Negative.    Cardiovascular:  Positive for chest pain, dyspnea on exertion and leg swelling.   Respiratory:  Positive for shortness of breath.    Skin: Negative.    Musculoskeletal: Negative.    Gastrointestinal: Negative.    Genitourinary: Negative.    Neurological:  Positive for weakness.   Psychiatric/Behavioral:  Negative.         Objective:   Physical Exam  Vitals and nursing note reviewed.   Constitutional:       Appearance: He is obese.   HENT:      Head: Normocephalic and atraumatic.   Eyes:      General:         Right eye: No discharge.         Left eye: No discharge.      Pupils: Pupils are equal, round, and reactive to light.   Cardiovascular:      Rate and Rhythm: Normal rate and regular rhythm.      Heart sounds: S1 normal and S2 normal. No murmur heard.     No friction rub.   Pulmonary:      Effort: Pulmonary effort is normal. No respiratory distress.      Breath sounds: Normal breath sounds. No rales.   Abdominal:      General: There is distension.      Palpations: Abdomen is soft.      Tenderness: There is no abdominal tenderness.   Musculoskeletal:      Cervical back: Neck supple.      Right lower leg: No edema.      Left lower leg: No edema.   Skin:     General: Skin is warm and dry.   Neurological:      General: No focal deficit present.      Mental Status: He is alert and oriented to person, place, and time.   Psychiatric:         Mood and Affect: Mood normal.         Behavior: Behavior normal.         Thought Content: Thought content normal.         Lab Results   Component Value Date    CHOL 110 (L) 08/15/2022    CHOL 135 04/06/2021    CHOL 185 10/14/2019     Lab Results   Component Value Date    HDL 40 08/15/2022    HDL 41 04/06/2021    HDL 38 (L) 10/14/2019     Lab Results   Component Value Date    LDLCALC 54.4 (L) 08/15/2022    LDLCALC 70.8 04/06/2021    LDLCALC 80.8 10/14/2019     Lab Results   Component Value Date    TRIG 78 08/15/2022    TRIG 116 04/06/2021    TRIG 331 (H) 10/14/2019     Lab Results   Component Value Date    CHOLHDL 36.4 08/15/2022    CHOLHDL 30.4 04/06/2021    CHOLHDL 20.5 10/14/2019       Chemistry        Component Value Date/Time     01/25/2024 1832    K 4.5 01/25/2024 1832     01/25/2024 1832    CO2 25 01/25/2024 1832    BUN 30 (H) 01/25/2024 1832    CREATININE 1.1  01/25/2024 1832     (H) 01/25/2024 1832        Component Value Date/Time    CALCIUM 9.9 01/25/2024 1832    ALKPHOS 86 01/25/2024 1832    AST 26 01/25/2024 1832    ALT 35 01/25/2024 1832    BILITOT 0.7 01/25/2024 1832    ESTGFRAFRICA >60.0 06/20/2022 1635    EGFRNONAA >60.0 06/20/2022 1635          Lab Results   Component Value Date    TSH 2.059 03/01/2023     Lab Results   Component Value Date    INR 1.0 03/16/2023    INR 1.0 02/09/2023    INR 1.1 06/20/2022     @RESUFAST(WBC,HGB,HCT,MCV,PLT)  @LABRCNTIP(BNP,BNPTRIAGEBLO)@  Estimated Creatinine Clearance: 113 mL/min (based on SCr of 1.1 mg/dL).     Results for orders placed during the hospital encounter of 04/20/23    Echo    Interpretation Summary  · The left ventricle is normal in size with moderate concentric hypertrophy and normal systolic function.  · Mild left atrial enlargement.  · The estimated ejection fraction is 60%.  · Grade II left ventricular diastolic dysfunction.  · There is abnormal septal wall motion consistent with right ventricular pacemaker.  · Normal right ventricular size with normal right ventricular systolic function.  · There is a 29 mm evolute transcutaneously-placed aortic bioprosthesis present. Well seated  · The aortic valve mean gradient is 16 mmHg with a dimensionless index of 0.57.  · Normal central venous pressure (3 mmHg).  · The estimated PA systolic pressure is 34 mmHg.     No results found for this or any previous visit.     Assessment:      1. Primary hypertension    2. On statin therapy due to risk of future cardiovascular event    3. Chronic diastolic congestive heart failure    4. Pulmonary hypertension    5. Heart murmur    6. ROLLINS (dyspnea on exertion)    7. Left atrial enlargement    8. Aortic valve stenosis, etiology of cardiac valve disease unspecified    9. PVD (peripheral vascular disease)    10. At risk for cardiovascular event    11. S/P TAVR (transcatheter aortic valve replacement)    12. Complete heart block     13. Pacemaker    14. Class 3 severe obesity due to excess calories with serious comorbidity and body mass index (BMI) of 45.0 to 49.9 in adult    15. Morbid obesity with BMI of 45.0-49.9, adult        Plan:   Primary hypertension    On statin therapy due to risk of future cardiovascular event    Chronic diastolic congestive heart failure    Pulmonary hypertension    Heart murmur    ROLLINS (dyspnea on exertion)    Left atrial enlargement    Aortic valve stenosis, etiology of cardiac valve disease unspecified    PVD (peripheral vascular disease)    At risk for cardiovascular event    S/P TAVR (transcatheter aortic valve replacement)    Complete heart block    Pacemaker    Class 3 severe obesity due to excess calories with serious comorbidity and body mass index (BMI) of 45.0 to 49.9 in adult    Morbid obesity with BMI of 45.0-49.9, adult      Cont current CV medications  Ok to try 40mg lasix daily for 3 days  RF modifications  Cont low Na diet, low fat  Daily exercise as tolerated    Echo, Nuclear stress and labs  RTC 1 mos or sooner if needed    Courtney Guillot, FNP-C [facility], Cardiology

## 2024-01-30 ENCOUNTER — TELEPHONE (OUTPATIENT)
Dept: CARDIOLOGY | Facility: CLINIC | Age: 59
End: 2024-01-30
Payer: COMMERCIAL

## 2024-01-30 LAB — NT-PROBNP SERPL IA-MCNC: <36 PG/ML

## 2024-01-30 NOTE — TELEPHONE ENCOUNTER
Contacted pt and informed him HGA1C is at a diabetic level, contact PCP for soonest appt to get it under control. Pt vu w/o q/c      ----- Message from Marline Santos NP sent at 1/29/2024  4:13 PM CST -----  HGA1C is at a diabetic level, please call you PCP to make soonest appt to get it under control as well.

## 2024-01-30 NOTE — TELEPHONE ENCOUNTER
Conatcted pt and informed him Fluid marker was nml, follow up with PCP for abdominal bloating work up. Pt vu w/o q/c    ----- Message from Marline Santos NP sent at 1/30/2024  3:24 PM CST -----  Fluid marker was nml, follow up with PCP for abdominal bloating work up

## 2024-02-05 ENCOUNTER — HOSPITAL ENCOUNTER (OUTPATIENT)
Dept: RADIOLOGY | Facility: HOSPITAL | Age: 59
Discharge: HOME | End: 2024-02-05
Payer: COMMERCIAL

## 2024-02-05 ENCOUNTER — HOSPITAL ENCOUNTER (OUTPATIENT)
Dept: CARDIOLOGY | Facility: HOSPITAL | Age: 59
Discharge: HOME | End: 2024-02-05
Payer: COMMERCIAL

## 2024-02-05 VITALS
HEIGHT: 73 IN | DIASTOLIC BLOOD PRESSURE: 64 MMHG | BODY MASS INDEX: 41.75 KG/M2 | WEIGHT: 315 LBS | SYSTOLIC BLOOD PRESSURE: 128 MMHG

## 2024-02-05 DIAGNOSIS — I50.32 CHRONIC DIASTOLIC CONGESTIVE HEART FAILURE: Chronic | ICD-10-CM

## 2024-02-05 DIAGNOSIS — I10 PRIMARY HYPERTENSION: Chronic | ICD-10-CM

## 2024-02-05 LAB
AORTIC ROOT ANNULUS: 3.05 CM
ASCENDING AORTA: 2.8 CM
AV INDEX (PROSTH): 0.43
AV MEAN GRADIENT: 14 MMHG
AV PEAK GRADIENT: 24 MMHG
AV REGURGITATION PRESSURE HALF TIME: 596.65 MS
AV VALVE AREA BY VELOCITY RATIO: 3.64 CM²
AV VALVE AREA: 3.54 CM²
AV VELOCITY RATIO: 0.44
BSA FOR ECHO PROCEDURE: 2.87 M2
CV ECHO LV RWT: 0.51 CM
CV STRESS BASE HR: 63 BPM
DIASTOLIC BLOOD PRESSURE: 56 MMHG
DOP CALC AO PEAK VEL: 2.45 M/S
DOP CALC AO VTI: 50 CM
DOP CALC LVOT AREA: 8.2 CM2
DOP CALC LVOT DIAMETER: 3.23 CM
DOP CALC LVOT PEAK VEL: 1.09 M/S
DOP CALC LVOT STROKE VOLUME: 176.9 CM3
DOP CALC RVOT PEAK VEL: 0.57 M/S
DOP CALC RVOT VTI: 12.3 CM
DOP CALCLVOT PEAK VEL VTI: 21.6 CM
E WAVE DECELERATION TIME: 281.94 MSEC
E/A RATIO: 1.02
E/E' RATIO: 19.83 M/S
ECHO LV POSTERIOR WALL: 1.37 CM (ref 0.6–1.1)
EJECTION FRACTION: 55 %
FRACTIONAL SHORTENING: 36 % (ref 28–44)
INTERVENTRICULAR SEPTUM: 1.37 CM (ref 0.6–1.1)
IVC DIAMETER: 1.98 CM
LA MAJOR: 5.75 CM
LA MINOR: 5.42 CM
LA WIDTH: 4.3 CM
LEFT ATRIUM SIZE: 4.14 CM
LEFT ATRIUM VOLUME INDEX MOD: 30.7 ML/M2
LEFT ATRIUM VOLUME INDEX: 30.8 ML/M2
LEFT ATRIUM VOLUME MOD: 84.19 CM3
LEFT ATRIUM VOLUME: 84.44 CM3
LEFT INTERNAL DIMENSION IN SYSTOLE: 3.48 CM (ref 2.1–4)
LEFT VENTRICLE DIASTOLIC VOLUME INDEX: 51.72 ML/M2
LEFT VENTRICLE DIASTOLIC VOLUME: 141.71 ML
LEFT VENTRICLE MASS INDEX: 117 G/M2
LEFT VENTRICLE SYSTOLIC VOLUME INDEX: 18.3 ML/M2
LEFT VENTRICLE SYSTOLIC VOLUME: 50.03 ML
LEFT VENTRICULAR INTERNAL DIMENSION IN DIASTOLE: 5.41 CM (ref 3.5–6)
LEFT VENTRICULAR MASS: 319.26 G
LV LATERAL E/E' RATIO: 29.75 M/S
LV SEPTAL E/E' RATIO: 14.88 M/S
LVOT MG: 2.79 MMHG
LVOT MV: 0.78 CM/S
MV PEAK A VEL: 1.17 M/S
MV PEAK E VEL: 1.19 M/S
NUC REST EJECTION FRACTION: 61
NUC STRESS EJECTION FRACTION: 63 %
OHS CV CPX 85 PERCENT MAX PREDICTED HEART RATE MALE: 136
OHS CV CPX MAX PREDICTED HEART RATE: 160
OHS CV CPX PATIENT IS FEMALE: 0
OHS CV CPX PATIENT IS MALE: 1
OHS CV CPX PEAK DIASTOLIC BLOOD PRESSURE: 76 MMHG
OHS CV CPX PEAK HEAR RATE: 88 BPM
OHS CV CPX PEAK RATE PRESSURE PRODUCT: NORMAL
OHS CV CPX PEAK SYSTOLIC BLOOD PRESSURE: 143 MMHG
OHS CV CPX PERCENT MAX PREDICTED HEART RATE ACHIEVED: 55
OHS CV CPX RATE PRESSURE PRODUCT PRESENTING: 8442
PISA AR MAX VEL: 4.07 M/S
PISA TR MAX VEL: 2.86 M/S
PV MEAN GRADIENT: 1 MMHG
PV PEAK GRADIENT: 1 MMHG
PV PEAK VELOCITY: 1.02 M/S
RA MAJOR: 4.93 CM
RA PRESSURE ESTIMATED: 3 MMHG
RA WIDTH: 4.13 CM
RIGHT VENTRICULAR END-DIASTOLIC DIMENSION: 4.11 CM
RV TB RVSP: 6 MMHG
SINUS: 2.8 CM
STJ: 2.86 CM
SYSTOLIC BLOOD PRESSURE: 134 MMHG
TDI LATERAL: 0.04 M/S
TDI SEPTAL: 0.08 M/S
TDI: 0.06 M/S
TR MAX PG: 33 MMHG
TRICUSPID ANNULAR PLANE SYSTOLIC EXCURSION: 2.25 CM
TV REST PULMONARY ARTERY PRESSURE: 36 MMHG
Z-SCORE OF LEFT VENTRICULAR DIMENSION IN END DIASTOLE: -16.97
Z-SCORE OF LEFT VENTRICULAR DIMENSION IN END SYSTOLE: -12.56

## 2024-02-05 PROCEDURE — 93017 CV STRESS TEST TRACING ONLY: CPT

## 2024-02-05 PROCEDURE — 93306 TTE W/DOPPLER COMPLETE: CPT | Mod: 26,,, | Performed by: INTERNAL MEDICINE

## 2024-02-05 PROCEDURE — 93016 CV STRESS TEST SUPVJ ONLY: CPT | Mod: ,,, | Performed by: INTERNAL MEDICINE

## 2024-02-05 PROCEDURE — 78452 HT MUSCLE IMAGE SPECT MULT: CPT | Mod: 26,,, | Performed by: INTERNAL MEDICINE

## 2024-02-05 PROCEDURE — 63600175 PHARM REV CODE 636 W HCPCS

## 2024-02-05 PROCEDURE — 93018 CV STRESS TEST I&R ONLY: CPT | Mod: ,,, | Performed by: INTERNAL MEDICINE

## 2024-02-05 PROCEDURE — A9502 TC99M TETROFOSMIN: HCPCS

## 2024-02-05 PROCEDURE — 93306 TTE W/DOPPLER COMPLETE: CPT

## 2024-02-05 RX ORDER — REGADENOSON 0.08 MG/ML
0.4 INJECTION, SOLUTION INTRAVENOUS ONCE
Status: COMPLETED | OUTPATIENT
Start: 2024-02-05 | End: 2024-02-05

## 2024-02-05 RX ADMIN — REGADENOSON 0.4 MG: 0.08 INJECTION, SOLUTION INTRAVENOUS at 10:02

## 2024-02-09 ENCOUNTER — TELEPHONE (OUTPATIENT)
Dept: CARDIOLOGY | Facility: CLINIC | Age: 59
End: 2024-02-09
Payer: COMMERCIAL

## 2024-02-09 NOTE — TELEPHONE ENCOUNTER
Contacted patient; Patient received and understood results with no questions or concerns.     ----- Message from Marline Santos NP sent at 2/9/2024  2:29 PM CST -----  Stress test neg for ischemia

## 2024-02-09 NOTE — TELEPHONE ENCOUNTER
Conatcted pt and informed him Heart ultrasound with nml function. Pt vu w/o q/c and states he will f/u w/ Dr. Shaikh      ----- Message from Marline Santos NP sent at 2/9/2024  2:31 PM CST -----  Heart ultrasound with nml function

## 2024-02-26 ENCOUNTER — OFFICE VISIT (OUTPATIENT)
Dept: CARDIOLOGY | Facility: CLINIC | Age: 59
End: 2024-02-26
Payer: COMMERCIAL

## 2024-02-26 ENCOUNTER — HOSPITAL ENCOUNTER (OUTPATIENT)
Dept: CARDIOLOGY | Facility: HOSPITAL | Age: 59
Discharge: HOME | End: 2024-02-26
Attending: INTERNAL MEDICINE
Payer: COMMERCIAL

## 2024-02-26 VITALS
HEIGHT: 73 IN | OXYGEN SATURATION: 97 % | DIASTOLIC BLOOD PRESSURE: 70 MMHG | HEART RATE: 66 BPM | BODY MASS INDEX: 41.75 KG/M2 | WEIGHT: 315 LBS | RESPIRATION RATE: 16 BRPM | SYSTOLIC BLOOD PRESSURE: 126 MMHG

## 2024-02-26 DIAGNOSIS — Z95.0 PACEMAKER: ICD-10-CM

## 2024-02-26 DIAGNOSIS — Z95.2 S/P TAVR (TRANSCATHETER AORTIC VALVE REPLACEMENT): ICD-10-CM

## 2024-02-26 DIAGNOSIS — I10 PRIMARY HYPERTENSION: Chronic | ICD-10-CM

## 2024-02-26 DIAGNOSIS — I70.0 AORTIC ATHEROSCLEROSIS: ICD-10-CM

## 2024-02-26 DIAGNOSIS — I73.9 PVD (PERIPHERAL VASCULAR DISEASE): ICD-10-CM

## 2024-02-26 DIAGNOSIS — I35.0 NONRHEUMATIC AORTIC VALVE STENOSIS: ICD-10-CM

## 2024-02-26 DIAGNOSIS — I50.32 CHRONIC DIASTOLIC CONGESTIVE HEART FAILURE: Primary | Chronic | ICD-10-CM

## 2024-02-26 DIAGNOSIS — E66.01 MORBID OBESITY WITH BMI OF 45.0-49.9, ADULT: Chronic | ICD-10-CM

## 2024-02-26 DIAGNOSIS — I27.20 PULMONARY HYPERTENSION: Chronic | ICD-10-CM

## 2024-02-26 DIAGNOSIS — Z95.0 CARDIAC PACEMAKER IN SITU: ICD-10-CM

## 2024-02-26 DIAGNOSIS — I51.7 LEFT ATRIAL ENLARGEMENT: ICD-10-CM

## 2024-02-26 DIAGNOSIS — I44.2 COMPLETE HEART BLOCK: ICD-10-CM

## 2024-02-26 PROCEDURE — 99999 PR PBB SHADOW E&M-EST. PATIENT-LVL IV: CPT | Mod: PBBFAC,,, | Performed by: INTERNAL MEDICINE

## 2024-02-26 PROCEDURE — 3078F DIAST BP <80 MM HG: CPT | Mod: CPTII,S$GLB,, | Performed by: INTERNAL MEDICINE

## 2024-02-26 PROCEDURE — 1159F MED LIST DOCD IN RCRD: CPT | Mod: CPTII,S$GLB,, | Performed by: INTERNAL MEDICINE

## 2024-02-26 PROCEDURE — 93280 PM DEVICE PROGR EVAL DUAL: CPT | Mod: 26,,, | Performed by: INTERNAL MEDICINE

## 2024-02-26 PROCEDURE — 3074F SYST BP LT 130 MM HG: CPT | Mod: CPTII,S$GLB,, | Performed by: INTERNAL MEDICINE

## 2024-02-26 PROCEDURE — 3044F HG A1C LEVEL LT 7.0%: CPT | Mod: CPTII,S$GLB,, | Performed by: INTERNAL MEDICINE

## 2024-02-26 PROCEDURE — 1160F RVW MEDS BY RX/DR IN RCRD: CPT | Mod: CPTII,S$GLB,, | Performed by: INTERNAL MEDICINE

## 2024-02-26 PROCEDURE — 3008F BODY MASS INDEX DOCD: CPT | Mod: CPTII,S$GLB,, | Performed by: INTERNAL MEDICINE

## 2024-02-26 PROCEDURE — 93280 PM DEVICE PROGR EVAL DUAL: CPT

## 2024-02-26 PROCEDURE — 99214 OFFICE O/P EST MOD 30 MIN: CPT | Mod: S$GLB,,, | Performed by: INTERNAL MEDICINE

## 2024-02-26 RX ORDER — SEMAGLUTIDE 2.4 MG/.75ML
2.4 INJECTION, SOLUTION SUBCUTANEOUS
Qty: 12 ML | Refills: 5 | Status: ACTIVE | OUTPATIENT
Start: 2024-02-26 | End: 2024-05-06

## 2024-02-26 NOTE — PROGRESS NOTES
Subjective:   Patient ID:  Dragan Man II is a 58 y.o. male who presents for follow up of No chief complaint on file.      57 yo. Male, f/u for 6 months f/u. Selling the beer at the store,   PMH severe AS, HTN, LAVERNE on CPAP, obesit and Von Willebrand diseasey. No Dx of heart attack,DM,stroke and cancer. No smoking/drinking  Chronic ROLLINS. Recently worse with dizziness, left chest tightness. Occurred at workplace and physical activity. Improved the symptoms at home. Thought related to the temperature change from parking lot to cooler.   Treadmill stress ekg showed no stress induced EKG change. Peak  mmHG   ECHO showed normal EF, severe LAE, and mild to mod AS  No f/h premature CAD  In 2012, had episode of syncope when lifted up heavy stuff.  2012. MPI showed no ischemia and echo showed normal EF, dilated RV. Mild LAE and   Enrolled in HTN digit program    03/2021visit. States that ROLLINS slightly worse  No chest pain, dizziness palpitation, faint  Leg swelling worse in PM and better in AM    Gained the weight 10 pounds in 6 months.  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 Occasional ROLLINS. No faint syncope dizziness and chest pain  Sleeps on elevated bed due to LAVERNE. And leg swelling   Decreased exercise capacity at workplace  stationary BIKING. NO SMOKING.DRINKING  03/2021 echo normal EF and mod to severe AS. Aortic valve area is 1.25 cm2; peak velocity is 4.55 m/s; mean gradient is 50 mmHg.  No f/h valve disease and SCD   BNP and Cr wnl     visit  S/p CYNTHIA done in  revealing calcified tricuspid AV, mode AS and mild AI. Mild sore throat. No chest pain and dizziness  C/o hands and leg swelling   Repeat echo in  Aortic valve area is 1.22 cm2; peak velocity is 4.61 m/s; mean gradient is 52 mmHg.  Chronic SOB and partially improved after breathing Rx. Severely obese  No faint chest pain and palpitation. ekg NSR and no acute stt  change     visit  06/2022 LHC/RHC  · The coronary arteries were normal..  · The filling pressures on the right and left were moderately elevated. Pulmonary hypertension was moderate.  · 53 MMHG GRADIENT ACROSS AORTIC VAKLVE SUGGESTIVE OF SEVERE AORTIC STENOSIS.  On weight control program, working well. Leg swelling improved.  Pt has moderate aortic stenosis per planimetry and elevated gradient pressure in LVOT  He states that he lost 10 poudns now and feels better      visit  Loat 20 lbs in 6 months, on Ozempic.  Remains ROLLINS and on inhaler, worse at allergy season.  No chest pain, faint dizziness. Switches to light duty work with a lot of walking. Energy ok   Ekg NSr LVH  ms  His echo showed elevated velocity at LVOT > 1.5 m/s. And calculated CRISTINO per continuity equation and planimetry per CYNTHIA > 1 cm2. In mod AS level    04/23 visit  S/p [facility] on [facility]/15/20[facility] by Dr. Prather.   S/p PPM 2 days after TAVR  04/20/2023 echo  · The left ventricle is normal in size with moderate concentric hypertrophy and normal systolic function.  · Mild left atrial enlargement.  · The estimated ejection fraction is 60%.  · Grade II left ventricular diastolic dysfunction.  · There is abnormal septal wall motion consistent with right ventricular pacemaker.  · Normal right ventricular size with normal right ventricular systolic function.  · There is a 29 mm evolute transcutaneously-placed aortic bioprosthesis present. Well seated  · The aortic valve mean gradient is 16 mmHg with a dimensionless index of 0.57.  · Normal central venous pressure (3 mmHg).  · The estimated PA systolic pressure is 34 mmHg.  Gained 20 lbs again after the surgery. No interrupt of ozempic brigette op  C/o worsening SOB dizziness. No syncope  Seeing the white spot post op.    08/23 visit   Energy and activity much better after TAVR procedure. More activity and less SOB  Off Ozempic and gained the weight. Will go back obesity clinic  Want to switch  to [facility] ppm clinic    Interval history  Weight gain again after cut back GLP-1 RA Rx.  SOB worse with smoking smell.   No chest pain dizziness  In 01/24, NUKE stress test normal, ECHO showed EF nl LVH s/p TAVr, NT BNP 38.                  Past Medical History:   Diagnosis Date    Actinic keratoses 10/14/2019    Bilateral carpal tunnel syndrome 4/12/2021    Chronic bilateral low back pain without sciatica 4/12/2021    Depression     Morbid obesity with BMI of 40.0-44.9, adult 11/17/2017    LAVERNE (obstructive sleep apnea)     Prediabetes 4/12/2021    Seasonal allergic rhinitis due to pollen 10/14/2019    Ulnar neuropathy of both upper extremities 2/17/2020       Past Surgical History:   Procedure Laterality Date    A-V CARDIAC PACEMAKER INSERTION N/A 3/17/2023    Procedure: INSERTION, CARDIAC PACEMAKER, DUAL CHAMBER;  Surgeon: Royce Liao MD;  Location: [facility] EP LAB;  Service: Cardiology;  Laterality: N/A;  CHB, DUAL PPM, ANES, MDT, DM, CVICU [id number]    CARDIAC CATH COSURGEON N/A 3/16/2023    Procedure: Cardiac Cath Cosurgeon;  Surgeon: Mitchell Kingston MD;  Location: [facility] CATH LAB;  Service: Cardiovascular;  Laterality: N/A;    CATHETERIZATION OF BOTH LEFT AND RIGHT HEART N/A 6/28/2022    Procedure: CATHETERIZATION, HEART, BOTH LEFT AND RIGHT;  Surgeon: Carlotta Gordon MD;  Location: [facility] CATH LAB;  Service: Cardiology;  Laterality: N/A;    None      TRANSCATHETER AORTIC VALVE REPLACEMENT (TAVR) N/A 3/16/2023    Procedure: REPLACEMENT, AORTIC VALVE, TRANSCATHETER (TAVR);  Surgeon: Adan Greene MD;  Location: [facility] CATH LAB;  Service: Cardiology;  Laterality: N/A;       Social History     Tobacco Use    Smoking status: Never    Smokeless tobacco: Former     Types: Snuff     Quit date: 2003    Tobacco comments:     quit 15 years ago    Substance Use Topics    Alcohol use: Yes     Comment: socially // beer    Drug use: No       Family History   Problem Relation Age of Onset    Diabetes Father     Diabetes  Paternal Grandfather     No Known Problems Mother          ROS    Objective:   Physical Exam  HENT:      Head: Normocephalic.   Eyes:      Pupils: Pupils are equal, round, and reactive to light.   Neck:      Thyroid: No thyromegaly.      Vascular: Normal carotid pulses. No carotid bruit or JVD.   Cardiovascular:      Rate and Rhythm: Normal rate and regular rhythm. No extrasystoles are present.     Chest Wall: PMI is not displaced.      Pulses: Normal pulses.           Carotid pulses are 2+ on the right side and 2+ on the left side.     Heart sounds: Murmur (ESM 3/6 on bases and along LSB. up to the neck) heard.      No gallop. No S3 sounds.   Pulmonary:      Effort: No respiratory distress.      Breath sounds: Normal breath sounds. No stridor.   Chest:      Comments: S/p PPM pocket on left chest healing well  Abdominal:      General: Bowel sounds are normal.      Palpations: Abdomen is soft.      Tenderness: There is no abdominal tenderness. There is no rebound.   Musculoskeletal:         General: Normal range of motion.   Skin:     Findings: No rash.   Neurological:      Mental Status: He is alert and oriented to person, place, and time.   Psychiatric:         Behavior: Behavior normal.         Lab Results   Component Value Date    CHOL 110 (L) 08/15/2022    CHOL 135 04/06/2021    CHOL 185 10/14/2019     Lab Results   Component Value Date    HDL 40 08/15/2022    HDL 41 04/06/2021    HDL 38 (L) 10/14/2019     Lab Results   Component Value Date    LDLCALC 54.4 (L) 08/15/2022    LDLCALC 70.8 04/06/2021    LDLCALC 80.8 10/14/2019     Lab Results   Component Value Date    TRIG 78 08/15/2022    TRIG 116 04/06/2021    TRIG 331 (H) 10/14/2019     Lab Results   Component Value Date    CHOLHDL 36.4 08/15/2022    CHOLHDL 30.4 04/06/2021    CHOLHDL 20.5 10/14/2019       Chemistry        Component Value Date/Time     01/25/2024 1832    K 4.5 01/25/2024 1832     01/25/2024 1832    CO2 25 01/25/2024 1832    BUN 30  (H) 01/25/2024 1832    CREATININE 1.1 01/25/2024 1832     (H) 01/25/2024 1832        Component Value Date/Time    CALCIUM 9.9 01/25/2024 1832    ALKPHOS 86 01/25/2024 1832    AST 26 01/25/2024 1832    ALT 35 01/25/2024 1832    BILITOT 0.7 01/25/2024 1832    ESTGFRAFRICA >60.0 06/20/2022 1635    EGFRNONAA >60.0 06/20/2022 1635          Lab Results   Component Value Date    HGBA1C 6.4 (H) 01/29/2024     Lab Results   Component Value Date    TSH 2.059 03/01/2023     Lab Results   Component Value Date    INR 1.0 03/16/2023    INR 1.0 02/09/2023    INR 1.1 06/20/2022     Lab Results   Component Value Date    WBC 9.93 01/25/2024    HGB 13.1 (L) 01/25/2024    HCT 37.6 (L) 01/25/2024    MCV 95 01/25/2024     (L) 01/25/2024     BMP  Sodium   Date Value Ref Range Status   01/25/2024 137 136 - 145 mmol/L Final     Potassium   Date Value Ref Range Status   01/25/2024 4.5 3.5 - 5.1 mmol/L Final     Chloride   Date Value Ref Range Status   01/25/2024 103 95 - 110 mmol/L Final     CO2   Date Value Ref Range Status   01/25/2024 25 23 - 29 mmol/L Final     BUN   Date Value Ref Range Status   01/25/2024 30 (H) 6 - 20 mg/dL Final     Creatinine   Date Value Ref Range Status   01/25/2024 1.1 0.5 - 1.4 mg/dL Final     Calcium   Date Value Ref Range Status   01/25/2024 9.9 8.7 - 10.5 mg/dL Final     Anion Gap   Date Value Ref Range Status   01/25/2024 9 8 - 16 mmol/L Final     eGFR if    Date Value Ref Range Status   06/20/2022 >60.0 >60 mL/min/1.73 m^2 Final     eGFR if non    Date Value Ref Range Status   06/20/2022 >60.0 >60 mL/min/1.73 m^2 Final     Comment:     Calculation used to obtain the estimated glomerular filtration  rate (eGFR) is the CKD-EPI equation.        BNP  @LABRCNTIP(BNP,BNPTRIAGEBLO)@  @LABRCNTIP(troponini)@  CrCl cannot be calculated (Patient's most recent lab result is older than the maximum 7 days allowed.).  No results found in the last 24 hours.  No results found in  the last 24 hours.  No results found in the last 24 hours.    Assessment:      1. Chronic diastolic congestive heart failure    2. Primary hypertension    3. Pulmonary hypertension    4. Left atrial enlargement    5. Nonrheumatic aortic valve stenosis    6. PVD (peripheral vascular disease)    7. Aortic atherosclerosis    8. S/P TAVR (transcatheter aortic valve replacement)    9. Pacemaker    10. Morbid obesity with BMI of 45.0-49.9, adult      CHFpEF compensated  CAD stable  S/p TAVR functions well  S/p PPM on AsVp  Obesity  SOB irritating by smoking smell    Plan:   Increase ozempic to 2.4 mg for obesity and SOB  F/u pulm for PFT  Continue asa Lipitor lasix HCTZ torpolXL and valsartan   Rtc in 3 M

## 2024-02-27 LAB — OHS CV RV PACING PERCENT: 100 %

## 2024-02-28 ENCOUNTER — PATIENT MESSAGE (OUTPATIENT)
Dept: CARDIOLOGY | Facility: CLINIC | Age: 59
End: 2024-02-28
Payer: COMMERCIAL

## 2024-03-04 ENCOUNTER — OFFICE VISIT (OUTPATIENT)
Dept: PULMONOLOGY | Facility: CLINIC | Age: 59
End: 2024-03-04
Payer: COMMERCIAL

## 2024-03-04 ENCOUNTER — CLINICAL SUPPORT (OUTPATIENT)
Dept: PULMONOLOGY | Facility: CLINIC | Age: 59
End: 2024-03-04
Payer: COMMERCIAL

## 2024-03-04 VITALS
BODY MASS INDEX: 39.17 KG/M2 | HEART RATE: 66 BPM | SYSTOLIC BLOOD PRESSURE: 124 MMHG | RESPIRATION RATE: 18 BRPM | DIASTOLIC BLOOD PRESSURE: 68 MMHG | OXYGEN SATURATION: 97 % | HEIGHT: 75 IN | WEIGHT: 315 LBS

## 2024-03-04 DIAGNOSIS — J98.4 RESTRICTIVE LUNG DISEASE SECONDARY TO OBESITY: ICD-10-CM

## 2024-03-04 DIAGNOSIS — G47.33 OSA (OBSTRUCTIVE SLEEP APNEA): ICD-10-CM

## 2024-03-04 DIAGNOSIS — J45.20 MILD INTERMITTENT ASTHMA WITHOUT COMPLICATION: ICD-10-CM

## 2024-03-04 DIAGNOSIS — E66.9 RESTRICTIVE LUNG DISEASE SECONDARY TO OBESITY: ICD-10-CM

## 2024-03-04 DIAGNOSIS — G47.33 OBSTRUCTIVE SLEEP APNEA TREATED WITH CONTINUOUS POSITIVE AIRWAY PRESSURE (CPAP): Chronic | ICD-10-CM

## 2024-03-04 DIAGNOSIS — E66.01 MORBID OBESITY WITH BMI OF 40.0-44.9, ADULT: Primary | ICD-10-CM

## 2024-03-04 DIAGNOSIS — R06.09 DOE (DYSPNEA ON EXERTION): ICD-10-CM

## 2024-03-04 LAB
BRPFT: ABNORMAL
FEF 25 75 LLN: 1.55
FEF 25 75 PRE REF: 65.1 %
FEF 25 75 REF: 3.17
FEV1 FVC LLN: 65
FEV1 FVC PRE REF: 97.3 %
FEV1 FVC REF: 77
FEV1 LLN: 2.94
FEV1 PRE REF: 62.6 %
FEV1 REF: 3.9
FVC LLN: 3.88
FVC PRE REF: 64.1 %
FVC REF: 5.09
PEF LLN: 7.37
PEF PRE REF: 40.9 %
PEF REF: 9.88
PRE FEF 25 75: 2.07 L/S (ref 1.55–4.79)
PRE FET 100: 10.21 SEC
PRE FEV1 FVC: 74.87 % (ref 64.97–88.91)
PRE FEV1: 2.44 L (ref 2.94–4.87)
PRE FVC: 3.26 L (ref 3.88–6.31)
PRE PEF: 4.04 L/S (ref 7.37–12.38)

## 2024-03-04 PROCEDURE — 1160F RVW MEDS BY RX/DR IN RCRD: CPT | Mod: CPTII,S$GLB,, | Performed by: NURSE PRACTITIONER

## 2024-03-04 PROCEDURE — 3044F HG A1C LEVEL LT 7.0%: CPT | Mod: CPTII,S$GLB,, | Performed by: NURSE PRACTITIONER

## 2024-03-04 PROCEDURE — 3078F DIAST BP <80 MM HG: CPT | Mod: CPTII,S$GLB,, | Performed by: NURSE PRACTITIONER

## 2024-03-04 PROCEDURE — 3074F SYST BP LT 130 MM HG: CPT | Mod: CPTII,S$GLB,, | Performed by: NURSE PRACTITIONER

## 2024-03-04 PROCEDURE — 99214 OFFICE O/P EST MOD 30 MIN: CPT | Mod: 25,S$GLB,, | Performed by: NURSE PRACTITIONER

## 2024-03-04 PROCEDURE — 99999 PR PBB SHADOW E&M-EST. PATIENT-LVL IV: CPT | Mod: PBBFAC,,, | Performed by: NURSE PRACTITIONER

## 2024-03-04 PROCEDURE — 94010 BREATHING CAPACITY TEST: CPT | Mod: S$GLB,,, | Performed by: INTERNAL MEDICINE

## 2024-03-04 PROCEDURE — 1159F MED LIST DOCD IN RCRD: CPT | Mod: CPTII,S$GLB,, | Performed by: NURSE PRACTITIONER

## 2024-03-04 PROCEDURE — 3008F BODY MASS INDEX DOCD: CPT | Mod: CPTII,S$GLB,, | Performed by: NURSE PRACTITIONER

## 2024-03-04 NOTE — PROGRESS NOTES
"Subjective:      Patient ID: Dragan Man II is a 58 y.o. male.    Chief Complaint: Apnea    Apnea        Follow up APAP and ROLLINS.   Patient states improved symptoms with use of AutoPAP. SPatient states he is benefiting from use of the AutoPAP. His original AHI is 119 events per hour  Taking BREO daily. Decreased endurance with walking. No weight loss. BMI 43      Patient Active Problem List   Diagnosis    Obstructive sleep apnea treated with continuous positive airway pressure (CPAP)    Advanced sleep phase syndrome    Behaviorally induced insufficient sleep syndrome    Sleep-related bruxism    Inadequate sleep hygiene    Morbid obesity with BMI of 45.0-49.9, adult    Primary hypertension    Actinic keratoses    Heart murmur    ROLLINS (dyspnea on exertion)    Seasonal allergic rhinitis due to pollen    On statin therapy due to risk of future cardiovascular event    Left atrial enlargement    Aortic valve stenosis    Ulnar neuropathy of both upper extremities    Generalized edema    Chronic diastolic congestive heart failure    PVD (peripheral vascular disease)    Prediabetes    Mild anemia    Chronic bilateral low back pain without sciatica    Bilateral carpal tunnel syndrome    Right-sided low back pain without sciatica    Right foot pain    Lower extremity edema    At risk for cardiovascular event    Von Willebrand disease    Pulmonary hypertension    Class 3 severe obesity due to excess calories with serious comorbidity and body mass index (BMI) of 45.0 to 49.9 in adult    Aortic atherosclerosis    S/P TAVR (transcatheter aortic valve replacement)    Complete heart block    Pulmonary nodule    Bladder wall thickening    Pacemaker     /68 (BP Location: Left arm, Patient Position: Sitting, BP Method: Large (Manual))   Pulse 66   Resp 18   Ht 6' 3" (1.905 m)   Wt (!) 158.3 kg (348 lb 15.8 oz)   SpO2 97%   BMI 43.62 kg/m²   Body mass index is 43.62 kg/m².    Review of Systems   Respiratory:  Positive for " dyspnea on extertion.    All other systems reviewed and are negative.    Objective:      Physical Exam  Constitutional:       Appearance: He is obese.   HENT:      Head: Normocephalic and atraumatic.      Nose: Nose normal.   Cardiovascular:      Rate and Rhythm: Normal rate and regular rhythm.   Pulmonary:      Effort: Pulmonary effort is normal.      Breath sounds: Normal breath sounds.   Abdominal:      Palpations: Abdomen is soft.      Tenderness: There is no abdominal tenderness.   Musculoskeletal:         General: Normal range of motion.      Cervical back: Normal range of motion and neck supple.   Skin:     General: Skin is warm and dry.   Neurological:      General: No focal deficit present.      Mental Status: He is alert and oriented to person, place, and time.   Psychiatric:         Mood and Affect: Mood normal.         Behavior: Behavior normal.       Personal Diagnostic Review  APAP 100% compliant with AHI 1 over last 30 days.      Spirometry reviewed. Normal ratio. Below predicted values.    Assessment:       1. Morbid obesity with BMI of 40.0-44.9, adult    2. LAVERNE (obstructive sleep apnea)    3. Mild intermittent asthma without complication    4. Restrictive lung disease secondary to obesity        Outpatient Encounter Medications as of 3/4/2024   Medication Sig Dispense Refill    albuterol (PROVENTIL/VENTOLIN HFA) 90 mcg/actuation inhaler INHALE 2 PUFFS BY MOUTH INTO THE LUNGS EVERY 4 HOURS AS NEEDED FOR WHEEZING. RESCUE 6.7 g 1    aspirin (ECOTRIN) 81 MG EC tablet Take 81 mg by mouth once daily.      atorvastatin (LIPITOR) 20 MG tablet Take 1 tablet (20 mg total) by mouth every evening. 90 tablet 2    doxycycline (MONODOX) 100 MG capsule Take once daily with food. May cause upset stomach. 90 capsule 3    fluorouraciL (EFUDEX) 5 % cream AAA of the face, scalp and ears bid x 2 weeks 40 g 1    fluticasone furoate-vilanteroL (BREO ELLIPTA) 200-25 mcg/dose DsDv diskus inhaler Inhale 1 puff into the lungs  once daily. Controller 60 each 11    furosemide (LASIX) 20 MG tablet Take 1 tablet (20 mg total) by mouth once daily. 30 tablet 11    hydroCHLOROthiazide (HYDRODIURIL) 25 MG tablet Take 1 tablet (25 mg total) by mouth once daily. 30 tablet 11    metoprolol succinate (TOPROL-XL) 50 MG 24 hr tablet Take 1 tablet (50 mg total) by mouth once daily. 90 tablet 2    mometasone (NASONEX) 50 mcg/actuation nasal spray INSTILL 2 SPRAYS INTO EACH NOSTRIL ONCE DAILY. 1 each 1    semaglutide, weight loss, (WEGOVY) 2.4 mg/0.75 mL PnIj Inject 2.4 mg into the skin every 7 days. 12 mL 5    sodium chloride (OCEAN) 0.65 % nasal spray 1 spray by Nasal route as needed for Congestion.      tretinoin (RETIN-A) 0.05 % cream APPLY PEA SIZED AMOUNT TO ENTIRE FACE AT BEDTIME. IF DRYNESS,USE EVERY 3RD NIGHT AND INCREASE AS TOLERATED TO EVERY NIGHT 20 g 6    valsartan (DIOVAN) 320 MG tablet Take 1 tablet (320 mg total) by mouth once daily. 30 tablet 5    [DISCONTINUED] semaglutide (OZEMPIC) 0.25 mg or 0.5 mg(2 mg/1.5 mL) pen injector Inject 0.25 mg subq weekly x 4 weeks then 0.5 mg subq weekly 3 mL 1     No facility-administered encounter medications on file as of 3/4/2024.     Orders Placed This Encounter   Procedures    CPAP/BIPAP SUPPLIES     Order Specific Question:   Length of need (1-99 months):     Answer:   99     Order Specific Question:   Choose ONE mask type and its corresponding cushions and/or pillows:     Answer:    Full Face Mask, 1 per 90 days:  Full Face Cushion, (3 per 90 days)     Order Specific Question:   Choose EITHER Heated or Non-Heated Tubjing     Answer:    Non-Heated Tubing, 1 per 90 days     Order Specific Question:   All other supplies as needed as listed below:     Answer:    Headgear, 1 per 180 days     Order Specific Question:   All other supplies as needed as listed below:     Answer:    Disposable Filter, 6 per 90 days     Order Specific Question:   All other supplies as needed as listed  below:     Answer:    Non-Disposable Filter, 1 per 180 days     Order Specific Question:   All other supplies as needed as listed below:     Answer:    Humidifier Chamber, 1 per 180 days    Ambulatory referral/consult to [facility]     Standing Status:   Future     Standing Expiration Date:   4/4/2025     Referral Priority:   Routine     Referral Type:   Consultation     Referral Reason:   Specialty Services Required     Requested Specialty:   Internal Medicine     Number of Visits Requested:   1     Plan:     Problem List Items Addressed This Visit    None  Visit Diagnoses       Morbid obesity with BMI of 40.0-44.9, adult    -  Primary    Relevant Orders    Ambulatory referral/consult to [facility]    LAVERNE (obstructive sleep apnea)        Relevant Orders    CPAP/BIPAP SUPPLIES    Mild intermittent asthma without complication        Restrictive lung disease secondary to obesity              Continue current pulmonary drug regimen.                 Elizabeth LeJeune, ACNP, ANP

## 2024-03-11 DIAGNOSIS — J45.20 MILD INTERMITTENT ASTHMA WITHOUT COMPLICATION: ICD-10-CM

## 2024-03-12 RX ORDER — ALBUTEROL SULFATE 90 UG/1
AEROSOL, METERED RESPIRATORY (INHALATION)
Qty: 6.7 G | Refills: 1 | Status: SHIPPED | OUTPATIENT
Start: 2024-03-12 | End: 2024-04-30

## 2024-04-04 RX ORDER — HYDROCHLOROTHIAZIDE 25 MG/1
25 TABLET ORAL
Qty: 90 TABLET | Refills: 3 | Status: SHIPPED | OUTPATIENT
Start: 2024-04-04 | End: 2024-05-06 | Stop reason: SDUPTHER

## 2024-04-23 ENCOUNTER — PATIENT MESSAGE (OUTPATIENT)
Dept: INTERNAL MEDICINE | Facility: CLINIC | Age: 59
End: 2024-04-23
Payer: COMMERCIAL

## 2024-04-23 DIAGNOSIS — Z79.899 ON STATIN THERAPY DUE TO RISK OF FUTURE CARDIOVASCULAR EVENT: Chronic | ICD-10-CM

## 2024-04-23 RX ORDER — ATORVASTATIN CALCIUM 20 MG/1
20 TABLET, FILM COATED ORAL NIGHTLY
Qty: 90 TABLET | Refills: 0 | Status: SHIPPED | OUTPATIENT
Start: 2024-04-23 | End: 2024-05-06 | Stop reason: SDUPTHER

## 2024-04-23 NOTE — TELEPHONE ENCOUNTER
Care Due:                  Date            Visit Type   Department     Provider  --------------------------------------------------------------------------------                                MYCHART                              ANNUAL                              CHECKUP/PHY  [facility] INTER[facility]  Last Visit: 04-      [facility]       Josué Barba  Next Visit: None Scheduled  None         None Found                                                            Last  Test          Frequency    Reason                     Performed    Due Date  --------------------------------------------------------------------------------    Office Visit  15 months..  atorvastatin, metoprolol,   04- 07-                             valsartan................    Lipid Panel.  12 months..  atorvastatin.............  08-   08-    Health [facility] Embedded Care Due Messages. Reference number: [id number].   4/23/2024 12:42:33 AM CDT

## 2024-04-30 DIAGNOSIS — J45.20 MILD INTERMITTENT ASTHMA WITHOUT COMPLICATION: ICD-10-CM

## 2024-04-30 RX ORDER — ALBUTEROL SULFATE 90 UG/1
AEROSOL, METERED RESPIRATORY (INHALATION)
Qty: 6.7 G | Refills: 1 | Status: SHIPPED | OUTPATIENT
Start: 2024-04-30

## 2024-05-06 ENCOUNTER — OFFICE VISIT (OUTPATIENT)
Dept: INTERNAL MEDICINE | Facility: CLINIC | Age: 59
End: 2024-05-06
Payer: COMMERCIAL

## 2024-05-06 ENCOUNTER — TELEPHONE (OUTPATIENT)
Dept: INTERNAL MEDICINE | Facility: CLINIC | Age: 59
End: 2024-05-06

## 2024-05-06 DIAGNOSIS — I10 PRIMARY HYPERTENSION: Primary | Chronic | ICD-10-CM

## 2024-05-06 DIAGNOSIS — G47.33 OBSTRUCTIVE SLEEP APNEA TREATED WITH CONTINUOUS POSITIVE AIRWAY PRESSURE (CPAP): Chronic | ICD-10-CM

## 2024-05-06 DIAGNOSIS — I27.20 PULMONARY HYPERTENSION: Chronic | ICD-10-CM

## 2024-05-06 DIAGNOSIS — I70.0 AORTIC ATHEROSCLEROSIS: Chronic | ICD-10-CM

## 2024-05-06 DIAGNOSIS — J45.20 MILD INTERMITTENT ASTHMA WITHOUT COMPLICATION: ICD-10-CM

## 2024-05-06 DIAGNOSIS — Z12.11 SCREENING FOR COLORECTAL CANCER: ICD-10-CM

## 2024-05-06 DIAGNOSIS — R73.03 PREDIABETES: ICD-10-CM

## 2024-05-06 DIAGNOSIS — I73.9 PVD (PERIPHERAL VASCULAR DISEASE): Chronic | ICD-10-CM

## 2024-05-06 DIAGNOSIS — Z95.0 PACEMAKER: Chronic | ICD-10-CM

## 2024-05-06 DIAGNOSIS — Z12.12 SCREENING FOR COLORECTAL CANCER: ICD-10-CM

## 2024-05-06 DIAGNOSIS — Z95.2 S/P TAVR (TRANSCATHETER AORTIC VALVE REPLACEMENT): Chronic | ICD-10-CM

## 2024-05-06 DIAGNOSIS — D64.9 MILD ANEMIA: ICD-10-CM

## 2024-05-06 DIAGNOSIS — Z79.899 ON STATIN THERAPY DUE TO RISK OF FUTURE CARDIOVASCULAR EVENT: Chronic | ICD-10-CM

## 2024-05-06 DIAGNOSIS — Z12.5 SCREENING PSA (PROSTATE SPECIFIC ANTIGEN): ICD-10-CM

## 2024-05-06 DIAGNOSIS — I50.32 CHRONIC DIASTOLIC CONGESTIVE HEART FAILURE: Chronic | ICD-10-CM

## 2024-05-06 PROBLEM — Z95.3 S/P TAVR (TRANSCATHETER AORTIC VALVE REPLACEMENT): Chronic | Status: ACTIVE | Noted: 2019-12-16

## 2024-05-06 PROBLEM — Z95.3 S/P TAVR (TRANSCATHETER AORTIC VALVE REPLACEMENT): Chronic | Status: ACTIVE | Noted: 2023-03-17

## 2024-05-06 PROCEDURE — 3044F HG A1C LEVEL LT 7.0%: CPT | Mod: CPTII,95,, | Performed by: FAMILY MEDICINE

## 2024-05-06 PROCEDURE — 4010F ACE/ARB THERAPY RXD/TAKEN: CPT | Mod: CPTII,95,, | Performed by: FAMILY MEDICINE

## 2024-05-06 PROCEDURE — G2211 COMPLEX E/M VISIT ADD ON: HCPCS | Mod: 95,,, | Performed by: FAMILY MEDICINE

## 2024-05-06 PROCEDURE — 1159F MED LIST DOCD IN RCRD: CPT | Mod: CPTII,95,, | Performed by: FAMILY MEDICINE

## 2024-05-06 PROCEDURE — 99214 OFFICE O/P EST MOD 30 MIN: CPT | Mod: 95,,, | Performed by: FAMILY MEDICINE

## 2024-05-06 PROCEDURE — 1160F RVW MEDS BY RX/DR IN RCRD: CPT | Mod: CPTII,95,, | Performed by: FAMILY MEDICINE

## 2024-05-06 RX ORDER — VALSARTAN 320 MG/1
320 TABLET ORAL DAILY
Qty: 90 TABLET | Refills: 0 | Status: SHIPPED | OUTPATIENT
Start: 2024-05-06 | End: 2024-06-03 | Stop reason: SDUPTHER

## 2024-05-06 RX ORDER — FLUTICASONE FUROATE AND VILANTEROL 200; 25 UG/1; UG/1
1 POWDER RESPIRATORY (INHALATION) DAILY
Qty: 60 EACH | Refills: 11 | Status: SHIPPED | OUTPATIENT
Start: 2024-05-06 | End: 2024-06-10 | Stop reason: SDUPTHER

## 2024-05-06 RX ORDER — FUROSEMIDE 20 MG/1
20 TABLET ORAL DAILY
Qty: 90 TABLET | Refills: 0 | Status: SHIPPED | OUTPATIENT
Start: 2024-05-06 | End: 2024-06-03 | Stop reason: SDUPTHER

## 2024-05-06 RX ORDER — ATORVASTATIN CALCIUM 20 MG/1
20 TABLET, FILM COATED ORAL NIGHTLY
Qty: 90 TABLET | Refills: 0 | Status: SHIPPED | OUTPATIENT
Start: 2024-05-06 | End: 2024-06-03 | Stop reason: SDUPTHER

## 2024-05-06 RX ORDER — HYDROCHLOROTHIAZIDE 25 MG/1
25 TABLET ORAL DAILY
Qty: 90 TABLET | Refills: 0 | Status: SHIPPED | OUTPATIENT
Start: 2024-05-06 | End: 2024-06-03 | Stop reason: SDUPTHER

## 2024-05-06 RX ORDER — METOPROLOL SUCCINATE 50 MG/1
50 TABLET, EXTENDED RELEASE ORAL DAILY
Qty: 90 TABLET | Refills: 0 | Status: SHIPPED | OUTPATIENT
Start: 2024-05-06 | End: 2024-06-03 | Stop reason: SDUPTHER

## 2024-05-06 NOTE — PROGRESS NOTES
TELEMEDICINE VIRTUAL VIDEO VISIT  5/6/24  1:40 PM CDT    Visit Type: Audiovisual    Patient's Location: Dragan represents that they are located within the state of [place].    History of Present Illness    Dragan presents today for a general follow-up.    Dragan reports an average blood pressure reading over the last 30 days of 137/71, occasionally edging towards the high end of normal. He is regularly taking Hydrochlorothiazide, Valsartan, and Metoprolol for management. No concerns about these medications were expressed.    He experiences intermittent periods of shortness of breath. He has undergone a recent nuclear cardiac stress test and echocardiogram which showed no signs of ischemic evidence or high-risk issues, and a CT of the chest in January showed no abnormalities. Dragan is on daily use of Breo Ellipta inhaler.    Apart from his blood pressure medications, he is also on daily Aspirin, fish oil (increased due to perceived muscle benefits), and vitamins B12, B1, and B6 taken separately because the B12 occasionally makes him feel jittery. He confirms the daily use of his CPAP machine for sleep apnea.    He confirms his intention to schedule appropriate monitoring labs, and has a glucose tolerance test and a PSA blood test due to be taken. His last colonoscopy was in 2014, hence another is scheduled for this year.    After relocating to a new residence with a yard, he reports experiencing allergies during yard work and is identifying plants that may be the trigger. He enjoys the yard work experience overall.       Review of Systems   HENT:  Negative for hearing loss, rhinorrhea and trouble swallowing.    Eyes:  Negative for discharge and visual disturbance.   Cardiovascular:  Negative for chest pain and palpitations.   Gastrointestinal:  Negative for blood in stool, diarrhea and vomiting.   Endocrine: Negative for polydipsia and polyuria.   Genitourinary:  Negative for difficulty urinating, hematuria and  urgency.   Neurological:  Negative for weakness and headaches.   Psychiatric/Behavioral:  Negative for confusion and dysphoric mood.        Assessment & Plan     Confirmed the patient's current medications, which included hydrochlorothiazide, valsartan, metoprolol, furosemide, and the Breo Ellipta inhaler.   Ensured a 90-day supply for all prescribed medications.   Recommend a change in the patient's vitamin intake, suggesting a super B complex to replace the separate B12, B1, and B6 vitamins.   Ordered labs to better understand the patient's metabolism.   Requested a glucose tolerance test and educated the patient about the procedure, which involves fasting, consuming a sugary drink, and having blood sugar checked at 1 and 2 hours post-consumption.   Additionally, ordered a routine blood test to check the patient's PSA for prostate cancer screening.   Scheduled a colonoscopy, as the patient's last one was in 2014 and it was due for a 10-year follow-up.   Referred the patient for a colonoscopy due in 2024.   Planned a follow-up visit after the patient's upcoming appointment with his cardiologist, Dr. Shaikh.   Referred the patient to cardiologist, Dr. Shaikh, for further evaluation and treatment.   Reassured the patient about his breathing issue, explaining that his recent nuclear cardiac stress test and echocardiogram showed no high-risk issues.       1. Primary hypertension  Assessment & Plan:  BP Readings from Last 6 Encounters:   03/04/24 124/68   02/26/24 126/70   02/05/24 128/64   01/29/24 128/64   01/26/24 137/65   08/21/23 130/70      Last 5 Patient Entered Readings                Current 30 Day Average: 137/71  Recent Readings 5/6/2024 5/6/2024 4/30/2024 4/30/2024 4/29/2024   SBP (mmHg) 142 144 139 139 140   DBP (mmHg) 70 70 68 70 72   Pulse 60 60 63 62 60                 Lab Results   Component Value Date    EGFRNORACEVR >60 01/25/2024    CREATININE 1.1 01/25/2024    BUN 30 (H) 01/25/2024    K 4.5 01/25/2024      01/25/2024     01/25/2024     Results for orders placed or performed during the hospital encounter of 01/25/24   EKG 12-lead    Collection Time: 01/25/24  6:16 PM    Narrative    Test Reason : SOB    Vent. Rate : 060 BPM     Atrial Rate : 060 BPM     P-R Int : 172 ms          QRS Dur : 180 ms      QT Int : 460 ms       P-R-T Axes : 091 -41 086 degrees     QTc Int : 460 ms    Atrial-paced rhythm  Left axis deviation  Nonspecific intraventricular block  Inferior infarct ,age undetermined  Abnormal ECG  When compared with ECG of 21-JUN-2023 09:31,  Electronic atrial pacemaker has replaced Electronic ventricular pacemaker  Confirmed by MELISA ELMORE, KEIKO NEFF ([id number]) on 1/26/2024 9:00:37 PM    Referred By: [email or web address]   SELF           Confirmed By:KEIKO VINCENT MD         Orders:  -     hydroCHLOROthiazide (HYDRODIURIL) 25 MG tablet; Take 1 tablet (25 mg total) by mouth once daily.  Dispense: 90 tablet; Refill: 0  -     metoprolol succinate (TOPROL-XL) 50 MG 24 hr tablet; Take 1 tablet (50 mg total) by mouth once daily.  Dispense: 90 tablet; Refill: 0  -     valsartan (DIOVAN) 320 MG tablet; Take 1 tablet (320 mg total) by mouth once daily.  Dispense: 90 tablet; Refill: 0  -     furosemide (LASIX) 20 MG tablet; Take 1 tablet (20 mg total) by mouth once daily.  Dispense: 90 tablet; Refill: 0  -     LIPID PANEL; Future; Expected date: 05/06/2024  -     Aldosterone/Renin Activity Ratio; Future; Expected date: 05/06/2024    2. Obstructive sleep apnea treated with continuous positive airway pressure (CPAP)    3. Pulmonary hypertension  Overview:  CARDIAC CATH 06/28/22  - The pre-procedure left ventricular end diastolic pressure was 32.  - There was trivial (1+) aortic regurgitation.  - The estimated blood loss was none.  - There was diastolic dysfunction.  - The coronary arteries were normal.  - The filling pressures on the right and left were moderately elevated. Pulmonary hypertension was moderate.  - 53  MMHG GRADIENT ACROSS AORTIC VAKLVE SUGGESTIVE OF SEVERE AORTIC STENOSIS.    Echo 2/5/24    Left Ventricle: The left ventricle is normal in size. Normal wall thickness. There is concentric hypertrophy. Regional wall motion abnormalities present. Septal motion is consistent with post-operative status. There is normal systolic function. Ejection fraction by visual approximation is 55%. Grade II diastolic dysfunction.    Right Ventricle: pacer wire noted Right ventricle wall motion  is normal. Systolic function is normal.    Aortic Valve: There is a transcatheter valve replacement in the aortic position. It is reported to be a 29 mm . There is mild aortic regurgitation.    Pulmonary Artery: The estimated pulmonary artery systolic pressure is 36 mmHg.    IVC/SVC: Normal venous pressure at 3 mmHg.     Orders:  -     metoprolol succinate (TOPROL-XL) 50 MG 24 hr tablet; Take 1 tablet (50 mg total) by mouth once daily.  Dispense: 90 tablet; Refill: 0  -     furosemide (LASIX) 20 MG tablet; Take 1 tablet (20 mg total) by mouth once daily.  Dispense: 90 tablet; Refill: 0    4. On statin therapy due to risk of future cardiovascular event  -     atorvastatin (LIPITOR) 20 MG tablet; Take 1 tablet (20 mg total) by mouth every evening.  Dispense: 90 tablet; Refill: 0  -     LIPID PANEL; Future; Expected date: 05/06/2024    5. Mild intermittent asthma without complication  -     fluticasone furoate-vilanteroL (BREO ELLIPTA) 200-25 mcg/dose DsDv diskus inhaler; Inhale 1 puff into the lungs once daily. Controller  Dispense: 60 each; Refill: 11    6. S/P TAVR (transcatheter aortic valve replacement)  Overview:  Echo 2/5/24    Left Ventricle: The left ventricle is normal in size. Normal wall thickness. There is concentric hypertrophy. Regional wall motion abnormalities present. Septal motion is consistent with post-operative status. There is normal systolic function. Ejection fraction by visual approximation is 55%. Grade II diastolic  dysfunction.    Right Ventricle: pacer wire noted Right ventricle wall motion  is normal. Systolic function is normal.    Aortic Valve: There is a transcatheter valve replacement in the aortic position. It is reported to be a 29 mm . There is mild aortic regurgitation.    Pulmonary Artery: The estimated pulmonary artery systolic pressure is 36 mmHg.    IVC/SVC: Normal venous pressure at 3 mmHg.     CARDIAC CATH 06/28/22  - The pre-procedure left ventricular end diastolic pressure was 32.  - There was trivial (1+) aortic regurgitation.  - The estimated blood loss was none.  - There was diastolic dysfunction.  - The coronary arteries were normal.  - The filling pressures on the right and left were moderately elevated. Pulmonary hypertension was moderate.  - 53 MMHG GRADIENT ACROSS AORTIC VAKLVE SUGGESTIVE OF SEVERE AORTIC STENOSIS.    ECHOCARDIOGRAM 10/18/2019  1 - Normal left ventricular systolic function (EF 60-65%).  2 - Impaired LV relaxation, elevated LAP (grade 2 diastolic dysfunction).  3 - Normal right ventricular systolic function.  4 - No wall motion abnormalities.  5 - Severe left atrial enlargement.  6 - Concentric hypertrophy.  7 - Mild aortic regurgitation.  8 - Moderate aortic stenosis, CRISTINO = 1.87 cm2, AVAi = 0.7 cm2/m2, peak velocity = 3.87 m/s, mean gradient = 34 mmHg.      7. Screening for colorectal cancer  -     Ambulatory referral/consult to Endo Procedure ; Future; Expected date: 05/07/2024    8. Screening PSA (prostate specific antigen)  -     PSA, Screening; Standing    9. Prediabetes  -     Glucose Tolerance 2 Hour; Future; Expected date: 05/06/2024    10. Aortic atherosclerosis  -     atorvastatin (LIPITOR) 20 MG tablet; Take 1 tablet (20 mg total) by mouth every evening.  Dispense: 90 tablet; Refill: 0    11. Chronic diastolic congestive heart failure  Overview:  ECHOCARDIOGRAM 03/22/2021  ·The left ventricle is normal in size with normal systolic function. The estimated ejection  fraction is 60%  ·Indeterminate left ventricular diastolic function.  ·Normal right ventricular size with normal right ventricular systolic function.  ·Mild left atrial enlargement.  ·Normal central venous pressure (3 mmHg).  ·There is severe aortic valve stenosis.  ·Aortic valve area is 1.25 cm2; peak velocity is 4.55 m/s; mean gradient is 50 mmHg.    ECHOCARDIOGRAM 10/18/2019  1 - Normal left ventricular systolic function (EF 60-65%).  2 - Impaired LV relaxation, elevated LAP (grade 2 diastolic dysfunction).  3 - Normal right ventricular systolic function.  4 - No wall motion abnormalities.  5 - Severe left atrial enlargement.  6 - Concentric hypertrophy.  7 - Mild aortic regurgitation.  8 - Moderate aortic stenosis, CRISTINO = 1.87 cm2, AVAi = 0.7 cm2/m2, peak velocity = 3.87 m/s, mean gradient = 34 mmHg.    Orders:  -     valsartan (DIOVAN) 320 MG tablet; Take 1 tablet (320 mg total) by mouth once daily.  Dispense: 90 tablet; Refill: 0  -     furosemide (LASIX) 20 MG tablet; Take 1 tablet (20 mg total) by mouth once daily.  Dispense: 90 tablet; Refill: 0    12. Pacemaker    13. PVD (peripheral vascular disease)  -     atorvastatin (LIPITOR) 20 MG tablet; Take 1 tablet (20 mg total) by mouth every evening.  Dispense: 90 tablet; Refill: 0    14. Mild anemia  -     CBC Auto Differential; Future; Expected date: 05/06/2024  -     Ferritin; Future; Expected date: 05/06/2024  -     Iron and TIBC; Future; Expected date: 05/06/2024    No other significant complaints or concerns were reported.  Today's visit involved the intricate management of episodic problem(s) and the ongoing care for the patient's serious or complex condition(s) listed above, reflecting the inherent complexity of providing longitudinal, comprehensive evaluation and management as the central hub for the patient's primary care services.  There were no vitals filed for this visit.  Physical Exam    Constitutional: No acute distress. Normal appearance. Not  "ill-appearing.  Pulmonary: Pulmonary effort is normal. No respiratory distress.  Skin: Skin is not jaundiced.  Neurological: Alert. Mental status is at baseline.  Psychiatric: Mood normal. Behavior normal. Thought content normal.         This note was generated with the assistance of ambient listening technology. Verbal consent was obtained by the patient and accompanying visitor(s) for the recording of patient appointment to facilitate this note. I attest to having reviewed and edited the generated note for accuracy, though some syntax or spelling errors may persist. Please contact the author of this note for any clarification.  FOLLOW-UP:  Schedule fasting labs in late May (at least 3 days before his 6/3/24 cardiology appointment with Dr. Shaikh).  Schedule VV with me about a week after his 6/3/24 cardiology appointment.  Schedule OV with me about 3 months later.    TOTAL TIME evaluating and managing this patient for this encounter was greater than or equal to 27 minutes.  This time was exclusive of any separately billable procedures for this patient and exclusive of time spent treating any other patient.    Documentation entered by me for this encounter may have been done in part using speech-recognition technology. Although I have made an effort to ensure accuracy, "sound like" errors may exist and should be interpreted in context.    Each patient to whom medical services are provided by telemedicine is: (1) informed of the relationship between the physician and patient and the respective role of any other health care provider with respect to management of the patient; and (2) notified that he or she may decline to receive medical services by telemedicine and may withdraw from such care at any time.   "

## 2024-05-06 NOTE — TELEPHONE ENCOUNTER
----- Message from GIL Barba MD sent at 5/6/2024  2:23 PM CDT -----  Schedule fasting labs in late May (at least 3 days before his 6/3/24 cardiology appointment with Dr. Shaikh).  Schedule VV with me about a week after his 6/3/24 cardiology appointment.  Schedule OV with me about 3 months later.     decreased shane/decreased step length/decreased stride length/decreased weight-shifting ability

## 2024-05-06 NOTE — Clinical Note
Schedule fasting labs in late May (at least 3 days before his 6/3/24 cardiology appointment with Dr. Shaikh). Schedule VV with me about a week after his 6/3/24 cardiology appointment. Schedule OV with me about 3 months later.

## 2024-05-06 NOTE — ASSESSMENT & PLAN NOTE
BP Readings from Last 6 Encounters:   03/04/24 124/68   02/26/24 126/70   02/05/24 128/64   01/29/24 128/64   01/26/24 137/65   08/21/23 130/70      Last 5 Patient Entered Readings                Current 30 Day Average: 137/71  Recent Readings 5/6/2024 5/6/2024 4/30/2024 4/30/2024 4/29/2024   SBP (mmHg) 142 144 139 139 140   DBP (mmHg) 70 70 68 70 72   Pulse 60 60 63 62 60                 Lab Results   Component Value Date    EGFRNORACEVR >60 01/25/2024    CREATININE 1.1 01/25/2024    BUN 30 (H) 01/25/2024    K 4.5 01/25/2024     01/25/2024     01/25/2024     Results for orders placed or performed during the hospital encounter of 01/25/24   EKG 12-lead    Collection Time: 01/25/24  6:16 PM    Narrative    Test Reason : SOB    Vent. Rate : 060 BPM     Atrial Rate : 060 BPM     P-R Int : 172 ms          QRS Dur : 180 ms      QT Int : 460 ms       P-R-T Axes : 091 -41 086 degrees     QTc Int : 460 ms    Atrial-paced rhythm  Left axis deviation  Nonspecific intraventricular block  Inferior infarct ,age undetermined  Abnormal ECG  When compared with ECG of 21-JUN-2023 09:31,  Electronic atrial pacemaker has replaced Electronic ventricular pacemaker  Confirmed by KEIKO VINCENT MD [id number]) on 1/26/2024 9:00:37 PM    Referred By: [email or web address]   SELF           Confirmed By:KEIKO VINCENT MD

## 2024-05-09 ENCOUNTER — HOSPITAL ENCOUNTER (OUTPATIENT)
Dept: PREADMISSION TESTING | Facility: HOSPITAL | Age: 59
Discharge: HOME | End: 2024-05-09
Attending: FAMILY MEDICINE
Payer: COMMERCIAL

## 2024-05-09 DIAGNOSIS — Z12.12 SCREENING FOR COLORECTAL CANCER: Primary | ICD-10-CM

## 2024-05-09 DIAGNOSIS — Z12.11 SCREENING FOR COLORECTAL CANCER: Primary | ICD-10-CM

## 2024-05-09 RX ORDER — POLYETHYLENE GLYCOL 3350, SODIUM SULFATE ANHYDROUS, SODIUM BICARBONATE, SODIUM CHLORIDE, POTASSIUM CHLORIDE 236; 22.74; 6.74; 5.86; 2.97 G/4L; G/4L; G/4L; G/4L; G/4L
4 POWDER, FOR SOLUTION ORAL ONCE
Qty: 4000 ML | Refills: 0 | Status: SHIPPED | OUTPATIENT
Start: 2024-05-09 | End: 2024-05-09

## 2024-05-30 ENCOUNTER — LAB VISIT (OUTPATIENT)
Dept: LAB | Facility: HOSPITAL | Age: 59
End: 2024-05-30
Attending: FAMILY MEDICINE
Payer: COMMERCIAL

## 2024-05-30 DIAGNOSIS — I10 PRIMARY HYPERTENSION: Chronic | ICD-10-CM

## 2024-05-30 DIAGNOSIS — Z79.899 ON STATIN THERAPY DUE TO RISK OF FUTURE CARDIOVASCULAR EVENT: Chronic | ICD-10-CM

## 2024-05-30 LAB
CHOLEST SERPL-MCNC: 134 MG/DL (ref 120–199)
CHOLEST/HDLC SERPL: 3.4 {RATIO} (ref 2–5)
HDLC SERPL-MCNC: 40 MG/DL (ref 40–75)
HDLC SERPL: 29.9 % (ref 20–50)
LDLC SERPL CALC-MCNC: 78.4 MG/DL (ref 63–159)
NONHDLC SERPL-MCNC: 94 MG/DL
TRIGL SERPL-MCNC: 78 MG/DL (ref 30–150)

## 2024-05-30 PROCEDURE — 36415 COLL VENOUS BLD VENIPUNCTURE: CPT | Performed by: FAMILY MEDICINE

## 2024-05-30 PROCEDURE — 80061 LIPID PANEL: CPT | Performed by: FAMILY MEDICINE

## 2024-05-31 ENCOUNTER — ANESTHESIA EVENT (OUTPATIENT)
Dept: ENDOSCOPY | Facility: HOSPITAL | Age: 59
End: 2024-05-31
Payer: COMMERCIAL

## 2024-05-31 ENCOUNTER — ANESTHESIA (OUTPATIENT)
Dept: ENDOSCOPY | Facility: HOSPITAL | Age: 59
End: 2024-05-31
Payer: COMMERCIAL

## 2024-05-31 ENCOUNTER — HOSPITAL ENCOUNTER (OUTPATIENT)
Facility: HOSPITAL | Age: 59
Discharge: HOME | End: 2024-05-31
Attending: INTERNAL MEDICINE | Admitting: INTERNAL MEDICINE
Payer: COMMERCIAL

## 2024-05-31 DIAGNOSIS — Z12.11 ENCOUNTER FOR SCREENING COLONOSCOPY: Primary | ICD-10-CM

## 2024-05-31 DIAGNOSIS — E66.01 MORBID OBESITY WITH BMI OF 45.0-49.9, ADULT: Chronic | ICD-10-CM

## 2024-05-31 PROCEDURE — 45380 COLONOSCOPY AND BIOPSY: CPT | Mod: 33,,, | Performed by: INTERNAL MEDICINE

## 2024-05-31 PROCEDURE — 37000009 HC ANESTHESIA EA ADD 15 MINS: Performed by: INTERNAL MEDICINE

## 2024-05-31 PROCEDURE — 25000003 PHARM REV CODE 250: Performed by: FAMILY MEDICINE

## 2024-05-31 PROCEDURE — 63600175 PHARM REV CODE 636 W HCPCS: Performed by: FAMILY MEDICINE

## 2024-05-31 PROCEDURE — 45380 COLONOSCOPY AND BIOPSY: CPT | Mod: PT | Performed by: INTERNAL MEDICINE

## 2024-05-31 PROCEDURE — 37000008 HC ANESTHESIA 1ST 15 MINUTES: Performed by: INTERNAL MEDICINE

## 2024-05-31 PROCEDURE — 27201012 HC FORCEPS, HOT/COLD, DISP: Performed by: INTERNAL MEDICINE

## 2024-05-31 PROCEDURE — 88305 TISSUE EXAM BY PATHOLOGIST: CPT | Performed by: PATHOLOGY

## 2024-05-31 PROCEDURE — 88305 TISSUE EXAM BY PATHOLOGIST: CPT | Mod: 26,,, | Performed by: PATHOLOGY

## 2024-05-31 PROCEDURE — 25000003 PHARM REV CODE 250: Performed by: INTERNAL MEDICINE

## 2024-05-31 RX ORDER — DEXTROMETHORPHAN/PSEUDOEPHED 2.5-7.5/.8
DROPS ORAL
Status: DISCONTINUED | OUTPATIENT
Start: 2024-05-31 | End: 2024-05-31 | Stop reason: HOSPADM

## 2024-05-31 RX ORDER — PROPOFOL 10 MG/ML
VIAL (ML) INTRAVENOUS
Status: DISCONTINUED | OUTPATIENT
Start: 2024-05-31 | End: 2024-05-31

## 2024-05-31 RX ORDER — LIDOCAINE HYDROCHLORIDE 20 MG/ML
INJECTION, SOLUTION EPIDURAL; INFILTRATION; INTRACAUDAL; PERINEURAL
Status: DISCONTINUED | OUTPATIENT
Start: 2024-05-31 | End: 2024-05-31

## 2024-05-31 RX ADMIN — PROPOFOL 100 MG: 10 INJECTION, EMULSION INTRAVENOUS at 11:05

## 2024-05-31 RX ADMIN — LIDOCAINE HYDROCHLORIDE 50 MG: 20 INJECTION, SOLUTION EPIDURAL; INFILTRATION; INTRACAUDAL; PERINEURAL at 11:05

## 2024-05-31 RX ADMIN — PROPOFOL 50 MG: 10 INJECTION, EMULSION INTRAVENOUS at 12:05

## 2024-05-31 RX ADMIN — PROPOFOL 50 MG: 10 INJECTION, EMULSION INTRAVENOUS at 11:05

## 2024-05-31 RX ADMIN — SODIUM CHLORIDE, SODIUM LACTATE, POTASSIUM CHLORIDE, AND CALCIUM CHLORIDE: .6; .31; .03; .02 INJECTION, SOLUTION INTRAVENOUS at 11:05

## 2024-05-31 NOTE — TRANSFER OF CARE
"Anesthesia Transfer of Care Note    Patient: Dragan Man II    Procedure(s) Performed: Procedure(s) (LRB):  COLONOSCOPY (N/A)    Patient location: GI    Anesthesia Type: MAC    Transport from OR: Transported from OR on room air with adequate spontaneous ventilation    Post pain: adequate analgesia    Post assessment: no apparent anesthetic complications    Post vital signs: stable    Level of consciousness: awake and responds to stimulation    Nausea/Vomiting: no nausea/vomiting    Complications: none    Transfer of care protocol was followed      Last vitals: Visit Vitals  /60 (BP Location: Left arm, Patient Position: Lying)   Pulse 71   Temp 37.8 °C (100 °F) (Temporal)   Resp 18   Ht 6' 3" (1.905 m)   Wt (!) 154.2 kg (340 lb)   SpO2 95%   BMI 42.50 kg/m²     "

## 2024-05-31 NOTE — ANESTHESIA POSTPROCEDURE EVALUATION
Anesthesia Post Evaluation    Patient: Dragan Man II    Procedure(s) Performed: Procedure(s) (LRB):  COLONOSCOPY (N/A)    Final Anesthesia Type: MAC      Patient location during evaluation: GI PACU  Patient participation: Yes- Able to Participate  Level of consciousness: awake and alert  Post-procedure vital signs: reviewed and stable  Pain management: adequate  Airway patency: patent    PONV status at discharge: No PONV  Anesthetic complications: no      Cardiovascular status: stable  Respiratory status: unassisted and spontaneous ventilation  Hydration status: euvolemic  Follow-up not needed.              Vitals Value Taken Time   BP  05/31/24 1215   Temp  05/31/24 1215   Pulse  05/31/24 1215   Resp  05/31/24 1215   SpO2  05/31/24 1215         No case tracking events are documented in the log.      Pain/Noah Score: No data recorded

## 2024-05-31 NOTE — [employer] PATIENT INSTRUCTIONS
Discharge Summary/Instructions after an Endoscopic Procedure  Patient Name: Dragan Man  Patient MRN: [id number]  Patient YOB: 1965  Friday, May 31, 2024 Renata Blue MD  Dear patient,  As a result of recent federal legislation (The Federal Cures Act), you may   receive lab or pathology results from your procedure in your [facility]   account before your physician is able to contact you. Your physician or   their representative will relay the results to you with their   recommendations at their soonest availability.  Thank you,  RESTRICTIONS:  During your procedure today, you received medications for sedation.  These   medications may affect your judgment, balance and coordination.  Therefore,   for 24 hours, you have the following restrictions:   - DO NOT drive a car, operate machinery, make legal/financial decisions,   sign important papers or drink alcohol.    ACTIVITY:  Today: no heavy lifting, straining or running due to procedural   sedation/anesthesia.  The following day: return to full activity including work.  DIET:  Eat and drink normally unless instructed otherwise.     TREATMENT FOR COMMON SIDE EFFECTS:  - Mild abdominal pain, nausea, belching, bloating or excessive gas:  rest,   eat lightly and use a heating pad.  - Sore Throat: treat with throat lozenges and/or gargle with warm salt   water.  - Because air was used during the procedure, expelling large amounts of air   from your rectum or belching is normal.  - If a bowel prep was taken, you may not have a bowel movement for 1-3 days.    This is normal.  SYMPTOMS TO WATCH FOR AND REPORT TO YOUR PHYSICIAN:  1. Abdominal pain or bloating, other than gas cramps.  2. Chest pain.  3. Back pain.  4. Signs of infection such as: chills or fever occurring within 24 hours   after the procedure.  5. Rectal bleeding, which would show as bright red, maroon, or black stools.   (A tablespoon of blood from the rectum is not serious, especially if    hemorrhoids are present.)  6. Vomiting.  7. Weakness or dizziness.  GO DIRECTLY TO THE NEAREST EMERGENCY ROOM IF YOU HAVE ANY OF THE FOLLOWING:      Difficulty breathing              Chills and/or fever over 101 F   Persistent vomiting and/or vomiting blood   Severe abdominal pain   Severe chest pain   Black, tarry stools   Bleeding- more than one tablespoon   Any other symptom or condition that you feel may need urgent attention  Your doctor recommends these additional instructions:  If any biopsies were taken, your doctors clinic will contact you in 1 to 2   weeks with any results.  - Discharge patient to home (via wheelchair).   - High fiber diet.   - Continue present medications.   - Await pathology results.   - Repeat colonoscopy in 5 years for surveillance.   - Patient has a contact number available for emergencies.  The signs and   symptoms of potential delayed complications were discussed with the   patient.  Return to normal activities tomorrow.  Written discharge   instructions were provided to the patient.  For questions, problems or results please call your physician Renata Blue MD at Work:  [phone]  If you have any questions about the above instructions, call the GI   department at [phone] or call the endoscopy unit at [phone]   from 7am until 3 pm.  [facility] [place], EMERGENCY ROOM PHONE NUMBER:   [phone]  IF A COMPLICATION OR EMERGENCY SITUATION ARISES AND YOU ARE UNABLE TO REACH   YOUR PHYSICIAN - GO DIRECTLY TO THE EMERGENCY ROOM.  I have read or have had read to me these discharge instructions for my   procedure and have received a written copy.  I understand these   instructions and will follow-up with my physician if I have any questions.     __________________________________       _____________________________________  Nurse Signature                                          Patient/Designated   Responsible Party Signature  Renata Blue  MD Renata Blue MD  5/31/2024 12:12:51 PM  [employer]

## 2024-05-31 NOTE — ANESTHESIA PREPROCEDURE EVALUATION
05/31/2024  Dragan Man II is a 58 y.o., male.      Pre-op Assessment    I have reviewed the Patient Summary Reports.     I have reviewed the Nursing Notes. I have reviewed the NPO Status.   I have reviewed the Medications.     Review of Systems  Anesthesia Hx:  No problems with previous Anesthesia               Denies Personal Hx of Anesthesia complications.                    Hematology/Oncology:  Hematology Normal   Oncology Normal                                   EENT/Dental:  EENT/Dental Normal           Cardiovascular:     Hypertension       CHF     ROLLINS   Overview:  ECHOCARDIOGRAM 03/22/2021  ·The left ventricle is normal in size with normal systolic function. The estimated ejection fraction is 60%  ·Indeterminate left ventricular diastolic function.  ·Normal right ventricular size with normal right ventricular systolic function.  ·Mild left atrial enlargement.  ·Normal central venous pressure (3 mmHg).  ·There is severe aortic valve stenosis.  ·Aortic valve area is 1.25 cm2; peak velocity is 4.55 m/s; mean gradient is 50 mmHg.      Valvular Heart Disease: Aortic Stenosis (AS)                    Hypertension         Pulmonary:      Shortness of breath  Sleep Apnea                Renal/:  Renal/ Normal                 Hepatic/GI:  Hepatic/GI Normal                 Musculoskeletal:  Musculoskeletal Normal                Neurological:  Neurology Normal                                      Endocrine:  Endocrine Normal            Dermatological:  Skin Normal    Psych:  Psychiatric History                  Physical Exam  General: Well nourished, Cooperative and Alert    Airway:  Mallampati: II   Mouth Opening: Normal  TM Distance: Normal  Tongue: Normal  Neck ROM: Normal ROM    Dental:  Intact        Anesthesia Plan  Type of Anesthesia, risks & benefits discussed:    Anesthesia Type: MAC  Intra-op  Monitoring Plan: Standard ASA Monitors  Post Op Pain Control Plan: multimodal analgesia and IV/PO Opioids PRN  Induction:  IV  Airway Plan: Direct, Post-Induction  Informed Consent: Informed consent signed with the Patient and all parties understand the risks and agree with anesthesia plan.  All questions answered.   ASA Score: 3  Day of Surgery Review of History & Physical: H&P Update referred to the surgeon/provider.    Ready For Surgery From Anesthesia Perspective.     .

## 2024-05-31 NOTE — H&P
Short Stay Endoscopy History and Physical    PCP - GIL Barba MD    Procedure - Colonoscopy  ASA - per anesthesia  Mallampati - per anesthesia  History of Anesthesia problems - no  Family history Anesthesia problems -  no     HPI:  This is a 58 y.o. male here for evaluation of :   Active Hospital Problems    Diagnosis  POA    *Encounter for screening colonoscopy [Z12.11]  Not Applicable      Resolved Hospital Problems   No resolved problems to display.         Health Maintenance         Date Due Completion Date    Pneumococcal Vaccines (Age 0-64) (1 of 2 - PCV) Never done ---    PROSTATE-SPECIFIC ANTIGEN 08/15/2023 8/15/2022    COVID-19 Vaccine (5 - 2023-24 season) 09/01/2023 11/16/2022    RSV Vaccine (Age 60+ and Pregnant patients) (1 - 1-dose 60+ series) Never done ---    Colorectal Cancer Screening 05/14/2024 5/14/2014    Influenza Vaccine (Season Ended) 09/01/2024 10/19/2022    Hemoglobin A1c (Prediabetes) 01/29/2025 1/29/2024    High Dose Statin 05/06/2025 5/6/2024    Lipid Panel 05/30/2029 5/30/2024    TETANUS VACCINE 10/14/2029 10/14/2019              ROS:  CONSTITUTIONAL: Denies weight change,  fatigue, fevers, chills, night sweats.  CARDIOVASCULAR: Denies chest pain, shortness of breath, orthopnea and edema.  RESPIRATORY: Denies cough, hemoptysis, dyspnea, and wheezing.  GI: See HPI.    Medical History:   Past Medical History:   Diagnosis Date    Actinic keratoses 10/14/2019    Aortic valve stenosis 12/16/2019    CARDIAC CATH 06/28/22  - The pre-procedure left ventricular end diastolic pressure was 32.  - There was trivial (1+) aortic regurgitation.  - The estimated blood loss was none.  - There was diastolic dysfunction.  - The coronary arteries were normal.  - The filling pressures on the right and left were moderately elevated. Pulmonary hypertension was moderate.  - 53 [facility] GRADIENT ACROSS AORTIC VAKLV    Bilateral carpal tunnel syndrome 04/12/2021    Chronic bilateral low back pain without  sciatica 04/12/2021    Depression     Morbid obesity with BMI of 40.0-44.9, adult 11/17/2017    LAVERNE (obstructive sleep apnea)     Prediabetes 04/12/2021    Seasonal allergic rhinitis due to pollen 10/14/2019    Ulnar neuropathy of both upper extremities 02/17/2020       Surgical History:   Past Surgical History:   Procedure Laterality Date    A-V CARDIAC PACEMAKER INSERTION N/A 3/17/2023    Procedure: INSERTION, CARDIAC PACEMAKER, DUAL CHAMBER;  Surgeon: Royce Liao MD;  Location: [facility] EP LAB;  Service: Cardiology;  Laterality: N/A;  CHB, DUAL PPM, ANES, MDT, DM, CVICU [id number]    CARDIAC CATH COSURGEON N/A 3/16/2023    Procedure: Cardiac Cath Cosurgeon;  Surgeon: Mitchell Kingston MD;  Location: [facility] CATH LAB;  Service: Cardiovascular;  Laterality: N/A;    CATHETERIZATION OF BOTH LEFT AND RIGHT HEART N/A 6/28/2022    Procedure: CATHETERIZATION, HEART, BOTH LEFT AND RIGHT;  Surgeon: Carlotta Gordon MD;  Location: [facility] CATH LAB;  Service: Cardiology;  Laterality: N/A;    None      TRANSCATHETER AORTIC VALVE REPLACEMENT (TAVR) N/A 3/16/2023    Procedure: REPLACEMENT, AORTIC VALVE, TRANSCATHETER (TAVR);  Surgeon: Adan Greene MD;  Location: [facility] CATH LAB;  Service: Cardiology;  Laterality: N/A;       Family History:   Family History   Problem Relation Name Age of Onset    Diabetes Father      Diabetes Paternal Grandfather      No Known Problems Mother         Social History:   Social History     Tobacco Use    Smoking status: Never    Smokeless tobacco: Former     Types: Snuff     Quit date: 2003    Tobacco comments:     quit 15 years ago    Substance Use Topics    Alcohol use: Yes     Comment: socially // beer    Drug use: No       Allergies:   Review of patient's allergies indicates:   Allergen Reactions    Olmesartan Other (See Comments)     Numbness and tingling in fingers and knee joint pain    Chlorthalidone Other (See Comments)     Patient states it made him depressed.        Medications:   No current  facility-administered medications on file prior to encounter.     Current Outpatient Medications on File Prior to Encounter   Medication Sig Dispense Refill    albuterol (PROVENTIL/VENTOLIN HFA) 90 mcg/actuation inhaler INHALE 2 PUFFS BY MOUTH INTO THE LUNGS EVERY 4 HOURS AS NEEDED FOR WHEEZING. RESCUE 6.7 g 1    aspirin (ECOTRIN) 81 MG EC tablet Take 81 mg by mouth once daily.      atorvastatin (LIPITOR) 20 MG tablet Take 1 tablet (20 mg total) by mouth every evening. 90 tablet 0    doxycycline (MONODOX) 100 MG capsule Take once daily with food. May cause upset stomach. 90 capsule 3    fluorouraciL (EFUDEX) 5 % cream AAA of the face, scalp and ears bid x 2 weeks 40 g 1    fluticasone furoate-vilanteroL (BREO ELLIPTA) 200-25 mcg/dose DsDv diskus inhaler Inhale 1 puff into the lungs once daily. Controller 60 each 11    furosemide (LASIX) 20 MG tablet Take 1 tablet (20 mg total) by mouth once daily. 90 tablet 0    hydroCHLOROthiazide (HYDRODIURIL) 25 MG tablet Take 1 tablet (25 mg total) by mouth once daily. 90 tablet 0    metoprolol succinate (TOPROL-XL) 50 MG 24 hr tablet Take 1 tablet (50 mg total) by mouth once daily. 90 tablet 0    mometasone (NASONEX) 50 mcg/actuation nasal spray INSTILL 2 SPRAYS INTO EACH NOSTRIL ONCE DAILY. 1 each 1    sodium chloride (OCEAN) 0.65 % nasal spray 1 spray by Nasal route as needed for Congestion.      tretinoin (RETIN-A) 0.05 % cream APPLY PEA SIZED AMOUNT TO ENTIRE FACE AT BEDTIME. IF DRYNESS,USE EVERY 3RD NIGHT AND INCREASE AS TOLERATED TO EVERY NIGHT 20 g 6    valsartan (DIOVAN) 320 MG tablet Take 1 tablet (320 mg total) by mouth once daily. 90 tablet 0       Physical Exam:  Vital Signs: There were no vitals filed for this visit.  General Appearance: Well appearing in no acute distress  ENT: OP clear  Chest: CTA B  CV: RRR, no m/r/g  Abd: s/nt/nd/nabs  Ext: no edema    Labs:Reviewed    IMP:  Active Hospital Problems    Diagnosis  POA    *Encounter for screening colonoscopy  [Z12.11]  Not Applicable      Resolved Hospital Problems   No resolved problems to display.         Plan:   I have explained the risks and benefits of colonoscopy to the patient including but not limited to bleeding, perforation, infection, and death. The patient wishes to proceed.

## 2024-05-31 NOTE — DISCHARGE SUMMARY
[facility] - Endoscopy (Hospital)  Discharge Note  Short Stay    Procedure(s) (LRB):  COLONOSCOPY (N/A)      OUTCOME: Patient tolerated treatment/procedure well without complication and is now ready for discharge.    DISPOSITION: Home or Self Care    FINAL DIAGNOSIS:  Encounter for screening colonoscopy    FOLLOWUP: With primary care provider    DISCHARGE INSTRUCTIONS:  No discharge procedures on file.

## 2024-06-03 ENCOUNTER — OFFICE VISIT (OUTPATIENT)
Dept: CARDIOLOGY | Facility: CLINIC | Age: 59
End: 2024-06-03
Payer: COMMERCIAL

## 2024-06-03 VITALS
BODY MASS INDEX: 39.17 KG/M2 | HEIGHT: 75 IN | DIASTOLIC BLOOD PRESSURE: 67 MMHG | TEMPERATURE: 99 F | WEIGHT: 315 LBS | HEART RATE: 66 BPM | OXYGEN SATURATION: 96 % | SYSTOLIC BLOOD PRESSURE: 121 MMHG | RESPIRATION RATE: 18 BRPM

## 2024-06-03 VITALS
BODY MASS INDEX: 39.17 KG/M2 | SYSTOLIC BLOOD PRESSURE: 124 MMHG | HEART RATE: 60 BPM | DIASTOLIC BLOOD PRESSURE: 68 MMHG | WEIGHT: 315 LBS | OXYGEN SATURATION: 97 % | HEIGHT: 75 IN

## 2024-06-03 DIAGNOSIS — I51.7 LEFT ATRIAL ENLARGEMENT: ICD-10-CM

## 2024-06-03 DIAGNOSIS — I44.2 COMPLETE HEART BLOCK: ICD-10-CM

## 2024-06-03 DIAGNOSIS — I50.32 CHRONIC DIASTOLIC CONGESTIVE HEART FAILURE: Primary | Chronic | ICD-10-CM

## 2024-06-03 DIAGNOSIS — Z79.899 ON STATIN THERAPY DUE TO RISK OF FUTURE CARDIOVASCULAR EVENT: Chronic | ICD-10-CM

## 2024-06-03 DIAGNOSIS — I73.9 PVD (PERIPHERAL VASCULAR DISEASE): Chronic | ICD-10-CM

## 2024-06-03 DIAGNOSIS — Z95.2 S/P TAVR (TRANSCATHETER AORTIC VALVE REPLACEMENT): Chronic | ICD-10-CM

## 2024-06-03 DIAGNOSIS — E66.01 CLASS 3 SEVERE OBESITY DUE TO EXCESS CALORIES WITH SERIOUS COMORBIDITY AND BODY MASS INDEX (BMI) OF 45.0 TO 49.9 IN ADULT: ICD-10-CM

## 2024-06-03 DIAGNOSIS — I10 PRIMARY HYPERTENSION: Chronic | ICD-10-CM

## 2024-06-03 DIAGNOSIS — Z95.0 PACEMAKER: Chronic | ICD-10-CM

## 2024-06-03 DIAGNOSIS — I70.0 AORTIC ATHEROSCLEROSIS: Chronic | ICD-10-CM

## 2024-06-03 DIAGNOSIS — I27.20 PULMONARY HYPERTENSION: Chronic | ICD-10-CM

## 2024-06-03 LAB
FINAL PATHOLOGIC DIAGNOSIS: NORMAL
GROSS: NORMAL
Lab: NORMAL

## 2024-06-03 PROCEDURE — 99214 OFFICE O/P EST MOD 30 MIN: CPT | Mod: S$GLB,,, | Performed by: INTERNAL MEDICINE

## 2024-06-03 PROCEDURE — 99999 PR PBB SHADOW E&M-EST. PATIENT-LVL IV: CPT | Mod: PBBFAC,,, | Performed by: INTERNAL MEDICINE

## 2024-06-03 PROCEDURE — 3008F BODY MASS INDEX DOCD: CPT | Mod: CPTII,S$GLB,, | Performed by: INTERNAL MEDICINE

## 2024-06-03 PROCEDURE — 4010F ACE/ARB THERAPY RXD/TAKEN: CPT | Mod: CPTII,S$GLB,, | Performed by: INTERNAL MEDICINE

## 2024-06-03 PROCEDURE — 1159F MED LIST DOCD IN RCRD: CPT | Mod: CPTII,S$GLB,, | Performed by: INTERNAL MEDICINE

## 2024-06-03 PROCEDURE — 1160F RVW MEDS BY RX/DR IN RCRD: CPT | Mod: CPTII,S$GLB,, | Performed by: INTERNAL MEDICINE

## 2024-06-03 PROCEDURE — 3074F SYST BP LT 130 MM HG: CPT | Mod: CPTII,S$GLB,, | Performed by: INTERNAL MEDICINE

## 2024-06-03 PROCEDURE — 3078F DIAST BP <80 MM HG: CPT | Mod: CPTII,S$GLB,, | Performed by: INTERNAL MEDICINE

## 2024-06-03 PROCEDURE — 3044F HG A1C LEVEL LT 7.0%: CPT | Mod: CPTII,S$GLB,, | Performed by: INTERNAL MEDICINE

## 2024-06-03 RX ORDER — ATORVASTATIN CALCIUM 20 MG/1
20 TABLET, FILM COATED ORAL NIGHTLY
Qty: 90 TABLET | Refills: 3 | Status: SHIPPED | OUTPATIENT
Start: 2024-06-03 | End: 2024-06-10 | Stop reason: SDUPTHER

## 2024-06-03 RX ORDER — METOPROLOL SUCCINATE 50 MG/1
50 TABLET, EXTENDED RELEASE ORAL DAILY
Qty: 90 TABLET | Refills: 3 | Status: SHIPPED | OUTPATIENT
Start: 2024-06-03

## 2024-06-03 RX ORDER — VALSARTAN 320 MG/1
320 TABLET ORAL DAILY
Qty: 90 TABLET | Refills: 3 | Status: SHIPPED | OUTPATIENT
Start: 2024-06-03 | End: 2025-06-03

## 2024-06-03 RX ORDER — HYDROCHLOROTHIAZIDE 25 MG/1
25 TABLET ORAL DAILY
Qty: 90 TABLET | Refills: 3 | Status: SHIPPED | OUTPATIENT
Start: 2024-06-03

## 2024-06-03 RX ORDER — SEMAGLUTIDE 2.4 MG/.75ML
2.4 INJECTION, SOLUTION SUBCUTANEOUS
Qty: 12 ML | Refills: 5 | Status: SHIPPED | OUTPATIENT
Start: 2024-06-03

## 2024-06-03 RX ORDER — FUROSEMIDE 20 MG/1
20 TABLET ORAL DAILY
Qty: 90 TABLET | Refills: 3 | Status: SHIPPED | OUTPATIENT
Start: 2024-06-03 | End: 2025-06-03

## 2024-06-03 NOTE — PROGRESS NOTES
Subjective:   Patient ID:  Dragan Man II is a 58 y.o. male who presents for follow up of No chief complaint on file.      59 yo. Male, f/u for 3 months f/u. Selling the beer at the store,   PM severe AS s/p TAVR s/p PPM, HTN, LAVERNE on CPAP, obesit and Von Willebrand diseasey. No Dx of heart attack, DM,stroke and cancer. No smoking/drinking. No kids  Chronic ROLLINS. Recently worse with dizziness, left chest tightness. Occurred at workplace and physical activity. Improved the symptoms at home. Thought related to the temperature change from parking lot to cooler.   Treadmill stress ekg showed no stress induced EKG change. Peak  mmHG   ECHO showed normal EF, severe LAE, and mild to mod AS  No f/h premature CAD  In 2012, had episode of syncope when lifted up heavy stuff.  2012. MPI showed no ischemia and echo showed normal EF, dilated RV. Mild LAE and   Enrolled in HTN digit program    03/2021visit. States that ROLLINS slightly worse  No chest pain, dizziness palpitation, faint  Leg swelling worse in PM and better in AM    Gained the weight 10 pounds in 6 months.  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 Occasional ROLLINS. No faint syncope dizziness and chest pain  Sleeps on elevated bed due to LAVERNE. And leg swelling   Decreased exercise capacity at workplace  stationary BIKING. NO SMOKING.DRINKING  03/2021 echo normal EF and mod to severe AS. Aortic valve area is 1.25 cm2; peak velocity is 4.55 m/s; mean gradient is 50 mmHg.  No f/h valve disease and SCD   BNP and Cr wnl     visit  S/p CYNTHIA done in  revealing calcified tricuspid AV, mode AS and mild AI. Mild sore throat. No chest pain and dizziness  C/o hands and leg swelling   Repeat echo in  Aortic valve area is 1.22 cm2; peak velocity is 4.61 m/s; mean gradient is 52 mmHg.  Chronic SOB and partially improved after breathing Rx. Severely obese  No faint chest pain and palpitation. ekg NSR and  no acute stt change     visit  06/2022 LHC/RHC  · The coronary arteries were normal..  · The filling pressures on the right and left were moderately elevated. Pulmonary hypertension was moderate.  · 53 MMHG GRADIENT ACROSS AORTIC VAKLVE SUGGESTIVE OF SEVERE AORTIC STENOSIS.  On weight control program, working well. Leg swelling improved.  Pt has moderate aortic stenosis per planimetry and elevated gradient pressure in LVOT  He states that he lost 10 poudns now and feels better      visit  Loat 20 lbs in 6 months, on Ozempic.  Remains ROLLINS and on inhaler, worse at allergy season.  No chest pain, faint dizziness. Switches to light duty work with a lot of walking. Energy ok   Ekg NSr LVH  ms  His echo showed elevated velocity at LVOT > 1.5 m/s. And calculated CRISTINO per continuity equation and planimetry per CYNTHIA > 1 cm2. In mod AS level    04/23 visit  S/p TAVR on 03/15/2023 at [facility] by Dr. Prather.   S/p PPM 2 days after TAVR  04/20/2023 echo  · The left ventricle is normal in size with moderate concentric hypertrophy and normal systolic function.  · Mild left atrial enlargement.  · The estimated ejection fraction is 60%.  · Grade II left ventricular diastolic dysfunction.  · There is abnormal septal wall motion consistent with right ventricular pacemaker.  · Normal right ventricular size with normal right ventricular systolic function.  · There is a 29 mm evolute transcutaneously-placed aortic bioprosthesis present. Well seated  · The aortic valve mean gradient is 16 mmHg with a dimensionless index of 0.57.  · Normal central venous pressure (3 mmHg).  · The estimated PA systolic pressure is 34 mmHg.  Gained 20 lbs again after the surgery. No interrupt of ozempic brigette op  C/o worsening SOB dizziness. No syncope  Seeing the white spot post op.    08/23 visit   Energy and activity much better after TAVR procedure. More activity and less SOB  Off Ozempic and gained the weight. Will go back obesity  clinic  Want to switch to [facility] ppm clinic    02/24 visit  Weight gain again after cut back GLP-1 RA Rx.  SOB worse with smoking smell.   No chest pain dizziness  In 01/24, NUKE stress test normal, ECHO showed EF nl LVH s/p TAVr, NT BNP 38.    Interval history  Physical work. And PFT restriction physiology. On breathing Rx prn  NT BNP 36 in 01/24. No orthopnea PND, chest pain leg swelling  DJD pain  02/24 MPI no ischemia and echo showed EF nml. S/p TAVR gradient 14 mmHg                  Past Medical History:   Diagnosis Date    Actinic keratoses 10/14/2019    Aortic valve stenosis 12/16/2019    CARDIAC CATH 06/28/22  - The pre-procedure left ventricular end diastolic pressure was 32.  - There was trivial (1+) aortic regurgitation.  - The estimated blood loss was none.  - There was diastolic dysfunction.  - The coronary arteries were normal.  - The filling pressures on the right and left were moderately elevated. Pulmonary hypertension was moderate.  - 53 MMHG GRADIENT ACROSS AORTIC VAKLV    Bilateral carpal tunnel syndrome 04/12/2021    Chronic bilateral low back pain without sciatica 04/12/2021    Depression     Morbid obesity with BMI of 40.0-44.9, adult 11/17/2017    LAVERNE (obstructive sleep apnea)     Prediabetes 04/12/2021    Seasonal allergic rhinitis due to pollen 10/14/2019    Ulnar neuropathy of both upper extremities 02/17/2020       Past Surgical History:   Procedure Laterality Date    A-V CARDIAC PACEMAKER INSERTION N/A 3/17/2023    Procedure: INSERTION, CARDIAC PACEMAKER, DUAL CHAMBER;  Surgeon: Royce Liao MD;  Location: [facility] EP LAB;  Service: Cardiology;  Laterality: N/A;  CHB, DUAL PPM, ANES, MDT, DM, CVICU [id number]    CARDIAC CATH COSURGEON N/A 3/16/2023    Procedure: Cardiac Cath Cosurgeon;  Surgeon: Mitchell Kingston MD;  Location: [facility] CATH LAB;  Service: Cardiovascular;  Laterality: N/A;    CATHETERIZATION OF BOTH LEFT AND RIGHT HEART N/A 6/28/2022    Procedure: CATHETERIZATION, HEART, BOTH  LEFT AND RIGHT;  Surgeon: Carlotta Gordon MD;  Location: [facility] CATH LAB;  Service: Cardiology;  Laterality: N/A;    COLONOSCOPY N/A 5/31/2024    Procedure: COLONOSCOPY;  Surgeon: Renata Blue MD;  Location: [facility] ENDO;  Service: Endoscopy;  Laterality: N/A;    None      TRANSCATHETER AORTIC VALVE REPLACEMENT (TAVR) N/A 3/16/2023    Procedure: REPLACEMENT, AORTIC VALVE, TRANSCATHETER (TAVR);  Surgeon: Adan Greene MD;  Location: [facility] CATH LAB;  Service: Cardiology;  Laterality: N/A;       Social History     Tobacco Use    Smoking status: Never    Smokeless tobacco: Former     Types: Snuff     Quit date: 2003    Tobacco comments:     quit 15 years ago    Substance Use Topics    Alcohol use: Yes     Comment: socially // beer    Drug use: No       Family History   Problem Relation Name Age of Onset    Diabetes Father      Diabetes Paternal Grandfather      No Known Problems Mother           ROS    Objective:   Physical Exam  HENT:      Head: Normocephalic.   Eyes:      Pupils: Pupils are equal, round, and reactive to light.   Neck:      Thyroid: No thyromegaly.      Vascular: Normal carotid pulses. No carotid bruit or JVD.   Cardiovascular:      Rate and Rhythm: Normal rate and regular rhythm. No extrasystoles are present.     Chest Wall: PMI is not displaced.      Pulses: Normal pulses.           Carotid pulses are 2+ on the right side and 2+ on the left side.     Heart sounds: Murmur (ESM 3/6 on bases and along LSB. up to the neck) heard.      No gallop. No S3 sounds.   Pulmonary:      Effort: No respiratory distress.      Breath sounds: Normal breath sounds. No stridor.   Chest:      Comments: S/p PPM pocket on left chest healing well  Abdominal:      General: Bowel sounds are normal.      Palpations: Abdomen is soft.      Tenderness: There is no abdominal tenderness. There is no rebound.   Musculoskeletal:         General: Normal range of motion.   Skin:     Findings: No rash.   Neurological:       Mental Status: He is alert and oriented to person, place, and time.   Psychiatric:         Behavior: Behavior normal.         Lab Results   Component Value Date    CHOL 134 05/30/2024    CHOL 110 (L) 08/15/2022    CHOL 135 04/06/2021     Lab Results   Component Value Date    HDL 40 05/30/2024    HDL 40 08/15/2022    HDL 41 04/06/2021     Lab Results   Component Value Date    LDLCALC 78.4 05/30/2024    LDLCALC 54.4 (L) 08/15/2022    LDLCALC 70.8 04/06/2021     Lab Results   Component Value Date    TRIG 78 05/30/2024    TRIG 78 08/15/2022    TRIG 116 04/06/2021     Lab Results   Component Value Date    CHOLHDL 29.9 05/30/2024    CHOLHDL 36.4 08/15/2022    CHOLHDL 30.4 04/06/2021       Chemistry        Component Value Date/Time     01/25/2024 1832    K 4.5 01/25/2024 1832     01/25/2024 1832    CO2 25 01/25/2024 1832    BUN 30 (H) 01/25/2024 1832    CREATININE 1.1 01/25/2024 1832     (H) 01/25/2024 1832        Component Value Date/Time    CALCIUM 9.9 01/25/2024 1832    ALKPHOS 86 01/25/2024 1832    AST 26 01/25/2024 1832    ALT 35 01/25/2024 1832    BILITOT 0.7 01/25/2024 1832    ESTGFRAFRICA >60.0 06/20/2022 1635    EGFRNONAA >60.0 06/20/2022 1635          Lab Results   Component Value Date    HGBA1C 6.4 (H) 01/29/2024     Lab Results   Component Value Date    TSH 2.059 03/01/2023     Lab Results   Component Value Date    INR 1.0 03/16/2023    INR 1.0 02/09/2023    INR 1.1 06/20/2022     Lab Results   Component Value Date    WBC 9.93 01/25/2024    HGB 13.1 (L) 01/25/2024    HCT 37.6 (L) 01/25/2024    MCV 95 01/25/2024     (L) 01/25/2024     BMP  Sodium   Date Value Ref Range Status   01/25/2024 137 136 - 145 mmol/L Final     Potassium   Date Value Ref Range Status   01/25/2024 4.5 3.5 - 5.1 mmol/L Final     Chloride   Date Value Ref Range Status   01/25/2024 103 95 - 110 mmol/L Final     CO2   Date Value Ref Range Status   01/25/2024 25 23 - 29 mmol/L Final     BUN   Date Value Ref Range  Status   01/25/2024 30 (H) 6 - 20 mg/dL Final     Creatinine   Date Value Ref Range Status   01/25/2024 1.1 0.5 - 1.4 mg/dL Final     Calcium   Date Value Ref Range Status   01/25/2024 9.9 8.7 - 10.5 mg/dL Final     Anion Gap   Date Value Ref Range Status   01/25/2024 9 8 - 16 mmol/L Final     eGFR if    Date Value Ref Range Status   06/20/2022 >60.0 >60 mL/min/1.73 m^2 Final     eGFR if non    Date Value Ref Range Status   06/20/2022 >60.0 >60 mL/min/1.73 m^2 Final     Comment:     Calculation used to obtain the estimated glomerular filtration  rate (eGFR) is the CKD-EPI equation.        BNP  @LABRCNTIP(BNP,BNPTRIAGEBLO)@  @LABRCNTIP(troponini)@  CrCl cannot be calculated (Patient's most recent lab result is older than the maximum 7 days allowed.).  No results found in the last 24 hours.  No results found in the last 24 hours.  No results found in the last 24 hours.    Assessment:      1. Chronic diastolic congestive heart failure    2. Aortic atherosclerosis    3. Complete heart block    4. Left atrial enlargement    5. On statin therapy due to risk of future cardiovascular event    6. Primary hypertension    7. Pacemaker    8. Pulmonary hypertension    9. PVD (peripheral vascular disease)    10. S/P TAVR (transcatheter aortic valve replacement)    11. Class 3 severe obesity due to excess calories with serious comorbidity and body mass index (BMI) of 45.0 to 49.9 in adult        Plan:   Continue asa Lipitor lasix HCTZ torpolXL and valsartan     Counseled DASH  Check Lipid profile with PCP in 6 months  Recommend heart-healthy diet, weight control and regular exercise.  Fadumo. Risk modification.   I have reviewed all pertinent labs and cardiac studies independently. Plans and recommendations have been formulated under my direct supervision. All questions answered and patient voiced understanding.   If symptoms persist go to the ED  RTC in 6 months

## 2024-06-08 NOTE — PROGRESS NOTES
Results to be addressed at upcoming appointment(s) already scheduled.  Future Appointments  6/10/2024  12:20 PM   GIL Barba MD      [facility] IM             [facility]  8/1/2024   2:00 PM    GIL Barba MD      [facility]  8/27/2024  8:30 AM    [facility]        [facility]  9/20/2024  8:20 AM    Chely Cochran MD     [facility] LIFE           [facility]  12/5/2024  8:20 AM    Ron Shaikh MD             [facility] CARDIO         [facility]  3/3/2025   11:30 AM   Lejeune, Elizabeth B, NP   [facility] PULMSVC        [facility]

## 2024-06-10 ENCOUNTER — OFFICE VISIT (OUTPATIENT)
Dept: INTERNAL MEDICINE | Facility: CLINIC | Age: 59
End: 2024-06-10
Payer: COMMERCIAL

## 2024-06-10 DIAGNOSIS — I73.9 PVD (PERIPHERAL VASCULAR DISEASE): Chronic | ICD-10-CM

## 2024-06-10 DIAGNOSIS — Z79.899 ON STATIN THERAPY DUE TO RISK OF FUTURE CARDIOVASCULAR EVENT: Chronic | ICD-10-CM

## 2024-06-10 DIAGNOSIS — J45.30 MILD PERSISTENT ASTHMA WITHOUT COMPLICATION: Chronic | ICD-10-CM

## 2024-06-10 DIAGNOSIS — I70.0 AORTIC ATHEROSCLEROSIS: Chronic | ICD-10-CM

## 2024-06-10 DIAGNOSIS — R73.03 PREDIABETES: Primary | Chronic | ICD-10-CM

## 2024-06-10 PROBLEM — E66.813 CLASS 3 SEVERE OBESITY DUE TO EXCESS CALORIES WITH SERIOUS COMORBIDITY AND BODY MASS INDEX (BMI) OF 40.0 TO 44.9 IN ADULT: Chronic | Status: ACTIVE | Noted: 2017-11-17

## 2024-06-10 PROBLEM — E66.01 CLASS 3 SEVERE OBESITY DUE TO EXCESS CALORIES WITH SERIOUS COMORBIDITY AND BODY MASS INDEX (BMI) OF 40.0 TO 44.9 IN ADULT: Chronic | Status: ACTIVE | Noted: 2023-01-25

## 2024-06-10 PROBLEM — E66.01 CLASS 3 SEVERE OBESITY DUE TO EXCESS CALORIES WITH SERIOUS COMORBIDITY AND BODY MASS INDEX (BMI) OF 40.0 TO 44.9 IN ADULT: Chronic | Status: ACTIVE | Noted: 2017-11-17

## 2024-06-10 PROBLEM — E66.813 CLASS 3 SEVERE OBESITY DUE TO EXCESS CALORIES WITH SERIOUS COMORBIDITY AND BODY MASS INDEX (BMI) OF 40.0 TO 44.9 IN ADULT: Chronic | Status: ACTIVE | Noted: 2023-01-25

## 2024-06-10 PROCEDURE — 3044F HG A1C LEVEL LT 7.0%: CPT | Mod: CPTII,95,, | Performed by: FAMILY MEDICINE

## 2024-06-10 PROCEDURE — 99214 OFFICE O/P EST MOD 30 MIN: CPT | Mod: 95,,, | Performed by: FAMILY MEDICINE

## 2024-06-10 PROCEDURE — 1160F RVW MEDS BY RX/DR IN RCRD: CPT | Mod: CPTII,95,, | Performed by: FAMILY MEDICINE

## 2024-06-10 PROCEDURE — 1159F MED LIST DOCD IN RCRD: CPT | Mod: CPTII,95,, | Performed by: FAMILY MEDICINE

## 2024-06-10 PROCEDURE — G2211 COMPLEX E/M VISIT ADD ON: HCPCS | Mod: 95,,, | Performed by: FAMILY MEDICINE

## 2024-06-10 PROCEDURE — 4010F ACE/ARB THERAPY RXD/TAKEN: CPT | Mod: CPTII,95,, | Performed by: FAMILY MEDICINE

## 2024-06-10 RX ORDER — ATORVASTATIN CALCIUM 20 MG/1
20 TABLET, FILM COATED ORAL NIGHTLY
Qty: 90 TABLET | Refills: 3 | Status: SHIPPED | OUTPATIENT
Start: 2024-06-10

## 2024-06-10 RX ORDER — FLUTICASONE FUROATE AND VILANTEROL 200; 25 UG/1; UG/1
1 POWDER RESPIRATORY (INHALATION) DAILY
Qty: 60 EACH | Refills: 11 | Status: SHIPPED | OUTPATIENT
Start: 2024-06-10

## 2024-06-10 NOTE — PROGRESS NOTES
TELEMEDICINE VIRTUAL VIDEO VISIT  6/10/24 12:20 PM CDT    Visit Type: Audiovisual    Patient's Location: Dragan represents that they are located within the state [facility].    History of Present Illness    Dragan presents today for follow-up on current medications and to review lab results.    He experiences shortness of breath when it gets hot outside, which is not a new symptom for him.    He is trying to lose weight by changing his diet, avoiding sugary foods, and doing yard work and tractor work for exercise. He is careful not to overdo the exercise.    He currently takes atorvastatin, which he tolerates well without side effects. He uses Breo Ellipta daily for asthma. He has not been able to obtain Wegovy due to insurance not approving it.       Review of Systems   Constitutional:  Negative for activity change and unexpected weight change.   HENT:  Negative for hearing loss, rhinorrhea and trouble swallowing.    Eyes:  Negative for discharge and visual disturbance.   Respiratory:  Negative for chest tightness and wheezing.    Cardiovascular:  Negative for chest pain and palpitations.   Gastrointestinal:  Negative for blood in stool, constipation, diarrhea and vomiting.   Endocrine: Negative for polydipsia and polyuria.   Genitourinary:  Negative for difficulty urinating, hematuria and urgency.   Musculoskeletal:  Negative for arthralgias, joint swelling and neck pain.   Neurological:  Negative for weakness and headaches.   Psychiatric/Behavioral:  Negative for confusion and dysphoric mood.        Assessment & Plan    R73.03 PREDIABETES:   Suspect diabetes based on previous labs showing prediabetes almost in the diabetes range.   Ordered 2-hour glucose tolerance test to confirm diabetes diagnosis, which will allow for additional treatment options like Ozempic and Mounjaro to help with weight loss and heart health.   Instructed the patient to come in fasting but well hydrated for the glucose tolerance test.    Explained that glucose and other tests will be checked, then a sugary drink will be given.   Blood glucose will be rechecked at 1 hour and 2 hours after the sugary drink.   Informed the patient that staff will call with specific instructions to schedule the glucose tolerance test and other labs.   Advised the patient to call the office if the lab does not perform the glucose tolerance test correctly.  I70.0 ATHEROSCLEROSIS OF AORTA:   The patient's aortic atherosclerosis is asymptomatic on atorvastatin.   Continued atorvastatin, which the patient is tolerating well with no side effects.  J45.30 MILD PERSISTENT ASTHMA, UNCOMPLICATED:   The patient's asthma is well-controlled on current Breo Ellipta regimen, with only a little shortness of breath due to the heat.   Continued Breo Ellipta daily, which the patient is tolerating well with no side effects.  Z79.899 OTHER LONG TERM (CURRENT) DRUG THERAPY:   Continued atorvastatin, which the patient is tolerating well with no side effects.   Continued Breo Ellipta daily, which the patient is tolerating well with no side effects.  E66.01 MORBID (SEVERE) OBESITY DUE TO EXCESS CALORIES:   The patient has class 3, severe obesity with body mass index 43.87.   The patient is trying to lose weight by switching up diet, avoiding sugary foods, and exercising through yard workouts in the early morning before it gets hot, ideally before 10am.   Ordered 2-hour glucose tolerance test to confirm diabetes diagnosis, which will allow for prescribing medicines like Ozempic and Mounjaro to help with weight loss and heart health.  LIFESTYLE CHANGES:   Educated on importance of listening to body and not overdoing physical activity, especially in the heat.   Advised to do yard work and physical activity in the early morning before 10 am when it is cooler.       1. Prediabetes    2. Aortic atherosclerosis  -     atorvastatin (LIPITOR) 20 MG tablet; Take 1 tablet (20 mg total) by mouth every  evening.  Dispense: 90 tablet; Refill: 3    3. On statin therapy due to risk of future cardiovascular event  -     atorvastatin (LIPITOR) 20 MG tablet; Take 1 tablet (20 mg total) by mouth every evening.  Dispense: 90 tablet; Refill: 3    4. PVD (peripheral vascular disease)  -     atorvastatin (LIPITOR) 20 MG tablet; Take 1 tablet (20 mg total) by mouth every evening.  Dispense: 90 tablet; Refill: 3    5. Mild persistent asthma without complication  -     fluticasone furoate-vilanteroL (BREO ELLIPTA) 200-25 mcg/dose DsDv diskus inhaler; Inhale 1 puff into the lungs once daily. Controller  Dispense: 60 each; Refill: 11    No other significant complaints or concerns were reported.  Today's visit involved the intricate management of episodic problem(s) and the ongoing care for the patient's serious or complex condition(s) listed above, reflecting the inherent complexity of providing longitudinal, comprehensive evaluation and management as the central hub for the patient's primary care services.  There were no vitals filed for this visit.  Physical Exam    Constitutional: No acute distress. Normal appearance. Not ill-appearing.  Pulmonary: Pulmonary effort is normal. No respiratory distress.  Skin: Skin is not jaundiced.  Neurological: Alert. Mental status is at baseline.  Psychiatric: Mood normal. Behavior normal. Thought content normal.  Vitals: BODY MASS INDEX: 43.87.         FOLLOW-UP:  Schedule fasting lab appointment for labs previously ordered:   -Glucose Tolerance 2 Hour   -CBC Auto Differential   -Ferritin   -Iron and TIBC   -Aldosterone/Renin Activity Ratio  Schedule VV with me a day or two later.    This note was generated with the assistance of ambient listening technology. Verbal consent was obtained by the patient and accompanying visitor(s) for the recording of patient appointment to facilitate this note. I attest to having reviewed and edited the generated note for accuracy, though some syntax or  "spelling errors may persist. Please contact the author of this note for any clarification.      TOTAL TIME evaluating and managing this patient for this encounter was greater than or equal to 17 minutes.  This time was exclusive of any separately billable procedures for this patient and exclusive of time spent treating any other patient.    Documentation entered by me for this encounter may have been done in part using speech-recognition technology. Although I have made an effort to ensure accuracy, "sound like" errors may exist and should be interpreted in context.    Each patient to whom medical services are provided by telemedicine is: (1) informed of the relationship between the physician and patient and the respective role of any other health care provider with respect to management of the patient; and (2) notified that he or she may decline to receive medical services by telemedicine and may withdraw from such care at any time.   "

## 2024-06-10 NOTE — Clinical Note
Schedule fasting lab appointment for labs previously ordered:  -Glucose Tolerance 2 Hour  -CBC Auto Differential  -Ferritin  -Iron and TIBC  -Aldosterone/Renin Activity Ratio Schedule VV with me a day or two later.

## 2024-06-11 ENCOUNTER — TELEPHONE (OUTPATIENT)
Dept: INTERNAL MEDICINE | Facility: CLINIC | Age: 59
End: 2024-06-11
Payer: COMMERCIAL

## 2024-06-11 NOTE — TELEPHONE ENCOUNTER
----- Message from GIL Barba MD sent at 6/10/2024  4:26 PM CDT -----  Schedule fasting lab appointment for labs previously ordered:   -Glucose Tolerance 2 Hour   -CBC Auto Differential   -Ferritin   -Iron and TIBC   -Aldosterone/Renin Activity Ratio  Schedule VV with me a day or two later.

## 2024-06-13 ENCOUNTER — LAB VISIT (OUTPATIENT)
Dept: LAB | Facility: HOSPITAL | Age: 59
End: 2024-06-13
Attending: FAMILY MEDICINE
Payer: COMMERCIAL

## 2024-06-13 DIAGNOSIS — I10 PRIMARY HYPERTENSION: Chronic | ICD-10-CM

## 2024-06-13 DIAGNOSIS — R73.03 PREDIABETES: ICD-10-CM

## 2024-06-13 DIAGNOSIS — D64.9 MILD ANEMIA: ICD-10-CM

## 2024-06-13 LAB
BASOPHILS # BLD AUTO: 0.04 K/UL (ref 0–0.2)
BASOPHILS NFR BLD: 0.5 % (ref 0–1.9)
DIFFERENTIAL METHOD BLD: ABNORMAL
EOSINOPHIL # BLD AUTO: 0.2 K/UL (ref 0–0.5)
EOSINOPHIL NFR BLD: 2.1 % (ref 0–8)
ERYTHROCYTE [DISTWIDTH] IN BLOOD BY AUTOMATED COUNT: 13.1 % (ref 11.5–14.5)
FERRITIN SERPL-MCNC: 276 NG/ML (ref 20–300)
GLUCOSE SERPL-MCNC: 120 MG/DL (ref 70–110)
GLUCOSE SERPL-MCNC: 134 MG/DL
GLUCOSE SERPL-MCNC: 253 MG/DL
HCT VFR BLD AUTO: 38.1 % (ref 40–54)
HGB BLD-MCNC: 12.4 G/DL (ref 14–18)
IMM GRANULOCYTES # BLD AUTO: 0.04 K/UL (ref 0–0.04)
IMM GRANULOCYTES NFR BLD AUTO: 0.5 % (ref 0–0.5)
IRON SERPL-MCNC: 49 UG/DL (ref 45–160)
LYMPHOCYTES # BLD AUTO: 1.9 K/UL (ref 1–4.8)
LYMPHOCYTES NFR BLD: 24.3 % (ref 18–48)
MCH RBC QN AUTO: 32.5 PG (ref 27–31)
MCHC RBC AUTO-ENTMCNC: 32.5 G/DL (ref 32–36)
MCV RBC AUTO: 100 FL (ref 82–98)
MONOCYTES # BLD AUTO: 0.7 K/UL (ref 0.3–1)
MONOCYTES NFR BLD: 9 % (ref 4–15)
NEUTROPHILS # BLD AUTO: 4.9 K/UL (ref 1.8–7.7)
NEUTROPHILS NFR BLD: 63.6 % (ref 38–73)
NRBC BLD-RTO: 0 /100 WBC
PLATELET # BLD AUTO: 165 K/UL (ref 150–450)
PMV BLD AUTO: 11 FL (ref 9.2–12.9)
RBC # BLD AUTO: 3.81 M/UL (ref 4.6–6.2)
SATURATED IRON: 14 % (ref 20–50)
TOTAL IRON BINDING CAPACITY: 346 UG/DL (ref 250–450)
TRANSFERRIN SERPL-MCNC: 234 MG/DL (ref 200–375)
WBC # BLD AUTO: 7.67 K/UL (ref 3.9–12.7)

## 2024-06-13 PROCEDURE — 83540 ASSAY OF IRON: CPT | Performed by: FAMILY MEDICINE

## 2024-06-13 PROCEDURE — 36415 COLL VENOUS BLD VENIPUNCTURE: CPT | Performed by: FAMILY MEDICINE

## 2024-06-13 PROCEDURE — 82728 ASSAY OF FERRITIN: CPT | Performed by: FAMILY MEDICINE

## 2024-06-13 PROCEDURE — 82088 ASSAY OF ALDOSTERONE: CPT | Performed by: FAMILY MEDICINE

## 2024-06-13 PROCEDURE — 82951 GLUCOSE TOLERANCE TEST (GTT): CPT | Performed by: FAMILY MEDICINE

## 2024-06-13 PROCEDURE — 85025 COMPLETE CBC W/AUTO DIFF WBC: CPT | Performed by: FAMILY MEDICINE

## 2024-06-16 LAB
ALDOST SERPL-MCNC: 3.8 NG/DL
ALDOST/RENIN PLAS-RTO: 0.3 RATIO
RENIN PLAS-CCNC: 11.2 NG/ML/HR

## 2024-06-20 ENCOUNTER — PATIENT MESSAGE (OUTPATIENT)
Dept: PRIMARY CARE CLINIC | Facility: CLINIC | Age: 59
End: 2024-06-20
Payer: COMMERCIAL

## 2024-06-24 ENCOUNTER — TELEPHONE (OUTPATIENT)
Dept: INTERNAL MEDICINE | Facility: CLINIC | Age: 59
End: 2024-06-24

## 2024-07-02 DIAGNOSIS — J45.20 MILD INTERMITTENT ASTHMA WITHOUT COMPLICATION: ICD-10-CM

## 2024-07-03 RX ORDER — ALBUTEROL SULFATE 90 UG/1
AEROSOL, METERED RESPIRATORY (INHALATION)
Qty: 6.7 G | Refills: 1 | Status: SHIPPED | OUTPATIENT
Start: 2024-07-03

## 2024-08-01 ENCOUNTER — LAB VISIT (OUTPATIENT)
Dept: LAB | Facility: HOSPITAL | Age: 59
End: 2024-08-01
Attending: FAMILY MEDICINE
Payer: COMMERCIAL

## 2024-08-01 ENCOUNTER — OFFICE VISIT (OUTPATIENT)
Dept: INTERNAL MEDICINE | Facility: CLINIC | Age: 59
End: 2024-08-01
Payer: COMMERCIAL

## 2024-08-01 VITALS
DIASTOLIC BLOOD PRESSURE: 60 MMHG | HEIGHT: 75 IN | TEMPERATURE: 99 F | BODY MASS INDEX: 39.17 KG/M2 | WEIGHT: 315 LBS | HEART RATE: 77 BPM | OXYGEN SATURATION: 98 % | SYSTOLIC BLOOD PRESSURE: 122 MMHG | RESPIRATION RATE: 19 BRPM

## 2024-08-01 DIAGNOSIS — J45.30 MILD PERSISTENT ASTHMA WITHOUT COMPLICATION: Chronic | ICD-10-CM

## 2024-08-01 DIAGNOSIS — R73.03 PREDIABETES: Primary | Chronic | ICD-10-CM

## 2024-08-01 DIAGNOSIS — Z12.5 SCREENING PSA (PROSTATE SPECIFIC ANTIGEN): ICD-10-CM

## 2024-08-01 DIAGNOSIS — I70.0 AORTIC ATHEROSCLEROSIS: Chronic | ICD-10-CM

## 2024-08-01 DIAGNOSIS — E66.01 CLASS 3 SEVERE OBESITY DUE TO EXCESS CALORIES WITH SERIOUS COMORBIDITY AND BODY MASS INDEX (BMI) OF 40.0 TO 44.9 IN ADULT: Chronic | ICD-10-CM

## 2024-08-01 DIAGNOSIS — Z79.899 ON STATIN THERAPY DUE TO RISK OF FUTURE CARDIOVASCULAR EVENT: Chronic | ICD-10-CM

## 2024-08-01 DIAGNOSIS — I73.9 PVD (PERIPHERAL VASCULAR DISEASE): Chronic | ICD-10-CM

## 2024-08-01 PROBLEM — D50.8 IRON DEFICIENCY ANEMIA SECONDARY TO INADEQUATE DIETARY IRON INTAKE: Chronic | Status: ACTIVE | Noted: 2021-04-12

## 2024-08-01 LAB — COMPLEXED PSA SERPL-MCNC: 0.37 NG/ML (ref 0–4)

## 2024-08-01 PROCEDURE — G2211 COMPLEX E/M VISIT ADD ON: HCPCS | Mod: S$GLB,,, | Performed by: FAMILY MEDICINE

## 2024-08-01 PROCEDURE — 3044F HG A1C LEVEL LT 7.0%: CPT | Mod: CPTII,S$GLB,, | Performed by: FAMILY MEDICINE

## 2024-08-01 PROCEDURE — 3074F SYST BP LT 130 MM HG: CPT | Mod: CPTII,S$GLB,, | Performed by: FAMILY MEDICINE

## 2024-08-01 PROCEDURE — 1159F MED LIST DOCD IN RCRD: CPT | Mod: CPTII,S$GLB,, | Performed by: FAMILY MEDICINE

## 2024-08-01 PROCEDURE — 4010F ACE/ARB THERAPY RXD/TAKEN: CPT | Mod: CPTII,S$GLB,, | Performed by: FAMILY MEDICINE

## 2024-08-01 PROCEDURE — 3078F DIAST BP <80 MM HG: CPT | Mod: CPTII,S$GLB,, | Performed by: FAMILY MEDICINE

## 2024-08-01 PROCEDURE — 3008F BODY MASS INDEX DOCD: CPT | Mod: CPTII,S$GLB,, | Performed by: FAMILY MEDICINE

## 2024-08-01 PROCEDURE — 99999 PR PBB SHADOW E&M-EST. PATIENT-LVL V: CPT | Mod: PBBFAC,,, | Performed by: FAMILY MEDICINE

## 2024-08-01 PROCEDURE — 99214 OFFICE O/P EST MOD 30 MIN: CPT | Mod: S$GLB,,, | Performed by: FAMILY MEDICINE

## 2024-08-01 PROCEDURE — 1160F RVW MEDS BY RX/DR IN RCRD: CPT | Mod: CPTII,S$GLB,, | Performed by: FAMILY MEDICINE

## 2024-08-01 PROCEDURE — 84153 ASSAY OF PSA TOTAL: CPT | Performed by: FAMILY MEDICINE

## 2024-08-01 PROCEDURE — 36415 COLL VENOUS BLD VENIPUNCTURE: CPT | Performed by: FAMILY MEDICINE

## 2024-08-01 RX ORDER — FLUTICASONE FUROATE AND VILANTEROL 200; 25 UG/1; UG/1
1 POWDER RESPIRATORY (INHALATION) DAILY
Qty: 60 EACH | Refills: 11 | Status: SHIPPED | OUTPATIENT
Start: 2024-08-01

## 2024-08-01 RX ORDER — ATORVASTATIN CALCIUM 20 MG/1
20 TABLET, FILM COATED ORAL NIGHTLY
Qty: 90 TABLET | Refills: 3 | Status: SHIPPED | OUTPATIENT
Start: 2024-08-01

## 2024-08-01 NOTE — PROGRESS NOTES
"OFFICE VISIT 8/1/24  2:00 PM CDT    History of Present Illness    Dragan presents today for follow up.    He recently underwent a two-hour glucose tolerance test, revealing he is close to having full diabetes but does not yet meet the diagnostic criteria. His insurance denied coverage for previously prescribed semaglutide, which he finds prohibitively expensive without coverage. He reports recent weight loss of almost two lbs through dietary changes, including increased protein intake with more eggs and spinach greens. He acknowledges difficulty with weight loss, describing it as "tough" and expressing a desire to continue with diet and lifestyle changes rather than considering bariatric surgery. He reports being able to do more physical work now compared to before and has noticed a difference in his health.    He reports a history of mild iron deficiency since childhood. He manages his iron levels through dietary approaches, including increased intake of spinach, broccoli, and other greens. He has tried OTC iron supplements but experienced intolerance, including constipation. He finds obtaining iron through food sources more tolerable.    He reports ongoing breathing issues triggered by heat, non-moving air, and smoke (including cooking fumes). He can quickly detect these triggers and states his breathing problems are well-managed when he avoids them. He continues to use Breo Ellipta for breathing management, which remains effective.    He continues atorvastatin for cholesterol management with good tolerance.    He has a history of von Willebrand's disease, diagnosed during childhood when he was identified as a "free bleeder".    His sister had bariatric surgery, but he expresses discomfort with the idea of undergoing a similar procedure himself.    He is not currently working but reports an increased ability to do physical work compared to before.       Review of Systems: Except as noted herein, ROS is otherwise " negative.     Assessment & Plan     Assessed patient's prediabetes status based on recent 2-hour glucose tolerance test results   Noted blood pressure is within acceptable range   Reviewed mild iron deficiency, considering current dietary approach   Evaluated current weight management efforts and discussed potential bariatric surgery option, which patient declined   Considered COPD management, noting current triggers and symptom control  J44.9 CHRONIC OBSTRUCTIVE PULMONARY DISEASE, UNSPECIFIED:   Advised the patient to maintain awareness of COPD triggers, particularly heat and smoke, and avoid when possible.   Continued Breo Ellipta for COPD management.   Refilled Breo Ellipta prescription.   The patient reports breathing problems in specific environmental conditions such as heat and non-moving air areas.   Confirmed that Breo Ellipta is still effective for breathing management.  R73.03 PREDIABETES:   Explained that prediabetes can progress to diabetes over time, necessitating regular monitoring.   Ordered A1c and metabolic panel for 6 months from now.   Performed a two-hour glucose tolerance test to assess proximity to full diabetes.   Determined that the patient does not meet diagnostic criteria for full diabetes based on recent glucose tolerance test.   Acknowledged that prediabetes typically progresses with age.   Scheduled diabetes screening every 6 months.  D68.00 VON WILLEBRAND DISEASE, UNSPECIFIED:   Noted the patient's history of von Willebrand's disease diagnosis from childhood.  E78.5 HYPERLIPIDEMIA, UNSPECIFIED:   Continued atorvastatin for cholesterol management.   Refilled atorvastatin prescription.   Reviewed lipid panel results.   Confirmed that the patient is tolerating atorvastatin well.  E66.9 OBESITY, UNSPECIFIED:   Discussed that even without significant weight loss, physical activity and healthy eating still improve overall health.   Noted that the patient's weight has decreased by almost 2 lbs  compared to 2 months ago.   Acknowledged the patient's weight loss as a positive change.   Discussed the difficulty of weight loss due to changes in stomach-brain communication at certain BMI levels.   Informed the patient about bariatric surgical procedures as a potential option.   Encouraged the patient to continue with physical activity and healthy eating, emphasizing health improvements beyond weight loss.   The patient reports dietary changes including higher protein intake and more greens.  D50.9 IRON DEFICIENCY ANEMIA, UNSPECIFIED:   Advised the patient to continue current dietary approach focusing on iron-rich foods like spinach and broccoli.   Noted the patient's history of low iron counts.   Determined that the patient's iron deficiency is mild.   Confirmed that the patient is managing iron deficiency through dietary changes, preferring food sources over supplements due to side effects.  Z12.5 ENCOUNTER FOR SCREENING FOR MALIGNANT NEOPLASM OF PROSTATE:   Ordered PSA blood test to screen for prostate cancer.   Noted that a previously ordered PSA blood test for prostate cancer screening was not completed.   Assessed the need for PSA test, noting it's not an emergency.   Ordered PSA blood test to be done during the current visit.  LIFESTYLE CHANGES:   Dragan to continue current dietary approach focusing on higher protein intake.   Discussed that even without significant weight loss, physical activity and healthy eating still imp  rove overall health.   Follow up in 6 months with new nurse practitioner, Maria C Campbell.       1. Prediabetes  Assessment & Plan:  Lab Results   Component Value Date    HGBA1C 6.4 (H) 01/29/2024    HGBA1C 5.4 12/14/2022    HGBA1C 5.6 08/15/2022    HGBA1C 5.8 (H) 04/06/2021    HGBA1C 5.5 11/17/2017     (H) 01/25/2024    GLU 91 05/01/2023    GLU 94 04/20/2023     (H) 03/17/2023     (H) 03/16/2023     Lab Results   Component Value Date    GLUCFAST 120 (H) 06/13/2024  "   ZFIA4NH 253 06/13/2024    LINO1RW 134 06/13/2024       Orders:  -     Hemoglobin A1C; Future; Expected date: 02/01/2025    2. Class 3 severe obesity due to excess calories with serious comorbidity and body mass index (BMI) of 40.0 to 44.9 in adult  Assessment & Plan:  Wt Readings from Last 6 Encounters:   08/01/24 (!) 158.4 kg (349 lb 3.3 oz)   06/03/24 (!) 159.2 kg (350 lb 15.6 oz)   05/31/24 (!) 154.2 kg (340 lb)   03/04/24 (!) 158.3 kg (348 lb 15.8 oz)   02/26/24 (!) 160.1 kg (352 lb 15.3 oz)   02/05/24 (!) 159.7 kg (352 lb)     Estimated body mass index is 43.65 kg/m² as calculated from the following:    Height as of this encounter: 6' 3" (1.905 m).    Weight as of this encounter: 158.4 kg (349 lb 3.3 oz).        3. Screening PSA (prostate specific antigen)  -     PSA, Screening; Standing    4. Aortic atherosclerosis  -     atorvastatin (LIPITOR) 20 MG tablet; Take 1 tablet (20 mg total) by mouth every evening.  Dispense: 90 tablet; Refill: 3    5. On statin therapy due to risk of future cardiovascular event  -     atorvastatin (LIPITOR) 20 MG tablet; Take 1 tablet (20 mg total) by mouth every evening.  Dispense: 90 tablet; Refill: 3  -     COMPREHENSIVE METABOLIC PANEL; Future; Expected date: 02/01/2025    6. PVD (peripheral vascular disease)  -     atorvastatin (LIPITOR) 20 MG tablet; Take 1 tablet (20 mg total) by mouth every evening.  Dispense: 90 tablet; Refill: 3    7. Mild persistent asthma without complication  -     fluticasone furoate-vilanteroL (BREO ELLIPTA) 200-25 mcg/dose DsDv diskus inhaler; Inhale 1 puff into the lungs once daily. Controller  Dispense: 60 each; Refill: 11    No other significant complaints or concerns were reported.  Today's visit involved the intricate management of episodic problem(s) and the ongoing care for the patient's serious or complex condition(s) listed above, reflecting the inherent complexity of providing longitudinal, comprehensive evaluation and management as the " "central hub for the patient's primary care services.  Vitals:    08/01/24 1417   BP: 122/60   BP Location: Right arm   Patient Position: Sitting   BP Method: Large (Manual)   Pulse: 77   Resp: 19   Temp: 99.3 °F (37.4 °C)   SpO2: 98%   Weight: (!) 158.4 kg (349 lb 3.3 oz)   Height: 6' 3" (1.905 m)   Physical Exam  Vitals reviewed.   Constitutional:       General: He is not in acute distress.     Appearance: Normal appearance. He is not ill-appearing or diaphoretic.   Neck:      Thyroid: No thyroid mass, thyromegaly or thyroid tenderness.   Cardiovascular:      Rate and Rhythm: Normal rate and regular rhythm.   Pulmonary:      Effort: Pulmonary effort is normal.      Breath sounds: Normal breath sounds.   Skin:     General: Skin is warm and dry.   Neurological:      Mental Status: He is alert and oriented to person, place, and time. Mental status is at baseline.   Psychiatric:         Mood and Affect: Mood normal.         Behavior: Behavior normal.         Judgment: Judgment normal.       This note was generated with the assistance of ambient listening technology. Verbal consent was obtained by the patient and accompanying visitor(s) for the recording of patient appointment to facilitate this note. I attest to having reviewed and edited the generated note for accuracy, though some syntax or spelling errors may persist. Please contact the author of this note for any clarification.    Documentation entered by me for this encounter may have been done in part using speech-recognition technology. Although I have made an effort to ensure accuracy, "sound like" errors may exist and should be interpreted in context.     WRAP-UP INSTRUCTIONS  6M F/U with Trece     AT NEXT APPOINTMENT:  If meeting goals and doing well, anticipate continuing present treatment plan.  Review results: Labs.  Anticipated follow-up:  Order Future Labs to be done in 365+ days (CBC CMP, Lipid, PSA)  OV with me after those labs done.  Ensure refills " sufficient to last until next appointment.

## 2024-08-20 DIAGNOSIS — L71.9 ROSACEA: ICD-10-CM

## 2024-08-26 RX ORDER — DOXYCYCLINE 100 MG/1
CAPSULE ORAL
Qty: 90 CAPSULE | Refills: 3 | Status: SHIPPED | OUTPATIENT
Start: 2024-08-26

## 2024-08-27 ENCOUNTER — HOSPITAL ENCOUNTER (OUTPATIENT)
Dept: CARDIOLOGY | Facility: HOSPITAL | Age: 59
Discharge: HOME | End: 2024-08-27
Attending: INTERNAL MEDICINE
Payer: COMMERCIAL

## 2024-08-27 DIAGNOSIS — I44.2 COMPLETE HEART BLOCK: ICD-10-CM

## 2024-08-27 DIAGNOSIS — Z95.2 S/P TAVR (TRANSCATHETER AORTIC VALVE REPLACEMENT): ICD-10-CM

## 2024-08-27 DIAGNOSIS — Z95.0 CARDIAC PACEMAKER IN SITU: ICD-10-CM

## 2024-08-27 PROCEDURE — 93280 PM DEVICE PROGR EVAL DUAL: CPT

## 2024-10-10 ENCOUNTER — OFFICE VISIT (OUTPATIENT)
Dept: DERMATOLOGY | Facility: CLINIC | Age: 59
End: 2024-10-10
Payer: COMMERCIAL

## 2024-10-10 DIAGNOSIS — L82.1 SEBORRHEIC KERATOSIS: ICD-10-CM

## 2024-10-10 DIAGNOSIS — Z85.828 HISTORY OF SKIN CANCER: ICD-10-CM

## 2024-10-10 DIAGNOSIS — L70.0 ACNE VULGARIS: ICD-10-CM

## 2024-10-10 DIAGNOSIS — Z12.83 SCREENING, MALIGNANT NEOPLASM, SKIN: ICD-10-CM

## 2024-10-10 DIAGNOSIS — L90.5 SCAR CONDITIONS/SKIN FIBROSIS: Primary | ICD-10-CM

## 2024-10-10 DIAGNOSIS — D48.5 NEOPLASM OF UNCERTAIN BEHAVIOR OF SKIN: ICD-10-CM

## 2024-10-10 DIAGNOSIS — L73.9 FOLLICULITIS: ICD-10-CM

## 2024-10-10 DIAGNOSIS — L57.0 ACTINIC KERATOSES: ICD-10-CM

## 2024-10-10 PROCEDURE — 99999 PR PBB SHADOW E&M-EST. PATIENT-LVL III: CPT | Mod: PBBFAC,,, | Performed by: DERMATOLOGY

## 2024-10-10 RX ORDER — TRETINOIN 0.5 MG/G
CREAM TOPICAL
Qty: 20 G | Refills: 6 | Status: SHIPPED | OUTPATIENT
Start: 2024-10-10

## 2024-10-10 RX ORDER — CLINDAMYCIN PHOSPHATE 11.9 MG/ML
SOLUTION TOPICAL 2 TIMES DAILY
Qty: 60 ML | Refills: 11 | Status: SHIPPED | OUTPATIENT
Start: 2024-10-10

## 2024-10-10 NOTE — PROGRESS NOTES
Subjective:       Patient ID:  Dragan Man II is a 58 y.o. male who presents for   Chief Complaint   Patient presents with    Skin Check     [facility]. Pt concern of the skin lesion located on the right side of nasal and scalp. 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 He is s/p efudex x several weeks with some improvement but gradual return. He c/o crusted lesion of the right posterior calf.     He is using tretinoin cream 2-3 nights/week.      Denies bleeding, tender, growing, or concerning lesions.      This is a high risk patient here to check for the development of new lesions.     For rosacea, he has tried doxy 100 mg qD.         Review of Systems   Constitutional:  Negative for fever and chills.   Gastrointestinal:  Negative for nausea and vomiting.   Skin:  Positive for activity-related sunscreen use. Negative for daily sunscreen use and recent sunburn.   Hematologic/Lymphatic: Does not bruise/bleed easily.        Objective:    Physical Exam   Constitutional: He appears well-developed and well-nourished. No distress.   Neurological: He is alert and oriented to person, place, and time. He is not disoriented.   Psychiatric: He has a normal mood and affect.   Skin:   Areas Examined (abnormalities noted in diagram):   Scalp / Hair Palpated and Inspected  Head / Face Inspection Performed  Neck Inspection Performed  Chest / Axilla Inspection Performed  Abdomen Inspection Performed  Back Inspection Performed  RUE Inspected  LUE Inspection Performed  RLE Inspected  LLE Inspection Performed  Nails and Digits Inspection Performed                    Diagram Legend     Erythematous scaling macule/papule c/w actinic keratosis       Vascular papule c/w angioma      Pigmented verrucoid papule/plaque c/w seborrheic keratosis      Yellow umbilicated papule c/w sebaceous hyperplasia      Irregularly shaped tan macule c/w lentigo     1-2 mm smooth white papules consistent with Milia      Movable subcutaneous cyst with punctum c/w epidermal inclusion cyst      Subcutaneous movable cyst c/w pilar cyst      Firm pink to brown papule c/w dermatofibroma      Pedunculated fleshy papule(s) c/w skin tag(s)      Evenly pigmented macule c/w junctional nevus     Mildly variegated pigmented, slightly irregular-bordered macule c/w mildly atypical nevus      Flesh colored to evenly pigmented papule c/w intradermal nevus       Pink pearly papule/plaque c/w basal cell carcinoma      Erythematous hyperkeratotic cursted plaque c/w SCC      Surgical scar with no sign of skin cancer recurrence      Open and closed comedones      Inflammatory papules and pustules      Verrucoid papule consistent consistent with wart     Erythematous eczematous patches and plaques     Dystrophic onycholytic nail with subungual debris c/w onychomycosis     Umbilicated papule    Erythematous-base heme-crusted tan verrucoid plaque consistent with inflamed seborrheic keratosis     Erythematous Silvery Scaling Plaque c/w Psoriasis     See annotation        Assessment / Plan:      Pathology Orders:       Normal Orders This Visit    Specimen to Pathology, Dermatology     Questions:    Procedure Type: Dermatology and skin neoplasms    Number of Specimens: 1    ------------------------: -------------------------    Spec 1 Procedure: Biopsy    Spec 1 Clinical Impression: r/o SCC vs. prurigo/irritation    Spec 1 Source: right posterior calf    Clinical Information: see above    Release to patient: Immediate    Send normal result to authorizing provider's In Basket if patient is active on MyChart: Yes             Scar conditions/skin fibrosis  Screening, malignant neoplasm, skin  History of skin cancer  Scar of the several sites, hx of NMSC.  No evidence of recurrence on physical exam today.  Continue routine skin surveillance. Daily sunscreen advised.    Seborrheic keratosis  Reassurance given. Discussed diagnosis and that lesions are benign.  AAD handout given.     Folliculitis  -     clindamycin (CLEOCIN T) 1 % external solution; Apply topically 2 (two) times daily. For pustules/bumps in scalp  Dispense: 60 mL; Refill: 11  -     will restart doxy (for rosacea and folliculitis)    Side effect profile of doxy reviewed including increased sun sensitivity and upset stomach.    Acne vulgaris  -     tretinoin (RETIN-A) 0.05 % cream; APPLY PEA SIZED AMOUNT TO ENTIRE FACE AT BEDTIME. IF DRYNESS,USE EVERY 3RD NIGHT AND INCREASE AS TOLERATED TO EVERY NIGHT  Dispense: 20 g; Refill:       Actinic keratoses  -    Cryosurgery Procedure Note    The patient is informed of the precancerous quality and need for treatment of these lesions. After risks, benefits and alternatives explained, including blistering, pain, hyper- and hypopigmentation, patient verbally consents to cryotherapy to precancerous lesions. Liquid nitrogen cryosurgery is applied to the 22 actinic keratoses, as detailed in the physical exam, to produce a freeze injury. The patient is aware that blisters may form and is instructed on wound care with gentle cleansing and use of vaseline ointment to keep moist until healed. The patient is supplied a handout on cryosurgery and is instructed to call if lesions do not completely resolve.    Neoplasm of uncertain behavior of skin  -     Specimen to Pathology, Dermatology  -     Shave biopsy(-ies) done of 1 site(s).   Patient informed to call for results within 2 weeks if have not received notification via telephone call or [facility]        Follow up for call for results.    PROCEDURE NOTE - SHAVE BIOPSY   Location: see  above    After risk, benefits, and alternatives were discussed with the patient, the patient agrees to the procedure by verbal informed consent.  The area(s) were cleansed with alcohol. 2 cc of plain lidocaine 1% was injected for local anesthesia into each lesion(s).  A sharp dermablade was used to remove part or all of the lesion(s).  The specimen(s) will be sent for tissue pathology.  Hemostasis was obtained with aluminum chloride and/or hyfrecation.  The area(s) were dressed with vaseline ointment and bandaged.  The patient tolerated the procedure well without adverse events.  Wound care instructions were given to the patient on the AVS.  The patient will be notified of pathology results once available. Results will also be available in Epic.

## 2024-10-10 NOTE — PATIENT INSTRUCTIONS
Shave Biopsy Wound Care    Your doctor has performed a shave biopsy today.  A band aid and vaseline ointment has been placed over the site.  This should remain in place for 24 hours.  It is recommended that you keep the area dry for the first 24 hours.  After 24 hours, you may remove the band aid and wash the area with warm soap and water and apply Vaseline jelly.  Many patients prefer to use Neosporin or Bacitracin ointment.  This is acceptable; however, know that you can develop an allergy to this medication even if you have used it safely for years.  It is important to keep the area moist.  Letting it dry out and get air slows healing time, and will worsen the scar.  Band aid is optional after first 24 hours.      If you notice increasing redness, tenderness, pain, or yellow drainage at the biopsy site, please notify your doctor.  These are signs of an infection.    If your biopsy site is bleeding, apply firm pressure for 15 minutes straight.  Repeat for another 15 minutes, if it is still bleeding.   If the surgical site continues to bleed, then please contact your doctor.      [facility]   DERMATOLOGY  [address] [address]   Dept: [phone]   Dept Fax: [phone]         CRYOSURGERY      Your doctor has used a method called cryosurgery to treat your skin condition. Cryosurgery refers to the use of very cold substances to treat a variety of skin conditions such as warts, pre-skin cancers, molluscum contagiosum, sun spots, and several benign growths. The substance we use in cryosurgery is liquid nitrogen and is so cold (-195 degrees Celsius) that is burns when administered.     Following treatment in the office, the skin may immediately burn and become red. You may find the area around the lesion is affected as well. It is sometimes necessary to treat not only the lesion, but a small area of the surrounding normal skin to achieve a good  response.     A blister, and even a blood filled blister, may form after treatment.   This is a normal response. If the blister is painful, it is acceptable to sterilize a needle and with rubbing alcohol and gently pop the blister. It is important that you gently wash the area with soap and warm water as the blister fluid may contain wart virus if a wart was treated. Do no remove the roof of the blister.     The area treated can take anywhere from 1-3 weeks to heal. Healing time depends on the kind of skin lesion treated, the location, and how aggressively the lesion was treated. It is recommended that the areas treated are covered with Vaseline or bacitracin ointment and a band-aid. If a band-aid is not practical, just ointment applied several times per day will do. Keeping these areas moist will speed the healing time.    Treatment with liquid nitrogen can leave a scar. In dark skin, it may be a light or dark scar, in light skin it may be a white or pink scar. These will generally fade with time.    If you have any concerns after your treatment, please feel free to call the office.         [facility] DERMATOLOGY 4TH FLOOR  [address] [address]  Dept: [phone]  Dept Fax: [phone]

## 2024-10-28 ENCOUNTER — PATIENT MESSAGE (OUTPATIENT)
Dept: DERMATOLOGY | Facility: CLINIC | Age: 59
End: 2024-10-28
Payer: COMMERCIAL

## 2024-10-28 NOTE — PROGRESS NOTES
Last 5 Patient Entered Readings                                      Current 30 Day Average: 142/71     Recent Readings 5/19/2019 5/19/2019 5/18/2019 5/18/2019 5/18/2019    SBP (mmHg) 147 147 136 136 139    DBP (mmHg) 68 67 62 62 63    Pulse 82 83 67 66 70          Digital Medicine: Health  Follow Up    Lifestyle Modifications:    1.Dietary Modifications (Sodium intake <2,000mg/day, food labels, dining out):   Patient reports he has been drinking more smoothies, eating more veggies and has been trying to lay off of bread.  Patient reports he has cut his coffee consumption down to 2 cups in the morning.  Encouraged patient to keep up the great work.     2.Physical Activity:   Patient reports he will start using his treadmill and bike once he is done with doing work around his house.  Will follow up.     3.Medication Therapy:   Patient has been compliant with the medication regimen.    4.Patient has the following medication side effects/concerns: None  (Frequency/Alleviating factors/Precipitating factors, etc.)     Follow up with Mr. Dragan Man II completed. No further questions or concerns. Will continue to follow up to achieve health goals.  Patient is currently not at goal, 142/71 mmHg does exceed <130/80 mmHg.   [id number]

## 2024-11-25 ENCOUNTER — PATIENT MESSAGE (OUTPATIENT)
Dept: ADMINISTRATIVE | Facility: OTHER | Age: 59
End: 2024-11-25
Payer: COMMERCIAL

## 2024-12-02 DIAGNOSIS — I50.32 CHRONIC DIASTOLIC CONGESTIVE HEART FAILURE: Chronic | ICD-10-CM

## 2024-12-02 DIAGNOSIS — I10 PRIMARY HYPERTENSION: Primary | Chronic | ICD-10-CM

## 2024-12-05 ENCOUNTER — HOSPITAL ENCOUNTER (OUTPATIENT)
Dept: CARDIOLOGY | Facility: HOSPITAL | Age: 59
Discharge: HOME | End: 2024-12-05
Attending: INTERNAL MEDICINE
Payer: COMMERCIAL

## 2024-12-05 ENCOUNTER — OFFICE VISIT (OUTPATIENT)
Dept: CARDIOLOGY | Facility: CLINIC | Age: 59
End: 2024-12-05
Payer: COMMERCIAL

## 2024-12-05 VITALS
BODY MASS INDEX: 46.07 KG/M2 | DIASTOLIC BLOOD PRESSURE: 73 MMHG | SYSTOLIC BLOOD PRESSURE: 133 MMHG | OXYGEN SATURATION: 96 % | HEIGHT: 73 IN

## 2024-12-05 DIAGNOSIS — I51.7 LEFT ATRIAL ENLARGEMENT: ICD-10-CM

## 2024-12-05 DIAGNOSIS — I44.2 COMPLETE HEART BLOCK: ICD-10-CM

## 2024-12-05 DIAGNOSIS — I10 PRIMARY HYPERTENSION: Chronic | ICD-10-CM

## 2024-12-05 DIAGNOSIS — E66.01 CLASS 3 SEVERE OBESITY DUE TO EXCESS CALORIES WITH SERIOUS COMORBIDITY AND BODY MASS INDEX (BMI) OF 40.0 TO 44.9 IN ADULT: Chronic | ICD-10-CM

## 2024-12-05 DIAGNOSIS — I73.9 PVD (PERIPHERAL VASCULAR DISEASE): Chronic | ICD-10-CM

## 2024-12-05 DIAGNOSIS — I50.32 CHRONIC DIASTOLIC CONGESTIVE HEART FAILURE: Chronic | ICD-10-CM

## 2024-12-05 DIAGNOSIS — I27.20 PULMONARY HYPERTENSION: Chronic | ICD-10-CM

## 2024-12-05 DIAGNOSIS — Z95.2 S/P TAVR (TRANSCATHETER AORTIC VALVE REPLACEMENT): Chronic | ICD-10-CM

## 2024-12-05 DIAGNOSIS — Z95.0 PACEMAKER: Chronic | ICD-10-CM

## 2024-12-05 DIAGNOSIS — M79.604 PAIN IN BOTH LOWER EXTREMITIES: Primary | ICD-10-CM

## 2024-12-05 DIAGNOSIS — M79.605 PAIN IN BOTH LOWER EXTREMITIES: Primary | ICD-10-CM

## 2024-12-05 DIAGNOSIS — I70.0 AORTIC ATHEROSCLEROSIS: Chronic | ICD-10-CM

## 2024-12-05 DIAGNOSIS — E66.813 CLASS 3 SEVERE OBESITY DUE TO EXCESS CALORIES WITH SERIOUS COMORBIDITY AND BODY MASS INDEX (BMI) OF 40.0 TO 44.9 IN ADULT: Chronic | ICD-10-CM

## 2024-12-05 LAB
OHS QRS DURATION: 184 MS
OHS QTC CALCULATION: 486 MS

## 2024-12-05 PROCEDURE — 93010 ELECTROCARDIOGRAM REPORT: CPT | Mod: ,,, | Performed by: INTERNAL MEDICINE

## 2024-12-05 PROCEDURE — 4010F ACE/ARB THERAPY RXD/TAKEN: CPT | Mod: CPTII,S$GLB,, | Performed by: INTERNAL MEDICINE

## 2024-12-05 PROCEDURE — 3044F HG A1C LEVEL LT 7.0%: CPT | Mod: CPTII,S$GLB,, | Performed by: INTERNAL MEDICINE

## 2024-12-05 PROCEDURE — 99214 OFFICE O/P EST MOD 30 MIN: CPT | Mod: S$GLB,,, | Performed by: INTERNAL MEDICINE

## 2024-12-05 PROCEDURE — 3075F SYST BP GE 130 - 139MM HG: CPT | Mod: CPTII,S$GLB,, | Performed by: INTERNAL MEDICINE

## 2024-12-05 PROCEDURE — 3078F DIAST BP <80 MM HG: CPT | Mod: CPTII,S$GLB,, | Performed by: INTERNAL MEDICINE

## 2024-12-05 PROCEDURE — 1160F RVW MEDS BY RX/DR IN RCRD: CPT | Mod: CPTII,S$GLB,, | Performed by: INTERNAL MEDICINE

## 2024-12-05 PROCEDURE — 99999 PR PBB SHADOW E&M-EST. PATIENT-LVL IV: CPT | Mod: PBBFAC,,, | Performed by: INTERNAL MEDICINE

## 2024-12-05 PROCEDURE — 93005 ELECTROCARDIOGRAM TRACING: CPT

## 2024-12-05 PROCEDURE — 1159F MED LIST DOCD IN RCRD: CPT | Mod: CPTII,S$GLB,, | Performed by: INTERNAL MEDICINE

## 2024-12-05 PROCEDURE — 3008F BODY MASS INDEX DOCD: CPT | Mod: CPTII,S$GLB,, | Performed by: INTERNAL MEDICINE

## 2024-12-05 NOTE — PROGRESS NOTES
Subjective:   Patient ID:  Dragan Man II is a 59 y.o. male who presents for follow up of Annual Exam      58 yo. Male, f/u for 6 months f/u. Selling the beer at the store,   PMH severe AS s/p TAVR s/p PPM, HTN, LAVERNE on CPAP, obesit and Von Willebrand diseasey. No Dx of heart attack, DM,stroke and cancer. No smoking/drinking. No kids  Chronic ROLLINS. Recently worse with dizziness, left chest tightness. Occurred at workplace and physical activity. Improved the symptoms at home. Thought related to the temperature change from parking lot to cooler.   Treadmill stress ekg showed no stress induced EKG change. Peak  mmHG   ECHO showed normal EF, severe LAE, and mild to mod AS  No f/h premature CAD  In 2012, had episode of syncope when lifted up heavy stuff.  2012. MPI showed no ischemia and echo showed normal EF, dilated RV. Mild LAE and   Enrolled in HTN digit program    03/2021visit. States that ROLLINS slightly worse  No chest pain, dizziness palpitation, faint  Leg swelling worse in PM and better in AM    Gained the weight 10 pounds in 6 months.  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 Occasional ROLLINS. No faint syncope dizziness and chest pain  Sleeps on elevated bed due to LAVERNE. And leg swelling   Decreased exercise capacity at workplace  stationary BIKING. NO SMOKING.DRINKING  03/2021 echo normal EF and mod to severe AS. Aortic valve area is 1.25 cm2; peak velocity is 4.55 m/s; mean gradient is 50 mmHg.  No f/h valve disease and SCD   BNP and Cr wnl     visit  S/p CYNTHIA done in  revealing calcified tricuspid AV, mode AS and mild AI. Mild sore throat. No chest pain and dizziness  C/o hands and leg swelling   Repeat echo in  Aortic valve area is 1.22 cm2; peak velocity is 4.61 m/s; mean gradient is 52 mmHg.  Chronic SOB and partially improved after breathing Rx. Severely obese  No faint chest pain and palpitation. ekg NSR and no acute stt  change     visit  06/2022 LHC/RHC  · The coronary arteries were normal..  · The filling pressures on the right and left were moderately elevated. Pulmonary hypertension was moderate.  · 53 MMHG GRADIENT ACROSS AORTIC VAKLVE SUGGESTIVE OF SEVERE AORTIC STENOSIS.  On weight control program, working well. Leg swelling improved.  Pt has moderate aortic stenosis per planimetry and elevated gradient pressure in LVOT  He states that he lost 10 poudns now and feels better      visit  Loat 20 lbs in 6 months, on Ozempic.  Remains ROLLINS and on inhaler, worse at allergy season.  No chest pain, faint dizziness. Switches to light duty work with a lot of walking. Energy ok   Ekg NSr LVH  ms  His echo showed elevated velocity at LVOT > 1.5 m/s. And calculated CRISTINO per continuity equation and planimetry per CYNTHIA > 1 cm2. In mod AS level    04/23 visit  S/p TAVR on 03/15/2023 at [facility] by Dr. Prather.   S/p PPM 2 days after TAVR  04/20/2023 echo  · The left ventricle is normal in size with moderate concentric hypertrophy and normal systolic function.  · Mild left atrial enlargement.  · The estimated ejection fraction is 60%.  · Grade II left ventricular diastolic dysfunction.  · There is abnormal septal wall motion consistent with right ventricular pacemaker.  · Normal right ventricular size with normal right ventricular systolic function.  · There is a 29 mm evolute transcutaneously-placed aortic bioprosthesis present. Well seated  · The aortic valve mean gradient is 16 mmHg with a dimensionless index of 0.57.  · Normal central venous pressure (3 mmHg).  · The estimated PA systolic pressure is 34 mmHg.  Gained 20 lbs again after the surgery. No interrupt of ozempic brigette op  C/o worsening SOB dizziness. No syncope  Seeing the white spot post op.    08/23 visit   Energy and activity much better after TAVR procedure. More activity and less SOB  Off Ozempic and gained the weight. Will go back obesity clinic  Want to switch  to [facility] ppm clinic    02/24 visit  Weight gain again after cut back GLP-1 RA Rx.  SOB worse with smoking smell.   No chest pain dizziness  In 01/24, NUKE stress test normal, ECHO showed EF nl LVH s/p TAVr, NT BNP 38.    06/24 visit  Physical work. And PFT restriction physiology. On breathing Rx prn  NT BNP 36 in 01/24. No orthopnea PND, chest pain leg swelling  DJD pain  02/24 MPI no ischemia and echo showed EF nml. S/p TAVR gradient 14 mmHg    Interval history  Leg weakness and pain from the hip to knee and calf along lateral sides.   EKG reviewed by myself today reveals AV pacing  LDL 70 and BP A1c c  No chest pain dizziness   High copay for GLP-1i                    Past Medical History:   Diagnosis Date    Actinic keratoses 10/14/2019    Aortic valve stenosis 12/16/2019    CARDIAC CATH 06/28/22  - The pre-procedure left ventricular end diastolic pressure was 32.  - There was trivial (1+) aortic regurgitation.  - The estimated blood loss was none.  - There was diastolic dysfunction.  - The coronary arteries were normal.  - The filling pressures on the right and left were moderately elevated. Pulmonary hypertension was moderate.  - 53 MMHG GRADIENT ACROSS AORTIC VAKLV    Bilateral carpal tunnel syndrome 04/12/2021    Chronic bilateral low back pain without sciatica 04/12/2021    Depression     Morbid obesity with BMI of 40.0-44.9, adult 11/17/2017    LAVERNE (obstructive sleep apnea)     Prediabetes 04/12/2021    Seasonal allergic rhinitis due to pollen 10/14/2019    Ulnar neuropathy of both upper extremities 02/17/2020       Past Surgical History:   Procedure Laterality Date    A-V CARDIAC PACEMAKER INSERTION N/A 3/17/2023    Procedure: INSERTION, CARDIAC PACEMAKER, DUAL CHAMBER;  Surgeon: Royce Liao MD;  Location: [facility] EP LAB;  Service: Cardiology;  Laterality: N/A;  CHB, DUAL PPM, ANES, MDT, DM, CVICU [id number]    CARDIAC CATH COSURGEON N/A 3/16/2023    Procedure: Cardiac Cath Cosurgeon;  Surgeon: Mitchell ALEGRIA  MD Thee;  Location: [facility] CATH LAB;  Service: Cardiovascular;  Laterality: N/A;    CATHETERIZATION OF BOTH LEFT AND RIGHT HEART N/A 6/28/2022    Procedure: CATHETERIZATION, HEART, BOTH LEFT AND RIGHT;  Surgeon: Carlotta Gordon MD;  Location: [facility] CATH LAB;  Service: Cardiology;  Laterality: N/A;    COLONOSCOPY N/A 5/31/2024    Procedure: COLONOSCOPY;  Surgeon: Renata Blue MD;  Location: [facility] ENDO;  Service: Endoscopy;  Laterality: N/A;    None      TRANSCATHETER AORTIC VALVE REPLACEMENT (TAVR) N/A 3/16/2023    Procedure: REPLACEMENT, AORTIC VALVE, TRANSCATHETER (TAVR);  Surgeon: Adan Greene MD;  Location: [facility] CATH LAB;  Service: Cardiology;  Laterality: N/A;       Social History     Tobacco Use    Smoking status: Never    Smokeless tobacco: Former     Types: Snuff     Quit date: 2003    Tobacco comments:     quit 15 years ago    Substance Use Topics    Alcohol use: Yes     Comment: socially // beer    Drug use: No       Family History   Problem Relation Name Age of Onset    Diabetes Father      Diabetes Paternal Grandfather      No Known Problems Mother           ROS    Objective:   Physical Exam  HENT:      Head: Normocephalic.   Eyes:      Pupils: Pupils are equal, round, and reactive to light.   Neck:      Thyroid: No thyromegaly.      Vascular: Normal carotid pulses. No carotid bruit or JVD.   Cardiovascular:      Rate and Rhythm: Normal rate and regular rhythm. No extrasystoles are present.     Chest Wall: PMI is not displaced.      Pulses: Normal pulses.           Carotid pulses are 2+ on the right side and 2+ on the left side.     Heart sounds: Murmur (ESM 3/6 on bases and along LSB. up to the neck) heard.      No gallop. No S3 sounds.   Pulmonary:      Effort: No respiratory distress.      Breath sounds: Normal breath sounds. No stridor.   Chest:      Comments: S/p PPM pocket on left chest healing well  Abdominal:      General: Bowel sounds are normal.      Palpations: Abdomen is soft.       Tenderness: There is no abdominal tenderness. There is no rebound.   Musculoskeletal:         General: Normal range of motion.   Skin:     Findings: No rash.   Neurological:      Mental Status: He is alert and oriented to person, place, and time.   Psychiatric:         Behavior: Behavior normal.         Lab Results   Component Value Date    CHOL 134 05/30/2024    CHOL 110 (L) 08/15/2022    CHOL 135 04/06/2021     Lab Results   Component Value Date    HDL 40 05/30/2024    HDL 40 08/15/2022    HDL 41 04/06/2021     Lab Results   Component Value Date    LDLCALC 78.4 05/30/2024    LDLCALC 54.4 (L) 08/15/2022    LDLCALC 70.8 04/06/2021     Lab Results   Component Value Date    TRIG 78 05/30/2024    TRIG 78 08/15/2022    TRIG 116 04/06/2021     Lab Results   Component Value Date    CHOLHDL 29.9 05/30/2024    CHOLHDL 36.4 08/15/2022    CHOLHDL 30.4 04/06/2021       Chemistry        Component Value Date/Time     01/25/2024 1832    K 4.5 01/25/2024 1832     01/25/2024 1832    CO2 25 01/25/2024 1832    BUN 30 (H) 01/25/2024 1832    CREATININE 1.1 01/25/2024 1832     (H) 01/25/2024 1832        Component Value Date/Time    CALCIUM 9.9 01/25/2024 1832    ALKPHOS 86 01/25/2024 1832    AST 26 01/25/2024 1832    ALT 35 01/25/2024 1832    BILITOT 0.7 01/25/2024 1832    ESTGFRAFRICA >60.0 06/20/2022 1635    EGFRNONAA >60.0 06/20/2022 1635          Lab Results   Component Value Date    HGBA1C 6.4 (H) 01/29/2024     Lab Results   Component Value Date    TSH 2.059 03/01/2023     Lab Results   Component Value Date    INR 1.0 03/16/2023    INR 1.0 02/09/2023    INR 1.1 06/20/2022     Lab Results   Component Value Date    WBC 7.67 06/13/2024    HGB 12.4 (L) 06/13/2024    HCT 38.1 (L) 06/13/2024     (H) 06/13/2024     06/13/2024     BMP  Sodium   Date Value Ref Range Status   01/25/2024 137 136 - 145 mmol/L Final     Potassium   Date Value Ref Range Status   01/25/2024 4.5 3.5 - 5.1 mmol/L Final      Chloride   Date Value Ref Range Status   01/25/2024 103 95 - 110 mmol/L Final     CO2   Date Value Ref Range Status   01/25/2024 25 23 - 29 mmol/L Final     BUN   Date Value Ref Range Status   01/25/2024 30 (H) 6 - 20 mg/dL Final     Creatinine   Date Value Ref Range Status   01/25/2024 1.1 0.5 - 1.4 mg/dL Final     Calcium   Date Value Ref Range Status   01/25/2024 9.9 8.7 - 10.5 mg/dL Final     Anion Gap   Date Value Ref Range Status   01/25/2024 9 8 - 16 mmol/L Final     eGFR if    Date Value Ref Range Status   06/20/2022 >60.0 >60 mL/min/1.73 m^2 Final     eGFR if non    Date Value Ref Range Status   06/20/2022 >60.0 >60 mL/min/1.73 m^2 Final     Comment:     Calculation used to obtain the estimated glomerular filtration  rate (eGFR) is the CKD-EPI equation.        BNP  @LABRCNTIP(BNP,BNPTRIAGEBLO)@  @LABRCNTIP(troponini)@  CrCl cannot be calculated (Patient's most recent lab result is older than the maximum 7 days allowed.).  No results found in the last 24 hours.  No results found in the last 24 hours.  No results found in the last 24 hours.    Assessment:      1. Pain in both lower extremities    2. Aortic atherosclerosis    3. Chronic diastolic congestive heart failure    4. Complete heart block    5. Left atrial enlargement    6. Pacemaker    7. Primary hypertension    8. Pulmonary hypertension    9. PVD (peripheral vascular disease)    10. S/P TAVR (transcatheter aortic valve replacement)    11. Class 3 severe obesity due to excess calories with serious comorbidity and body mass index (BMI) of 40.0 to 44.9 in adult        Plan:   MONICO and LE arterial US for leg pain, if negative f/u PCP for sciatic pain   Continue asa statin BB irbesratan HCTZ and lasix 20 mg daily    Counseled DASH  Check Lipid profile with PCP in 6 months  Recommend heart-healthy diet, weight control and regular exercise.  Fadumo. Risk modification.   I have reviewed all pertinent labs and cardiac  studies independently. Plans and recommendations have been formulated under my direct supervision. All questions answered and patient voiced understanding.   If symptoms persist go to the ED  RTC in 6 months

## 2024-12-20 ENCOUNTER — HOSPITAL ENCOUNTER (OUTPATIENT)
Dept: CARDIOLOGY | Facility: HOSPITAL | Age: 59
Discharge: HOME | End: 2024-12-20
Attending: INTERNAL MEDICINE
Payer: COMMERCIAL

## 2024-12-20 VITALS
SYSTOLIC BLOOD PRESSURE: 121 MMHG | BODY MASS INDEX: 41.75 KG/M2 | HEIGHT: 73 IN | DIASTOLIC BLOOD PRESSURE: 61 MMHG | WEIGHT: 315 LBS

## 2024-12-20 VITALS — HEIGHT: 73 IN | BODY MASS INDEX: 41.75 KG/M2 | WEIGHT: 315 LBS

## 2024-12-20 DIAGNOSIS — M79.605 PAIN IN BOTH LOWER EXTREMITIES: ICD-10-CM

## 2024-12-20 DIAGNOSIS — I10 PRIMARY HYPERTENSION: Chronic | ICD-10-CM

## 2024-12-20 DIAGNOSIS — I27.20 PULMONARY HYPERTENSION: Chronic | ICD-10-CM

## 2024-12-20 DIAGNOSIS — M79.604 PAIN IN BOTH LOWER EXTREMITIES: ICD-10-CM

## 2024-12-20 DIAGNOSIS — I50.32 CHRONIC DIASTOLIC CONGESTIVE HEART FAILURE: Chronic | ICD-10-CM

## 2024-12-20 LAB
LEFT ABI: 1.42
LEFT ANT TIBIAL SYS PSV: 101 CM/S
LEFT ARM BP: 121 MMHG
LEFT CFA PSV: 134 CM/S
LEFT DORSALIS PEDIS: 172 MMHG
LEFT PERONEAL SYS PSV: 65 CM/S
LEFT POPLITEAL PSV: 54 CM/S
LEFT POST TIBIAL SYS PSV: 125 CM/S
LEFT POSTERIOR TIBIAL: 172 MMHG
LEFT PROFUNDA SYS PSV: 95 CM/S
LEFT SUPER FEMORAL DIST SYS PSV: 98 CM/S
LEFT SUPER FEMORAL MID SYS PSV: 114 CM/S
LEFT SUPER FEMORAL OSTIAL SYS PSV: 116 CM/S
LEFT SUPER FEMORAL PROX SYS PSV: 135 CM/S
LEFT TBI: 1.31
LEFT TIB/PER TRUNK SYS PSV: 54 CM/S
LEFT TOE PRESSURE: 158 MMHG
OHS CV LEFT LOWER EXTREMITY ABI (NO CALC): 1.4
OHS CV RIGHT ABI LOWER EXTREMITY (NO CALC): 1.4
RIGHT ABI: 1.4
RIGHT ANT TIBIAL SYS PSV: 138 CM/S
RIGHT ARM BP: 121 MMHG
RIGHT CFA PSV: 146 CM/S
RIGHT DORSALIS PEDIS: 156 MMHG
RIGHT PERONEAL SYS PSV: 111 CM/S
RIGHT POPLITEAL PSV: 108 CM/S
RIGHT POST TIBIAL SYS PSV: 142 CM/S
RIGHT POSTERIOR TIBIAL: 170 MMHG
RIGHT PROFUNDA SYS PSV: 103 CM/S
RIGHT SUPER FEMORAL DIST SYS PSV: 119 CM/S
RIGHT SUPER FEMORAL MID SYS PSV: 129 CM/S
RIGHT SUPER FEMORAL OSTIAL SYS PSV: 112 CM/S
RIGHT SUPER FEMORAL PROX SYS PSV: 124 CM/S
RIGHT TBI: 1.19
RIGHT TIB/PER TRUNK SYS PSV: 108 CM/S
RIGHT TOE PRESSURE: 144 MMHG

## 2024-12-20 PROCEDURE — 93925 LOWER EXTREMITY STUDY: CPT | Mod: 26,,, | Performed by: INTERNAL MEDICINE

## 2024-12-20 PROCEDURE — 93922 UPR/L XTREMITY ART 2 LEVELS: CPT | Mod: 26,,, | Performed by: INTERNAL MEDICINE

## 2024-12-20 PROCEDURE — 93925 LOWER EXTREMITY STUDY: CPT

## 2024-12-20 PROCEDURE — 93922 UPR/L XTREMITY ART 2 LEVELS: CPT

## 2024-12-20 RX ORDER — FUROSEMIDE 20 MG/1
20 TABLET ORAL
Qty: 90 TABLET | Refills: 0 | Status: SHIPPED | OUTPATIENT
Start: 2024-12-20

## 2024-12-20 NOTE — TELEPHONE ENCOUNTER
Refill Decision Note   Dragan Man  is requesting a refill authorization.  Brief Assessment and Rationale for Refill:  Approve     Medication Therapy Plan:  FLOS      Comments:     Note composed:11:46 AM 12/20/2024

## 2024-12-20 NOTE — TELEPHONE ENCOUNTER
Care Due:                  Date            Visit Type   Department     Provider  --------------------------------------------------------------------------------                                EP -                              PRIMARY      [facility] INTERNAL  Last Visit: 08-      CARE ([facility])   MEDICINE       Josué Barba  Next Visit: None Scheduled  None         None Found                                                            Last  Test          Frequency    Reason                     Performed    Due Date  --------------------------------------------------------------------------------    CMP.........  12 months..  atorvastatin, irbesartan.  01- 01-    [facility] Embedded Care Due Messages. Reference number: [id number].   12/20/2024 9:31:06 AM CST

## 2024-12-24 ENCOUNTER — TELEPHONE (OUTPATIENT)
Dept: CARDIOLOGY | Facility: CLINIC | Age: 59
End: 2024-12-24
Payer: COMMERCIAL

## 2024-12-24 NOTE — TELEPHONE ENCOUNTER
Pt was contacted about results:     LE arterial US showed minimal change. In normal range  Advise to discuss with PCP for non vascular etiology of leg pain         Pt verbalized understanding with no questions or concerns.

## 2024-12-24 NOTE — TELEPHONE ENCOUNTER
Attempted to contact patient regarding results lvm for him to give us a call back.  ----- Message from Ron Shaikh MD sent at 12/24/2024  9:02 AM CST -----  LE arterial US showed minimal change. In normal range  Advise to discuss with PCP for non vascular etiology of leg pain

## 2025-01-27 ENCOUNTER — PATIENT MESSAGE (OUTPATIENT)
Dept: ADMINISTRATIVE | Facility: OTHER | Age: 60
End: 2025-01-27
Payer: COMMERCIAL

## 2025-01-27 ENCOUNTER — LAB VISIT (OUTPATIENT)
Dept: LAB | Facility: HOSPITAL | Age: 60
End: 2025-01-27
Attending: FAMILY MEDICINE
Payer: COMMERCIAL

## 2025-01-27 DIAGNOSIS — R73.03 PREDIABETES: Chronic | ICD-10-CM

## 2025-01-27 DIAGNOSIS — Z79.899 ON STATIN THERAPY DUE TO RISK OF FUTURE CARDIOVASCULAR EVENT: Chronic | ICD-10-CM

## 2025-01-27 LAB
ALBUMIN SERPL BCP-MCNC: 3.8 G/DL (ref 3.5–5.2)
ALP SERPL-CCNC: 86 U/L (ref 40–150)
ALT SERPL W/O P-5'-P-CCNC: 25 U/L (ref 10–44)
ANION GAP SERPL CALC-SCNC: 9 MMOL/L (ref 8–16)
AST SERPL-CCNC: 23 U/L (ref 10–40)
BILIRUB SERPL-MCNC: 1.1 MG/DL (ref 0.1–1)
BUN SERPL-MCNC: 17 MG/DL (ref 8–23)
CALCIUM SERPL-MCNC: 9.6 MG/DL (ref 8.7–10.5)
CHLORIDE SERPL-SCNC: 102 MMOL/L (ref 95–110)
CO2 SERPL-SCNC: 26 MMOL/L (ref 23–29)
CREAT SERPL-MCNC: 1 MG/DL (ref 0.5–1.4)
EST. GFR  (NO RACE VARIABLE): >60 ML/MIN/1.73 M^2
ESTIMATED AVG GLUCOSE: 123 MG/DL (ref 68–131)
GLUCOSE SERPL-MCNC: 145 MG/DL (ref 70–110)
HBA1C MFR BLD: 5.9 % (ref 4–5.6)
POTASSIUM SERPL-SCNC: 4.6 MMOL/L (ref 3.5–5.1)
PROT SERPL-MCNC: 7.6 G/DL (ref 6–8.4)
SODIUM SERPL-SCNC: 137 MMOL/L (ref 136–145)

## 2025-01-27 PROCEDURE — 83036 HEMOGLOBIN GLYCOSYLATED A1C: CPT | Performed by: FAMILY MEDICINE

## 2025-01-27 PROCEDURE — 80053 COMPREHEN METABOLIC PANEL: CPT | Performed by: FAMILY MEDICINE

## 2025-01-27 PROCEDURE — 36415 COLL VENOUS BLD VENIPUNCTURE: CPT | Performed by: FAMILY MEDICINE

## 2025-01-30 ENCOUNTER — OFFICE VISIT (OUTPATIENT)
Dept: DERMATOLOGY | Facility: CLINIC | Age: 60
End: 2025-01-30
Payer: COMMERCIAL

## 2025-01-30 DIAGNOSIS — L57.0 ACTINIC KERATOSES: ICD-10-CM

## 2025-01-30 DIAGNOSIS — L90.5 SCAR CONDITIONS/SKIN FIBROSIS: Primary | ICD-10-CM

## 2025-01-30 DIAGNOSIS — L82.1 SEBORRHEIC KERATOSIS: ICD-10-CM

## 2025-01-30 DIAGNOSIS — L71.9 ROSACEA: ICD-10-CM

## 2025-01-30 DIAGNOSIS — Z85.828 HISTORY OF SKIN CANCER: ICD-10-CM

## 2025-01-30 DIAGNOSIS — Z12.83 SCREENING, MALIGNANT NEOPLASM, SKIN: ICD-10-CM

## 2025-01-30 DIAGNOSIS — L71.1 RHINOPHYMA: ICD-10-CM

## 2025-01-30 PROCEDURE — 3044F HG A1C LEVEL LT 7.0%: CPT | Mod: CPTII,S$GLB,, | Performed by: DERMATOLOGY

## 2025-01-30 PROCEDURE — 17003 DESTRUCT PREMALG LES 2-14: CPT | Mod: S$GLB,,, | Performed by: DERMATOLOGY

## 2025-01-30 PROCEDURE — 1159F MED LIST DOCD IN RCRD: CPT | Mod: CPTII,S$GLB,, | Performed by: DERMATOLOGY

## 2025-01-30 PROCEDURE — 1160F RVW MEDS BY RX/DR IN RCRD: CPT | Mod: CPTII,S$GLB,, | Performed by: DERMATOLOGY

## 2025-01-30 PROCEDURE — 99214 OFFICE O/P EST MOD 30 MIN: CPT | Mod: 25,S$GLB,, | Performed by: DERMATOLOGY

## 2025-01-30 PROCEDURE — 99999 PR PBB SHADOW E&M-EST. PATIENT-LVL IV: CPT | Mod: PBBFAC,,, | Performed by: DERMATOLOGY

## 2025-01-30 PROCEDURE — 17000 DESTRUCT PREMALG LESION: CPT | Mod: S$GLB,,, | Performed by: DERMATOLOGY

## 2025-01-30 RX ORDER — CLINDAMYCIN HYDROCHLORIDE 300 MG/1
300 CAPSULE ORAL EVERY 8 HOURS
Qty: 21 CAPSULE | Refills: 0 | Status: SHIPPED | OUTPATIENT
Start: 2025-01-30

## 2025-01-30 RX ORDER — DOXYCYCLINE 100 MG/1
CAPSULE ORAL
Qty: 180 CAPSULE | Refills: 1 | Status: SHIPPED | OUTPATIENT
Start: 2025-01-30

## 2025-01-30 RX ORDER — TIRBANIBULIN 10 MG/G
OINTMENT TOPICAL
Qty: 5 PACKET | Refills: 1 | Status: SHIPPED | OUTPATIENT
Start: 2025-01-30

## 2025-01-30 RX ORDER — METHYLPREDNISOLONE 4 MG/1
TABLET ORAL
Qty: 21 EACH | Refills: 0 | Status: SHIPPED | OUTPATIENT
Start: 2025-01-30 | End: 2025-02-20

## 2025-01-30 NOTE — PATIENT INSTRUCTIONS

## 2025-01-30 NOTE — PROGRESS NOTES
Subjective:      Patient ID:  Dragan Man II is a 59 y.o. male who presents for   Chief Complaint   Patient presents with    Skin Check     Fbse. Pt reports spot on nose has gotten bigger. Pt notes pus and has been applying a cream.      Hx of SCC of the left pre-auricular (s/p Mohs 3/12/24), SCC of the right superior brow (s/p Mohs on 8-9/2020?), SCCIS of the right nasal sidewall (s/p Mohs 1/13/20), SCC of the left temple (s/p Mohs on 5/29/20), SCCIS of the left medial cheek (s/p Mohs on 5/29/20), SCCIS of the right lower eyelid (s/p Mohs on 6/1/20?), last seen on 10/10/24. He c/o lesion of the left nasal ala,  + bleeding and purulent drainage.     He previously used tretinoin cream 2-3 nights/week.      Denies bleeding, tender, growing, or concerning lesions.      This is a high risk patient here to check for the development of new lesions.     For rosacea, he has tried doxy 100 mg qD.       Review of Systems   Constitutional:  Negative for fever and chills.   Gastrointestinal:  Negative for nausea and vomiting.   Skin:  Positive for activity-related sunscreen use. Negative for daily sunscreen use and recent sunburn.   Hematologic/Lymphatic: Does not bruise/bleed easily.       Objective:   Physical Exam   Constitutional: He appears well-developed and well-nourished. No distress.   Neurological: He is alert and oriented to person, place, and time. He is not disoriented.   Psychiatric: He has a normal mood and affect.   Skin:   Areas Examined (abnormalities noted in diagram):   Scalp / Hair Palpated and Inspected  Head / Face Inspection Performed  Neck Inspection Performed  Chest / Axilla Inspection Performed  Abdomen Inspection Performed  Back Inspection Performed  RUE Inspected  LUE Inspection Performed  RLE Inspected  LLE Inspection Performed  Nails and Digits Inspection Performed                 Diagram Legend     Erythematous scaling macule/papule c/w actinic keratosis       Vascular papule c/w angioma       Pigmented verrucoid papule/plaque c/w seborrheic keratosis      Yellow umbilicated papule c/w sebaceous hyperplasia      Irregularly shaped tan macule c/w lentigo     1-2 mm smooth white papules consistent with Milia      Movable subcutaneous cyst with punctum c/w epidermal inclusion cyst      Subcutaneous movable cyst c/w pilar cyst      Firm pink to brown papule c/w dermatofibroma      Pedunculated fleshy papule(s) c/w skin tag(s)      Evenly pigmented macule c/w junctional nevus     Mildly variegated pigmented, slightly irregular-bordered macule c/w mildly atypical nevus      Flesh colored to evenly pigmented papule c/w intradermal nevus       Pink pearly papule/plaque c/w basal cell carcinoma      Erythematous hyperkeratotic cursted plaque c/w SCC      Surgical scar with no sign of skin cancer recurrence      Open and closed comedones      Inflammatory papules and pustules      Verrucoid papule consistent consistent with wart     Erythematous eczematous patches and plaques     Dystrophic onycholytic nail with subungual debris c/w onychomycosis     Umbilicated papule    Erythematous-base heme-crusted tan verrucoid plaque consistent with inflamed seborrheic keratosis     Erythematous Silvery Scaling Plaque c/w Psoriasis     See annotation      Assessment / Plan:        Scar conditions/skin fibrosis  Screening, malignant neoplasm, skin  History of skin cancer  Scar of the several sites, hx of NMSC.  No evidence of recurrence on physical exam today.  Continue routine skin surveillance. Daily sunscreen advised.    Area of previous non-melanoma skin cancer were examined. Site well healed with no signs of recurrence.    Total body skin examination performed today including at least 12 points as noted in physical examination. No lesions suspicious for malignancy noted.      Seborrheic keratosis  Reassurance given. Discussed diagnosis and that lesions are benign.  AAD handout given.       Rhinophyma  Rosacea  -      clindamycin (CLEOCIN) 300 MG capsule; Take 1 capsule (300 mg total) by mouth every 8 (eight) hours.  Dispense: 21 capsule; Refill: 0  -     methylPREDNISolone (MEDROL DOSEPACK) 4 mg tablet; use as directed  Dispense: 21 each; Refill: 0  -     doxycycline (MONODOX) 100 MG capsule; Take bid with food. May cause upset stomach.  Dispense: 180 capsule; Refill: 1  -     severe rhinophyma in his changes of the nose with left nasal alar swelling.  We will give information for Dr. Jason Malone in Plastic surgery.  Patient concern for infection given purulent drainage.  We will discontinue doxycycline and start clindamycin t.i.d. for 7 days and Medrol Dosepak.  Once finished with clindamycin, patient can then resume doxycycline, we will start trial at higher dose of 100 mg b.i.d..    Actinic keratoses  -     tirbanibulin (KLISYRI) 1 % OiPk; Apply one packet daily for 5 days.  Dispense: 5 packet; Refill: 1  -     Once lesions heal from cryotherapy.  Will begin treatment with Klisyri applied to the areas of the face and frontal hairline q.d. for 5 days.  Side effects discussed including blistering, irritation and redness.  Discussed areas may blister or become irritated.  Patient acknowledged understanding.  AVS given with written instructions for efudex use.    Cryosurgery Procedure Note    The patient is informed of the precancerous quality and need for treatment of these lesions. After risks, benefits and alternatives explained, including blistering, pain, hyper- and hypopigmentation, patient verbally consents to cryotherapy to precancerous lesions. Liquid nitrogen cryosurgery is applied to the 12 actinic keratoses, as detailed in the physical exam, to produce a freeze injury. The patient is aware that blisters may form and is instructed on wound care with gentle cleansing and use of vaseline ointment to keep moist until healed. The patient is supplied a handout on cryosurgery and is instructed to call if lesions do not  completely resolve.           Follow up in about 3 months (around 4/30/2025).

## 2025-02-03 ENCOUNTER — HOSPITAL ENCOUNTER (OUTPATIENT)
Dept: RADIOLOGY | Facility: HOSPITAL | Age: 60
Discharge: HOME | End: 2025-02-03
Attending: NURSE PRACTITIONER
Payer: COMMERCIAL

## 2025-02-03 ENCOUNTER — TELEPHONE (OUTPATIENT)
Dept: INTERNAL MEDICINE | Facility: CLINIC | Age: 60
End: 2025-02-03

## 2025-02-03 ENCOUNTER — OFFICE VISIT (OUTPATIENT)
Dept: INTERNAL MEDICINE | Facility: CLINIC | Age: 60
End: 2025-02-03
Payer: COMMERCIAL

## 2025-02-03 VITALS
SYSTOLIC BLOOD PRESSURE: 124 MMHG | TEMPERATURE: 98 F | OXYGEN SATURATION: 98 % | HEIGHT: 73 IN | DIASTOLIC BLOOD PRESSURE: 62 MMHG | HEART RATE: 82 BPM | BODY MASS INDEX: 41.75 KG/M2 | WEIGHT: 315 LBS

## 2025-02-03 DIAGNOSIS — M25.561 ACUTE PAIN OF RIGHT KNEE: ICD-10-CM

## 2025-02-03 DIAGNOSIS — E66.813 CLASS 3 SEVERE OBESITY DUE TO EXCESS CALORIES WITH SERIOUS COMORBIDITY AND BODY MASS INDEX (BMI) OF 45.0 TO 49.9 IN ADULT: ICD-10-CM

## 2025-02-03 DIAGNOSIS — R73.03 PREDIABETES: Chronic | ICD-10-CM

## 2025-02-03 DIAGNOSIS — E66.01 CLASS 3 SEVERE OBESITY DUE TO EXCESS CALORIES WITH SERIOUS COMORBIDITY AND BODY MASS INDEX (BMI) OF 45.0 TO 49.9 IN ADULT: ICD-10-CM

## 2025-02-03 DIAGNOSIS — G47.33 OBSTRUCTIVE SLEEP APNEA TREATED WITH CONTINUOUS POSITIVE AIRWAY PRESSURE (CPAP): Chronic | ICD-10-CM

## 2025-02-03 DIAGNOSIS — I10 PRIMARY HYPERTENSION: Primary | Chronic | ICD-10-CM

## 2025-02-03 DIAGNOSIS — M79.604 LEG PAIN, LATERAL, RIGHT: ICD-10-CM

## 2025-02-03 DIAGNOSIS — I50.32 CHRONIC DIASTOLIC CONGESTIVE HEART FAILURE: Chronic | ICD-10-CM

## 2025-02-03 PROCEDURE — 73562 X-RAY EXAM OF KNEE 3: CPT | Mod: TC,RT

## 2025-02-03 PROCEDURE — 99999 PR PBB SHADOW E&M-EST. PATIENT-LVL V: CPT | Mod: PBBFAC,,, | Performed by: NURSE PRACTITIONER

## 2025-02-03 PROCEDURE — 3044F HG A1C LEVEL LT 7.0%: CPT | Mod: CPTII,S$GLB,, | Performed by: NURSE PRACTITIONER

## 2025-02-03 PROCEDURE — G2211 COMPLEX E/M VISIT ADD ON: HCPCS | Mod: S$GLB,,, | Performed by: NURSE PRACTITIONER

## 2025-02-03 PROCEDURE — 73552 X-RAY EXAM OF FEMUR 2/>: CPT | Mod: 26,RT,, | Performed by: RADIOLOGY

## 2025-02-03 PROCEDURE — 3008F BODY MASS INDEX DOCD: CPT | Mod: CPTII,S$GLB,, | Performed by: NURSE PRACTITIONER

## 2025-02-03 PROCEDURE — 1159F MED LIST DOCD IN RCRD: CPT | Mod: CPTII,S$GLB,, | Performed by: NURSE PRACTITIONER

## 2025-02-03 PROCEDURE — 3078F DIAST BP <80 MM HG: CPT | Mod: CPTII,S$GLB,, | Performed by: NURSE PRACTITIONER

## 2025-02-03 PROCEDURE — 73552 X-RAY EXAM OF FEMUR 2/>: CPT | Mod: TC,RT

## 2025-02-03 PROCEDURE — 1160F RVW MEDS BY RX/DR IN RCRD: CPT | Mod: CPTII,S$GLB,, | Performed by: NURSE PRACTITIONER

## 2025-02-03 PROCEDURE — 3074F SYST BP LT 130 MM HG: CPT | Mod: CPTII,S$GLB,, | Performed by: NURSE PRACTITIONER

## 2025-02-03 PROCEDURE — 99214 OFFICE O/P EST MOD 30 MIN: CPT | Mod: S$GLB,,, | Performed by: NURSE PRACTITIONER

## 2025-02-03 PROCEDURE — 73562 X-RAY EXAM OF KNEE 3: CPT | Mod: 26,RT,, | Performed by: RADIOLOGY

## 2025-02-03 RX ORDER — NAPROXEN 500 MG/1
500 TABLET ORAL 2 TIMES DAILY WITH MEALS
Qty: 14 TABLET | Refills: 0 | Status: SHIPPED | OUTPATIENT
Start: 2025-02-03 | End: 2025-02-10

## 2025-02-03 NOTE — PROGRESS NOTES
Subjective:      Patient ID: Dragan Man II is a 59 y.o. male.    Chief Complaint: Follow-up    History of Present Illness    CHIEF COMPLAINT:  Dragan presents today for follow up of weight management and medication approval.    WEIGHT MANAGEMENT:  He reports a 2-pound weight loss at today's visit and approximately 10-pound loss on his home scale. He was previously considering Ozempic for weight management.    HYPERTENSION:  He reports elevated blood pressure readings in the morning and has recently acquired a digital blood pressure machine for home monitoring.    PRE-DIABETES:  He reports being pre-diabetic and acknowledges being at high risk for developing diabetes.    MUSCULOSKELETAL PAIN:  He reports lateral knee pain that radiated up his right lateral leg to just below his right hip.  Report the pain is associated numbness affecting the entire knee region, rated 6/10 in severity. He describes a limping and waddling gait. Reports right lateral leg pain started before Jaquelin and he heard a pop when it first occurred. He is currently managing pain with aspirin and Poxone.    MEDICAL HISTORY:  He has a history of sleep apnea.      ROS:  General: -fever, -chills, -fatigue, -weight gain, +weight loss  Eyes: -vision changes, -redness, -discharge  ENT: -ear pain, -nasal congestion, -sore throat  Cardiovascular: -chest pain, -palpitations, -lower extremity edema  Respiratory: -cough, -shortness of breath  Gastrointestinal: -abdominal pain, -nausea, -vomiting, -diarrhea, -constipation, -blood in stool  Genitourinary: -dysuria, -hematuria, -frequency  Musculoskeletal: +joint pain, -muscle pain  Skin: -rash, -lesion  Neurological: -headache, -dizziness, -numbness, -tingling  Psychiatric: -anxiety, -depression, -sleep difficulty          Patient Active Problem List   Diagnosis    Obstructive sleep apnea treated with continuous positive airway pressure (CPAP)    Encounter for screening colonoscopy    Advanced sleep  phase syndrome    Behaviorally induced insufficient sleep syndrome    Sleep-related bruxism    Inadequate sleep hygiene    Primary hypertension    Actinic keratoses    Heart murmur    ROLLINS (dyspnea on exertion)    Seasonal allergic rhinitis due to pollen    On statin therapy due to risk of future cardiovascular event    Left atrial enlargement    Ulnar neuropathy of both upper extremities    Generalized edema    Chronic diastolic congestive heart failure    PVD (peripheral vascular disease)    Prediabetes    Mild iron deficiency anemia secondary to inadequate dietary iron intake    Chronic bilateral low back pain without sciatica    Bilateral carpal tunnel syndrome    Right-sided low back pain without sciatica    Right foot pain    Lower extremity edema    At risk for cardiovascular event    Von Willebrand disease    Pulmonary hypertension    Class 3 severe obesity due to excess calories with serious comorbidity and body mass index (BMI) of 40.0 to 44.9 in adult    Aortic atherosclerosis    S/P TAVR (transcatheter aortic valve replacement)    Complete heart block    Pulmonary nodule    Bladder wall thickening    Pacemaker    Mild persistent asthma without complication         Current Outpatient Medications:     albuterol (PROVENTIL/VENTOLIN HFA) 90 mcg/actuation inhaler, INHALE 2 PUFFS BY MOUTH INTO THE LUNGS EVERY 4 HOURS AS NEEDED FOR WHEEZING RESCUE, Disp: 6.7 g, Rfl: 1    aspirin (ECOTRIN) 81 MG EC tablet, Take 81 mg by mouth once daily., Disp: , Rfl:     atorvastatin (LIPITOR) 20 MG tablet, Take 1 tablet (20 mg total) by mouth every evening., Disp: 90 tablet, Rfl: 3    clindamycin (CLEOCIN T) 1 % external solution, Apply topically 2 (two) times daily. For pustules/bumps in scalp, Disp: 60 mL, Rfl: 11    clindamycin (CLEOCIN) 300 MG capsule, Take 1 capsule (300 mg total) by mouth every 8 (eight) hours., Disp: 21 capsule, Rfl: 0    doxycycline (MONODOX) 100 MG capsule, Take bid with food. May cause upset stomach.,  Disp: 180 capsule, Rfl: 1    fluticasone furoate-vilanteroL (BREO ELLIPTA) 200-25 mcg/dose DsDv diskus inhaler, Inhale 1 puff into the lungs once daily. Controller, Disp: 60 each, Rfl: 11    furosemide (LASIX) 20 MG tablet, TAKE 1 TABLET BY MOUTH EVERY DAY, Disp: 90 tablet, Rfl: 0    hydroCHLOROthiazide (HYDRODIURIL) 25 MG tablet, Take 1 tablet (25 mg total) by mouth once daily., Disp: 90 tablet, Rfl: 3    irbesartan (AVAPRO) 300 MG tablet, Take 1 tablet (300 mg total) by mouth every evening., Disp: 30 tablet, Rfl: 5    methylPREDNISolone (MEDROL DOSEPACK) 4 mg tablet, use as directed, Disp: 21 each, Rfl: 0    metoprolol succinate (TOPROL-XL) 50 MG 24 hr tablet, Take 1 tablet (50 mg total) by mouth once daily., Disp: 90 tablet, Rfl: 3    sodium chloride (OCEAN) 0.65 % nasal spray, 1 spray by Nasal route as needed for Congestion., Disp: , Rfl:     tirbanibulin (KLISYRI) 1 % OiPk, Apply one packet daily for 5 days., Disp: 5 packet, Rfl: 1    tretinoin (RETIN-A) 0.05 % cream, APPLY PEA SIZED AMOUNT TO ENTIRE FACE AT BEDTIME. IF DRYNESS,USE EVERY 3RD NIGHT AND INCREASE AS TOLERATED TO EVERY NIGHT, Disp: 20 g, Rfl: 6    fluorouraciL (EFUDEX) 5 % cream, AAA of the face, scalp and ears bid x 2 weeks (Patient not taking: Reported on 2/3/2025), Disp: 40 g, Rfl: 1    mometasone (NASONEX) 50 mcg/actuation nasal spray, INSTILL 2 SPRAYS INTO EACH NOSTRIL ONCE DAILY. (Patient not taking: Reported on 2/3/2025), Disp: 1 each, Rfl: 1    naproxen (NAPROSYN) 500 MG tablet, Take 1 tablet (500 mg total) by mouth 2 (two) times daily with meals. for 7 days, Disp: 14 tablet, Rfl: 0    tirzepatide, weight loss, 2.5 mg/0.5 mL Soln, Inject 0.5 mLs (2.5 mg total) into the skin every 7 days. for 4 doses, Disp: 2 mL, Rfl: 0    [START ON 3/3/2025] tirzepatide, weight loss, 5 mg/0.5 mL Soln, Inject 0.5 mLs (5 mg total) into the skin every 7 days., Disp: 2 mL, Rfl: 0    [START ON 3/31/2025] tirzepatide, weight loss, 7.5 mg/0.5 mL PnIj, Inject 7.5  "mg into the skin every 7 days., Disp: 2 mL, Rfl: 2      Objective:   /62 (BP Location: Left arm, Patient Position: Sitting)   Pulse 82   Temp 97.5 °F (36.4 °C) (Tympanic)   Ht 6' 1" (1.854 m)   Wt (!) 157.4 kg (347 lb 0.1 oz)   SpO2 98%   BMI 45.78 kg/m²     Physical Exam             Physical Exam  Vitals and nursing note reviewed.   Constitutional:       General: He is awake. He is not in acute distress.     Appearance: Normal appearance. He is well-developed and well-groomed. He is morbidly obese. He is not ill-appearing, toxic-appearing or diaphoretic.   HENT:      Head: Normocephalic and atraumatic.   Cardiovascular:      Rate and Rhythm: Normal rate and regular rhythm.      Heart sounds: Murmur heard.   Pulmonary:      Effort: Pulmonary effort is normal. No tachypnea, bradypnea, accessory muscle usage, prolonged expiration, respiratory distress or retractions.      Breath sounds: Normal breath sounds. No stridor, decreased air movement or transmitted upper airway sounds. No decreased breath sounds, wheezing, rhonchi or rales.   Musculoskeletal:      Cervical back: Normal range of motion.      Right knee: Swelling present. No deformity, effusion, erythema or ecchymosis. Normal range of motion. Tenderness present over the LCL. Normal alignment, normal meniscus and normal patellar mobility.        Legs:    Skin:     General: Skin is warm and dry.   Neurological:      Mental Status: He is alert.      Gait: Gait abnormal.   Psychiatric:         Attention and Perception: Attention and perception normal.         Mood and Affect: Mood and affect normal.         Speech: Speech normal.         Behavior: Behavior normal. Behavior is cooperative.         Cognition and Memory: Cognition normal.          Assessment/Plan :   1. Primary hypertension    2. Chronic diastolic congestive heart failure  Overview:  ECHOCARDIOGRAM 03/22/2021  ·The left ventricle is normal in size with normal systolic function. The " estimated ejection fraction is 60%  ·Indeterminate left ventricular diastolic function.  ·Normal right ventricular size with normal right ventricular systolic function.  ·Mild left atrial enlargement.  ·Normal central venous pressure (3 mmHg).  ·There is severe aortic valve stenosis.  ·Aortic valve area is 1.25 cm2; peak velocity is 4.55 m/s; mean gradient is 50 mmHg.    ECHOCARDIOGRAM 10/18/2019  1 - Normal left ventricular systolic function (EF 60-65%).  2 - Impaired LV relaxation, elevated LAP (grade 2 diastolic dysfunction).  3 - Normal right ventricular systolic function.  4 - No wall motion abnormalities.  5 - Severe left atrial enlargement.  6 - Concentric hypertrophy.  7 - Mild aortic regurgitation.  8 - Moderate aortic stenosis, CRISTINO = 1.87 cm2, AVAi = 0.7 cm2/m2, peak velocity = 3.87 m/s, mean gradient = 34 mmHg.      3. Prediabetes  -     tirzepatide, weight loss, 2.5 mg/0.5 mL Soln; Inject 0.5 mLs (2.5 mg total) into the skin every 7 days. for 4 doses  Dispense: 2 mL; Refill: 0  -     tirzepatide, weight loss, 5 mg/0.5 mL Soln; Inject 0.5 mLs (5 mg total) into the skin every 7 days.  Dispense: 2 mL; Refill: 0  -     tirzepatide, weight loss, 7.5 mg/0.5 mL PnIj; Inject 7.5 mg into the skin every 7 days.  Dispense: 2 mL; Refill: 2    4. Obstructive sleep apnea treated with continuous positive airway pressure (CPAP)  -     tirzepatide, weight loss, 2.5 mg/0.5 mL Soln; Inject 0.5 mLs (2.5 mg total) into the skin every 7 days. for 4 doses  Dispense: 2 mL; Refill: 0  -     tirzepatide, weight loss, 5 mg/0.5 mL Soln; Inject 0.5 mLs (5 mg total) into the skin every 7 days.  Dispense: 2 mL; Refill: 0  -     tirzepatide, weight loss, 7.5 mg/0.5 mL PnIj; Inject 7.5 mg into the skin every 7 days.  Dispense: 2 mL; Refill: 2    5. Class 3 severe obesity due to excess calories with serious comorbidity and body mass index (BMI) of 45.0 to 49.9 in adult  -     tirzepatide, weight loss, 2.5 mg/0.5 mL Soln; Inject 0.5 mLs  (2.5 mg total) into the skin every 7 days. for 4 doses  Dispense: 2 mL; Refill: 0  -     tirzepatide, weight loss, 5 mg/0.5 mL Soln; Inject 0.5 mLs (5 mg total) into the skin every 7 days.  Dispense: 2 mL; Refill: 0  -     tirzepatide, weight loss, 7.5 mg/0.5 mL PnIj; Inject 7.5 mg into the skin every 7 days.  Dispense: 2 mL; Refill: 2    6. Acute pain of right knee  -     Ambulatory referral/consult to Orthopedics; Future; Expected date: 02/10/2025  -     X-Ray Knee 3 View Right; Future; Expected date: 02/03/2025  -     naproxen (NAPROSYN) 500 MG tablet; Take 1 tablet (500 mg total) by mouth 2 (two) times daily with meals. for 7 days  Dispense: 14 tablet; Refill: 0  -     X-Ray Femur 2 View Right; Future; Expected date: 02/03/2025  - US leg and MONICO negative for thrombosis         Assessment & Plan    Noted improvement in A1c from 6.4-5.9 over the past year, indicating patient remains pre-diabetic but improving  Considering Ozempic for weight management, pending insurance approval  Evaluated knee/leg pain, suspecting possible IT band issue or ligament strain  Ordered x-ray to rule out bone abnormalities, though ligament issues may not be visible    OBESITY, CLASS 3:  - Monitored patient's weight, noting a 2-pound loss since the last visit, with patient reporting a 10-pound loss on home scale before recent events.  - Evaluated A1c, which improved from 6.4-5.9 within a year, indicating better glucose control related to weight management.  - Acknowledged patient's efforts and initiated Ozempic for weight loss, pending insurance approval.    PULMONARY HYPERTENSION:  - Dragan reports feeling a difference in breathing with temperature changes.    COMPLETE HEART BLOCK:  - Monitored patient's blood pressure, which is reported to be high in the morning.    PREDIABETES:  - While improvement is noted, patient remains at risk.    SLEEP APNEA:  - Dragan confirms having sleep apnea.    HYPERTENSION:  - Dragan acquired a new  digital blood pressure machine for home monitoring.    KNEE PAIN:  - Dragan reports pain on the outside of the knee, sometimes with numbness, at a level of 6 out of 10.  - Limping and waddling observed when walking.  - Examination suggests possible IT band, pulled ligament, or tendon issue.  - No significant swelling noted.  - Ordered knee x-ray and referred patient to orthopedics for evaluation.  - Prescribed Toradol (ketorolac) for pain management.    LEG PAIN:  - Dragan reports pain and numbness in the leg.  - Considered possibility of sciatica.  - Ordered femur x-ray and referred patient to orthopedics for evaluation.  - Prescribed Toradol (ketorolac) for pain management.          Follow up if symptoms worsen or fail to improve.    This note was generated with the assistance of ambient listening technology. Verbal consent was obtained by the patient and accompanying visitor(s) for the recording of patient appointment to facilitate this note. I attest to having reviewed and edited the generated note for accuracy, though some syntax or spelling errors may persist. Please contact the author of this note for any clarification.

## 2025-02-05 ENCOUNTER — TELEPHONE (OUTPATIENT)
Dept: INTERNAL MEDICINE | Facility: CLINIC | Age: 60
End: 2025-02-05
Payer: COMMERCIAL

## 2025-02-06 ENCOUNTER — TELEPHONE (OUTPATIENT)
Dept: DERMATOLOGY | Facility: CLINIC | Age: 60
End: 2025-02-06
Payer: COMMERCIAL

## 2025-02-06 ENCOUNTER — TELEPHONE (OUTPATIENT)
Dept: INTERNAL MEDICINE | Facility: CLINIC | Age: 60
End: 2025-02-06
Payer: COMMERCIAL

## 2025-02-06 DIAGNOSIS — L57.0 ACTINIC KERATOSES: Primary | ICD-10-CM

## 2025-02-06 RX ORDER — TIRBANIBULIN 10 MG/G
OINTMENT TOPICAL
Qty: 5 PACKET | Refills: 1 | Status: SHIPPED | OUTPATIENT
Start: 2025-02-06

## 2025-02-06 NOTE — TELEPHONE ENCOUNTER
Returned call to [facility]. They report they are not in network with patient pharmacy to fill the Klysiri. Message sent to provider   ----- Message from [employer] sent at 2/6/2025 10:51 AM CST -----  .Type: Patient Call Back      Who called:  Caron with the pharmacy     What is the request in detail:     calling concerning medication that was prescribed. medication was unable to fill because insurance is not in network with pharmacy. wanted to know if you had a different alternative .  Can the clinic reply by [facility]?         Would the patient rather a call back or a response via [facility]?    call  Best call back number:

## 2025-02-10 ENCOUNTER — OFFICE VISIT (OUTPATIENT)
Dept: ORTHOPEDICS | Facility: CLINIC | Age: 60
End: 2025-02-10
Payer: COMMERCIAL

## 2025-02-10 ENCOUNTER — TELEPHONE (OUTPATIENT)
Dept: DERMATOLOGY | Facility: CLINIC | Age: 60
End: 2025-02-10
Payer: COMMERCIAL

## 2025-02-10 VITALS
HEART RATE: 61 BPM | WEIGHT: 315 LBS | SYSTOLIC BLOOD PRESSURE: 126 MMHG | DIASTOLIC BLOOD PRESSURE: 63 MMHG | BODY MASS INDEX: 41.75 KG/M2 | HEIGHT: 73 IN

## 2025-02-10 DIAGNOSIS — Z95.0 CARDIAC PACEMAKER IN SITU: Primary | ICD-10-CM

## 2025-02-10 DIAGNOSIS — M54.16 LUMBAR RADICULOPATHY: Primary | ICD-10-CM

## 2025-02-10 DIAGNOSIS — M25.561 ACUTE PAIN OF RIGHT KNEE: ICD-10-CM

## 2025-02-10 DIAGNOSIS — M79.604 LEG PAIN, LATERAL, RIGHT: ICD-10-CM

## 2025-02-10 DIAGNOSIS — M54.50 ACUTE RIGHT-SIDED LOW BACK PAIN WITHOUT SCIATICA: Primary | ICD-10-CM

## 2025-02-10 DIAGNOSIS — I44.2 COMPLETE HEART BLOCK: ICD-10-CM

## 2025-02-10 PROCEDURE — 3078F DIAST BP <80 MM HG: CPT | Mod: CPTII,S$GLB,, | Performed by: PHYSICIAN ASSISTANT

## 2025-02-10 PROCEDURE — 3044F HG A1C LEVEL LT 7.0%: CPT | Mod: CPTII,S$GLB,, | Performed by: PHYSICIAN ASSISTANT

## 2025-02-10 PROCEDURE — 99204 OFFICE O/P NEW MOD 45 MIN: CPT | Mod: S$GLB,,, | Performed by: PHYSICIAN ASSISTANT

## 2025-02-10 PROCEDURE — 99999 PR PBB SHADOW E&M-EST. PATIENT-LVL V: CPT | Mod: PBBFAC,,, | Performed by: PHYSICIAN ASSISTANT

## 2025-02-10 PROCEDURE — 1160F RVW MEDS BY RX/DR IN RCRD: CPT | Mod: CPTII,S$GLB,, | Performed by: PHYSICIAN ASSISTANT

## 2025-02-10 PROCEDURE — 1159F MED LIST DOCD IN RCRD: CPT | Mod: CPTII,S$GLB,, | Performed by: PHYSICIAN ASSISTANT

## 2025-02-10 PROCEDURE — 3008F BODY MASS INDEX DOCD: CPT | Mod: CPTII,S$GLB,, | Performed by: PHYSICIAN ASSISTANT

## 2025-02-10 PROCEDURE — 3074F SYST BP LT 130 MM HG: CPT | Mod: CPTII,S$GLB,, | Performed by: PHYSICIAN ASSISTANT

## 2025-02-10 NOTE — PROGRESS NOTES
Subjective:      Patient ID: Dragan Man II is a 59 y.o. male.    History of Present Illness    CHIEF COMPLAINT:  - Leg pain and numbness    HPI:  Dragan presents with right leg pain and numbness. Pain is located on the outside of his right leg, extending to his foot. Dragan reports that he needs to stop when the pain occurs and tries stretching it out. He has attempted to manage symptoms by stretching, increasing movement, and using topical treatments such as Bengay and Icy Hot. He previously mentioned these issues to his regular doctor during a recent appointment.    He also reports occasional back problems. A lumbar x-ray from 2021 showed disc loss at L4 and L5. He has a pacemaker and has undergone MRI scans since its implantation.    He denies pain on the inside of the leg or in the groin.       Past Medical History:   Diagnosis Date    Actinic keratoses 10/14/2019    Aortic valve stenosis 12/16/2019    CARDIAC CATH 06/28/22  - The pre-procedure left ventricular end diastolic pressure was 32.  - There was trivial (1+) aortic regurgitation.  - The estimated blood loss was none.  - There was diastolic dysfunction.  - The coronary arteries were normal.  - The filling pressures on the right and left were moderately elevated. Pulmonary hypertension was moderate.  - 53 MMHG GRADIENT ACROSS AORTIC VAKLV    Bilateral carpal tunnel syndrome 04/12/2021    Chronic bilateral low back pain without sciatica 04/12/2021    Depression     Morbid obesity with BMI of 40.0-44.9, adult 11/17/2017    LAVERNE (obstructive sleep apnea)     Prediabetes 04/12/2021    Seasonal allergic rhinitis due to pollen 10/14/2019    Ulnar neuropathy of both upper extremities 02/17/2020     Current Outpatient Medications:     albuterol (PROVENTIL/VENTOLIN HFA) 90 mcg/actuation inhaler, INHALE 2 PUFFS BY MOUTH INTO THE LUNGS EVERY 4 HOURS AS NEEDED FOR WHEEZING RESCUE, Disp: 6.7 g, Rfl: 1    aspirin (ECOTRIN) 81 MG EC tablet, Take 81 mg by mouth once  daily., Disp: , Rfl:     atorvastatin (LIPITOR) 20 MG tablet, Take 1 tablet (20 mg total) by mouth every evening., Disp: 90 tablet, Rfl: 3    clindamycin (CLEOCIN T) 1 % external solution, Apply topically 2 (two) times daily. For pustules/bumps in scalp, Disp: 60 mL, Rfl: 11    clindamycin (CLEOCIN) 300 MG capsule, Take 1 capsule (300 mg total) by mouth every 8 (eight) hours., Disp: 21 capsule, Rfl: 0    doxycycline (MONODOX) 100 MG capsule, Take bid with food. May cause upset stomach., Disp: 180 capsule, Rfl: 1    fluorouraciL (EFUDEX) 5 % cream, AAA of the face, scalp and ears bid x 2 weeks, Disp: 40 g, Rfl: 1    fluticasone furoate-vilanteroL (BREO ELLIPTA) 200-25 mcg/dose DsDv diskus inhaler, Inhale 1 puff into the lungs once daily. Controller, Disp: 60 each, Rfl: 11    furosemide (LASIX) 20 MG tablet, TAKE 1 TABLET BY MOUTH EVERY DAY, Disp: 90 tablet, Rfl: 0    hydroCHLOROthiazide (HYDRODIURIL) 25 MG tablet, Take 1 tablet (25 mg total) by mouth once daily., Disp: 90 tablet, Rfl: 3    irbesartan (AVAPRO) 300 MG tablet, Take 1 tablet (300 mg total) by mouth every evening., Disp: 30 tablet, Rfl: 5    methylPREDNISolone (MEDROL DOSEPACK) 4 mg tablet, use as directed, Disp: 21 each, Rfl: 0    metoprolol succinate (TOPROL-XL) 50 MG 24 hr tablet, Take 1 tablet (50 mg total) by mouth once daily., Disp: 90 tablet, Rfl: 3    mometasone (NASONEX) 50 mcg/actuation nasal spray, INSTILL 2 SPRAYS INTO EACH NOSTRIL ONCE DAILY., Disp: 1 each, Rfl: 1    naproxen (NAPROSYN) 500 MG tablet, Take 1 tablet (500 mg total) by mouth 2 (two) times daily with meals. for 7 days, Disp: 14 tablet, Rfl: 0    sodium chloride (OCEAN) 0.65 % nasal spray, 1 spray by Nasal route as needed for Congestion., Disp: , Rfl:     tirbanibulin (KLISYRI) 1 % OiPk, Apply one packet daily for 5 days., Disp: 5 packet, Rfl: 1    tirzepatide, weight loss, 2.5 mg/0.5 mL Soln, Inject 0.5 mLs (2.5 mg total) into the skin every 7 days. for 4 doses, Disp: 2 mL, Rfl:  "0    [START ON 3/3/2025] tirzepatide, weight loss, 5 mg/0.5 mL Soln, Inject 0.5 mLs (5 mg total) into the skin every 7 days., Disp: 2 mL, Rfl: 0    [START ON 3/31/2025] tirzepatide, weight loss, 7.5 mg/0.5 mL PnIj, Inject 7.5 mg into the skin every 7 days., Disp: 2 mL, Rfl: 2    tretinoin (RETIN-A) 0.05 % cream, APPLY PEA SIZED AMOUNT TO ENTIRE FACE AT BEDTIME. IF DRYNESS,USE EVERY 3RD NIGHT AND INCREASE AS TOLERATED TO EVERY NIGHT, Disp: 20 g, Rfl: 6    Review of patient's allergies indicates:   Allergen Reactions    Olmesartan Other (See Comments)     Numbness and tingling in fingers and knee joint pain    Chlorthalidone Other (See Comments)     Patient states it made him depressed.        /63 (BP Location: Left arm, Patient Position: Sitting)   Pulse 61   Ht 6' 1" (1.854 m)   Wt (!) 157.4 kg (347 lb 0.1 oz)   BMI 45.78 kg/m²       Objective:    Ortho Exam   Right lower extremity:   Skin is intact   No edema   No TTP   ROM of the hip is full and painless   No pain with internal/external rotation   Patient reports pain and paresthesias extending along the lateral aspect of the right thigh and extending all the way down into his foot   Calf and compartments are soft and compressible   Motor exam normal   Sensation and pulses intact  Cap refill brisk    GEN: Well developed, well nourished male. AAOX3. No acute distress.   Head: Normocephalic, atraumatic.   Eyes: XU  Neck: Trachea is midline, no adenopathy  Resp: Breathing unlabored.  Neuro: Motor function normal, Cranial nerves intact  Psych: Mood and affect appropriate.    Assessment:     Imaging:  X-ray right femur was reviewed and shows no acute fracture or dislocation.  Os acetabuli is noted.      1. Lumbar radiculopathy    2. Acute pain of right knee    3. Leg pain, lateral, right        Plan:     - Reviewed the femur radiographs as well as his old lumbar radiographs.    - Explained that the degenerative findings on the lumbar x-ray are consistent " with his symptoms.  - Ordered MRI Lumbar Spine to evaluate for disc issues and nerve compression.  - Referred to pain management.  - Dragan expressed interest in pursuing pain management options over surgical referral.      Orders Placed This Encounter    MRI Lumbar Spine Without Contrast    Ambulatory referral/consult to Pain Clinic     Follow up if symptoms worsen or fail to improve.      Patient note was created using MModal Dictation.  Any errors in syntax or even information may not have been identified and edited on initial review prior to signing this note.    This note was generated with the assistance of ambient listening technology. Verbal consent was obtained by the patient and accompanying visitor(s) for the recording of patient appointment to facilitate this note. I attest to having reviewed and edited the generated note for accuracy, though some syntax or spelling errors may persist. Please contact the author of this note for any clarification.

## 2025-02-10 NOTE — TELEPHONE ENCOUNTER
Called and notified patient that medication was sent through [facility]. Pt reports they have already reached out to him and medicine is on its way.    ----- Message from Skye Aguirre MD sent at 2/6/2025  6:03 PM CST -----  Will try [facility], please recommend patient reach out to us if [facility] says that they are also not able to feel the medication.  ----- Message -----  From: Jaymie Wayne RN  Sent: 2/6/2025  12:57 PM CST  To: Skye Aguirre MD    [facility] called and they said they are unable to fill klysiri. Not in network with insurance  ----- Message -----  From: Lory Oglesby  Sent: 2/6/2025  10:53 AM CST  To: [facility] Cheung Staff    .Type: Patient Call Back      Who called:  Caron with the pharmacy     What is the request in detail:     calling concerning medication that was prescribed. medication was unable to fill because insurance is not in network with pharmacy. wanted to know if you had a different alternative .  Can the clinic reply by [facility]?         Would the patient rather a call back or a response via [facility]?    call  Best call back number:

## 2025-02-28 ENCOUNTER — TELEPHONE (OUTPATIENT)
Dept: INTERNAL MEDICINE | Facility: CLINIC | Age: 60
End: 2025-02-28
Payer: COMMERCIAL

## 2025-02-28 NOTE — TELEPHONE ENCOUNTER
Fluticasone inhaler not covered alternatives that are covered are:    Fluticasone-salmeterol 100-50  Budesonide-formoterol 160-4.5  Wixela 100-50 inhub

## 2025-03-10 ENCOUNTER — HOSPITAL ENCOUNTER (OUTPATIENT)
Dept: RADIOLOGY | Facility: HOSPITAL | Age: 60
Discharge: HOME | End: 2025-03-10
Attending: PHYSICIAN ASSISTANT
Payer: COMMERCIAL

## 2025-03-10 ENCOUNTER — OFFICE VISIT (OUTPATIENT)
Dept: PULMONOLOGY | Facility: CLINIC | Age: 60
End: 2025-03-10
Payer: COMMERCIAL

## 2025-03-10 VITALS
SYSTOLIC BLOOD PRESSURE: 120 MMHG | DIASTOLIC BLOOD PRESSURE: 56 MMHG | RESPIRATION RATE: 17 BRPM | WEIGHT: 315 LBS | HEART RATE: 60 BPM | BODY MASS INDEX: 41.75 KG/M2 | HEIGHT: 73 IN | OXYGEN SATURATION: 98 %

## 2025-03-10 DIAGNOSIS — J45.30 MILD PERSISTENT ASTHMA WITHOUT COMPLICATION: Primary | Chronic | ICD-10-CM

## 2025-03-10 DIAGNOSIS — R91.1 PULMONARY NODULE: Chronic | ICD-10-CM

## 2025-03-10 DIAGNOSIS — E66.01 MORBID OBESITY WITH BMI OF 45.0-49.9, ADULT: ICD-10-CM

## 2025-03-10 DIAGNOSIS — G47.33 OSA (OBSTRUCTIVE SLEEP APNEA): ICD-10-CM

## 2025-03-10 DIAGNOSIS — M54.50 ACUTE RIGHT-SIDED LOW BACK PAIN WITHOUT SCIATICA: ICD-10-CM

## 2025-03-10 PROCEDURE — 1160F RVW MEDS BY RX/DR IN RCRD: CPT | Mod: CPTII,S$GLB,, | Performed by: NURSE PRACTITIONER

## 2025-03-10 PROCEDURE — 1159F MED LIST DOCD IN RCRD: CPT | Mod: CPTII,S$GLB,, | Performed by: NURSE PRACTITIONER

## 2025-03-10 PROCEDURE — 4010F ACE/ARB THERAPY RXD/TAKEN: CPT | Mod: CPTII,S$GLB,, | Performed by: NURSE PRACTITIONER

## 2025-03-10 PROCEDURE — 99214 OFFICE O/P EST MOD 30 MIN: CPT | Mod: 25,S$GLB,, | Performed by: NURSE PRACTITIONER

## 2025-03-10 PROCEDURE — 3078F DIAST BP <80 MM HG: CPT | Mod: CPTII,S$GLB,, | Performed by: NURSE PRACTITIONER

## 2025-03-10 PROCEDURE — 3074F SYST BP LT 130 MM HG: CPT | Mod: CPTII,S$GLB,, | Performed by: NURSE PRACTITIONER

## 2025-03-10 PROCEDURE — 3044F HG A1C LEVEL LT 7.0%: CPT | Mod: CPTII,S$GLB,, | Performed by: NURSE PRACTITIONER

## 2025-03-10 PROCEDURE — 3008F BODY MASS INDEX DOCD: CPT | Mod: CPTII,S$GLB,, | Performed by: NURSE PRACTITIONER

## 2025-03-10 PROCEDURE — 71046 X-RAY EXAM CHEST 2 VIEWS: CPT | Mod: TC

## 2025-03-10 PROCEDURE — 71046 X-RAY EXAM CHEST 2 VIEWS: CPT | Mod: 26,,, | Performed by: RADIOLOGY

## 2025-03-10 PROCEDURE — 99999 PR PBB SHADOW E&M-EST. PATIENT-LVL IV: CPT | Mod: PBBFAC,,, | Performed by: NURSE PRACTITIONER

## 2025-03-10 NOTE — PROGRESS NOTES
"Subjective:      Patient ID: Dragan Man II is a 59 y.o. male.    Chief Complaint: Asthma and Sleep Apnea    HPI  Presents for sleep apnea on AutoPAP therapy and asthma. Compliant with BREO.  No fever, chills, or hemoptysis. No pleuritic type chest pain. Breathing is stable as compared to 6 months ago.   Patient states improved symptoms with use of AutoPAP. Sleeping more soundly. Waking up feeling more refreshed. Improved daytime sleepiness. Patient states he is benefiting from use of the AutoPAP.   BMI 45    Problem List[1]  BP (!) 120/56   Pulse 60   Resp 17   Ht 6' 1" (1.854 m)   Wt (!) 154.9 kg (341 lb 7.9 oz)   SpO2 98%   BMI 45.05 kg/m²   Body mass index is 45.05 kg/m².    Review of Systems   Constitutional: Negative.    HENT: Negative.     Respiratory: Negative.     Cardiovascular: Negative.    Musculoskeletal: Negative.    Gastrointestinal: Negative.    Neurological: Negative.    Psychiatric/Behavioral: Negative.       Objective:      Physical Exam  Constitutional:       Appearance: He is obese.   HENT:      Head: Normocephalic and atraumatic.      Nose: Nose normal.   Cardiovascular:      Rate and Rhythm: Normal rate and regular rhythm.   Pulmonary:      Effort: Pulmonary effort is normal.      Breath sounds: Normal breath sounds.   Abdominal:      Palpations: Abdomen is soft.      Tenderness: There is no abdominal tenderness.   Musculoskeletal:         General: Normal range of motion.      Cervical back: Normal range of motion and neck supple.   Skin:     General: Skin is warm and dry.   Neurological:      General: No focal deficit present.      Mental Status: He is alert and oriented to person, place, and time.   Psychiatric:         Mood and Affect: Mood normal.         Behavior: Behavior normal.       Personal Diagnostic Review      3/10/2025     2:07 PM   EPWORTH SLEEPINESS SCALE   Sitting and reading 2   Watching TV 1   Sitting, inactive in a public place (e.g. a theatre or a meeting) 1 "   As a passenger in a car for an hour without a break 0   Lying down to rest in the afternoon when circumstances permit 3   Sitting and talking to someone 0   Sitting quietly after a lunch without alcohol 1   In a car, while stopped for a few minutes in traffic 0   Total score 8      Spirometry reviewed. FEV1 89 % of predicted.        Results for orders placed during the hospital encounter of 03/10/25    X-Ray Chest PA And Lateral    Narrative  EXAMINATION:  XR CHEST PA AND LATERAL    CLINICAL HISTORY:  Low back pain, unspecified    TECHNIQUE:  PA and lateral views of the chest were performed.    COMPARISON:  01/25/2024    FINDINGS:  The lungs are clear and free of infiltrate.  No pleural effusion or pneumothorax. The heart is enlarged.  A dual lead pacing device is present.    Impression  1.  No acute cardiopulmonary process.      Electronically signed by: Nick Tan DO  Date:    03/10/2025  Time:    13:51        Assessment:       1. Mild persistent asthma without complication    2. LAVERNE (obstructive sleep apnea)    3. Pulmonary nodule    4. Morbid obesity with BMI of 45.0-49.9, adult        Encounter Medications[2]  Orders Placed This Encounter   Procedures    CPAP/BIPAP SUPPLIES     Length of need (1-99 months)::   99     Choose ONE mask type and its corresponding cushions and/or pillows::    Full Face Mask, 1 per 90 days:  Full Face Cushion, (3 per 90 days)     Choose EITHER Heated or Non-Heated Tubjing:    Non-Heated Tubing, 1 per 90 days     All other supplies as needed as listed below::    Headgear, 1 per 180 days     All other supplies as needed as listed below::    Disposable Filter, 6 per 90 days     All other supplies as needed as listed below::    Non-Disposable Filter, 1 per 180 days     All other supplies as needed as listed below::    Humidifier Chamber, 1 per 180 days    Spirometry without Bronchodilator     Standing Status:   Future     Expiration Date:    3/10/2026     Release to patient:   Immediate     Plan:     Continue current pulmonary drug regimen.    1. Mild persistent asthma without complication  Comments:  BREO    2. LAVERNE (obstructive sleep apnea)  -     CPAP/BIPAP SUPPLIES  -     Spirometry without Bronchodilator; Future    3. Pulmonary nodule  Overview:  02/2023 There is a 4 mm right lower lobe pulmonary nodule (axial series 2, image 79). Low risk for malignancy. No follow up     Social History     Tobacco Use   Smoking Status Never   Smokeless Tobacco Former    Types: Snuff    Quit date: 2003   Tobacco Comments    quit 15 years ago            4. Morbid obesity with BMI of 45.0-49.9, adult     Weight loss and exercise to improve overall health.                     Elizabeth LeJeune, ACNP, ANP         [1]   Patient Active Problem List  Diagnosis    Obstructive sleep apnea treated with continuous positive airway pressure (CPAP)    Encounter for screening colonoscopy    Advanced sleep phase syndrome    Behaviorally induced insufficient sleep syndrome    Sleep-related bruxism    Inadequate sleep hygiene    Primary hypertension    Actinic keratoses    Heart murmur    ROLLINS (dyspnea on exertion)    Seasonal allergic rhinitis due to pollen    On statin therapy due to risk of future cardiovascular event    Left atrial enlargement    Ulnar neuropathy of both upper extremities    Generalized edema    Chronic diastolic congestive heart failure    PVD (peripheral vascular disease)    Prediabetes    Mild iron deficiency anemia secondary to inadequate dietary iron intake    Chronic bilateral low back pain without sciatica    Bilateral carpal tunnel syndrome    Right-sided low back pain without sciatica    Right foot pain    Lower extremity edema    At risk for cardiovascular event    Von Willebrand disease    Pulmonary hypertension    Class 3 severe obesity due to excess calories with serious comorbidity and body mass index (BMI) of 40.0 to 44.9 in adult    Aortic  atherosclerosis    S/P TAVR (transcatheter aortic valve replacement)    Complete heart block    Pulmonary nodule    Bladder wall thickening    Pacemaker    Mild persistent asthma without complication   [2]   Outpatient Encounter Medications as of 3/10/2025   Medication Sig Dispense Refill    albuterol (PROVENTIL/VENTOLIN HFA) 90 mcg/actuation inhaler INHALE 2 PUFFS BY MOUTH INTO THE LUNGS EVERY 4 HOURS AS NEEDED FOR WHEEZING RESCUE 6.7 g 1    aspirin (ECOTRIN) 81 MG EC tablet Take 81 mg by mouth once daily.      atorvastatin (LIPITOR) 20 MG tablet Take 1 tablet (20 mg total) by mouth every evening. 90 tablet 3    clindamycin (CLEOCIN T) 1 % external solution Apply topically 2 (two) times daily. For pustules/bumps in scalp 60 mL 11    clindamycin (CLEOCIN) 300 MG capsule Take 1 capsule (300 mg total) by mouth every 8 (eight) hours. 21 capsule 0    doxycycline (MONODOX) 100 MG capsule Take bid with food. May cause upset stomach. 180 capsule 1    fluorouraciL (EFUDEX) 5 % cream AAA of the face, scalp and ears bid x 2 weeks 40 g 1    fluticasone furoate-vilanteroL (BREO ELLIPTA) 200-25 mcg/dose DsDv diskus inhaler Inhale 1 puff into the lungs once daily. Controller 60 each 11    furosemide (LASIX) 20 MG tablet TAKE 1 TABLET BY MOUTH EVERY DAY 90 tablet 0    hydroCHLOROthiazide (HYDRODIURIL) 25 MG tablet Take 1 tablet (25 mg total) by mouth once daily. 90 tablet 3    irbesartan (AVAPRO) 300 MG tablet Take 1 tablet (300 mg total) by mouth every evening. 90 tablet 1    metoprolol succinate (TOPROL-XL) 50 MG 24 hr tablet Take 1 tablet (50 mg total) by mouth once daily. 90 tablet 3    mometasone (NASONEX) 50 mcg/actuation nasal spray INSTILL 2 SPRAYS INTO EACH NOSTRIL ONCE DAILY. 1 each 1    sodium chloride (OCEAN) 0.65 % nasal spray 1 spray by Nasal route as needed for Congestion.      tirbanibulin (KLISYRI) 1 % OiPk Apply one packet daily for 5 days. 5 packet 1    tirzepatide, weight loss, 5 mg/0.5 mL Soln Inject 0.5 mLs  (5 mg total) into the skin every 7 days. 2 mL 0    [START ON 3/31/2025] tirzepatide, weight loss, 7.5 mg/0.5 mL PnIj Inject 7.5 mg into the skin every 7 days. 2 mL 2    tretinoin (RETIN-A) 0.05 % cream APPLY PEA SIZED AMOUNT TO ENTIRE FACE AT BEDTIME. IF DRYNESS,USE EVERY 3RD NIGHT AND INCREASE AS TOLERATED TO EVERY NIGHT 20 g 6    [] methylPREDNISolone (MEDROL DOSEPACK) 4 mg tablet use as directed 21 each 0    [] naproxen (NAPROSYN) 500 MG tablet Take 1 tablet (500 mg total) by mouth 2 (two) times daily with meals. for 7 days 14 tablet 0    [] tirzepatide, weight loss, 2.5 mg/0.5 mL Soln Inject 0.5 mLs (2.5 mg total) into the skin every 7 days. for 4 doses 2 mL 0    [DISCONTINUED] irbesartan (AVAPRO) 300 MG tablet Take 1 tablet (300 mg total) by mouth every evening. 30 tablet 5     No facility-administered encounter medications on file as of 3/10/2025.

## 2025-03-12 ENCOUNTER — HOSPITAL ENCOUNTER (OUTPATIENT)
Dept: RADIOLOGY | Facility: HOSPITAL | Age: 60
Discharge: HOME | End: 2025-03-12
Attending: PHYSICIAN ASSISTANT
Payer: COMMERCIAL

## 2025-03-12 VITALS — OXYGEN SATURATION: 94 % | HEART RATE: 88 BPM

## 2025-03-12 DIAGNOSIS — M54.16 LUMBAR RADICULOPATHY: ICD-10-CM

## 2025-03-12 PROCEDURE — 72148 MRI LUMBAR SPINE W/O DYE: CPT | Mod: TC

## 2025-03-12 PROCEDURE — 72148 MRI LUMBAR SPINE W/O DYE: CPT | Mod: 26,,, | Performed by: STUDENT IN AN ORGANIZED HEALTH CARE EDUCATION/TRAINING PROGRAM

## 2025-03-12 NOTE — NURSING
MRI complete at this time, patient tolerated MRI well, heart rate 88, O2 sat. 94% on RA, cardiac device placed in normal operating mode per [employer] patient discharged in NAD.

## 2025-03-12 NOTE — NURSING
Patient arrived for scheduled MRI, patient identification verified X 2, allergies reviewed, patient assisted to MRI table without difficulty, cardiac device placed in MRI safe mode per [employer] MRI safe cardiac monitor and pulse ox. applied, heart rate 80 , O2 sat. 95% on RA, NAD noted at this time, will continue to monitor patient throughout MRI.

## 2025-03-26 ENCOUNTER — PATIENT MESSAGE (OUTPATIENT)
Dept: INTERNAL MEDICINE | Facility: CLINIC | Age: 60
End: 2025-03-26
Payer: COMMERCIAL

## 2025-05-05 ENCOUNTER — TELEPHONE (OUTPATIENT)
Dept: INTERNAL MEDICINE | Facility: CLINIC | Age: 60
End: 2025-05-05
Payer: COMMERCIAL

## 2025-05-05 NOTE — TELEPHONE ENCOUNTER
Fluticasone inhaler not covered, suggested alternatives are Breyna, advair, breo ellipita wixela and budesonide

## 2025-05-07 ENCOUNTER — OFFICE VISIT (OUTPATIENT)
Dept: PAIN MEDICINE | Facility: CLINIC | Age: 60
End: 2025-05-07
Payer: COMMERCIAL

## 2025-05-07 VITALS
HEART RATE: 73 BPM | WEIGHT: 315 LBS | DIASTOLIC BLOOD PRESSURE: 68 MMHG | SYSTOLIC BLOOD PRESSURE: 132 MMHG | HEIGHT: 73 IN | BODY MASS INDEX: 41.75 KG/M2

## 2025-05-07 DIAGNOSIS — M79.604 LEG PAIN, LATERAL, RIGHT: ICD-10-CM

## 2025-05-07 DIAGNOSIS — M54.16 LUMBAR RADICULOPATHY: ICD-10-CM

## 2025-05-07 PROCEDURE — 99999 PR PBB SHADOW E&M-EST. PATIENT-LVL IV: CPT | Mod: PBBFAC,,, | Performed by: PHYSICAL MEDICINE & REHABILITATION

## 2025-05-07 PROCEDURE — 1159F MED LIST DOCD IN RCRD: CPT | Mod: CPTII,S$GLB,, | Performed by: PHYSICAL MEDICINE & REHABILITATION

## 2025-05-07 PROCEDURE — 3044F HG A1C LEVEL LT 7.0%: CPT | Mod: CPTII,S$GLB,, | Performed by: PHYSICAL MEDICINE & REHABILITATION

## 2025-05-07 PROCEDURE — 3075F SYST BP GE 130 - 139MM HG: CPT | Mod: CPTII,S$GLB,, | Performed by: PHYSICAL MEDICINE & REHABILITATION

## 2025-05-07 PROCEDURE — 99204 OFFICE O/P NEW MOD 45 MIN: CPT | Mod: S$GLB,,, | Performed by: PHYSICAL MEDICINE & REHABILITATION

## 2025-05-07 PROCEDURE — 3078F DIAST BP <80 MM HG: CPT | Mod: CPTII,S$GLB,, | Performed by: PHYSICAL MEDICINE & REHABILITATION

## 2025-05-07 PROCEDURE — 3008F BODY MASS INDEX DOCD: CPT | Mod: CPTII,S$GLB,, | Performed by: PHYSICAL MEDICINE & REHABILITATION

## 2025-05-07 PROCEDURE — 4010F ACE/ARB THERAPY RXD/TAKEN: CPT | Mod: CPTII,S$GLB,, | Performed by: PHYSICAL MEDICINE & REHABILITATION

## 2025-05-07 NOTE — PATIENT INSTRUCTIONS
Planning for lumbar epidural steroid injection on 6/12/25 and would request that you abstain from work/activities that day and also on 6/13/25.

## 2025-05-07 NOTE — PROGRESS NOTES
New Patient Chronic Pain Note (Initial Visit)    Referring Physician: Jason Moreno PA-C    PCP: GIL Barba MD    Chief Complaint:   Chief Complaint   Patient presents with    Leg Pain        SUBJECTIVE:    Dragan Man II is a 59 y.o. male who presents to the clinic for the evaluation of right lower extremity pain.  He was referred by Orthopedics for further evaluation and management of this pain.  He has a past medical history of carpal tunnel syndrome, ulnar neuropathy, asthma, hypertension, CHF, peripheral vascular disease, complete heart block, s/p pacemaker placement, s/p transcatheter aortic valve replacement, Von Willebrand's disease, iron-deficiency anemia, prediabetes, obesity, LAVERNE on CPAP, and multiple other medical comorbidities as listed in his chart.  The pain started about 1 year ago and symptoms have been worsening.The pain is located in the right right hip area and radiates to the right lower extremity to the calf.  The pain is described as sharp, stabbing, aching and is rated as 5/10. The pain is rated with a score of  4/10 on the BEST day and a score of 8/10 on the WORST day.  Symptoms interfere with daily activity. The pain is exacerbated by bending, lifting, prolonged standing.  The pain is mitigated by Tylenol.     Patient denies night fever/night sweats, urinary incontinence, bowel incontinence, significant weight loss, significant motor weakness, and loss of sensations.    Pain Disability Index Review:          No data to display                Non-Pharmacologic Treatments:  Physical Therapy/Home Exercise: yes  Ice/Heat:yes  TENS: no  Acupuncture: no  Massage: no  Chiropractic: no    Other: no      Pain Medications:  - Opioids:  Norco  - Adjuvant Medications:  Tylenol  - Anti-Coagulants: Aspirin     report:  Reviewed and consistent with medication use as prescribed.    Pain Procedures:   Denies      Imaging:   MRI lumbar spine 03/12/2025:  Minimal grade 1 anterolisthesis of  L4 on L5. No evidence of aggressive osseous lesion or acute compression deformity.  Conus medullaris tip is at L1. The vertebral bodies demonstrate normal height and signal intensity. Moderate disc height loss at L5-S1, mild at L4-5.     T12-L1: No significant thecal sac or foraminal stenosis.     L1-2: No significant thecal sac or foraminal stenosis.     L2-3: No significant thecal sac or foraminal stenosis.     L3-4: No significant thecal sac or foraminal stenosis.  Mild to moderate bilateral facet arthropathy and ligamentum flavum thickening.     L4-5: Uncovering of the disc.  Moderate concentric disc bulge and severe bilateral facet arthropathy and ligamentum flavum thickening contribute to severe spinal canal stenosis measuring approximately 6 mm in AP dimension.  Moderate right and mild left neuroforaminal narrowing     L5-S1: Moderate concentric disc bulge with superimposed left paracentral disc protrusion displaces the descending left S1 nerve root.  Pronounced epidural fat contributes to severe spinal canal stenosis.  Small posterior annular fissure.     Paraspinal soft tissues are unremarkable.    X-ray right femur 02/03/2025:  No acute fracture or dislocation. Os acetabula noted.     X-ray right knee 02/03/2025:  There is mild-to-moderate joint space narrowing and minimal marginal osteophyte formation seen involving the medial compartment of the right knee. Mild degenerative change at the right patellofemoral compartment. No significant joint effusion. No acute fracture or dislocation.     Past Medical History:   Diagnosis Date    Actinic keratoses 10/14/2019    Aortic valve stenosis 12/16/2019    CARDIAC CATH 06/28/22  - The pre-procedure left ventricular end diastolic pressure was 32.  - There was trivial (1+) aortic regurgitation.  - The estimated blood loss was none.  - There was diastolic dysfunction.  - The coronary arteries were normal.  - The filling pressures on the right and left were moderately  elevated. Pulmonary hypertension was moderate.  - 53 MMHG GRADIENT ACROSS AORTIC VAKLV    Bilateral carpal tunnel syndrome 04/12/2021    Chronic bilateral low back pain without sciatica 04/12/2021    Depression     Morbid obesity with BMI of 40.0-44.9, adult 11/17/2017    LAVERNE (obstructive sleep apnea)     Prediabetes 04/12/2021    Seasonal allergic rhinitis due to pollen 10/14/2019    Ulnar neuropathy of both upper extremities 02/17/2020     Past Surgical History:   Procedure Laterality Date    A-V CARDIAC PACEMAKER INSERTION N/A 3/17/2023    Procedure: INSERTION, CARDIAC PACEMAKER, DUAL CHAMBER;  Surgeon: Royce Liao MD;  Location: [facility] EP LAB;  Service: Cardiology;  Laterality: N/A;  CHB, DUAL PPM, ANES, MDT, DM, CVICU [id number]    CARDIAC CATH COSURGEON N/A 3/16/2023    Procedure: Cardiac Cath Cosurgeon;  Surgeon: Mitchell Kingston MD;  Location: [facility] CATH LAB;  Service: Cardiovascular;  Laterality: N/A;    CATHETERIZATION OF BOTH LEFT AND RIGHT HEART N/A 6/28/2022    Procedure: CATHETERIZATION, HEART, BOTH LEFT AND RIGHT;  Surgeon: Carlotta Gordon MD;  Location: [facility] CATH LAB;  Service: Cardiology;  Laterality: N/A;    COLONOSCOPY N/A 5/31/2024    Procedure: COLONOSCOPY;  Surgeon: Renata Blue MD;  Location: [facility] ENDO;  Service: Endoscopy;  Laterality: N/A;    None      TRANSCATHETER AORTIC VALVE REPLACEMENT (TAVR) N/A 3/16/2023    Procedure: REPLACEMENT, AORTIC VALVE, TRANSCATHETER (TAVR);  Surgeon: Adan Greene MD;  Location: [facility] CATH LAB;  Service: Cardiology;  Laterality: N/A;     Social History[1]  Family History   Problem Relation Name Age of Onset    Diabetes Father      Diabetes Paternal Grandfather      No Known Problems Mother         Review of patient's allergies indicates:   Allergen Reactions    Olmesartan Other (See Comments)     Numbness and tingling in fingers and knee joint pain    Chlorthalidone Other (See Comments)     Patient states it made him depressed.        Current  Outpatient Medications   Medication Sig    albuterol (PROVENTIL/VENTOLIN HFA) 90 mcg/actuation inhaler INHALE 2 PUFFS BY MOUTH INTO THE LUNGS EVERY 4 HOURS AS NEEDED FOR WHEEZING RESCUE    aspirin (ECOTRIN) 81 MG EC tablet Take 81 mg by mouth once daily.    atorvastatin (LIPITOR) 20 MG tablet Take 1 tablet (20 mg total) by mouth every evening.    clindamycin (CLEOCIN T) 1 % external solution Apply topically 2 (two) times daily. For pustules/bumps in scalp    clindamycin (CLEOCIN) 300 MG capsule Take 1 capsule (300 mg total) by mouth every 8 (eight) hours.    doxycycline (MONODOX) 100 MG capsule Take bid with food. May cause upset stomach.    fluorouraciL (EFUDEX) 5 % cream AAA of the face, scalp and ears bid x 2 weeks    fluticasone furoate-vilanteroL (BREO ELLIPTA) 200-25 mcg/dose DsDv diskus inhaler Inhale 1 puff into the lungs once daily. Controller    furosemide (LASIX) 20 MG tablet TAKE 1 TABLET BY MOUTH EVERY DAY    hydroCHLOROthiazide (HYDRODIURIL) 25 MG tablet Take 1 tablet (25 mg total) by mouth once daily.    irbesartan (AVAPRO) 300 MG tablet Take 1 tablet (300 mg total) by mouth every evening.    metoprolol succinate (TOPROL-XL) 50 MG 24 hr tablet Take 1 tablet (50 mg total) by mouth once daily.    mometasone (NASONEX) 50 mcg/actuation nasal spray INSTILL 2 SPRAYS INTO EACH NOSTRIL ONCE DAILY.    sodium chloride (OCEAN) 0.65 % nasal spray 1 spray by Nasal route as needed for Congestion.    tirbanibulin (KLISYRI) 1 % OiPk Apply one packet daily for 5 days.    tirzepatide, weight loss, 5 mg/0.5 mL Soln Inject 0.5 mLs (5 mg total) into the skin every 7 days.    tirzepatide, weight loss, 7.5 mg/0.5 mL PnIj Inject 7.5 mg into the skin every 7 days.    tretinoin (RETIN-A) 0.05 % cream APPLY PEA SIZED AMOUNT TO ENTIRE FACE AT BEDTIME. IF DRYNESS,USE EVERY 3RD NIGHT AND INCREASE AS TOLERATED TO EVERY NIGHT     No current facility-administered medications for this visit.       Review of Systems     GENERAL:  No  "weight loss, malaise or fevers.  HEENT:   No recent changes in vision or hearing  NECK:  Negative for lumps, no difficulty with swallowing.  RESPIRATORY:  Negative for cough, wheezing or shortness of breath, patient denies any recent URI.  CARDIOVASCULAR:  Negative for chest pain, leg swelling or palpitations.  GI:  Negative for abdominal discomfort, blood in stools or black stools or change in bowel habits.  MUSCULOSKELETAL:  See HPI.  SKIN:  Negative for lesions, rash, and itching.  PSYCH:  No mood disorder or recent psychosocial stressors.  Patients sleep is not disturbed secondary to pain.  HEMATOLOGY/LYMPHOLOGY:  Negative for prolonged bleeding, bruising easily or swollen nodes.  Patient is currently taking anti-coagulants  NEURO:   No history of headaches, syncope, paralysis, seizures or tremors.  All other reviewed and negative other than HPI.    OBJECTIVE:    /68   Pulse 73   Ht 6' 1" (1.854 m)   Wt (!) 154.6 kg (340 lb 15.1 oz)   BMI 44.98 kg/m²         Physical Exam    GENERAL: Well appearing, in no acute distress, alert and oriented x3.  Obese.  PSYCH:  Mood and affect appropriate.  SKIN: Skin color, texture, turgor normal, no rashes or lesions.  HEAD/FACE:  Normocephalic, atraumatic. Cranial nerves grossly intact.  CV: RRR with palpation of the radial artery.  PULM: No evidence of respiratory difficulty, symmetric chest rise.  GI:  Soft and non-tender.  BACK: Straight leg raising in the sitting and supine positions is positive to radicular pain on the right.  Minimal pain to palpation over the facet joints of the lumbar spine and lumbar paraspinals.  Limited range of motion secondary to pain reproduction. Directional preference:  Flexion  EXTREMITIES:No deformities, edema, or skin discoloration. Good capillary refill.  MUSCULOSKELETAL:  Hip and knee provocative maneuvers are unremarkable.  There is minimal pain with palpation over the sacroiliac joints bilaterally.  FABERs test is negative.  " FADIRs test is negative.   Bilateral upper and lower extremity strength is normal and symmetric.  No atrophy or tone abnormalities are noted.  NEURO: Bilateral upper and lower extremity coordination and muscle stretch reflexes are physiologic and symmetric.  Plantar response are downgoing. No clonus.  No loss of sensation is noted.  GAIT: normal.      LABS:  Lab Results   Component Value Date    WBC 7.67 06/13/2024    HGB 12.4 (L) 06/13/2024    HCT 38.1 (L) 06/13/2024     (H) 06/13/2024     06/13/2024       CMP  Sodium   Date Value Ref Range Status   01/27/2025 137 136 - 145 mmol/L Final     Potassium   Date Value Ref Range Status   01/27/2025 4.6 3.5 - 5.1 mmol/L Final     Chloride   Date Value Ref Range Status   01/27/2025 102 95 - 110 mmol/L Final     CO2   Date Value Ref Range Status   01/27/2025 26 23 - 29 mmol/L Final     Glucose   Date Value Ref Range Status   01/27/2025 145 (H) 70 - 110 mg/dL Final     BUN   Date Value Ref Range Status   01/27/2025 17 8 - 23 mg/dL Final     Creatinine   Date Value Ref Range Status   01/27/2025 1.0 0.5 - 1.4 mg/dL Final     Calcium   Date Value Ref Range Status   01/27/2025 9.6 8.7 - 10.5 mg/dL Final     Total Protein   Date Value Ref Range Status   01/27/2025 7.6 6.0 - 8.4 g/dL Final     Albumin   Date Value Ref Range Status   01/27/2025 3.8 3.5 - 5.2 g/dL Final     Total Bilirubin   Date Value Ref Range Status   01/27/2025 1.1 (H) 0.1 - 1.0 mg/dL Final     Comment:     For infants and newborns, interpretation of results should be based  on gestational age, weight and in agreement with clinical  observations.    Premature Infant recommended reference ranges:  Up to 24 hours.............<8.0 mg/dL  Up to 48 hours............<12.0 mg/dL  3-5 days..................<15.0 mg/dL  6-29 days.................<15.0 mg/dL       Alkaline Phosphatase   Date Value Ref Range Status   01/27/2025 86 40 - 150 U/L Final     AST   Date Value Ref Range Status   01/27/2025 23 10 -  40 U/L Final     ALT   Date Value Ref Range Status   01/27/2025 25 10 - 44 U/L Final     Anion Gap   Date Value Ref Range Status   01/27/2025 9 8 - 16 mmol/L Final     eGFR if    Date Value Ref Range Status   06/20/2022 >60.0 >60 mL/min/1.73 m^2 Final     eGFR if non    Date Value Ref Range Status   06/20/2022 >60.0 >60 mL/min/1.73 m^2 Final     Comment:     Calculation used to obtain the estimated glomerular filtration  rate (eGFR) is the CKD-EPI equation.          Lab Results   Component Value Date    HGBA1C 5.9 (H) 01/27/2025             ASSESSMENT: 59 y.o. year old male with right lower extremity pain, consistent with     1. Leg pain, lateral, right  Ambulatory referral/consult to Pain Clinic    Case Request-RAD/Other Procedure Area: Right L4-5 and L5-S1 TFESI      2. Lumbar radiculopathy  Ambulatory referral/consult to Pain Clinic    Case Request-RAD/Other Procedure Area: Right L4-5 and L5-S1 TFESI            PLAN:   - Interventions: Scheduled for right-sided L4-5 and L5-S1 TFESI for lumbar radicular pain that has failed conservative treatments.. Explained the risks and benefits of the procedure in detail with the patient today in clinic along with alternative treatment options, and the patient elected to pursue the intervention at this time.      - Anticoagulation use: Patient is taking ASA as 2° prevention; no need to stop for lumbar RADHA    - Medications: I have stressed the importance of physical activity and a home exercise plan to help with pain and improve health. and Patient can continue with medications for now since they are providing benefits, using them appropriately, and without side effects.           - Therapy:  Advised patient continue with activities as tolerated    - Psychological:  Discussed coping mechanisms to help address chronic pain issues    - Labs:  Reviewed    - Imaging: Reviewed available imaging with patient and answered any questions they had  regarding study.    - Consults/Referrals:  None at this time    - Records:  Reviewed/Obtain old records from outside physicians and imaging    - Follow up visit: return to clinic in 4-6 weeks post procedure or as needed    - Counseled patient regarding the importance of activity modification and physical therapy    - This condition does not require this patient to take time off of work, and the primary goal of our Pain Management services is to improve the patient's functional capacity.    - Patient Questions: Answered all of the patient's questions regarding diagnosis, therapy, and treatment        The above plan and management options were discussed at length with patient. Patient is in agreement with the above and verbalized understanding.    I discussed the goals of interventional chronic pain management with the patient on today's visit.  I explained the utility of injections for diagnostic and therapeutic purposes.  We discussed a multimodal approach to pain including treating the patient's given worst pain at any given time.  We will use a systematic approach to addressing pain.  We will also adopt a multimodal approach that includes injections, adjuvant medications, physical therapy, at times psychiatry.  There may be a limited role for opioid use intermittently in the treatment of pain, more particularly for acute pain although no one approach can be used as a sole treatment modality.    I emphasized the importance of regular exercise, core strengthening and stretching, diet and weight loss as a cornerstone of long-term pain management.      Galileo Gan MD  Interventional Pain Management  [facility]    Disclaimer:  This note was prepared using voice recognition system and is likely to have sound alike errors that may have been overlooked even after proof reading.  Please call me with any questions         [1]   Social History  Socioeconomic History    Marital status:    Tobacco Use     Smoking status: Never    Smokeless tobacco: Former     Types: Snuff     Quit date: 2003    Tobacco comments:     quit 15 years ago    Substance and Sexual Activity    Alcohol use: Yes     Comment: rarely    Drug use: No    Sexual activity: Yes     Partners: Female   Social History Narrative    No smokers in household, 3 dogs.     Social Drivers of Health     Financial Resource Strain: Low Risk  (11/20/2023)    Overall Financial Resource Strain (CARDIA)     Difficulty of Paying Living Expenses: Not hard at all   Food Insecurity: No Food Insecurity (11/20/2023)    Hunger Vital Sign     Worried About Running Out of Food in the Last Year: Never true     Ran Out of Food in the Last Year: Never true   Transportation Needs: No Transportation Needs (11/20/2023)    PRAPARE - Transportation     Lack of Transportation (Medical): No     Lack of Transportation (Non-Medical): No   Physical Activity: Insufficiently Active (11/20/2023)    Exercise Vital Sign     Days of Exercise per Week: 2 days     Minutes of Exercise per Session: 30 min   Stress: Stress Concern Present (11/20/2023)    [employer] - Occupational Stress Questionnaire     Feeling of Stress : To some extent   Housing Stability: Low Risk  (11/20/2023)    Housing Stability Vital Sign     Unable to Pay for Housing in the Last Year: No     Number of Places Lived in the Last Year: 1     Unstable Housing in the Last Year: No

## 2025-05-12 ENCOUNTER — OFFICE VISIT (OUTPATIENT)
Dept: DERMATOLOGY | Facility: CLINIC | Age: 60
End: 2025-05-12
Payer: COMMERCIAL

## 2025-05-12 DIAGNOSIS — Z12.83 SCREENING, MALIGNANT NEOPLASM, SKIN: ICD-10-CM

## 2025-05-12 DIAGNOSIS — L57.0 ACTINIC KERATOSES: ICD-10-CM

## 2025-05-12 DIAGNOSIS — Z85.828 HISTORY OF SKIN CANCER: ICD-10-CM

## 2025-05-12 DIAGNOSIS — L90.5 SCAR CONDITIONS/SKIN FIBROSIS: Primary | ICD-10-CM

## 2025-05-12 PROCEDURE — 1159F MED LIST DOCD IN RCRD: CPT | Mod: CPTII,S$GLB,, | Performed by: DERMATOLOGY

## 2025-05-12 PROCEDURE — 1160F RVW MEDS BY RX/DR IN RCRD: CPT | Mod: CPTII,S$GLB,, | Performed by: DERMATOLOGY

## 2025-05-12 PROCEDURE — 3044F HG A1C LEVEL LT 7.0%: CPT | Mod: CPTII,S$GLB,, | Performed by: DERMATOLOGY

## 2025-05-12 PROCEDURE — 99213 OFFICE O/P EST LOW 20 MIN: CPT | Mod: 25,S$GLB,, | Performed by: DERMATOLOGY

## 2025-05-12 PROCEDURE — 99999 PR PBB SHADOW E&M-EST. PATIENT-LVL III: CPT | Mod: PBBFAC,,, | Performed by: DERMATOLOGY

## 2025-05-12 PROCEDURE — 17004 DESTROY PREMAL LESIONS 15/>: CPT | Mod: S$GLB,,, | Performed by: DERMATOLOGY

## 2025-05-12 PROCEDURE — 4010F ACE/ARB THERAPY RXD/TAKEN: CPT | Mod: CPTII,S$GLB,, | Performed by: DERMATOLOGY

## 2025-05-12 NOTE — PROGRESS NOTES
Subjective:      Patient ID:  Dragan Man II is a 59 y.o. male who presents for   Chief Complaint   Patient presents with    Skin Check     UBSE. Spot on left nostril, right cheek, and corner left eye.       Hx of SCC of the left pre-auricular (s/p Mohs 3/12/24), SCC of the right superior brow (s/p Mohs on 8-9/2020?), SCCIS of the right nasal sidewall (s/p Mohs 1/13/20), SCC of the left temple (s/p Mohs on 5/29/20), SCCIS of the left medial cheek (s/p Mohs on 5/29/20), SCCIS of the right lower eyelid (s/p Mohs on 6/1/20?), last seen on 1/30/25.  He is status post class Klysiri for 5 days with improvement in several precancers of the forehead.  He complains of mildly irritated lesion of right cheek status post cryotherapy at last visit.        Denies bleeding, tender, growing, or concerning lesions.      This is a high risk patient here to check for the development of new lesions.     For rosacea, he has tried doxy 100 mg qD.         Review of Systems   Constitutional:  Negative for fever and chills.   Gastrointestinal:  Negative for nausea and vomiting.   Skin:  Positive for activity-related sunscreen use. Negative for daily sunscreen use and recent sunburn.   Hematologic/Lymphatic: Does not bruise/bleed easily.       Objective:   Physical Exam   Constitutional: He appears well-developed and well-nourished. No distress.   Neurological: He is alert and oriented to person, place, and time. He is not disoriented.   Psychiatric: He has a normal mood and affect.   Skin:   Areas Examined (abnormalities noted in diagram):   Scalp / Hair Palpated and Inspected  Head / Face Inspection Performed  Neck Inspection Performed  Chest / Axilla Inspection Performed  Abdomen Inspection Performed  Back Inspection Performed  RUE Inspected  LUE Inspection Performed  RLE Inspected  LLE Inspection Performed  Nails and Digits Inspection Performed                 Diagram Legend     Erythematous scaling macule/papule c/w actinic  keratosis       Vascular papule c/w angioma      Pigmented verrucoid papule/plaque c/w seborrheic keratosis      Yellow umbilicated papule c/w sebaceous hyperplasia      Irregularly shaped tan macule c/w lentigo     1-2 mm smooth white papules consistent with Milia      Movable subcutaneous cyst with punctum c/w epidermal inclusion cyst      Subcutaneous movable cyst c/w pilar cyst      Firm pink to brown papule c/w dermatofibroma      Pedunculated fleshy papule(s) c/w skin tag(s)      Evenly pigmented macule c/w junctional nevus     Mildly variegated pigmented, slightly irregular-bordered macule c/w mildly atypical nevus      Flesh colored to evenly pigmented papule c/w intradermal nevus       Pink pearly papule/plaque c/w basal cell carcinoma      Erythematous hyperkeratotic cursted plaque c/w SCC      Surgical scar with no sign of skin cancer recurrence      Open and closed comedones      Inflammatory papules and pustules      Verrucoid papule consistent consistent with wart     Erythematous eczematous patches and plaques     Dystrophic onycholytic nail with subungual debris c/w onychomycosis     Umbilicated papule    Erythematous-base heme-crusted tan verrucoid plaque consistent with inflamed seborrheic keratosis     Erythematous Silvery Scaling Plaque c/w Psoriasis     See annotation      Assessment / Plan:        Scar conditions/skin fibrosis  Screening, malignant neoplasm, skin  History of skin cancer  Scar of the numerous sites, hx of NMSC.  No evidence of recurrence on physical exam today.  Continue routine skin surveillance. Daily sunscreen advised.    Area(s) of previous NMSC evaluated with no signs of recurrence.    Upper body skin examination performed today including at least 6 points as noted in physical examination. No lesions suspicious for malignancy noted.      Actinic keratoses  Cryosurgery Procedure Note    The patient is informed of the precancerous quality and need for treatment of these  lesions. After risks, benefits and alternatives explained, including blistering, pain, hyper- and hypopigmentation, patient verbally consents to cryotherapy to precancerous lesions. Liquid nitrogen cryosurgery is applied to the 17 actinic keratoses, as detailed in the physical exam, to produce a freeze injury. The patient is aware that blisters may form and is instructed on wound care with gentle cleansing and use of vaseline ointment to keep moist until healed. The patient is supplied a handout on cryosurgery and is instructed to call if lesions do not completely resolve.             Follow up in about 6 months (around 11/12/2025).

## 2025-05-12 NOTE — PATIENT INSTRUCTIONS

## 2025-05-28 ENCOUNTER — PATIENT MESSAGE (OUTPATIENT)
Dept: PAIN MEDICINE | Facility: HOSPITAL | Age: 60
End: 2025-05-28
Payer: COMMERCIAL

## 2025-05-28 NOTE — PRE-PROCEDURE INSTRUCTIONS
Spoke with patient regarding procedure scheduled on 6.12     Arrival time 0615     Has patient been sick with fever or on antibiotics within the last 7 days? No     Does the patient have any open wounds, sores or rashes? No     Does the patient have any recent fractures? no     Has patient received a vaccination within the last 7 days? No     Received the COVID vaccination? yes     Has the patient stopped all medications as directed? na     Does patient have a pacemaker, defibrillator, or implantable stimulator? PACEMAKER     Does the patient have a ride to and from procedure and someone reliable to remain with patient?  WIFE     Is the patient diabetic? PRE      Does the patient have sleep apnea? Or use O2 at home? LAVERNE ON CPAP      Is the patient receiving sedation? Yes      Is the patient instructed to remain NPO beginning at midnight the night before their procedure? yes     Procedure location confirmed with patient? Yes     Covid- Denies signs/symptoms. Instructed to notify PAT/MD if any changes.

## 2025-06-04 DIAGNOSIS — R01.1 HEART MURMUR: Primary | ICD-10-CM

## 2025-06-04 DIAGNOSIS — I10 PRIMARY HYPERTENSION: Chronic | ICD-10-CM

## 2025-06-05 ENCOUNTER — CLINICAL SUPPORT (OUTPATIENT)
Dept: CARDIOLOGY | Facility: HOSPITAL | Age: 60
End: 2025-06-05
Attending: INTERNAL MEDICINE
Payer: COMMERCIAL

## 2025-06-05 ENCOUNTER — OFFICE VISIT (OUTPATIENT)
Dept: CARDIOLOGY | Facility: CLINIC | Age: 60
End: 2025-06-05
Payer: COMMERCIAL

## 2025-06-05 ENCOUNTER — HOSPITAL ENCOUNTER (OUTPATIENT)
Dept: CARDIOLOGY | Facility: HOSPITAL | Age: 60
Discharge: HOME | End: 2025-06-05
Attending: INTERNAL MEDICINE
Payer: COMMERCIAL

## 2025-06-05 VITALS
WEIGHT: 315 LBS | SYSTOLIC BLOOD PRESSURE: 112 MMHG | BODY MASS INDEX: 41.75 KG/M2 | HEIGHT: 73 IN | OXYGEN SATURATION: 98 % | DIASTOLIC BLOOD PRESSURE: 70 MMHG | HEART RATE: 62 BPM

## 2025-06-05 DIAGNOSIS — G47.33 OBSTRUCTIVE SLEEP APNEA TREATED WITH CONTINUOUS POSITIVE AIRWAY PRESSURE (CPAP): Chronic | ICD-10-CM

## 2025-06-05 DIAGNOSIS — I10 PRIMARY HYPERTENSION: Chronic | ICD-10-CM

## 2025-06-05 DIAGNOSIS — Z91.89 AT RISK FOR CARDIOVASCULAR EVENT: ICD-10-CM

## 2025-06-05 DIAGNOSIS — Z79.899 ON STATIN THERAPY DUE TO RISK OF FUTURE CARDIOVASCULAR EVENT: Chronic | ICD-10-CM

## 2025-06-05 DIAGNOSIS — I50.32 CHRONIC DIASTOLIC CONGESTIVE HEART FAILURE: Chronic | ICD-10-CM

## 2025-06-05 DIAGNOSIS — E88.82: ICD-10-CM

## 2025-06-05 DIAGNOSIS — Z95.0 CARDIAC PACEMAKER IN SITU: ICD-10-CM

## 2025-06-05 DIAGNOSIS — I51.7 LEFT ATRIAL ENLARGEMENT: ICD-10-CM

## 2025-06-05 DIAGNOSIS — R01.1 HEART MURMUR: ICD-10-CM

## 2025-06-05 DIAGNOSIS — R06.09 DOE (DYSPNEA ON EXERTION): ICD-10-CM

## 2025-06-05 DIAGNOSIS — Z95.0 PACEMAKER: Chronic | ICD-10-CM

## 2025-06-05 DIAGNOSIS — I44.2 COMPLETE HEART BLOCK: ICD-10-CM

## 2025-06-05 DIAGNOSIS — I70.0 AORTIC ATHEROSCLEROSIS: Chronic | ICD-10-CM

## 2025-06-05 DIAGNOSIS — E66.813: ICD-10-CM

## 2025-06-05 DIAGNOSIS — Z15.2: ICD-10-CM

## 2025-06-05 DIAGNOSIS — Z95.3 S/P TAVR (TRANSCATHETER AORTIC VALVE REPLACEMENT): Primary | Chronic | ICD-10-CM

## 2025-06-05 DIAGNOSIS — I73.9 PVD (PERIPHERAL VASCULAR DISEASE): Chronic | ICD-10-CM

## 2025-06-05 DIAGNOSIS — I27.20 PULMONARY HYPERTENSION: Chronic | ICD-10-CM

## 2025-06-05 LAB
OHS QRS DURATION: 180 MS
OHS QTC CALCULATION: 462 MS

## 2025-06-05 PROCEDURE — 93005 ELECTROCARDIOGRAM TRACING: CPT

## 2025-06-05 PROCEDURE — 3008F BODY MASS INDEX DOCD: CPT | Mod: CPTII,S$GLB,, | Performed by: INTERNAL MEDICINE

## 2025-06-05 PROCEDURE — 3044F HG A1C LEVEL LT 7.0%: CPT | Mod: CPTII,S$GLB,, | Performed by: INTERNAL MEDICINE

## 2025-06-05 PROCEDURE — 99999 PR PBB SHADOW E&M-EST. PATIENT-LVL II: CPT | Mod: PBBFAC,,,

## 2025-06-05 PROCEDURE — 1159F MED LIST DOCD IN RCRD: CPT | Mod: CPTII,S$GLB,, | Performed by: INTERNAL MEDICINE

## 2025-06-05 PROCEDURE — 3074F SYST BP LT 130 MM HG: CPT | Mod: CPTII,S$GLB,, | Performed by: INTERNAL MEDICINE

## 2025-06-05 PROCEDURE — 93010 ELECTROCARDIOGRAM REPORT: CPT | Mod: ,,, | Performed by: INTERNAL MEDICINE

## 2025-06-05 PROCEDURE — 4010F ACE/ARB THERAPY RXD/TAKEN: CPT | Mod: CPTII,S$GLB,, | Performed by: INTERNAL MEDICINE

## 2025-06-05 PROCEDURE — 1160F RVW MEDS BY RX/DR IN RCRD: CPT | Mod: CPTII,S$GLB,, | Performed by: INTERNAL MEDICINE

## 2025-06-05 PROCEDURE — 93280 PM DEVICE PROGR EVAL DUAL: CPT

## 2025-06-05 PROCEDURE — 99999 PR PBB SHADOW E&M-EST. PATIENT-LVL III: CPT | Mod: PBBFAC,,, | Performed by: INTERNAL MEDICINE

## 2025-06-05 PROCEDURE — 99214 OFFICE O/P EST MOD 30 MIN: CPT | Mod: S$GLB,,, | Performed by: INTERNAL MEDICINE

## 2025-06-05 PROCEDURE — 3078F DIAST BP <80 MM HG: CPT | Mod: CPTII,S$GLB,, | Performed by: INTERNAL MEDICINE

## 2025-06-05 RX ORDER — TIRZEPATIDE 2.5 MG/.5ML
2.5 INJECTION, SOLUTION SUBCUTANEOUS
Qty: 2 ML | Refills: 3 | Status: ON HOLD | OUTPATIENT
Start: 2025-06-05 | End: 2025-06-12 | Stop reason: HOSPADM

## 2025-06-05 NOTE — PROGRESS NOTES
Subjective:   Patient ID:  Dragan Man II is a 59 y.o. male who presents for follow up of No chief complaint on file.      58 yo. Male, f/u for 6 months f/u. Selling the beer at the store,   [facility] severe AS s/p 29 mm Evolute TAVR s/p PPM, HTN, LAVERNE on CPAP, obesity and Von Willebrand diseasey. No Dx of heart attack, DM,stroke and cancer. No smoking/drinking. No kids No f/h premature CAD  06/2022 LHC/RHC  · The coronary arteries were normal..  · The filling pressures on the right and left were moderately elevated. Pulmonary hypertension was moderate.  · 53 MMHG GRADIENT ACROSS AORTIC VAKLVE SUGGESTIVE OF SEVERE AORTIC STENOSIS.   Treadmill stress ekg showed no stress induced EKG change. Peak  mmHG   ECHO showed normal EF, severe LAE, and mild to mod AS    In 2012, had episode of syncope when lifted up heavy stuff.  2012. MPI showed no ischemia and echo showed normal EF, dilated RV. Mild LAE and   Enrolled in HTN digit program    03/2021 echo normal EF and mod to severe AS. Aortic valve area is 1.25 cm2; peak velocity is 4.55 m/s; mean gradient is 50 mmHg.  No f/h valve disease and SCD   BNP and Cr wnl     visit  S/p CYNTHIA done in  revealing calcified tricuspid AV, mode AS and mild AI. Mild sore throat. No chest pain and dizziness  C/o hands and leg swelling   Repeat echo in  Aortic valve area is 1.22 cm2; peak velocity is 4.61 m/s; mean gradient is 52 mmHg.  Chronic SOB and partially improved after breathing Rx. Severely obese  No faint chest pain and palpitation. ekg NSR and no acute stt change    04/23 visit  S/p TAVR on 03/15/2023 at [facility] by Dr. Prather.   S/p PPM 2 days after TAVR  04/20/2023 echo  · The left ventricle is normal in size with moderate concentric hypertrophy and normal systolic function.  · Mild left atrial enlargement.  · The estimated ejection fraction is 60%.  · Grade II left ventricular diastolic dysfunction.  · There is abnormal septal wall motion  consistent with right ventricular pacemaker.  · Normal right ventricular size with normal right ventricular systolic function.  · There is a 29 mm evolute transcutaneously-placed aortic bioprosthesis present. Well seated  · The aortic valve mean gradient is 16 mmHg with a dimensionless index of 0.57.  · Normal central venous pressure (3 mmHg).  · The estimated PA systolic pressure is 34 mmHg.  Gained 20 lbs again after the surgery. No interrupt of ozempic brigette op  C/o worsening SOB dizziness. No syncope  Seeing the white spot post op.    08/23 visit   Energy and activity much better after TAVR procedure. More activity and less SOB  Off Ozempic and gained the weight. Will go back obesity clinic  Want to switch to [facility] ppm clinic    02/24 visit  Weight gain again after cut back GLP-1 RA Rx.  SOB worse with smoking smell.   No chest pain dizziness  In 01/24, NUKE stress test normal, ECHO showed EF nl LVH s/p TAVr, NT BNP 38.    06/24 visit  Physical work. And PFT restriction physiology. On breathing Rx prn  NT BNP 36 in 01/24. No orthopnea PND, chest pain leg swelling  DJD pain  02/24 MPI no ischemia and echo showed EF nml. S/p TAVR gradient 14 mmHg    12/24 visit  Leg weakness and pain from the hip to knee and calf along lateral sides.   EKG reviewed by myself today reveals AV pacing  LDL 70 and BP A1c c  No chest pain dizziness   High copay for GLP-1i    Interval history  12/24 LE arterial uS mild PAD, normal ABIs                  History of Present Illness    CHIEF COMPLAINT:  - Dragan presents for follow-up to review pacemaker function, discuss ongoing fluid retention issues, and address concerns about leg cramping and arthritis symptoms.    HPI:  - Reports ongoing issues with fluid retention despite taking furosemide (Lasix) and HCTZ  - Describes feeling constricted due to fluid buildup  - Complains of leg cramping  - Reports pain in hands and fingers, attributed to arthritis  - Has not yet seen a rheumatologist  for symptoms  - Scheduled to see a doctor for back pain next week, followed by an appointment with pain management  - Has been using a CPAP for sleep apnea  - Has a history of AVR  - Notes intermittent edema in one leg  - Uses compression socks to manage edema but did not wear them on the day of the visit  - Denies any hospital admissions in the past 6 months  - Denies dizziness or near-syncope    CARDIAC HISTORY:  - EKG ([id number]:[id number]): A sensing, V pacing  - AVR  - Doppler 2023: no significant stenosis  - MONICO normal  - Pacemaker: functioning well, 9.9 years battery life remaining    MEDICATIONS:  - Aspirin 81 mg daily  - Atorvastatin 20 mg daily for cholesterol  - Furosemide (Lasix) 20 mg daily for fluid retention  - HCTZ 25 mg daily for fluid retention/HTN  - Irbesartan for HTN  - Metoprolol for HTN    TEST RESULTS:  - A1C: 5.9  - Glucose: Good control  - LDL: 78  - Cholesterol: Controlled  - Hemoglobin: Borderline anemic    IMAGING:  - Vessel imagin, all vessel levels measured, results good    MEDICAL HISTORY:  - Sleep apnea  - Obesity  - HTN  - HLD  - Anemia: borderline          Past Medical History:   Diagnosis Date    Actinic keratoses 10/14/2019    Aortic valve stenosis 2019    CARDIAC CATH 22  - The pre-procedure left ventricular end diastolic pressure was 32.  - There was trivial (1+) aortic regurgitation.  - The estimated blood loss was none.  - There was diastolic dysfunction.  - The coronary arteries were normal.  - The filling pressures on the right and left were moderately elevated. Pulmonary hypertension was moderate.  - 53 MMHG GRADIENT ACROSS AORTIC VAKLV    Bilateral carpal tunnel syndrome 2021    Chronic bilateral low back pain without sciatica 2021    Depression     Morbid obesity with BMI of 40.0-44.9, adult 2017    LAVERNE (obstructive sleep apnea)     Prediabetes 2021    Seasonal allergic rhinitis due to pollen 10/14/2019    Ulnar  neuropathy of both upper extremities 02/17/2020       Past Surgical History:   Procedure Laterality Date    A-V CARDIAC PACEMAKER INSERTION N/A 3/17/2023    Procedure: INSERTION, CARDIAC PACEMAKER, DUAL CHAMBER;  Surgeon: Royce Liao MD;  Location: [facility] EP LAB;  Service: Cardiology;  Laterality: N/A;  CHB, DUAL PPM, ANES, MDT, DM, CVICU [id number]    CARDIAC CATH COSURGEON N/A 3/16/2023    Procedure: Cardiac Cath Cosurgeon;  Surgeon: Mitchell Kingston MD;  Location: [facility] CATH LAB;  Service: Cardiovascular;  Laterality: N/A;    CATHETERIZATION OF BOTH LEFT AND RIGHT HEART N/A 6/28/2022    Procedure: CATHETERIZATION, HEART, BOTH LEFT AND RIGHT;  Surgeon: Carlotta Gordon MD;  Location: [facility] CATH LAB;  Service: Cardiology;  Laterality: N/A;    COLONOSCOPY N/A 5/31/2024    Procedure: COLONOSCOPY;  Surgeon: Renata Blue MD;  Location: [facility] ENDO;  Service: Endoscopy;  Laterality: N/A;    None      TRANSCATHETER AORTIC VALVE REPLACEMENT (TAVR) N/A 3/16/2023    Procedure: REPLACEMENT, AORTIC VALVE, TRANSCATHETER (TAVR);  Surgeon: Adan Greene MD;  Location: [facility] CATH LAB;  Service: Cardiology;  Laterality: N/A;       Social History[1]    Family History   Problem Relation Name Age of Onset    Diabetes Father      Diabetes Paternal Grandfather      No Known Problems Mother           ROS    Objective:   Physical Exam  HENT:      Head: Normocephalic.   Eyes:      Pupils: Pupils are equal, round, and reactive to light.   Neck:      Thyroid: No thyromegaly.      Vascular: Normal carotid pulses. No carotid bruit or JVD.   Cardiovascular:      Rate and Rhythm: Normal rate and regular rhythm. No extrasystoles are present.     Chest Wall: PMI is not displaced.      Pulses: Normal pulses.           Carotid pulses are 2+ on the right side and 2+ on the left side.     Heart sounds: Murmur (ESM 3/6 on bases and along LSB. up to the neck) heard.      No gallop. No S3 sounds.   Pulmonary:      Effort: No respiratory  distress.      Breath sounds: Normal breath sounds. No stridor.   Chest:      Comments: S/p PPM pocket on left chest healing well  Abdominal:      General: Bowel sounds are normal.      Palpations: Abdomen is soft.      Tenderness: There is no abdominal tenderness. There is no rebound.   Musculoskeletal:         General: Normal range of motion.   Skin:     Findings: No rash.   Neurological:      Mental Status: He is alert and oriented to person, place, and time.   Psychiatric:         Behavior: Behavior normal.         Lab Results   Component Value Date    CHOL 134 05/30/2024    CHOL 110 (L) 08/15/2022    CHOL 135 04/06/2021     Lab Results   Component Value Date    HDL 40 05/30/2024    HDL 40 08/15/2022    HDL 41 04/06/2021     Lab Results   Component Value Date    LDLCALC 78.4 05/30/2024    LDLCALC 54.4 (L) 08/15/2022    LDLCALC 70.8 04/06/2021     Lab Results   Component Value Date    TRIG 78 05/30/2024    TRIG 78 08/15/2022    TRIG 116 04/06/2021     Lab Results   Component Value Date    CHOLHDL 29.9 05/30/2024    CHOLHDL 36.4 08/15/2022    CHOLHDL 30.4 04/06/2021       Chemistry        Component Value Date/Time     01/27/2025 0903    K 4.6 01/27/2025 0903     01/27/2025 0903    CO2 26 01/27/2025 0903    BUN 17 01/27/2025 0903    CREATININE 1.0 01/27/2025 0903     (H) 01/27/2025 0903        Component Value Date/Time    CALCIUM 9.6 01/27/2025 0903    ALKPHOS 86 01/27/2025 0903    AST 23 01/27/2025 0903    ALT 25 01/27/2025 0903    BILITOT 1.1 (H) 01/27/2025 0903    ESTGFRAFRICA >60.0 06/20/2022 1635    EGFRNONAA >60.0 06/20/2022 1635          Lab Results   Component Value Date    HGBA1C 5.9 (H) 01/27/2025     Lab Results   Component Value Date    TSH 2.059 03/01/2023     Lab Results   Component Value Date    INR 1.0 03/16/2023    INR 1.0 02/09/2023    INR 1.1 06/20/2022     Lab Results   Component Value Date    WBC 7.67 06/13/2024    HGB 12.4 (L) 06/13/2024    HCT 38.1 (L) 06/13/2024      (H) 06/13/2024     06/13/2024     BMP  Sodium   Date Value Ref Range Status   01/27/2025 137 136 - 145 mmol/L Final     Potassium   Date Value Ref Range Status   01/27/2025 4.6 3.5 - 5.1 mmol/L Final     Chloride   Date Value Ref Range Status   01/27/2025 102 95 - 110 mmol/L Final     CO2   Date Value Ref Range Status   01/27/2025 26 23 - 29 mmol/L Final     BUN   Date Value Ref Range Status   01/27/2025 17 8 - 23 mg/dL Final     Creatinine   Date Value Ref Range Status   01/27/2025 1.0 0.5 - 1.4 mg/dL Final     Calcium   Date Value Ref Range Status   01/27/2025 9.6 8.7 - 10.5 mg/dL Final     Anion Gap   Date Value Ref Range Status   01/27/2025 9 8 - 16 mmol/L Final     eGFR if    Date Value Ref Range Status   06/20/2022 >60.0 >60 mL/min/1.73 m^2 Final     eGFR if non    Date Value Ref Range Status   06/20/2022 >60.0 >60 mL/min/1.73 m^2 Final     Comment:     Calculation used to obtain the estimated glomerular filtration  rate (eGFR) is the CKD-EPI equation.        BNP  @LABRCNTIP(BNP,BNPTRIAGEBLO)@  @LABRCNTIP(troponini)@  CrCl cannot be calculated (Patient's most recent lab result is older than the maximum 7 days allowed.).  No results found in the last 24 hours.  No results found in the last 24 hours.  No results found in the last 24 hours.    Assessment:      1. S/P TAVR (transcatheter aortic valve replacement)    2. Complete heart block    3. PVD (peripheral vascular disease)    4. Pulmonary hypertension    5. Primary hypertension    6. Pacemaker    7. On statin therapy due to risk of future cardiovascular event    8. Left atrial enlargement    9. ROLLINS (dyspnea on exertion)    10. Chronic diastolic congestive heart failure    11. At risk for cardiovascular event    12. Aortic atherosclerosis    13. Obstructive sleep apnea treated with continuous positive airway pressure (CPAP)    14. 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  - Blood pressure well-managed at 112/70, heart rate 62 bpm.  - Potential osteoarthritis as cause of tightness, pending rheumatology evaluation.  - Ruled out significant vascular disease for leg cramping based on November Doppler results.  - Lab results: borderline anemia, well-controlled glucose (A1C 5.9), LDL at goal (78).  - Stable condition post-aortic valve replacement.    HYPERTENSION AND HEART CONDITIONS:  - Continued irbesartan for blood pressure control.  - Continued metoprolol for blood pressure control.  - Continued aspirin 81 mg.    HEART FAILURE:  - Continued furosemide 20 mg and HCTZ 25 mg to be taken together in the morning, with HCTZ acting as a booster for Lasix.    EDEMA:  - Dragan to continue using compression socks for leg swelling management.    HYPERLIPIDEMIA:  - Continued atorvastatin 20 mg for cholesterol.    OBESITY AND SLEEP APNEA:  - Dragan to continue weight loss efforts and exercise regimen.  - Will attempt to obtain pre-authorization for Zebond through [facility] for obesity and sleep apnea management.    CARDIAC PACEMAKER:  - Follow up on scheduled date for pacemaker check with Chary.    FOLLOW-UP:  - Follow up in 6 months.          Resend Zepbound for Obesity ad LAVERNE  Continue asa lipitor lasix HCTZ irbesartan BB for PVD HTN HLD CHF aorta atherolsceorsis        This note was generated with the assistance of ambient listening technology. Verbal consent was obtained by the patient and accompanying visitor(s) for the recording of patient appointment to facilitate this note. I attest to having reviewed and edited the generated note for accuracy, though some syntax or spelling errors may persist. Please contact the author of this note for any clarification.            [1]   Social History  Tobacco Use    Smoking  status: Never    Smokeless tobacco: Former     Types: Snuff     Quit date: 2003    Tobacco comments:     quit 15 years ago    Substance Use Topics    Alcohol use: Yes     Comment: rarely    Drug use: No

## 2025-06-12 ENCOUNTER — HOSPITAL ENCOUNTER (OUTPATIENT)
Facility: HOSPITAL | Age: 60
Discharge: HOME | End: 2025-06-12
Attending: PHYSICAL MEDICINE & REHABILITATION | Admitting: PHYSICAL MEDICINE & REHABILITATION
Payer: COMMERCIAL

## 2025-06-12 VITALS
SYSTOLIC BLOOD PRESSURE: 120 MMHG | DIASTOLIC BLOOD PRESSURE: 59 MMHG | OXYGEN SATURATION: 96 % | BODY MASS INDEX: 41.75 KG/M2 | WEIGHT: 315 LBS | RESPIRATION RATE: 17 BRPM | HEART RATE: 61 BPM | HEIGHT: 73 IN | TEMPERATURE: 98 F

## 2025-06-12 DIAGNOSIS — M54.16 LUMBAR RADICULITIS: Primary | ICD-10-CM

## 2025-06-12 DIAGNOSIS — G89.4 CHRONIC PAIN SYNDROME: ICD-10-CM

## 2025-06-12 PROCEDURE — 25500020 PHARM REV CODE 255: Performed by: PHYSICAL MEDICINE & REHABILITATION

## 2025-06-12 PROCEDURE — 64483 NJX AA&/STRD TFRM EPI L/S 1: CPT | Mod: RT,,, | Performed by: PHYSICAL MEDICINE & REHABILITATION

## 2025-06-12 PROCEDURE — 64484 NJX AA&/STRD TFRM EPI L/S EA: CPT | Mod: RT,,, | Performed by: PHYSICAL MEDICINE & REHABILITATION

## 2025-06-12 PROCEDURE — 63600175 PHARM REV CODE 636 W HCPCS: Mod: JZ,TB | Performed by: PHYSICAL MEDICINE & REHABILITATION

## 2025-06-12 PROCEDURE — 64483 NJX AA&/STRD TFRM EPI L/S 1: CPT | Mod: RT | Performed by: PHYSICAL MEDICINE & REHABILITATION

## 2025-06-12 PROCEDURE — 64484 NJX AA&/STRD TFRM EPI L/S EA: CPT | Mod: RT | Performed by: PHYSICAL MEDICINE & REHABILITATION

## 2025-06-12 RX ORDER — MIDAZOLAM HYDROCHLORIDE 1 MG/ML
INJECTION INTRAMUSCULAR; INTRAVENOUS
Status: DISCONTINUED | OUTPATIENT
Start: 2025-06-12 | End: 2025-06-12 | Stop reason: HOSPADM

## 2025-06-12 RX ORDER — FENTANYL CITRATE 50 UG/ML
INJECTION, SOLUTION INTRAMUSCULAR; INTRAVENOUS
Status: DISCONTINUED | OUTPATIENT
Start: 2025-06-12 | End: 2025-06-12 | Stop reason: HOSPADM

## 2025-06-12 RX ORDER — BUPIVACAINE HYDROCHLORIDE 2.5 MG/ML
INJECTION, SOLUTION EPIDURAL; INFILTRATION; INTRACAUDAL; PERINEURAL
Status: DISCONTINUED | OUTPATIENT
Start: 2025-06-12 | End: 2025-06-12 | Stop reason: HOSPADM

## 2025-06-12 RX ORDER — METHYLPREDNISOLONE ACETATE 40 MG/ML
INJECTION, SUSPENSION INTRA-ARTICULAR; INTRALESIONAL; INTRAMUSCULAR; SOFT TISSUE
Status: DISCONTINUED | OUTPATIENT
Start: 2025-06-12 | End: 2025-06-12 | Stop reason: HOSPADM

## 2025-06-12 RX ORDER — ONDANSETRON HYDROCHLORIDE 2 MG/ML
4 INJECTION, SOLUTION INTRAVENOUS ONCE AS NEEDED
Status: DISCONTINUED | OUTPATIENT
Start: 2025-06-12 | End: 2025-06-12 | Stop reason: HOSPADM

## 2025-06-12 NOTE — OP NOTE
INFORMED CONSENT: The procedure, risks, benefits and options were discussed with patient. There are no contraindications to the procedure. The patient expressed understanding and agreed to proceed. The personnel performing the procedure was discussed.    06/12/2025    Surgeon: Galileo Gan MD    Assistants: None    Sedation: Conscious sedation provided by M.D    The patient was monitored with continuous pulse oximetry, EKG, and intermittent blood pressure monitors, immediately prior to administration of sedation.  The patient was hemodynamically stable throughout the entire process was responsive to voice, and breathing spontaneously.  Supplemental O2 was provided at 2L/min via nasal cannula.  Patient was comfortable for the duration of the procedure.     There was a total of 2mg IV Midazolam and 50mcg Fentanyl titrated for the procedure    Total sedation time was >10minutes and <20minutes      PROCEDURE:    1)  Right  L4-5 TRANSFORAMINAL EPIDURAL STEROID INJECTION    2)  Right  L5-S1 TRANSFORAMINAL EPIDURAL STEROID INJECTION    Pre Procedure diagnosis:    Right  L4 and L5  Leg pain, lateral, right [M79.604]  Lumbar radiculopathy [M54.16]    Post-Procedure diagnosis:   same    Complications: None    Specimens: None      DESCRIPTION OF PROCEDURE: The patient was brought to the procedure room. IV access was obtained prior to the procedure. The patient was positioned prone on the fluoroscopy table. Continuous hemodynamic monitoring was initiated including blood pressure, EKG, and pulse oximetry. . The skin was prepped with chlorhexidine and draped in a sterile fashion. Skin anesthesia was achieved using a total of 10mL of lidocaine, 5mL over each respective injection site.     The  L4-5 and L5-S1  transforaminal spaces were identified with fluoroscopy in the  AP, oblique, and lateral views.  A 22 gauge spinal quinke needle was then advanced into the area of the trans foraminal spaces at the above levels with  confirmation of proper needle position using AP, oblique, and lateral fluoroscopic views. Once the needle tip was in the area of the transforaminal space, and there was no blood, CSF or paraesthesias,  1 mL of Omnipaque 300mg/ml was injected on each side for a total of 2 mL.  Fluoroscopic imaging in the AP and lateral views revealed a clear outline of the spinal nerve with proximal spread of agent through the neural foramen into the epidural space. A total combination of 1 mL of Bupivicaine 0.25% and 40 mg depo medrol was injected into each level for a total of 4mL of injected medications with displacement of the contrast dye confirming that the medication went into the area of the transforaminal spaces at each level. A sterile dressing was applied.   Patient tolerated the procedure well.    Patient was taken back to the recovery room for further observation.     The patient was discharged to home in stable condition

## 2025-06-12 NOTE — DISCHARGE SUMMARY
Discharge Note  Short Stay      SUMMARY     Admit Date: 6/12/2025    Attending Physician: Galileo Gan MD        Discharge Physician: Galileo Gan MD        Discharge Date: 6/12/2025 7:24 AM    Procedure(s) (LRB):  Right L4-5 and L5-S1 TFESI (Right)    Final Diagnosis: Leg pain, lateral, right [M79.604]  Lumbar radiculopathy [M54.16]    Disposition: Home or self care    Patient Instructions:   Current Discharge Medication List        CONTINUE these medications which have NOT CHANGED    Details   aspirin (ECOTRIN) 81 MG EC tablet Take 81 mg by mouth once daily.      atorvastatin (LIPITOR) 20 MG tablet Take 1 tablet (20 mg total) by mouth every evening.  Qty: 90 tablet, Refills: 3    Associated Diagnoses: Aortic atherosclerosis; On statin therapy due to risk of future cardiovascular event; PVD (peripheral vascular disease)      doxycycline (MONODOX) 100 MG capsule Take bid with food. May cause upset stomach.  Qty: 180 capsule, Refills: 1    Associated Diagnoses: Rhinophyma; Rosacea      hydroCHLOROthiazide (HYDRODIURIL) 25 MG tablet Take 1 tablet (25 mg total) by mouth once daily.  Qty: 90 tablet, Refills: 3    Comments: .  Associated Diagnoses: Primary hypertension      irbesartan (AVAPRO) 300 MG tablet Take 1 tablet (300 mg total) by mouth every evening.  Qty: 90 tablet, Refills: 1    Comments: .  Associated Diagnoses: Primary hypertension      metoprolol succinate (TOPROL-XL) 50 MG 24 hr tablet Take 1 tablet (50 mg total) by mouth once daily.  Qty: 90 tablet, Refills: 3    Comments: .  Associated Diagnoses: Primary hypertension; Pulmonary hypertension      clindamycin (CLEOCIN T) 1 % external solution Apply topically 2 (two) times daily. For pustules/bumps in scalp  Qty: 60 mL, Refills: 11    Associated Diagnoses: Folliculitis      fluorouraciL (EFUDEX) 5 % cream AAA of the face, scalp and ears bid x 2 weeks  Qty: 40 g, Refills: 1    Associated Diagnoses: Actinic keratoses      furosemide (LASIX) 20 MG  tablet TAKE 1 TABLET BY MOUTH EVERY DAY  Qty: 90 tablet, Refills: 0    Associated Diagnoses: Chronic diastolic congestive heart failure; Primary hypertension; Pulmonary hypertension      mometasone (NASONEX) 50 mcg/actuation nasal spray INSTILL 2 SPRAYS INTO EACH NOSTRIL ONCE DAILY.  Qty: 1 each, Refills: 1      sodium chloride (OCEAN) 0.65 % nasal spray 1 spray by Nasal route as needed for Congestion.      tretinoin (RETIN-A) 0.05 % cream APPLY PEA SIZED AMOUNT TO ENTIRE FACE AT BEDTIME. IF DRYNESS,USE EVERY 3RD NIGHT AND INCREASE AS TOLERATED TO EVERY NIGHT  Qty: 20 g, Refills: 6    Associated Diagnoses: Acne vulgaris           STOP taking these medications       albuterol (PROVENTIL/VENTOLIN HFA) 90 mcg/actuation inhaler Comments:   Reason for Stopping:         clindamycin (CLEOCIN) 300 MG capsule Comments:   Reason for Stopping:         fluticasone furoate-vilanteroL (BREO ELLIPTA) 200-25 mcg/dose DsDv diskus inhaler Comments:   Reason for Stopping:         tirbanibulin (KLISYRI) 1 % OiPk Comments:   Reason for Stopping:         tirzepatide, weight loss, (ZEPBOUND) 2.5 mg/0.5 mL PnIj Comments:   Reason for Stopping:                   Discharge Diagnosis: Leg pain, lateral, right [M79.604]  Lumbar radiculopathy [M54.16]  Condition on Discharge: Stable with no complications to procedure   Diet on Discharge: Same as before.  Activity: as per instruction sheet.  Discharge to: Home with a responsible adult.  Follow up: 2-4 weeks       Please call the office at [phone] if you experience any weakness or loss of sensation, fever > 101.5, pain uncontrolled with oral medications, persistent nausea/vomiting/or diarrhea, redness or drainage from the incisions, or any other worrisome concerns. If physician on call was not reached or could not communicate with our office for any reason please go to the nearest emergency department

## 2025-06-12 NOTE — H&P
HPI  Patient presenting for Procedure(s) (LRB):  Right L4-5 and L5-S1 TFESI (Right)       No health changes since previous encounter    Past Medical History:   Diagnosis Date    Actinic keratoses 10/14/2019    Aortic valve stenosis 12/16/2019    CARDIAC CATH 06/28/22  - The pre-procedure left ventricular end diastolic pressure was 32.  - There was trivial (1+) aortic regurgitation.  - The estimated blood loss was none.  - There was diastolic dysfunction.  - The coronary arteries were normal.  - The filling pressures on the right and left were moderately elevated. Pulmonary hypertension was moderate.  - 53 MMHG GRADIENT ACROSS AORTIC VAKLV    Bilateral carpal tunnel syndrome 04/12/2021    Chronic bilateral low back pain without sciatica 04/12/2021    Depression     Morbid obesity with BMI of 40.0-44.9, adult 11/17/2017    LAVERNE (obstructive sleep apnea)     Prediabetes 04/12/2021    Seasonal allergic rhinitis due to pollen 10/14/2019    Ulnar neuropathy of both upper extremities 02/17/2020     Past Surgical History:   Procedure Laterality Date    A-V CARDIAC PACEMAKER INSERTION N/A 3/17/2023    Procedure: INSERTION, CARDIAC PACEMAKER, DUAL CHAMBER;  Surgeon: Royce Liao MD;  Location: [facility] EP LAB;  Service: Cardiology;  Laterality: N/A;  CHB, DUAL PPM, ANES, MDT, DM, CVICU [id number]    CARDIAC CATH COSURGEON N/A 3/16/2023    Procedure: Cardiac Cath Cosurgeon;  Surgeon: Mitchell Kingston MD;  Location: [facility] CATH LAB;  Service: Cardiovascular;  Laterality: N/A;    CATHETERIZATION OF BOTH LEFT AND RIGHT HEART N/A 6/28/2022    Procedure: CATHETERIZATION, HEART, BOTH LEFT AND RIGHT;  Surgeon: Carlotta Gordon MD;  Location: [facility] CATH LAB;  Service: Cardiology;  Laterality: N/A;    COLONOSCOPY N/A 5/31/2024    Procedure: COLONOSCOPY;  Surgeon: Renata Blue MD;  Location: [facility] ENDO;  Service: Endoscopy;  Laterality: N/A;    None      TRANSCATHETER AORTIC VALVE REPLACEMENT (TAVR) N/A 3/16/2023    Procedure:  "REPLACEMENT, AORTIC VALVE, TRANSCATHETER (TAVR);  Surgeon: Adan Greene MD;  Location: [facility] CATH LAB;  Service: Cardiology;  Laterality: N/A;     Review of patient's allergies indicates:   Allergen Reactions    Olmesartan Other (See Comments)     Numbness and tingling in fingers and knee joint pain    Chlorthalidone Other (See Comments)     Patient states it made him depressed.         Medications Ordered Prior to Encounter[1]     PMHx, PSHx, Allergies, Medications reviewed in epic    ROS negative except pain complaints in HPI    OBJECTIVE:    BP (!) 141/67 (BP Location: Right arm, Patient Position: Sitting)   Pulse 60   Temp 97.7 °F (36.5 °C) (Temporal)   Resp 17   Ht 6' 1" (1.854 m)   Wt (!) 154.2 kg (339 lb 15.2 oz)   SpO2 97%   BMI 44.85 kg/m²     PHYSICAL EXAMINATION:    GENERAL: Well appearing, in no acute distress, alert and oriented x3.  PSYCH:  Mood and affect appropriate.  SKIN: Skin color, texture, turgor normal, no rashes or lesions which will impact the procedure.  CV: RRR with palpation of the radial artery.  PULM: No evidence of respiratory difficulty, symmetric chest rise. Clear to auscultation.  NEURO: Cranial nerves grossly intact.    Plan:    Proceed with procedure as planned Procedure(s) (LRB):  Right L4-5 and L5-S1 TFESI (Right)    Galileo Gan MD  06/12/2025                 [1]   No current facility-administered medications on file prior to encounter.     Current Outpatient Medications on File Prior to Encounter   Medication Sig Dispense Refill    aspirin (ECOTRIN) 81 MG EC tablet Take 81 mg by mouth once daily.      atorvastatin (LIPITOR) 20 MG tablet Take 1 tablet (20 mg total) by mouth every evening. 90 tablet 3    doxycycline (MONODOX) 100 MG capsule Take bid with food. May cause upset stomach. 180 capsule 1    hydroCHLOROthiazide (HYDRODIURIL) 25 MG tablet Take 1 tablet (25 mg total) by mouth once daily. 90 tablet 3    irbesartan (AVAPRO) 300 MG tablet Take 1 tablet (300 " mg total) by mouth every evening. 90 tablet 1    metoprolol succinate (TOPROL-XL) 50 MG 24 hr tablet Take 1 tablet (50 mg total) by mouth once daily. 90 tablet 3    albuterol (PROVENTIL/VENTOLIN HFA) 90 mcg/actuation inhaler INHALE 2 PUFFS BY MOUTH INTO THE LUNGS EVERY 4 HOURS AS NEEDED FOR WHEEZING RESCUE (Patient not taking: Reported on 6/5/2025) 6.7 g 1    clindamycin (CLEOCIN T) 1 % external solution Apply topically 2 (two) times daily. For pustules/bumps in scalp 60 mL 11    clindamycin (CLEOCIN) 300 MG capsule Take 1 capsule (300 mg total) by mouth every 8 (eight) hours. (Patient not taking: Reported on 6/5/2025) 21 capsule 0    fluorouraciL (EFUDEX) 5 % cream AAA of the face, scalp and ears bid x 2 weeks (Patient not taking: Reported on 6/5/2025) 40 g 1    fluticasone furoate-vilanteroL (BREO ELLIPTA) 200-25 mcg/dose DsDv diskus inhaler Inhale 1 puff into the lungs once daily. Controller 60 each 11    furosemide (LASIX) 20 MG tablet TAKE 1 TABLET BY MOUTH EVERY DAY 90 tablet 0    mometasone (NASONEX) 50 mcg/actuation nasal spray INSTILL 2 SPRAYS INTO EACH NOSTRIL ONCE DAILY. 1 each 1    sodium chloride (OCEAN) 0.65 % nasal spray 1 spray by Nasal route as needed for Congestion.      tirbanibulin (KLISYRI) 1 % OiPk Apply one packet daily for 5 days. (Patient not taking: Reported on 6/5/2025) 5 packet 1    tretinoin (RETIN-A) 0.05 % cream APPLY PEA SIZED AMOUNT TO ENTIRE FACE AT BEDTIME. IF DRYNESS,USE EVERY 3RD NIGHT AND INCREASE AS TOLERATED TO EVERY NIGHT 20 g 6

## 2025-06-12 NOTE — DISCHARGE INSTRUCTIONS

## 2025-07-03 NOTE — PROGRESS NOTES
Chronic Pain Note     Referring Physician: No ref. provider found    PCP: GIL Barba MD    Chief Complaint:   Chief Complaint   Patient presents with    Leg Pain     right        SUBJECTIVE:  Interval History (7/21/2025):  Patient Dragan Man II presents today for follow-up visit.  Patient was last seen on 6/12/2025 Right L4/5 + L5/S1 TF RADHA with 85% relief. He is able to do more at work without pain during or afterwards. He still has some numbness in the right thigh but it is not as bad and not throbbing anymore. He rates his pain 3/10 today.  Patient denies night fever/night sweats, urinary incontinence, bowel incontinence, significant weight loss and significant motor weakness.   Patient denies any other complaints or concerns at this time.      5/7/2025  Dragan Man II is a 59 y.o. male who presents to the clinic for the evaluation of right lower extremity pain.  He was referred by Orthopedics for further evaluation and management of this pain.  He has a past medical history of carpal tunnel syndrome, ulnar neuropathy, asthma, hypertension, CHF, peripheral vascular disease, complete heart block, s/p pacemaker placement, s/p transcatheter aortic valve replacement, Von Willebrand's disease, iron-deficiency anemia, prediabetes, obesity, LAVERNE on CPAP, and multiple other medical comorbidities as listed in his chart.  The pain started about 1 year ago and symptoms have been worsening.The pain is located in the right right hip area and radiates to the right lower extremity to the calf.  The pain is described as sharp, stabbing, aching and is rated as 5/10. The pain is rated with a score of  4/10 on the BEST day and a score of 8/10 on the WORST day.  Symptoms interfere with daily activity. The pain is exacerbated by bending, lifting, prolonged standing.  The pain is mitigated by Tylenol.     Patient denies night fever/night sweats, urinary incontinence, bowel incontinence, significant weight loss,  significant motor weakness, and loss of sensations.    Pain Disability Index Review:         7/21/2025     2:20 PM   Last 3 PDI Scores   Pain Disability Index (PDI) 21       Non-Pharmacologic Treatments:  Physical Therapy/Home Exercise: yes  Ice/Heat:yes  TENS: no  Acupuncture: no  Massage: no  Chiropractic: no    Other: no      Pain Medications:  - Opioids:  Norco  - Adjuvant Medications:  Tylenol  - Anti-Coagulants: Aspirin     report:  Reviewed and consistent with medication use as prescribed.    Pain Procedures:   6/12/2025 Right L4/5 + L5/S1 TF RADHA with 85% relief.      Imaging:   MRI lumbar spine 03/12/2025:  Minimal grade 1 anterolisthesis of L4 on L5. No evidence of aggressive osseous lesion or acute compression deformity.  Conus medullaris tip is at L1. The vertebral bodies demonstrate normal height and signal intensity. Moderate disc height loss at L5-S1, mild at L4-5.     T12-L1: No significant thecal sac or foraminal stenosis.     L1-2: No significant thecal sac or foraminal stenosis.     L2-3: No significant thecal sac or foraminal stenosis.     L3-4: No significant thecal sac or foraminal stenosis.  Mild to moderate bilateral facet arthropathy and ligamentum flavum thickening.     L4-5: Uncovering of the disc.  Moderate concentric disc bulge and severe bilateral facet arthropathy and ligamentum flavum thickening contribute to severe spinal canal stenosis measuring approximately 6 mm in AP dimension.  Moderate right and mild left neuroforaminal narrowing     L5-S1: Moderate concentric disc bulge with superimposed left paracentral disc protrusion displaces the descending left S1 nerve root.  Pronounced epidural fat contributes to severe spinal canal stenosis.  Small posterior annular fissure.     Paraspinal soft tissues are unremarkable.    X-ray right femur 02/03/2025:  No acute fracture or dislocation. Os acetabula noted.     X-ray right knee 02/03/2025:  There is mild-to-moderate joint space  narrowing and minimal marginal osteophyte formation seen involving the medial compartment of the right knee. Mild degenerative change at the right patellofemoral compartment. No significant joint effusion. No acute fracture or dislocation.     Past Medical History:   Diagnosis Date    Actinic keratoses 10/14/2019    Aortic valve stenosis 12/16/2019    CARDIAC CATH 06/28/22  - The pre-procedure left ventricular end diastolic pressure was 32.  - There was trivial (1+) aortic regurgitation.  - The estimated blood loss was none.  - There was diastolic dysfunction.  - The coronary arteries were normal.  - The filling pressures on the right and left were moderately elevated. Pulmonary hypertension was moderate.  - 53 MMHG GRADIENT ACROSS AORTIC VAKLV    Bilateral carpal tunnel syndrome 04/12/2021    Chronic bilateral low back pain without sciatica 04/12/2021    Depression     Morbid obesity with BMI of 40.0-44.9, adult 11/17/2017    LAVERNE (obstructive sleep apnea)     Prediabetes 04/12/2021    Seasonal allergic rhinitis due to pollen 10/14/2019    Ulnar neuropathy of both upper extremities 02/17/2020     Past Surgical History:   Procedure Laterality Date    A-V CARDIAC PACEMAKER INSERTION N/A 3/17/2023    Procedure: INSERTION, CARDIAC PACEMAKER, DUAL CHAMBER;  Surgeon: Royce Liao MD;  Location: [facility] EP LAB;  Service: Cardiology;  Laterality: N/A;  CHB, DUAL PPM, ANES, MDT, DM, CVICU [id number]    CARDIAC CATH COSURGEON N/A 3/16/2023    Procedure: Cardiac Cath Cosurgeon;  Surgeon: Mitchell Kingston MD;  Location: [facility] CATH LAB;  Service: Cardiovascular;  Laterality: N/A;    CATHETERIZATION OF BOTH LEFT AND RIGHT HEART N/A 6/28/2022    Procedure: CATHETERIZATION, HEART, BOTH LEFT AND RIGHT;  Surgeon: Carlotta Gordon MD;  Location: [facility] CATH LAB;  Service: Cardiology;  Laterality: N/A;    COLONOSCOPY N/A 5/31/2024    Procedure: COLONOSCOPY;  Surgeon: Renata Blue MD;  Location: [facility] ENDO;  Service: Endoscopy;   Laterality: N/A;    EPIDURAL STEROID INJECTION INTO LUMBAR SPINE Right 6/12/2025    Procedure: Right L4-5 and L5-S1 TFESI;  Surgeon: Galileo Gan MD;  Location: [facility] PAIN MGT;  Service: Pain Management;  Laterality: Right;    None      TRANSCATHETER AORTIC VALVE REPLACEMENT (TAVR) N/A 3/16/2023    Procedure: REPLACEMENT, AORTIC VALVE, TRANSCATHETER (TAVR);  Surgeon: Adan Greene MD;  Location: [facility] CATH LAB;  Service: Cardiology;  Laterality: N/A;     Social History[1]  Family History   Problem Relation Name Age of Onset    Diabetes Father      Diabetes Paternal Grandfather      No Known Problems Mother         Review of patient's allergies indicates:   Allergen Reactions    Olmesartan Other (See Comments)     Numbness and tingling in fingers and knee joint pain    Chlorthalidone Other (See Comments)     Patient states it made him depressed.        Current Outpatient Medications   Medication Sig    albuterol (PROVENTIL/VENTOLIN HFA) 90 mcg/actuation inhaler Inhale 2 puffs into the lungs every 4 (four) hours as needed for Wheezing or Shortness of Breath.    aspirin (ECOTRIN) 81 MG EC tablet Take 81 mg by mouth once daily.    atorvastatin (LIPITOR) 20 MG tablet Take 1 tablet (20 mg total) by mouth every evening.    clindamycin (CLEOCIN T) 1 % external solution Apply topically 2 (two) times daily. For pustules/bumps in scalp    doxycycline (MONODOX) 100 MG capsule Take bid with food. May cause upset stomach.    felodipine (PLENDIL) 5 MG 24 hr tablet Take 1 tablet (5 mg total) by mouth once daily.    furosemide (LASIX) 20 MG tablet TAKE 1 TABLET BY MOUTH EVERY DAY    hydroCHLOROthiazide (HYDRODIURIL) 25 MG tablet Take 1 tablet (25 mg total) by mouth once daily.    irbesartan (AVAPRO) 300 MG tablet Take 1 tablet (300 mg total) by mouth every evening.    metoprolol succinate (TOPROL-XL) 50 MG 24 hr tablet Take 1 tablet (50 mg total) by mouth once daily.    mometasone (NASONEX) 50 mcg/actuation nasal spray  "INSTILL 2 SPRAYS INTO EACH NOSTRIL ONCE DAILY.    sodium chloride (OCEAN) 0.65 % nasal spray 1 spray by Nasal route as needed for Congestion.    tretinoin (RETIN-A) 0.05 % cream APPLY PEA SIZED AMOUNT TO ENTIRE FACE AT BEDTIME. IF DRYNESS,USE EVERY 3RD NIGHT AND INCREASE AS TOLERATED TO EVERY NIGHT    fluorouraciL (EFUDEX) 5 % cream AAA of the face, scalp and ears bid x 2 weeks (Patient not taking: Reported on 5/12/2025)     No current facility-administered medications for this visit.       Review of Systems     GENERAL:  No weight loss, malaise or fevers.  HEENT:   No recent changes in vision or hearing  NECK:  Negative for lumps, no difficulty with swallowing.  RESPIRATORY:  Negative for cough, wheezing or shortness of breath, patient denies any recent URI.  CARDIOVASCULAR:  Negative for chest pain, leg swelling or palpitations.  GI:  Negative for abdominal discomfort, blood in stools or black stools or change in bowel habits.  MUSCULOSKELETAL:  See HPI.  SKIN:  Negative for lesions, rash, and itching.  PSYCH:  No mood disorder or recent psychosocial stressors.  Patients sleep is not disturbed secondary to pain.  HEMATOLOGY/LYMPHOLOGY:  Negative for prolonged bleeding, bruising easily or swollen nodes.  Patient is currently taking anti-coagulants  NEURO:   No history of headaches, syncope, paralysis, seizures or tremors.  All other reviewed and negative other than HPI.    OBJECTIVE:    BP (!) 125/46 (BP Location: Right arm, Patient Position: Sitting)   Pulse 78   Resp 17   Ht 6' 1" (1.854 m)   Wt (!) 156.8 kg (345 lb 12.7 oz)   BMI 45.62 kg/m²         Physical Exam    GENERAL: Well appearing, in no acute distress, alert and oriented x3.  Obese.  PSYCH:  Mood and affect appropriate.  SKIN: Skin color, texture, turgor normal, no rashes or lesions.  HEAD/FACE:  Normocephalic, atraumatic. Cranial nerves grossly intact.  CV: RRR with palpation of the radial artery.  PULM: No evidence of respiratory difficulty, " symmetric chest rise.  GI:  Soft and non-tender.  BACK: Straight leg raising in the sitting and supine positions is positive to radicular pain on the right.  Minimal pain to palpation over the facet joints of the lumbar spine and lumbar paraspinals.  Limited range of motion secondary to pain reproduction. Directional preference:  Flexion  EXTREMITIES:No deformities, edema, or skin discoloration. Good capillary refill.  MUSCULOSKELETAL:  Hip and knee provocative maneuvers are unremarkable.  There is minimal pain with palpation over the sacroiliac joints bilaterally.  FABERs test is negative.  FADIRs test is negative.   Bilateral upper and lower extremity strength is normal and symmetric.  No atrophy or tone abnormalities are noted.  NEURO: Bilateral upper and lower extremity coordination and muscle stretch reflexes are physiologic and symmetric.  Plantar response are downgoing. No clonus.  No loss of sensation is noted.  GAIT: normal.      LABS:  Lab Results   Component Value Date    WBC 7.67 06/13/2024    HGB 12.4 (L) 06/13/2024    HCT 38.1 (L) 06/13/2024     (H) 06/13/2024     06/13/2024       CMP  Sodium   Date Value Ref Range Status   01/27/2025 137 136 - 145 mmol/L Final     Potassium   Date Value Ref Range Status   01/27/2025 4.6 3.5 - 5.1 mmol/L Final     Chloride   Date Value Ref Range Status   01/27/2025 102 95 - 110 mmol/L Final     CO2   Date Value Ref Range Status   01/27/2025 26 23 - 29 mmol/L Final     Glucose   Date Value Ref Range Status   01/27/2025 145 (H) 70 - 110 mg/dL Final     BUN   Date Value Ref Range Status   01/27/2025 17 8 - 23 mg/dL Final     Creatinine   Date Value Ref Range Status   01/27/2025 1.0 0.5 - 1.4 mg/dL Final     Calcium   Date Value Ref Range Status   01/27/2025 9.6 8.7 - 10.5 mg/dL Final     Total Protein   Date Value Ref Range Status   01/27/2025 7.6 6.0 - 8.4 g/dL Final     Albumin   Date Value Ref Range Status   01/27/2025 3.8 3.5 - 5.2 g/dL Final     Total  Bilirubin   Date Value Ref Range Status   01/27/2025 1.1 (H) 0.1 - 1.0 mg/dL Final     Comment:     For infants and newborns, interpretation of results should be based  on gestational age, weight and in agreement with clinical  observations.    Premature Infant recommended reference ranges:  Up to 24 hours.............<8.0 mg/dL  Up to 48 hours............<12.0 mg/dL  3-5 days..................<15.0 mg/dL  6-29 days.................<15.0 mg/dL       Alkaline Phosphatase   Date Value Ref Range Status   01/27/2025 86 40 - 150 U/L Final     AST   Date Value Ref Range Status   01/27/2025 23 10 - 40 U/L Final     ALT   Date Value Ref Range Status   01/27/2025 25 10 - 44 U/L Final     Anion Gap   Date Value Ref Range Status   01/27/2025 9 8 - 16 mmol/L Final     eGFR if    Date Value Ref Range Status   06/20/2022 >60.0 >60 mL/min/1.73 m^2 Final     eGFR if non    Date Value Ref Range Status   06/20/2022 >60.0 >60 mL/min/1.73 m^2 Final     Comment:     Calculation used to obtain the estimated glomerular filtration  rate (eGFR) is the CKD-EPI equation.          Lab Results   Component Value Date    HGBA1C 5.9 (H) 01/27/2025             ASSESSMENT: 59 y.o. year old male with right lower extremity pain, consistent with     1. Chronic pain syndrome        2. Lumbar radiculopathy        3. Bulging lumbar disc                PLAN:   - Interventions:none at this time    - Anticoagulation use: Patient is taking ASA as 2° prevention; no need to stop for lumbar RADHA    - Medications: I have stressed the importance of physical activity and a home exercise plan to help with pain and improve health. and Patient can continue with medications for now since they are providing benefits, using them appropriately, and without side effects.     We have discussed continuing judicious use of Tylenol, not to exceed 3000 mg daily to avoid acute hepatotoxicity.          - Therapy:  Advised patient continue with  activities as tolerated    - Psychological:  Discussed coping mechanisms to help address chronic pain issues    - Labs:  Reviewed    - Imaging: Reviewed available imaging with patient and answered any questions they had regarding study.    - Consults/Referrals:  None at this time    - Records:  Reviewed/Obtain old records from outside physicians and imaging    - Follow up visit: return to clinic 3-4 months/ prn    - Counseled patient regarding the importance of activity modification and physical therapy    - This condition does not require this patient to take time off of work, and the primary goal of our Pain Management services is to improve the patient's functional capacity.    - Patient Questions: Answered all of the patient's questions regarding diagnosis, therapy, and treatment        The above plan and management options were discussed at length with patient. Patient is in agreement with the above and verbalized understanding.    I discussed the goals of interventional chronic pain management with the patient on today's visit.  I explained the utility of injections for diagnostic and therapeutic purposes.  We discussed a multimodal approach to pain including treating the patient's given worst pain at any given time.  We will use a systematic approach to addressing pain.  We will also adopt a multimodal approach that includes injections, adjuvant medications, physical therapy, at times psychiatry.  There may be a limited role for opioid use intermittently in the treatment of pain, more particularly for acute pain although no one approach can be used as a sole treatment modality.    I emphasized the importance of regular exercise, core strengthening and stretching, diet and weight loss as a cornerstone of long-term pain management.      Jocelin Hand NP  Interventional Pain Management  [facility]    Disclaimer:  This note was prepared using voice recognition system and is likely to have sound  alike errors that may have been overlooked even after proof reading.  Please call me with any questions           [1]   Social History  Socioeconomic History    Marital status:    Tobacco Use    Smoking status: Never    Smokeless tobacco: Former     Types: Snuff     Quit date: 2003    Tobacco comments:     quit 15 years ago    Substance and Sexual Activity    Alcohol use: Yes     Comment: rarely    Drug use: No    Sexual activity: Yes     Partners: Female   Social History Narrative    No smokers in household, 3 dogs.     Social Drivers of Health     Financial Resource Strain: Low Risk  (6/4/2025)    Overall Financial Resource Strain (CARDIA)     Difficulty of Paying Living Expenses: Not hard at all   Food Insecurity: No Food Insecurity (6/4/2025)    Hunger Vital Sign     Worried About Running Out of Food in the Last Year: Never true     Ran Out of Food in the Last Year: Never true   Transportation Needs: No Transportation Needs (6/4/2025)    PRAPARE - Transportation     Lack of Transportation (Medical): No     Lack of Transportation (Non-Medical): No   Physical Activity: Insufficiently Active (6/4/2025)    Exercise Vital Sign     Days of Exercise per Week: 6 days     Minutes of Exercise per Session: 10 min   Stress: Stress Concern Present (6/4/2025)    [place] [employer] - Occupational Stress Questionnaire     Feeling of Stress : To some extent   Housing Stability: Low Risk  (6/4/2025)    Housing Stability Vital Sign     Unable to Pay for Housing in the Last Year: No     Homeless in the Last Year: No

## 2025-07-11 DIAGNOSIS — J45.30 MILD PERSISTENT ASTHMA WITHOUT COMPLICATION: Primary | Chronic | ICD-10-CM

## 2025-07-11 RX ORDER — ALBUTEROL SULFATE 90 UG/1
2 INHALANT RESPIRATORY (INHALATION) EVERY 4 HOURS PRN
Qty: 8.7 G | Refills: 1 | Status: SHIPPED | OUTPATIENT
Start: 2025-07-11

## 2025-07-21 ENCOUNTER — OFFICE VISIT (OUTPATIENT)
Dept: PAIN MEDICINE | Facility: CLINIC | Age: 60
End: 2025-07-21
Payer: COMMERCIAL

## 2025-07-21 VITALS
DIASTOLIC BLOOD PRESSURE: 61 MMHG | WEIGHT: 315 LBS | HEIGHT: 73 IN | BODY MASS INDEX: 41.75 KG/M2 | HEART RATE: 78 BPM | SYSTOLIC BLOOD PRESSURE: 113 MMHG | RESPIRATION RATE: 17 BRPM

## 2025-07-21 DIAGNOSIS — M51.369 BULGING LUMBAR DISC: ICD-10-CM

## 2025-07-21 DIAGNOSIS — G89.4 CHRONIC PAIN SYNDROME: Primary | ICD-10-CM

## 2025-07-21 DIAGNOSIS — M54.16 LUMBAR RADICULOPATHY: ICD-10-CM

## 2025-07-21 PROCEDURE — 3078F DIAST BP <80 MM HG: CPT | Mod: CPTII,S$GLB,, | Performed by: NURSE PRACTITIONER

## 2025-07-21 PROCEDURE — 3074F SYST BP LT 130 MM HG: CPT | Mod: CPTII,S$GLB,, | Performed by: NURSE PRACTITIONER

## 2025-07-21 PROCEDURE — 1159F MED LIST DOCD IN RCRD: CPT | Mod: CPTII,S$GLB,, | Performed by: NURSE PRACTITIONER

## 2025-07-21 PROCEDURE — G2211 COMPLEX E/M VISIT ADD ON: HCPCS | Mod: S$GLB,,, | Performed by: NURSE PRACTITIONER

## 2025-07-21 PROCEDURE — 4010F ACE/ARB THERAPY RXD/TAKEN: CPT | Mod: CPTII,S$GLB,, | Performed by: NURSE PRACTITIONER

## 2025-07-21 PROCEDURE — 3008F BODY MASS INDEX DOCD: CPT | Mod: CPTII,S$GLB,, | Performed by: NURSE PRACTITIONER

## 2025-07-21 PROCEDURE — 99213 OFFICE O/P EST LOW 20 MIN: CPT | Mod: S$GLB,,, | Performed by: NURSE PRACTITIONER

## 2025-07-21 PROCEDURE — 99999 PR PBB SHADOW E&M-EST. PATIENT-LVL III: CPT | Mod: PBBFAC,,, | Performed by: NURSE PRACTITIONER

## 2025-07-21 PROCEDURE — 3044F HG A1C LEVEL LT 7.0%: CPT | Mod: CPTII,S$GLB,, | Performed by: NURSE PRACTITIONER

## 2025-07-30 DIAGNOSIS — L71.1 RHINOPHYMA: ICD-10-CM

## 2025-07-30 DIAGNOSIS — L71.9 ROSACEA: ICD-10-CM

## 2025-08-04 DIAGNOSIS — I70.0 AORTIC ATHEROSCLEROSIS: Chronic | ICD-10-CM

## 2025-08-04 DIAGNOSIS — Z79.899 ON STATIN THERAPY DUE TO RISK OF FUTURE CARDIOVASCULAR EVENT: Chronic | ICD-10-CM

## 2025-08-04 DIAGNOSIS — Z12.5 SCREENING PSA (PROSTATE SPECIFIC ANTIGEN): Primary | ICD-10-CM

## 2025-08-04 DIAGNOSIS — I73.9 PVD (PERIPHERAL VASCULAR DISEASE): Chronic | ICD-10-CM

## 2025-08-04 NOTE — TELEPHONE ENCOUNTER
Care Due:                  Date            Visit Type   Department     Provider  --------------------------------------------------------------------------------                                EP -                              PRIMARY      [facility] INTERNAL  Last Visit: 08-      CARE ([facility])   MEDICINE       Josué Barba  Next Visit: None Scheduled  None         None Found                                                            Last  Test          Frequency    Reason                     Performed    Due Date  --------------------------------------------------------------------------------    Office Visit  15 months..  atorvastatin, felodipine,   08-   10-                             furosemide, irbesartan...    [employer] Embedded Care Due Messages. Reference number: [id number].   8/04/2025 12:21:11 AM CDT

## 2025-08-05 NOTE — TELEPHONE ENCOUNTER
Refill Routing Note   Medication(s) are not appropriate for processing by [facility] for the following reason(s):        Required labs outdated    [facility] action(s):  Defer   Requires appointment : Yes               Appointments  past 12m or future 3m with PCP    Date Provider   Last Visit   8/1/2024 GIL Barba MD   Next Visit   Visit date not found GIL Barba MD   ED visits in past 90 days: 0        Note composed:8:14 PM 08/04/2025

## 2025-08-06 DIAGNOSIS — L70.0 ACNE VULGARIS: ICD-10-CM

## 2025-08-06 RX ORDER — TRETINOIN 0.5 MG/G
CREAM TOPICAL
Qty: 20 G | Refills: 6 | Status: SHIPPED | OUTPATIENT
Start: 2025-08-06

## 2025-08-06 RX ORDER — DOXYCYCLINE 100 MG/1
CAPSULE ORAL
Qty: 180 CAPSULE | Refills: 0 | Status: SHIPPED | OUTPATIENT
Start: 2025-08-06

## 2025-08-07 ENCOUNTER — LAB VISIT (OUTPATIENT)
Dept: LAB | Facility: HOSPITAL | Age: 60
End: 2025-08-07
Attending: FAMILY MEDICINE
Payer: COMMERCIAL

## 2025-08-07 DIAGNOSIS — Z79.899 ON STATIN THERAPY DUE TO RISK OF FUTURE CARDIOVASCULAR EVENT: Chronic | ICD-10-CM

## 2025-08-07 DIAGNOSIS — Z12.5 SCREENING PSA (PROSTATE SPECIFIC ANTIGEN): ICD-10-CM

## 2025-08-07 LAB
CHOLEST SERPL-MCNC: 113 MG/DL (ref 120–199)
CHOLEST/HDLC SERPL: 3 {RATIO} (ref 2–5)
HDLC SERPL-MCNC: 38 MG/DL (ref 40–75)
HDLC SERPL: 33.6 % (ref 20–50)
LDLC SERPL CALC-MCNC: 63.6 MG/DL (ref 63–159)
NONHDLC SERPL-MCNC: 75 MG/DL
PSA SERPL-MCNC: 0.44 NG/ML
TRIGL SERPL-MCNC: 57 MG/DL (ref 30–150)

## 2025-08-07 PROCEDURE — 36415 COLL VENOUS BLD VENIPUNCTURE: CPT

## 2025-08-07 PROCEDURE — 84153 ASSAY OF PSA TOTAL: CPT

## 2025-08-07 PROCEDURE — 80061 LIPID PANEL: CPT

## 2025-08-07 RX ORDER — ATORVASTATIN CALCIUM 20 MG/1
20 TABLET, FILM COATED ORAL NIGHTLY
Qty: 90 TABLET | Refills: 0 | Status: SHIPPED | OUTPATIENT
Start: 2025-08-07

## 2025-08-07 NOTE — TELEPHONE ENCOUNTER
TO MY TEAM:   Please order the following by written order guidelines and help patient schedule:  Lipid Panel [LAB18]  PSA [[id number]]  Schedule F/U with me or Laxmi Brown PA-C.  Thanks!

## 2025-08-08 ENCOUNTER — TELEPHONE (OUTPATIENT)
Dept: DERMATOLOGY | Facility: CLINIC | Age: 60
End: 2025-08-08
Payer: COMMERCIAL

## 2025-08-08 NOTE — TELEPHONE ENCOUNTER
Called and notified pt of the info below: Will refill at patient request, please notify patient that this dose of medication could potentially eat away at the lining of the stomach (gastritis, epigastric).  Ideally, he should take the doxycycline once daily with food and drink a full glass of water.  He should also avoid any NSAID/pain reliever use as this may also irritate the lining of the stomach.   Pt verbalized understanding

## 2025-08-12 ENCOUNTER — OFFICE VISIT (OUTPATIENT)
Dept: INTERNAL MEDICINE | Facility: CLINIC | Age: 60
End: 2025-08-12
Payer: COMMERCIAL

## 2025-08-12 VITALS
HEIGHT: 73 IN | TEMPERATURE: 98 F | DIASTOLIC BLOOD PRESSURE: 62 MMHG | OXYGEN SATURATION: 97 % | HEART RATE: 77 BPM | WEIGHT: 315 LBS | BODY MASS INDEX: 41.75 KG/M2 | SYSTOLIC BLOOD PRESSURE: 124 MMHG

## 2025-08-12 DIAGNOSIS — I70.0 AORTIC ATHEROSCLEROSIS: Chronic | ICD-10-CM

## 2025-08-12 DIAGNOSIS — Z95.0 PACEMAKER: Chronic | ICD-10-CM

## 2025-08-12 DIAGNOSIS — Z95.3 S/P TAVR (TRANSCATHETER AORTIC VALVE REPLACEMENT): Chronic | ICD-10-CM

## 2025-08-12 DIAGNOSIS — I27.20 PULMONARY HYPERTENSION: Chronic | ICD-10-CM

## 2025-08-12 DIAGNOSIS — R73.03 PREDIABETES: Chronic | ICD-10-CM

## 2025-08-12 DIAGNOSIS — G47.33 OBSTRUCTIVE SLEEP APNEA TREATED WITH CONTINUOUS POSITIVE AIRWAY PRESSURE (CPAP): Chronic | ICD-10-CM

## 2025-08-12 DIAGNOSIS — Z79.899 ON STATIN THERAPY DUE TO RISK OF FUTURE CARDIOVASCULAR EVENT: Chronic | ICD-10-CM

## 2025-08-12 DIAGNOSIS — I73.9 PVD (PERIPHERAL VASCULAR DISEASE): Chronic | ICD-10-CM

## 2025-08-12 DIAGNOSIS — I10 PRIMARY HYPERTENSION: Primary | Chronic | ICD-10-CM

## 2025-08-12 DIAGNOSIS — I50.32 CHRONIC DIASTOLIC CONGESTIVE HEART FAILURE: Chronic | ICD-10-CM

## 2025-08-12 PROCEDURE — 3078F DIAST BP <80 MM HG: CPT | Mod: CPTII,S$GLB,, | Performed by: PHYSICIAN ASSISTANT

## 2025-08-12 PROCEDURE — 99999 PR PBB SHADOW E&M-EST. PATIENT-LVL IV: CPT | Mod: PBBFAC,,, | Performed by: PHYSICIAN ASSISTANT

## 2025-08-12 PROCEDURE — 1159F MED LIST DOCD IN RCRD: CPT | Mod: CPTII,S$GLB,, | Performed by: PHYSICIAN ASSISTANT

## 2025-08-12 PROCEDURE — 4010F ACE/ARB THERAPY RXD/TAKEN: CPT | Mod: CPTII,S$GLB,, | Performed by: PHYSICIAN ASSISTANT

## 2025-08-12 PROCEDURE — 3074F SYST BP LT 130 MM HG: CPT | Mod: CPTII,S$GLB,, | Performed by: PHYSICIAN ASSISTANT

## 2025-08-12 PROCEDURE — 3008F BODY MASS INDEX DOCD: CPT | Mod: CPTII,S$GLB,, | Performed by: PHYSICIAN ASSISTANT

## 2025-08-12 PROCEDURE — 3044F HG A1C LEVEL LT 7.0%: CPT | Mod: CPTII,S$GLB,, | Performed by: PHYSICIAN ASSISTANT

## 2025-08-12 PROCEDURE — 90471 IMMUNIZATION ADMIN: CPT | Mod: S$GLB,,, | Performed by: PHYSICIAN ASSISTANT

## 2025-08-12 PROCEDURE — 99214 OFFICE O/P EST MOD 30 MIN: CPT | Mod: 25,S$GLB,, | Performed by: PHYSICIAN ASSISTANT

## 2025-08-12 PROCEDURE — 90677 PCV20 VACCINE IM: CPT | Mod: S$GLB,,, | Performed by: PHYSICIAN ASSISTANT

## 2025-08-12 RX ORDER — ATORVASTATIN CALCIUM 20 MG/1
20 TABLET, FILM COATED ORAL NIGHTLY
Qty: 90 TABLET | Refills: 2 | Status: SHIPPED | OUTPATIENT
Start: 2025-08-12

## 2025-08-12 RX ORDER — IRBESARTAN 300 MG/1
300 TABLET ORAL NIGHTLY
Qty: 90 TABLET | Refills: 2 | Status: SHIPPED | OUTPATIENT
Start: 2025-08-12

## 2025-08-12 RX ORDER — HYDROCHLOROTHIAZIDE 25 MG/1
25 TABLET ORAL DAILY
Qty: 90 TABLET | Refills: 2 | Status: SHIPPED | OUTPATIENT
Start: 2025-08-12

## 2025-08-12 RX ORDER — METOPROLOL SUCCINATE 50 MG/1
50 TABLET, EXTENDED RELEASE ORAL DAILY
Qty: 90 TABLET | Refills: 2 | Status: SHIPPED | OUTPATIENT
Start: 2025-08-12

## 2025-08-12 RX ORDER — FUROSEMIDE 20 MG/1
20 TABLET ORAL DAILY
Qty: 90 TABLET | Refills: 2 | Status: SHIPPED | OUTPATIENT
Start: 2025-08-12

## 2025-08-12 RX ORDER — FELODIPINE 10 MG/1
10 TABLET, EXTENDED RELEASE ORAL DAILY
Qty: 90 TABLET | Refills: 2 | Status: SHIPPED | OUTPATIENT
Start: 2025-08-12 | End: 2026-08-12

## (undated) DEVICE — LINE 60IN PRESSURE MON.

## (undated) DEVICE — SPIKE CONTRAST CONTROLLER

## (undated) DEVICE — GUIDEWIRE CONFIDA BECKER CURVE

## (undated) DEVICE — GUIDEWIRE EMERALD 150CM PTFE

## (undated) DEVICE — CABLE PACING ALLGTR CLIP 12FT

## (undated) DEVICE — GUIDEWIRE SUPRA CORE 035 190CM

## (undated) DEVICE — CATH PIG145 INFINITI 5X110CM

## (undated) DEVICE — KIT MICROINTRO 4F .018X40X7CM

## (undated) DEVICE — CATH OPTITORQUE RADIAL 5FR

## (undated) DEVICE — GUIDEWIRE STF .035X260CM STR

## (undated) DEVICE — DRAPE ANGIO BRACH 38X44IN

## (undated) DEVICE — KIT PERCUTANEOUS SHEATH

## (undated) DEVICE — CATH IMPULSE D6F AL2 5PK

## (undated) DEVICE — SUT SILK 0 BLK BR CT-1 30IN

## (undated) DEVICE — CABLE PACER

## (undated) DEVICE — COVER TABLE 44X90 STERILE

## (undated) DEVICE — KIT GLIDESHEATH SLEND 6FR 10CM

## (undated) DEVICE — OMNIPAQUE 300MG 150ML VIAL

## (undated) DEVICE — CATH ALII 4FR INFINITY

## (undated) DEVICE — PAD DEFIB CADENCE ADULT R2

## (undated) DEVICE — FLEXSHEATH DRYSEAL 14FR 33CM

## (undated) DEVICE — INTRODUCER OPTISEAL 7FRX13CM

## (undated) DEVICE — GUIDEWIRE STF .035X180CM ANG

## (undated) DEVICE — ELECTRODE REM PLYHSV RETURN 9

## (undated) DEVICE — LEAD 5076-52 MRI US RCMCRD
Type: IMPLANTABLE DEVICE | Site: HEART | Status: NON-FUNCTIONAL
Brand: CAPSUREFIX NOVUS MRI™ SURESCAN®
Removed: 2023-03-17

## (undated) DEVICE — CATH DXTERITY PG145 110CM 6FR

## (undated) DEVICE — KIT STYLET 52CM

## (undated) DEVICE — SHEATH INTRODUCER 6FR 11CM

## (undated) DEVICE — KIT MANIFOLD LOW PRESS TUBING

## (undated) DEVICE — Device

## (undated) DEVICE — CATH MPA2 INFINITI 4FR 100CM

## (undated) DEVICE — KIT CUSTOM MANIFOLD

## (undated) DEVICE — SEE MEDLINE ITEM 157187

## (undated) DEVICE — PACK PACER PERMANENT OMC

## (undated) DEVICE — GUIDEWIRE X SPORT .014IN 190CM

## (undated) DEVICE — CATH 5FR BALLOON PT HEART PACE

## (undated) DEVICE — DEVICE PERCLOSE SUT CLSR 6FR

## (undated) DEVICE — CATH DXTERITY IMA 100CM 6FR

## (undated) DEVICE — GUIDEWIRE EMERALD .035IN 260CM

## (undated) DEVICE — OMNIPAQUE 350 200ML

## (undated) DEVICE — SHEATH GUID R2P STR 6FR 75CM

## (undated) DEVICE — SHEATH INTRODUCER 8FR 11CM

## (undated) DEVICE — DRAPE INCISE IOBAN 2 23X17IN

## (undated) DEVICE — CATH INFINITI MULTIPAK JR4 5FR

## (undated) DEVICE — GUIDEWIRE WHOLEY HI TORQ 175CM

## (undated) DEVICE — BAND TR WITH INFLATOR

## (undated) DEVICE — CATH IMA INFINITI 4FRX100CM

## (undated) DEVICE — KIT MNTR POLE MT DUL 12&48 MAC

## (undated) DEVICE — PACK CATH LAB CUSTOM BR

## (undated) DEVICE — CATH CV QD LUMN 6FRX110CM

## (undated) DEVICE — TRAY CATH LAB OMC

## (undated) DEVICE — DRESSING AQUACEL AG ADV 3.5X6

## (undated) DEVICE — WIRE GUIDE TEFLON 3CM .035 145

## (undated) DEVICE — NDL PERCUTANOUS 9CM X 18GA

## (undated) DEVICE — SHEATH SAFE ULTRA 7FR

## (undated) DEVICE — BLLN LOMA VISTA TRUE 20MM

## (undated) DEVICE — CATH AL1 5FR

## (undated) DEVICE — ADHESIVE DERMABOND ADVANCED

## (undated) DEVICE — ANGIOTOUCH KIT

## (undated) DEVICE — STOPCOCK 3-WAY